# Patient Record
Sex: FEMALE | Race: WHITE | NOT HISPANIC OR LATINO | Employment: OTHER | URBAN - METROPOLITAN AREA
[De-identification: names, ages, dates, MRNs, and addresses within clinical notes are randomized per-mention and may not be internally consistent; named-entity substitution may affect disease eponyms.]

---

## 2020-07-21 ENCOUNTER — OFFICE VISIT (OUTPATIENT)
Dept: INTERNAL MEDICINE | Facility: CLINIC | Age: 75
End: 2020-07-21
Payer: MEDICARE

## 2020-07-21 VITALS
HEART RATE: 62 BPM | WEIGHT: 117.5 LBS | BODY MASS INDEX: 25.35 KG/M2 | OXYGEN SATURATION: 97 % | SYSTOLIC BLOOD PRESSURE: 146 MMHG | HEIGHT: 57 IN | DIASTOLIC BLOOD PRESSURE: 78 MMHG

## 2020-07-21 DIAGNOSIS — N39.0 ACUTE UTI: ICD-10-CM

## 2020-07-21 DIAGNOSIS — K58.9 IRRITABLE BOWEL SYNDROME, UNSPECIFIED TYPE: ICD-10-CM

## 2020-07-21 DIAGNOSIS — K21.9 GASTROESOPHAGEAL REFLUX DISEASE, ESOPHAGITIS PRESENCE NOT SPECIFIED: ICD-10-CM

## 2020-07-21 DIAGNOSIS — R22.9 SKIN NODULE: ICD-10-CM

## 2020-07-21 DIAGNOSIS — F41.9 ANXIETY: ICD-10-CM

## 2020-07-21 DIAGNOSIS — I10 ESSENTIAL HYPERTENSION: Primary | ICD-10-CM

## 2020-07-21 PROBLEM — R73.01 IMPAIRED FASTING GLUCOSE: Status: ACTIVE | Noted: 2018-03-22

## 2020-07-21 PROBLEM — H90.3 BILATERAL SENSORINEURAL HEARING LOSS: Status: ACTIVE | Noted: 2019-12-09

## 2020-07-21 PROBLEM — M15.0 PRIMARY GENERALIZED (OSTEO)ARTHRITIS: Status: ACTIVE | Noted: 2018-03-22

## 2020-07-21 PROBLEM — K58.2 MIXED IRRITABLE BOWEL SYNDROME: Status: ACTIVE | Noted: 2018-03-22

## 2020-07-21 LAB
BACTERIA #/AREA URNS AUTO: NORMAL /HPF
BILIRUB UR QL STRIP: NEGATIVE
CLARITY UR REFRACT.AUTO: CLEAR
COLOR UR AUTO: YELLOW
GLUCOSE UR QL STRIP: NEGATIVE
HGB UR QL STRIP: ABNORMAL
KETONES UR QL STRIP: NEGATIVE
LEUKOCYTE ESTERASE UR QL STRIP: NEGATIVE
MICROSCOPIC COMMENT: NORMAL
NITRITE UR QL STRIP: NEGATIVE
PH UR STRIP: 7 [PH] (ref 5–8)
PROT UR QL STRIP: NEGATIVE
RBC #/AREA URNS AUTO: 2 /HPF (ref 0–4)
SP GR UR STRIP: 1 (ref 1–1.03)
SQUAMOUS #/AREA URNS AUTO: 0 /HPF
URN SPEC COLLECT METH UR: ABNORMAL

## 2020-07-21 PROCEDURE — 87086 URINE CULTURE/COLONY COUNT: CPT

## 2020-07-21 PROCEDURE — 99999 PR PBB SHADOW E&M-NEW PATIENT-LVL IV: ICD-10-PCS | Mod: PBBFAC,,, | Performed by: PHYSICIAN ASSISTANT

## 2020-07-21 PROCEDURE — 81001 URINALYSIS AUTO W/SCOPE: CPT

## 2020-07-21 PROCEDURE — 99203 PR OFFICE/OUTPT VISIT, NEW, LEVL III, 30-44 MIN: ICD-10-PCS | Mod: S$PBB,,, | Performed by: PHYSICIAN ASSISTANT

## 2020-07-21 PROCEDURE — 99999 PR PBB SHADOW E&M-NEW PATIENT-LVL IV: CPT | Mod: PBBFAC,,, | Performed by: PHYSICIAN ASSISTANT

## 2020-07-21 PROCEDURE — 99204 OFFICE O/P NEW MOD 45 MIN: CPT | Mod: PBBFAC | Performed by: PHYSICIAN ASSISTANT

## 2020-07-21 PROCEDURE — 99203 OFFICE O/P NEW LOW 30 MIN: CPT | Mod: S$PBB,,, | Performed by: PHYSICIAN ASSISTANT

## 2020-07-21 RX ORDER — LORAZEPAM 1 MG/1
1 TABLET ORAL NIGHTLY PRN
Qty: 30 TABLET | Refills: 0 | Status: SHIPPED | OUTPATIENT
Start: 2020-07-21 | End: 2020-08-10 | Stop reason: SDUPTHER

## 2020-07-21 RX ORDER — DICYCLOMINE HYDROCHLORIDE 10 MG/1
10 CAPSULE ORAL
COMMUNITY
End: 2020-07-21 | Stop reason: SDUPTHER

## 2020-07-21 RX ORDER — POTASSIUM CHLORIDE 750 MG/1
10 TABLET, EXTENDED RELEASE ORAL DAILY
COMMUNITY
End: 2020-07-21 | Stop reason: SDUPTHER

## 2020-07-21 RX ORDER — LOSARTAN POTASSIUM 100 MG/1
100 TABLET ORAL DAILY
COMMUNITY
End: 2020-07-21 | Stop reason: SDUPTHER

## 2020-07-21 RX ORDER — LORAZEPAM 1 MG/1
1 TABLET ORAL EVERY 6 HOURS PRN
COMMUNITY
End: 2020-07-21 | Stop reason: SDUPTHER

## 2020-07-21 RX ORDER — HYDROCHLOROTHIAZIDE 25 MG/1
25 TABLET ORAL DAILY
Qty: 90 TABLET | Refills: 3 | Status: SHIPPED | OUTPATIENT
Start: 2020-07-21 | End: 2021-09-13

## 2020-07-21 RX ORDER — ESOMEPRAZOLE MAGNESIUM 40 MG/1
40 CAPSULE, DELAYED RELEASE ORAL
COMMUNITY
Start: 2020-06-30 | End: 2020-07-21 | Stop reason: SDUPTHER

## 2020-07-21 RX ORDER — POTASSIUM CHLORIDE 750 MG/1
10 TABLET, EXTENDED RELEASE ORAL DAILY
Qty: 90 TABLET | Refills: 3 | Status: SHIPPED | OUTPATIENT
Start: 2020-07-21 | End: 2021-07-12

## 2020-07-21 RX ORDER — OXYBUTYNIN CHLORIDE 5 MG/1
5 TABLET ORAL 3 TIMES DAILY
COMMUNITY
End: 2020-07-21 | Stop reason: SDUPTHER

## 2020-07-21 RX ORDER — DICYCLOMINE HYDROCHLORIDE 10 MG/1
10 CAPSULE ORAL
Qty: 90 CAPSULE | Refills: 3 | Status: SHIPPED | OUTPATIENT
Start: 2020-07-21 | End: 2020-12-10 | Stop reason: SDUPTHER

## 2020-07-21 RX ORDER — LOSARTAN POTASSIUM 100 MG/1
100 TABLET ORAL DAILY
Qty: 90 TABLET | Refills: 3 | Status: SHIPPED | OUTPATIENT
Start: 2020-07-21 | End: 2021-09-22

## 2020-07-21 RX ORDER — METOPROLOL SUCCINATE 50 MG/1
50 TABLET, EXTENDED RELEASE ORAL DAILY
COMMUNITY
End: 2020-07-21 | Stop reason: SDUPTHER

## 2020-07-21 RX ORDER — HYDROCHLOROTHIAZIDE 25 MG/1
25 TABLET ORAL DAILY
COMMUNITY
End: 2020-07-21 | Stop reason: SDUPTHER

## 2020-07-21 RX ORDER — METOPROLOL SUCCINATE 50 MG/1
50 TABLET, EXTENDED RELEASE ORAL DAILY
Qty: 90 TABLET | Refills: 3 | Status: SHIPPED | OUTPATIENT
Start: 2020-07-21 | End: 2021-09-13

## 2020-07-21 RX ORDER — NITROFURANTOIN 25; 75 MG/1; MG/1
100 CAPSULE ORAL
COMMUNITY
Start: 2020-07-14 | End: 2020-07-21

## 2020-07-21 RX ORDER — OMEPRAZOLE 40 MG/1
40 CAPSULE, DELAYED RELEASE ORAL DAILY
COMMUNITY
End: 2020-07-21 | Stop reason: CLARIF

## 2020-07-21 RX ORDER — ESOMEPRAZOLE MAGNESIUM 40 MG/1
40 CAPSULE, DELAYED RELEASE ORAL
Qty: 90 CAPSULE | Refills: 3 | Status: SHIPPED | OUTPATIENT
Start: 2020-07-21 | End: 2020-07-31 | Stop reason: CLARIF

## 2020-07-21 RX ORDER — OXYBUTYNIN CHLORIDE 5 MG/1
5 TABLET ORAL 3 TIMES DAILY
Qty: 90 TABLET | Refills: 3 | Status: SHIPPED | OUTPATIENT
Start: 2020-07-21 | End: 2021-03-18 | Stop reason: DRUGHIGH

## 2020-07-21 NOTE — PROGRESS NOTES
"Subjective:       Patient ID: Juli Parra is a 74 y.o. female.    Chief Complaint: Establish Care    HPI       Here to establish care    Recently moved to here a few weeks ago to live with her son after the passing of her .     Prior PCP Dr. Alan Fleming in [place], [place], last seen 6/30/2020 with lab work. (see Care Everywhere for medical records)    Needs med refills today.     HTN: stable on current meds(Losartan, hctz, and metoprolol). No cp, sob, ha, vision changes or edema. Occ has intermittent vertigo(chronic, eval'd by ENT in the past)    IBS: controlled with diet and bentyl as needed.     Anxiety: Stable on lorazepam 1mg nightly prn, Stress and anxiety as increased some since the death of her . Occ tearful but feels she is coping "okay" Has great support from her family.     OAB: Takes oxybutynin. Recent ED visit about 4-5 days ago for UTI. Treated with Macrobid, has one day left. Feels urge and mild dysuria still present. Hx of kidney stones s/p lithotripsy several years ago. No flank pain.     GERD: stable with PPI as needed.     Notes 2 small nodules to right forehead, comes and goes, seen by her PCP in [place] and started on Valtrex for suspected shingles one month ago. Per chart review rcvd course of Keflex as well.     OA: Stable. Obtains periodic steroid injections of her knees.      Past Medical History:   Diagnosis Date    Anxiety     GERD (gastroesophageal reflux disease)     HTN (hypertension)     IBS (irritable bowel syndrome)     Kidney stones     OAB (overactive bladder)     Vertigo        Family History   Problem Relation Age of Onset    Leukemia Father     Cancer Brother         Pancreatic       Social History     Tobacco Use    Smoking status: Never Smoker   Substance Use Topics    Alcohol use: Not Currently     Frequency: Never    Drug use: Never     Review of patient's allergies indicates:   Allergen Reactions    Egg Nausea Only    Sulfur Rash "         Current Outpatient Medications:     dicyclomine (BENTYL) 10 MG capsule, Take 1 capsule (10 mg total) by mouth 4 (four) times daily before meals and nightly., Disp: 90 capsule, Rfl: 3    esomeprazole (NEXIUM) 40 MG capsule, Take 1 capsule (40 mg total) by mouth before breakfast., Disp: 90 capsule, Rfl: 3    hydroCHLOROthiazide (HYDRODIURIL) 25 MG tablet, Take 1 tablet (25 mg total) by mouth once daily., Disp: 90 tablet, Rfl: 3    LORazepam (ATIVAN) 1 MG tablet, Take 1 tablet (1 mg total) by mouth nightly as needed for Anxiety., Disp: 30 tablet, Rfl: 0    losartan (COZAAR) 100 MG tablet, Take 1 tablet (100 mg total) by mouth once daily., Disp: 90 tablet, Rfl: 3    metoprolol succinate (TOPROL-XL) 50 MG 24 hr tablet, Take 1 tablet (50 mg total) by mouth once daily., Disp: 90 tablet, Rfl: 3    nitrofurantoin, macrocrystal-monohydrate, (MACROBID) 100 MG capsule, Take 100 mg by mouth., Disp: , Rfl:     oxybutynin (DITROPAN) 5 MG Tab, Take 1 tablet (5 mg total) by mouth 3 (three) times daily., Disp: 90 tablet, Rfl: 3    potassium chloride SA (K-DUR,KLOR-CON) 10 MEQ tablet, Take 1 tablet (10 mEq total) by mouth Daily., Disp: 90 tablet, Rfl: 3  No current facility-administered medications for this visit.     Past Surgical History:   Procedure Laterality Date    CATARACT EXTRACTION, BILATERAL      CHOLECYSTECTOMY      HYSTERECTOMY      LITHOTRIPSY         Review of Systems   Constitutional: Negative for chills, fever and unexpected weight change.   Respiratory: Negative for cough and shortness of breath.    Cardiovascular: Negative for chest pain and leg swelling.   Gastrointestinal: Negative for abdominal pain, nausea and vomiting.   Genitourinary: Positive for frequency and urgency.   Musculoskeletal: Positive for arthralgias.   Integumentary:  Negative for rash.   Neurological: Positive for vertigo. Negative for weakness, light-headedness and headaches.   Psychiatric/Behavioral: Positive for sleep  "disturbance.        Grief         Objective: BP (!) 146/78 (BP Location: Right arm, Patient Position: Sitting, BP Method: Medium (Manual))   Pulse 62   Ht 4' 9" (1.448 m)   Wt 53.3 kg (117 lb 8.1 oz)   SpO2 97%   BMI 25.43 kg/m²         Physical Exam  Vitals signs reviewed.   Constitutional:       General: She is not in acute distress.     Appearance: Normal appearance. She is well-developed.   HENT:      Head: Normocephalic and atraumatic.      Right Ear: Tympanic membrane, ear canal and external ear normal.      Left Ear: Tympanic membrane, ear canal and external ear normal.      Mouth/Throat:      Mouth: Mucous membranes are moist.      Pharynx: Oropharynx is clear.   Eyes:      Extraocular Movements: Extraocular movements intact.      Pupils: Pupils are equal, round, and reactive to light.   Cardiovascular:      Rate and Rhythm: Normal rate and regular rhythm.      Heart sounds: No murmur. No friction rub.   Pulmonary:      Effort: Pulmonary effort is normal.      Breath sounds: Normal breath sounds. No wheezing or rales.   Abdominal:      General: Bowel sounds are normal.      Palpations: Abdomen is soft.      Tenderness: There is no abdominal tenderness.   Musculoskeletal:      Right lower leg: No edema.      Left lower leg: No edema.   Lymphadenopathy:      Cervical: No cervical adenopathy.   Skin:     General: Skin is warm and dry.      Findings: No rash.   Neurological:      Mental Status: She is alert.   Psychiatric:         Mood and Affect: Affect is tearful (occ when discussing recent passing of her ).         Behavior: Behavior normal.         Thought Content: Thought content normal.         Assessment:       1. Essential hypertension    2. Acute UTI    3. Skin nodule    4. Anxiety    5. Gastroesophageal reflux disease, esophagitis presence not specified    6. Irritable bowel syndrome, unspecified type        Plan:         Juli was seen today for establish care.    Diagnoses and all " orders for this visit:    Essential hypertension  Stable  Prior chronic meds refilled  Discussed BP goals  Recent lab reviewed via CareEverywhere  -     potassium chloride SA (K-DUR,KLOR-CON) 10 MEQ tablet; Take 1 tablet (10 mEq total) by mouth Daily.  -     losartan (COZAAR) 100 MG tablet; Take 1 tablet (100 mg total) by mouth once daily.  -     hydroCHLOROthiazide (HYDRODIURIL) 25 MG tablet; Take 1 tablet (25 mg total) by mouth once daily.  -     metoprolol succinate (TOPROL-XL) 50 MG 24 hr tablet; Take 1 tablet (50 mg total) by mouth once daily.    Acute UTI  Currently on macrobid  - reach UA as pt reports continued symptoms  -     URINALYSIS  -     Urine culture    Skin nodule  - We discussed Derm referral   - Pt declines at this time, will monitor     Anxiety  Stable on current meds  Courtesy refill provided  Discussed need to establish with MD for further refills.   -     LORazepam (ATIVAN) 1 MG tablet; Take 1 tablet (1 mg total) by mouth nightly as needed for Anxiety.    Gastroesophageal reflux disease, esophagitis presence not specified  Stable  -     esomeprazole (NEXIUM) 40 MG capsule; Take 1 capsule (40 mg total) by mouth before breakfast.    Irritable bowel syndrome, unspecified type  Stable  -     dicyclomine (BENTYL) 10 MG capsule; Take 1 capsule (10 mg total) by mouth 4 (four) times daily before meals and nightly.    Other orders  -     oxybutynin (DITROPAN) 5 MG Tab; Take 1 tablet (5 mg total) by mouth 3 (three) times daily.      Nita Clifton PA-C

## 2020-07-21 NOTE — PATIENT INSTRUCTIONS
SCHEDULE APPOINTMENT WITH MD TO FULLY ESTABLISH CARE.    MY SUPERVISING MD IS DR. OVIDIO MORGAN. YOU MAY CHOOSE ANY MD YOU DESIRE.     I WILL CALL YOU ABOUT URINE LAB RESULTS.

## 2020-07-22 LAB — BACTERIA UR CULT: NO GROWTH

## 2020-07-23 ENCOUNTER — TELEPHONE (OUTPATIENT)
Dept: INTERNAL MEDICINE | Facility: CLINIC | Age: 75
End: 2020-07-23

## 2020-07-31 ENCOUNTER — TELEPHONE (OUTPATIENT)
Dept: INTERNAL MEDICINE | Facility: CLINIC | Age: 75
End: 2020-07-31

## 2020-07-31 RX ORDER — OMEPRAZOLE 40 MG/1
40 CAPSULE, DELAYED RELEASE ORAL DAILY
Qty: 90 CAPSULE | Refills: 2 | Status: SHIPPED | OUTPATIENT
Start: 2020-07-31 | End: 2021-10-27

## 2020-07-31 NOTE — TELEPHONE ENCOUNTER
----- Message from Georgette Sosa sent at 7/31/2020  1:35 PM CDT -----  Contact: Nicole britton/lee   Pharmacy states that esomeprazole (NEXIUM) 40 MG capsule is not cover by insurance. Protoxic and prosec is covered. Please advise

## 2020-08-10 ENCOUNTER — OFFICE VISIT (OUTPATIENT)
Dept: INTERNAL MEDICINE | Facility: CLINIC | Age: 75
End: 2020-08-10
Payer: MEDICARE

## 2020-08-10 VITALS
DIASTOLIC BLOOD PRESSURE: 80 MMHG | HEART RATE: 81 BPM | OXYGEN SATURATION: 95 % | BODY MASS INDEX: 25.62 KG/M2 | SYSTOLIC BLOOD PRESSURE: 138 MMHG | WEIGHT: 118.38 LBS

## 2020-08-10 DIAGNOSIS — I10 HYPERTENSION, UNSPECIFIED TYPE: Primary | ICD-10-CM

## 2020-08-10 DIAGNOSIS — M19.049 HAND ARTHRITIS: ICD-10-CM

## 2020-08-10 DIAGNOSIS — R21 RASH: ICD-10-CM

## 2020-08-10 DIAGNOSIS — K21.9 GASTROESOPHAGEAL REFLUX DISEASE, ESOPHAGITIS PRESENCE NOT SPECIFIED: ICD-10-CM

## 2020-08-10 DIAGNOSIS — R73.01 IMPAIRED FASTING GLUCOSE: ICD-10-CM

## 2020-08-10 DIAGNOSIS — F41.9 ANXIETY: ICD-10-CM

## 2020-08-10 DIAGNOSIS — M17.11 ARTHRITIS OF RIGHT KNEE: ICD-10-CM

## 2020-08-10 DIAGNOSIS — L98.9 SKIN LESION: ICD-10-CM

## 2020-08-10 DIAGNOSIS — H90.3 BILATERAL SENSORINEURAL HEARING LOSS: ICD-10-CM

## 2020-08-10 DIAGNOSIS — R73.09 ELEVATED GLUCOSE: ICD-10-CM

## 2020-08-10 PROCEDURE — 99999 PR PBB SHADOW E&M-EST. PATIENT-LVL IV: CPT | Mod: PBBFAC,,, | Performed by: INTERNAL MEDICINE

## 2020-08-10 PROCEDURE — 99214 OFFICE O/P EST MOD 30 MIN: CPT | Mod: PBBFAC | Performed by: INTERNAL MEDICINE

## 2020-08-10 PROCEDURE — 99999 PR PBB SHADOW E&M-EST. PATIENT-LVL IV: ICD-10-PCS | Mod: PBBFAC,,, | Performed by: INTERNAL MEDICINE

## 2020-08-10 PROCEDURE — 99214 PR OFFICE/OUTPT VISIT, EST, LEVL IV, 30-39 MIN: ICD-10-PCS | Mod: S$PBB,,, | Performed by: INTERNAL MEDICINE

## 2020-08-10 PROCEDURE — 99214 OFFICE O/P EST MOD 30 MIN: CPT | Mod: S$PBB,,, | Performed by: INTERNAL MEDICINE

## 2020-08-10 RX ORDER — LORAZEPAM 1 MG/1
1 TABLET ORAL NIGHTLY PRN
Qty: 30 TABLET | Refills: 3 | Status: SHIPPED | OUTPATIENT
Start: 2020-08-10 | End: 2020-08-20

## 2020-08-10 RX ORDER — VALACYCLOVIR HYDROCHLORIDE 500 MG/1
500 TABLET, FILM COATED ORAL 2 TIMES DAILY
Qty: 10 TABLET | Refills: 0 | Status: SHIPPED | OUTPATIENT
Start: 2020-08-10 | End: 2021-10-27

## 2020-08-10 NOTE — PROGRESS NOTES
Subjective:       Patient ID: Juli Parra is a 74 y.o. female.    Chief Complaint: Establish Care    Patient into officially establish care with practice.  She was seen by my partner for hypertension.  She recently moved to our area after 2nd   and her son who lives here did not want her living alone.  Basically she is here for follow-up.  She will ultimately need an orthopedist.  She gets shots in her hands and knees.    I am able to pull up her outside labs in reviewed her A1c lipids and chemistry from .  We will repeat those later in the year.  The patient also states she was treated for possible shingles or cutaneous herpes above the left high.  She had a rash that responded to Valtrex but has come back recently then she wanted to tried again before seeing dermatology.    Review of Systems   Constitutional: Negative for appetite change, chills and fever.   HENT: Negative for nosebleeds and sore throat.    Eyes: Negative for visual disturbance.   Respiratory: Negative for cough, shortness of breath and wheezing.    Cardiovascular: Negative for chest pain and leg swelling.   Gastrointestinal: Negative for abdominal pain, constipation and diarrhea.   Genitourinary: Negative for difficulty urinating and hematuria.   Musculoskeletal: Positive for arthralgias and joint deformity (fingers). Negative for neck pain and neck stiffness.   Integumentary:  Negative for pallor and rash.   Neurological: Negative for headaches.   Psychiatric/Behavioral: Negative for dysphoric mood and suicidal ideas. The patient is not nervous/anxious.         Grief           Objective:      Physical Exam  Constitutional:       General: She is not in acute distress.     Appearance: She is well-developed.   HENT:      Head: Normocephalic and atraumatic.      Mouth/Throat:      Pharynx: No oropharyngeal exudate.   Eyes:      General: No scleral icterus.     Conjunctiva/sclera: Conjunctivae normal.      Pupils: Pupils are  equal, round, and reactive to light.   Neck:      Musculoskeletal: Normal range of motion and neck supple.      Thyroid: No thyromegaly.   Cardiovascular:      Rate and Rhythm: Normal rate and regular rhythm.      Heart sounds: No murmur.   Pulmonary:      Effort: Pulmonary effort is normal.      Breath sounds: Normal breath sounds. No wheezing.   Abdominal:      General: Bowel sounds are normal. There is no distension.      Palpations: Abdomen is soft.      Tenderness: There is no abdominal tenderness.   Musculoskeletal:         General: Deformity (finger joints) present. No tenderness.   Lymphadenopathy:      Cervical: No cervical adenopathy.   Skin:     Findings: No erythema or rash.   Neurological:      General: No focal deficit present.      Mental Status: She is alert and oriented to person, place, and time.   Psychiatric:         Mood and Affect: Mood normal.         Behavior: Behavior normal.         Assessment:       1. Hypertension, unspecified type    2. Anxiety    3. Bilateral sensorineural hearing loss    4. Impaired fasting glucose    5. Gastroesophageal reflux disease, esophagitis presence not specified    6. Skin lesion    7. Arthritis of right knee    8. Hand arthritis    9. Elevated glucose    10. Rash        Plan:       Juli was seen today for establish care.    Diagnoses and all orders for this visit:    Hypertension, unspecified type    Anxiety  -     LORazepam (ATIVAN) 1 MG tablet; Take 1 tablet (1 mg total) by mouth nightly as needed for Anxiety.    Bilateral sensorineural hearing loss    Impaired fasting glucose    Gastroesophageal reflux disease, esophagitis presence not specified    Skin lesion    Arthritis of right knee  -     Ambulatory referral/consult to Orthopedics; Future    Hand arthritis  -     Ambulatory referral/consult to Orthopedics; Future    Elevated glucose  -     Basic metabolic panel; Future  -     Hemoglobin A1C; Future    Rash    Other orders  -     valACYclovir  (VALTREX) 500 MG tablet; Take 1 tablet (500 mg total) by mouth 2 (two) times daily. for 5 days        follow-up with labs in a few months sooner p.r.n.

## 2020-08-18 ENCOUNTER — TELEPHONE (OUTPATIENT)
Dept: INTERNAL MEDICINE | Facility: CLINIC | Age: 75
End: 2020-08-18

## 2020-08-18 DIAGNOSIS — R22.9 SKIN NODULE: Primary | ICD-10-CM

## 2020-08-18 NOTE — TELEPHONE ENCOUNTER
----- Message from Jt Benavides sent at 8/18/2020  8:32 AM CDT -----  Regarding: Referral  Contact: Patient [phone]  Patient would like to get medical advice.    Comments: patient calling stating at last visit 7/21/20 stating would get a referral to see Dermatologist, still awaiting the referral, call to update.    Patient [phone]    Please call an advise  Thank you

## 2020-08-20 ENCOUNTER — TELEPHONE (OUTPATIENT)
Dept: INTERNAL MEDICINE | Facility: CLINIC | Age: 75
End: 2020-08-20

## 2020-08-20 DIAGNOSIS — F41.9 ANXIETY: ICD-10-CM

## 2020-08-20 RX ORDER — LORAZEPAM 2 MG/1
TABLET ORAL
Qty: 15 TABLET | Refills: 3 | Status: SHIPPED | OUTPATIENT
Start: 2020-08-20 | End: 2020-12-22 | Stop reason: SDUPTHER

## 2020-08-20 NOTE — TELEPHONE ENCOUNTER
----- Message from Kamille Bernal sent at 8/20/2020  8:59 AM CDT -----  Contact: [facility] [phone]  Prescription Alternative Needed:     The pharmacy needs alternative on the following RX:    LORazepam (ATIVAN) 1 MG tablet    Reason: Drug on backorder. Please send alternative. 2 mg is in stock and is scored.    Pharmacy: [id number] [address] [place] [address] [address]PY AT [facility]    Please advise.    Thank You      
No pertinent family history in first degree relatives

## 2020-09-22 ENCOUNTER — PATIENT OUTREACH (OUTPATIENT)
Dept: ADMINISTRATIVE | Facility: OTHER | Age: 75
End: 2020-09-22

## 2020-09-22 ENCOUNTER — OFFICE VISIT (OUTPATIENT)
Dept: DERMATOLOGY | Facility: CLINIC | Age: 75
End: 2020-09-22
Payer: MEDICARE

## 2020-09-22 VITALS — BODY MASS INDEX: 25.53 KG/M2 | WEIGHT: 118 LBS

## 2020-09-22 DIAGNOSIS — L82.1 SEBORRHEIC KERATOSES: ICD-10-CM

## 2020-09-22 DIAGNOSIS — L72.0 MILIUM: Primary | ICD-10-CM

## 2020-09-22 DIAGNOSIS — R22.9 SKIN NODULE: ICD-10-CM

## 2020-09-22 PROCEDURE — 99201 PR OFFICE/OUTPT VISIT,NEW,LEVL I: CPT | Mod: S$PBB,,, | Performed by: DERMATOLOGY

## 2020-09-22 PROCEDURE — 99999 PR PBB SHADOW E&M-EST. PATIENT-LVL III: ICD-10-PCS | Mod: PBBFAC,,, | Performed by: DERMATOLOGY

## 2020-09-22 PROCEDURE — 99999 PR PBB SHADOW E&M-EST. PATIENT-LVL III: CPT | Mod: PBBFAC,,, | Performed by: DERMATOLOGY

## 2020-09-22 PROCEDURE — 99201 PR OFFICE/OUTPT VISIT,NEW,LEVL I: ICD-10-PCS | Mod: S$PBB,,, | Performed by: DERMATOLOGY

## 2020-09-22 PROCEDURE — 99213 OFFICE O/P EST LOW 20 MIN: CPT | Mod: PBBFAC,PO | Performed by: DERMATOLOGY

## 2020-09-22 NOTE — PROGRESS NOTES
Subjective:       Patient ID:  Juli Parra is a 75 y.o. female who presents for   Chief Complaint   Patient presents with    Spot     face     Lesions on face for over a month not painful no tx.     Spot - Initial  Affected locations: face  Signs / symptoms: asymptomatic  Aggravated by: nothing  Relieving factors/Treatments tried: nothing        Review of Systems   Constitutional: Negative for fever, chills, weight loss, weight gain, fatigue, night sweats and malaise.   Skin: Negative for daily sunscreen use and activity-related sunscreen use.   Hematologic/Lymphatic: Does not bruise/bleed easily.        Objective:    Physical Exam   Constitutional: She appears well-developed and well-nourished. No distress.   Neurological: She is alert and oriented to person, place, and time. She is not disoriented.   Psychiatric: She has a normal mood and affect.   Skin:   Areas Examined (abnormalities noted in diagram):   Head / Face Inspection Performed  Neck Inspection Performed  Chest / Axilla Inspection Performed  RUE Inspected  LUE Inspection Performed                   Diagram Legend     Erythematous scaling macule/papule c/w actinic keratosis       Vascular papule c/w angioma      Pigmented verrucoid papule/plaque c/w seborrheic keratosis      Yellow umbilicated papule c/w sebaceous hyperplasia      Irregularly shaped tan macule c/w lentigo     1-2 mm smooth white papules consistent with Milia      Movable subcutaneous cyst with punctum c/w epidermal inclusion cyst      Subcutaneous movable cyst c/w pilar cyst      Firm pink to brown papule c/w dermatofibroma      Pedunculated fleshy papule(s) c/w skin tag(s)      Evenly pigmented macule c/w junctional nevus     Mildly variegated pigmented, slightly irregular-bordered macule c/w mildly atypical nevus      Flesh colored to evenly pigmented papule c/w intradermal nevus       Pink pearly papule/plaque c/w basal cell carcinoma      Erythematous hyperkeratotic  cursted plaque c/w SCC      Surgical scar with no sign of skin cancer recurrence      Open and closed comedones      Inflammatory papules and pustules      Verrucoid papule consistent consistent with wart     Erythematous eczematous patches and plaques     Dystrophic onycholytic nail with subungual debris c/w onychomycosis     Umbilicated papule    Erythematous-base heme-crusted tan verrucoid plaque consistent with inflamed seborrheic keratosis     Erythematous Silvery Scaling Plaque c/w Psoriasis     See annotation      Assessment / Plan:        Milium  Reassurance  Consider vi peel      Skin nodule  -     Ambulatory referral/consult to Dermatology    Seborrheic keratoses  Reassurance  .               Follow up if symptoms worsen or fail to improve.

## 2020-09-22 NOTE — LETTER
September 23, 2020      Nita Clifton PA-C  [address] [facility] [address]           [place] [facility] D[facility]atology  [address]  [place] [address]  Phone: [phone]  Fax: [phone]          Patient: Juli Parra   MR Number: [id number]   YOB: 1945   Date of Visit: 9/22/2020       Dear Nita Clifton:    Thank you for referring Juli aPrra to me for evaluation. Attached you will find relevant portions of my assessment and plan of care.    If you have questions, please do not hesitate to call me. I look forward to following Juli Parra along with you.    Sincerely,    Carolyn Renteria MD    Enclosure  CC:  No Recipients    If you would like to receive this communication electronically, please contact [email or web address] or [phone] to request more information on [employer] Link access.    For providers and/or their staff who would like to refer a patient to [facility], please contact us through our one-stop-shop provider referral line, [facility], at [phone].    If you feel you have received this communication in error or would no longer like to receive these types of communications, please e-mail [email or web address]

## 2020-10-12 DIAGNOSIS — M25.561 RIGHT KNEE PAIN, UNSPECIFIED CHRONICITY: Primary | ICD-10-CM

## 2020-10-13 ENCOUNTER — HOSPITAL ENCOUNTER (OUTPATIENT)
Dept: RADIOLOGY | Facility: HOSPITAL | Age: 75
Discharge: HOME | End: 2020-10-13
Attending: STUDENT IN AN ORGANIZED HEALTH CARE EDUCATION/TRAINING PROGRAM
Payer: MEDICARE

## 2020-10-13 ENCOUNTER — HOSPITAL ENCOUNTER (OUTPATIENT)
Dept: RADIOLOGY | Facility: HOSPITAL | Age: 75
Discharge: HOME | End: 2020-10-13
Attending: ORTHOPAEDIC SURGERY
Payer: MEDICARE

## 2020-10-13 ENCOUNTER — OFFICE VISIT (OUTPATIENT)
Dept: ORTHOPEDICS | Facility: CLINIC | Age: 75
End: 2020-10-13
Payer: MEDICARE

## 2020-10-13 VITALS — BODY MASS INDEX: 25.45 KG/M2 | WEIGHT: 117.94 LBS | HEIGHT: 57 IN

## 2020-10-13 DIAGNOSIS — M19.049 HAND ARTHRITIS: ICD-10-CM

## 2020-10-13 DIAGNOSIS — M25.561 RIGHT KNEE PAIN, UNSPECIFIED CHRONICITY: ICD-10-CM

## 2020-10-13 DIAGNOSIS — M17.11 ARTHRITIS OF RIGHT KNEE: ICD-10-CM

## 2020-10-13 PROCEDURE — 73562 XR KNEE ORTHO RIGHT: ICD-10-PCS | Mod: 26,RT,, | Performed by: RADIOLOGY

## 2020-10-13 PROCEDURE — 99214 OFFICE O/P EST MOD 30 MIN: CPT | Mod: PBBFAC,25 | Performed by: ORTHOPAEDIC SURGERY

## 2020-10-13 PROCEDURE — 73110 X-RAY EXAM OF WRIST: CPT | Mod: 26,RT,, | Performed by: RADIOLOGY

## 2020-10-13 PROCEDURE — 99999 PR PBB SHADOW E&M-EST. PATIENT-LVL IV: ICD-10-PCS | Mod: PBBFAC,,, | Performed by: ORTHOPAEDIC SURGERY

## 2020-10-13 PROCEDURE — 73562 X-RAY EXAM OF KNEE 3: CPT | Mod: TC,RT

## 2020-10-13 PROCEDURE — 73560 X-RAY EXAM OF KNEE 1 OR 2: CPT | Mod: 26,59,LT, | Performed by: RADIOLOGY

## 2020-10-13 PROCEDURE — 73560 XR KNEE ORTHO RIGHT: ICD-10-PCS | Mod: 26,59,LT, | Performed by: RADIOLOGY

## 2020-10-13 PROCEDURE — 99203 PR OFFICE/OUTPT VISIT, NEW, LEVL III, 30-44 MIN: ICD-10-PCS | Mod: 25,S$PBB,, | Performed by: ORTHOPAEDIC SURGERY

## 2020-10-13 PROCEDURE — 20610 DRAIN/INJ JOINT/BURSA W/O US: CPT | Mod: PBBFAC | Performed by: ORTHOPAEDIC SURGERY

## 2020-10-13 PROCEDURE — 73110 X-RAY EXAM OF WRIST: CPT | Mod: TC,RT

## 2020-10-13 PROCEDURE — 73110 XR WRIST COMPLETE 3 VIEWS RIGHT: ICD-10-PCS | Mod: 26,RT,, | Performed by: RADIOLOGY

## 2020-10-13 PROCEDURE — 99203 OFFICE O/P NEW LOW 30 MIN: CPT | Mod: 25,S$PBB,, | Performed by: ORTHOPAEDIC SURGERY

## 2020-10-13 PROCEDURE — 73562 X-RAY EXAM OF KNEE 3: CPT | Mod: 26,RT,, | Performed by: RADIOLOGY

## 2020-10-13 PROCEDURE — 20610 LARGE JOINT ASPIRATION/INJECTION: R KNEE: ICD-10-PCS | Mod: S$PBB,RT,, | Performed by: ORTHOPAEDIC SURGERY

## 2020-10-13 PROCEDURE — 99999 PR PBB SHADOW E&M-EST. PATIENT-LVL IV: CPT | Mod: PBBFAC,,, | Performed by: ORTHOPAEDIC SURGERY

## 2020-10-13 PROCEDURE — 73560 X-RAY EXAM OF KNEE 1 OR 2: CPT | Mod: TC,59,LT

## 2020-10-13 RX ADMIN — TRIAMCINOLONE ACETONIDE 40 MG: 40 INJECTION, SUSPENSION INTRA-ARTICULAR; INTRAMUSCULAR at 02:10

## 2020-10-13 NOTE — LETTER
October 15, 2020      Jefe Serrano MD  [address]  [facility] [address]           [facility] 5th Fl  [address]  [facility] [address]  Phone: [phone]          Patient: Juli Parra   MR Number: [id number]   YOB: 1945   Date of Visit: 10/13/2020       Dear Dr. Jefe Serrano:    Thank you for referring Juli Parra to me for evaluation. Attached you will find relevant portions of my assessment and plan of care.    If you have questions, please do not hesitate to call me. I look forward to following Juli Parra along with you.    Sincerely,    Joseph Yo MD    Enclosure  CC:  No Recipients    If you would like to receive this communication electronically, please contact [email or web address] or [phone] to request more information on [employer] Link access.    For providers and/or their staff who would like to refer a patient to [facility], please contact us through our one-stop-shop provider referral line, [facility], at [phone].    If you feel you have received this communication in error or would no longer like to receive these types of communications, please e-mail [email or web address]

## 2020-10-13 NOTE — PROGRESS NOTES
Subjective:          Chief Complaint: Juli Parra is a 75 y.o. female who c/o right knee pain and right wrist pain      HPI  Juli Parra is a 75 y.o. female with osteoporosis, right wrist/hand arthritis, and right knee arthritis who presents for evaluation of right knee and right wrist pain.  Patient reports that she just moved to [place] from [place] due to her  passing away.  She moved in with her son.  She reports that she has right wrist/hand arthritis and right knee arthritis for which she receives corticosteroid injections q3-4 months.  Her pain is managed with injections and p.r.n. ibuprofen and Tylenol.  She has had injections to her right hand for 6 years and right knee for 2 years.  Her last pair of injections was 4 months ago (6/6/2020). She does not use any braces.  She does not use an assistive device to ambulate.  She is not interested in surgery for either of these problems at this time.  She takes calcium and vitamin-D for osteoporosis.      Past Medical History:   Diagnosis Date    Anxiety     GERD (gastroesophageal reflux disease)     HTN (hypertension)     IBS (irritable bowel syndrome)     Kidney stones     OAB (overactive bladder)     Vertigo        Current Outpatient Medications on File Prior to Visit   Medication Sig Dispense Refill    dicyclomine (BENTYL) 10 MG capsule Take 1 capsule (10 mg total) by mouth 4 (four) times daily before meals and nightly. 90 capsule 3    hydroCHLOROthiazide (HYDRODIURIL) 25 MG tablet Take 1 tablet (25 mg total) by mouth once daily. 90 tablet 3    LORazepam (ATIVAN) 2 MG Tab 1/2 nightly prn 15 tablet 3    losartan (COZAAR) 100 MG tablet Take 1 tablet (100 mg total) by mouth once daily. 90 tablet 3    metoprolol succinate (TOPROL-XL) 50 MG 24 hr tablet Take 1 tablet (50 mg total) by mouth once daily. 90 tablet 3    omeprazole (PRILOSEC) 40 MG capsule Take 1 capsule (40 mg total) by mouth once daily. 90 capsule 2    oxybutynin  (DITROPAN) 5 MG Tab Take 1 tablet (5 mg total) by mouth 3 (three) times daily. 90 tablet 3    potassium chloride SA (K-DUR,KLOR-CON) 10 MEQ tablet Take 1 tablet (10 mEq total) by mouth Daily. 90 tablet 3    valACYclovir (VALTREX) 500 MG tablet Take 1 tablet (500 mg total) by mouth 2 (two) times daily. for 5 days 10 tablet 0     No current facility-administered medications on file prior to visit.        Past Surgical History:   Procedure Laterality Date    CATARACT EXTRACTION, BILATERAL      CHOLECYSTECTOMY      HYSTERECTOMY      LITHOTRIPSY         Family History   Problem Relation Age of Onset    Leukemia Father     Cancer Brother         Pancreatic       Social History     Socioeconomic History    Marital status:      Spouse name: Not on file    Number of children: Not on file    Years of education: Not on file    Highest education level: Not on file   Occupational History    Not on file   Social Needs    Financial resource strain: Not on file    Food insecurity     Worry: Not on file     Inability: Not on file    Transportation needs     Medical: Not on file     Non-medical: Not on file   Tobacco Use    Smoking status: Never Smoker    Smokeless tobacco: Never Used   Substance and Sexual Activity    Alcohol use: Not Currently     Frequency: Never    Drug use: Never    Sexual activity: Not Currently   Lifestyle    Physical activity     Days per week: Not on file     Minutes per session: Not on file    Stress: Not on file   Relationships    Social connections     Talks on phone: Not on file     Gets together: Not on file     Attends Taoism service: Not on file     Active member of club or organization: Not on file     Attends meetings of clubs or organizations: Not on file     Relationship status: Not on file   Other Topics Concern    Are you pregnant or think you may be? Not Asked    Breast-feeding Not Asked   Social History Narrative    Not on file     ROS  Constitutional:  negative for fevers  Eyes: negative visual changes  ENT: negative for hearing loss  Respiratory: negative for dyspnea  Cardiovascular: negative for chest pain  Gastrointestinal: negative for abdominal pain  Genitourinary: negative for dysuria  Neurological: negative for headaches  Behavioral/Psych: negative for hallucinations  Endocrine: negative for temperature intolerance              Objective:        General: Juli is well-developed, well-nourished, appears stated age, in no acute distress, alert and oriented to time, place and person.     General appearance: A&Ox3. NAD.   HEENT: Normocephalic, head atraumatic. Conjuntiva normal. EOMI. External appearance of nose, mouth, ears wnl.  Neck: Supple. Trachea midline.  Respiratory: Breathing unlabored on room air. Symmetric chest rise.   CV: Extremities warm and well perfused.  Neurologic: No focal motor or sensory deficits.   Psychatric: A&Ox3. Appropriate mood and affect.  Skin: Warm, dry, well perfused. No rash.        Ortho/SPM Exam  Right knee exam  Normal inspection  Nontender to palpation  Range of motion 0-120  Motor and sensory intact  DP pulse 2 +    Right hand exam  Tenderness to palpation over the 1st CMC joint  Grinding at the 1st CMC joint with ROM  Motor and sensory intact  Radial pulse 2+        Imaging:   Plain radiographs of right knee obtained today independently interpreted show tricompartmental arthritis with collapse and varus deformity. There are calcifications in the suprapatellar pouch.     Plain radiographs of the right wrist obtained in clinic today show severe arthritis of the 1st carpometacarpal joint. Subarticular cystic changes are seen in all the carpal bones    Assessment:     75-year-old female with osteoporosis with right knee pain due to severe osteoarthritis, and right 1st CMC joint pain due to severe osteoarthritis. These are responsive to corticosteroid injections q3-4 months and prn tylenol and ibuprofen. Patient is not  interested in surgery at this time.      Encounter Diagnoses   Name Primary?    Arthritis of right knee     Hand arthritis           Plan:       CSI right knee administered today   Referral to Orthopedic Hand placed for right 1st CMC arthritis   Continue prn tylenol and ibuprofen  FU prn for right knee pain                    Patient questionnaires may have been collected.

## 2020-10-15 PROBLEM — M17.11 ARTHRITIS OF RIGHT KNEE: Status: ACTIVE | Noted: 2020-10-15

## 2020-10-15 RX ORDER — TRIAMCINOLONE ACETONIDE 40 MG/ML
40 INJECTION, SUSPENSION INTRA-ARTICULAR; INTRAMUSCULAR
Status: DISCONTINUED | OUTPATIENT
Start: 2020-10-13 | End: 2020-10-15 | Stop reason: HOSPADM

## 2020-10-15 NOTE — PROCEDURES
Large Joint Aspiration/Injection: R knee    Date/Time: 10/13/2020 2:30 PM  Performed by: Joseph Yo MD  Authorized by: Joseph Yo MD     Consent Done?:  Yes (Verbal)  Indications:  Pain  Site marked: the procedure site was marked    Timeout: prior to procedure the correct patient, procedure, and site was verified    Prep: patient was prepped and draped in usual sterile fashion      Local anesthesia used?: Yes    Local anesthetic:  Bupivacaine 0.25% without epinephrine  Anesthetic total (ml):  5      Details:  Needle Size:  25 G  Ultrasonic Guidance for needle placement?: No    Approach:  Anterolateral  Location:  Knee  Site:  R knee  Medications:  40 mg triamcinolone acetonide 40 mg/mL  Patient tolerance:  Patient tolerated the procedure well with no immediate complications

## 2020-10-27 ENCOUNTER — OFFICE VISIT (OUTPATIENT)
Dept: ORTHOPEDICS | Facility: CLINIC | Age: 75
End: 2020-10-27
Payer: MEDICARE

## 2020-10-27 ENCOUNTER — PATIENT OUTREACH (OUTPATIENT)
Dept: ADMINISTRATIVE | Facility: OTHER | Age: 75
End: 2020-10-27
Payer: MEDICARE

## 2020-10-27 VITALS — HEIGHT: 57 IN | WEIGHT: 119.63 LBS | BODY MASS INDEX: 25.81 KG/M2

## 2020-10-27 DIAGNOSIS — M18.12 ARTHRITIS OF CARPOMETACARPAL (CMC) JOINT OF LEFT THUMB: Primary | ICD-10-CM

## 2020-10-27 DIAGNOSIS — M19.049 HAND ARTHRITIS: ICD-10-CM

## 2020-10-27 DIAGNOSIS — M18.11 ARTHRITIS OF CARPOMETACARPAL (CMC) JOINT OF RIGHT THUMB: ICD-10-CM

## 2020-10-27 PROCEDURE — 99999 PR PBB SHADOW E&M-EST. PATIENT-LVL III: ICD-10-PCS | Mod: PBBFAC,,, | Performed by: ORTHOPAEDIC SURGERY

## 2020-10-27 PROCEDURE — 20600 DRAIN/INJ JOINT/BURSA W/O US: CPT | Mod: PBBFAC | Performed by: ORTHOPAEDIC SURGERY

## 2020-10-27 PROCEDURE — 99203 PR OFFICE/OUTPT VISIT, NEW, LEVL III, 30-44 MIN: ICD-10-PCS | Mod: 25,S$PBB,, | Performed by: ORTHOPAEDIC SURGERY

## 2020-10-27 PROCEDURE — 99203 OFFICE O/P NEW LOW 30 MIN: CPT | Mod: 25,S$PBB,, | Performed by: ORTHOPAEDIC SURGERY

## 2020-10-27 PROCEDURE — 20600 SMALL JOINT ASPIRATION/INJECTION: ICD-10-PCS | Mod: 50,S$PBB,, | Performed by: ORTHOPAEDIC SURGERY

## 2020-10-27 PROCEDURE — 99213 OFFICE O/P EST LOW 20 MIN: CPT | Mod: PBBFAC | Performed by: ORTHOPAEDIC SURGERY

## 2020-10-27 PROCEDURE — 99999 PR PBB SHADOW E&M-EST. PATIENT-LVL III: CPT | Mod: PBBFAC,,, | Performed by: ORTHOPAEDIC SURGERY

## 2020-10-27 RX ORDER — TRIAMCINOLONE ACETONIDE 40 MG/ML
40 INJECTION, SUSPENSION INTRA-ARTICULAR; INTRAMUSCULAR
Status: DISCONTINUED | OUTPATIENT
Start: 2020-10-27 | End: 2020-10-27 | Stop reason: HOSPADM

## 2020-10-27 RX ADMIN — TRIAMCINOLONE ACETONIDE 40 MG: 40 INJECTION, SUSPENSION INTRA-ARTICULAR; INTRAMUSCULAR at 10:10

## 2020-10-27 NOTE — LETTER
October 27, 2020      Joseph Yo MD  [address]  [facility] [address]           [facility] 5th Fl  [address]TH [address]LO[address]  [facility] [address]  Phone: [phone]          Patient: Juli Parra   MR Number: [id number]   YOB: 1945   Date of Visit: 10/27/2020       Dear Dr. Joseph Yo:    Thank you for referring Juli Parra to me for evaluation. Attached you will find relevant portions of my assessment and plan of care.    If you have questions, please do not hesitate to call me. I look forward to following Juli Parra along with you.    Sincerely,    Manfred Pacheco MD    Enclosure  CC:  No Recipients    If you would like to receive this communication electronically, please contact [email or web address] or [phone] to request more information on [facility] access.    For providers and/or their staff who would like to refer a patient to [facility], please contact us through our one-stop-shop provider referral line, [facility], at [phone].    If you feel you have received this communication in error or would no longer like to receive these types of communications, please e-mail [email or web address]

## 2020-10-27 NOTE — PROCEDURES
Small Joint Aspiration/Injection    Date/Time: 10/27/2020 10:45 AM  Performed by: Manfred Pacheco MD  Authorized by: Manfred Pacheco MD     Consent Done?:  Yes (Verbal)  Indications:  Pain  Site marked: the procedure site was marked    Timeout: prior to procedure the correct patient, procedure, and site was verified    Prep: patient was prepped and draped in usual sterile fashion      Local anesthesia used?: Yes    Local anesthetic:  Topical anesthetic  Location:  Thumb (left thumb cmc)  Ultrasonic guidance for needle placement?: No    Needle size:  25 G  Approach:  Dorsal  Medications:  40 mg triamcinolone acetonide 40 mg/mL  Patient tolerance:  Patient tolerated the procedure well with no immediate complications

## 2020-10-27 NOTE — PROGRESS NOTES
[facility]  History & Physical  Orthopedics    SUBJECTIVE:      COVID-19 attestation:  This patient was treated during the COVID-19 pandemic.  This was discussed with the patient, they are aware of our current policies and procedures, were given the option of delaying their visit and or switching to a virtual visit, delaying their surgery when applicable, and they elect to proceed.    Chief Complaint:  Bilateral base of thumb pain    Referring Provider: Joseph Yo MD     History of Present Illness:  Patient is a 75 y.o. right hand dominant female who presents today with complaints of bilateral base of thumb pain.  Patient endorses that she has had bilateral base of thumb pain associated with 1st CMC arthritis that was being managed with yearly CMC joint CSIs by her hand orthopedic surgeon Dr. Lim in [place].  Given that the patient has moved to [place] to be closer to family the patient would like to proceed with another CSI of her 1st CMC joint injection of her bilateral hands.  Patient received a right 1st CMC joint CSI by her previous orthopedic surgeon in June of this year with good relief.  Is not interested in surgery.  Of note, patient did have bilateral carpal tunnel releases by her same surgeon in the 1990s with complete resolution of her symptoms.  Denies any weakness, numbness, tingling to her bilateral upper extremities.  Seeing Dr. Yo for her knee arthritis for which she received steroid injections for and gets good relief from them    The patient is a/an retiree.    Onset of symptoms/DOI was 2 years ago.    Symptoms are aggravated by activity and movement.    Symptoms are alleviated by rest.    Symptoms consist of pain, swelling and decreased ROM.    The patient rates their pain as a 4/10.    Attempted treatment(s) and/or interventions include rest, activity modification and anti-inflammatory medications.     The patient denies any fevers, chills, N/V, D/C and  presents for evaluation.       Past Medical History:   Diagnosis Date    Anxiety     GERD (gastroesophageal reflux disease)     HTN (hypertension)     IBS (irritable bowel syndrome)     Kidney stones     OAB (overactive bladder)     Vertigo      Past Surgical History:   Procedure Laterality Date    CATARACT EXTRACTION, BILATERAL      CHOLECYSTECTOMY      HYSTERECTOMY      LITHOTRIPSY       Review of patient's allergies indicates:   Allergen Reactions    Egg Nausea Only    Sulfur Rash     Social History     Social History Narrative    Not on file     Family History   Problem Relation Age of Onset    Leukemia Father     Cancer Brother         Pancreatic         Current Outpatient Medications:     dicyclomine (BENTYL) 10 MG capsule, Take 1 capsule (10 mg total) by mouth 4 (four) times daily before meals and nightly., Disp: 90 capsule, Rfl: 3    hydroCHLOROthiazide (HYDRODIURIL) 25 MG tablet, Take 1 tablet (25 mg total) by mouth once daily., Disp: 90 tablet, Rfl: 3    LORazepam (ATIVAN) 2 MG Tab, 1/2 nightly prn, Disp: 15 tablet, Rfl: 3    losartan (COZAAR) 100 MG tablet, Take 1 tablet (100 mg total) by mouth once daily., Disp: 90 tablet, Rfl: 3    metoprolol succinate (TOPROL-XL) 50 MG 24 hr tablet, Take 1 tablet (50 mg total) by mouth once daily., Disp: 90 tablet, Rfl: 3    oxybutynin (DITROPAN) 5 MG Tab, Take 1 tablet (5 mg total) by mouth 3 (three) times daily., Disp: 90 tablet, Rfl: 3    potassium chloride SA (K-DUR,KLOR-CON) 10 MEQ tablet, Take 1 tablet (10 mEq total) by mouth Daily., Disp: 90 tablet, Rfl: 3    omeprazole (PRILOSEC) 40 MG capsule, Take 1 capsule (40 mg total) by mouth once daily., Disp: 90 capsule, Rfl: 2    valACYclovir (VALTREX) 500 MG tablet, Take 1 tablet (500 mg total) by mouth 2 (two) times daily. for 5 days, Disp: 10 tablet, Rfl: 0      Review of Systems:  Constitutional: no fever or chills  Eyes: no visual changes  ENT: no nasal congestion or sore  "throat  Respiratory: no cough or shortness of breath  Cardiovascular: no chest pain  Gastrointestinal: no nausea or vomiting, tolerating diet  Musculoskeletal: arthralgias, myalgias and pain    OBJECTIVE:      Vital Signs (Most Recent):  Vitals:    10/27/20 1026   Weight: 54.2 kg (119 lb 9.6 oz)   Height: 4' 9" (1.448 m)     Body mass index is 25.88 kg/m².      Physical Exam:  Constitutional: The patient appears well-developed and well-nourished. No distress.   Head: Normocephalic and atraumatic.   Nose: Nose normal.   Eyes: Conjunctivae and EOM are normal.   Neck: No tracheal deviation present.   Cardiovascular: Normal rate and intact distal pulses.    Pulmonary/Chest: Effort normal. No respiratory distress.   Abdominal: There is no guarding.   Neurological: The patient is alert.   Psychiatric: The patient has a normal mood and affect.     Bilateral Hand/Wrist Examination:    Observation/Inspection:  Swelling  none    Deformity  Z deformity of bilateral thumb  Discoloration  none     Scars   healed bilateral carpal tunnel release scars    Atrophy  None  Diffuse Heberden's nodules throughout both hands    HAND/WRIST EXAMINATION:  Finkelstein's Test   Neg  WHAT Test    Neg  Snuff box tenderness   Neg  Dickens's Test    Neg  Hook of [place]ate Tenderness  Neg  CMC grind    Pos bilaterally  Circumduction test   Pos bilaterally     Neurovascular Exam:  Digits WWP, brisk CR < 3s throughout  NVI motor/LTS to M/R/U nerves, radial pulse 2+  Tinel's Test - Carpal Tunnel  Neg  Tinel's Test - Cubital Tunnel  Neg  Phalen's Test    Neg  Median Nerve Compression Test Neg    ROM hand/wrist/elbow full, painless.  Patient is able to make a full and composite fist without extensor lag.  Tenderness to palpation over the 1st CMC joint without associated swelling or erythema.  Flexors and extensors full and intact.  Sensation intact to light touch throughout.  Brisk capillary refill and palpable radial pulse.    RRR  CTAB  Abd S/NT/ND " +BS    Diagnostic Results:     Xray -  bilateral hands with diffuse osteoarthritis throughout the hand and phalanxes, which is significantly evident in the 1st CMC joints of her bilateral hands.  EMG - none    ASSESSMENT/PLAN:      Juli Parra is a 75 y.o. 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  Plan:  Treatment options discussed which include activity modification, bracing, NSAIDs, CSI, and CMC arthroplasty.  Patient would like to proceed with bilateral 1st CMC joint CSIs given her previous success in the past of relieving her symptoms with those treatments which I think is reasonable at this time.  Patient was educated that she is allowed to receive these injections every 3 months and if she does not get any relief would like to proceed with operative intervention she is allowed to proceed with surgery after 6 weeks after her last injection due to her the increased risk of infection.   1) B 1st CMCJ CSIs  2) RTC PRN        Manfred Pacheco M.D.     Please be aware that this note has been generated with the assistance of MModal voice-to-text.  Please excuse any spelling or grammatical errors.

## 2020-12-03 ENCOUNTER — LAB VISIT (OUTPATIENT)
Dept: LAB | Facility: HOSPITAL | Age: 75
End: 2020-12-03
Attending: INTERNAL MEDICINE
Payer: MEDICARE

## 2020-12-03 DIAGNOSIS — R73.09 ELEVATED GLUCOSE: ICD-10-CM

## 2020-12-03 LAB
ANION GAP SERPL CALC-SCNC: 8 MMOL/L (ref 8–16)
BUN SERPL-MCNC: 12 MG/DL (ref 8–23)
CALCIUM SERPL-MCNC: 9.1 MG/DL (ref 8.7–10.5)
CHLORIDE SERPL-SCNC: 100 MMOL/L (ref 95–110)
CO2 SERPL-SCNC: 28 MMOL/L (ref 23–29)
CREAT SERPL-MCNC: 0.7 MG/DL (ref 0.5–1.4)
EST. GFR  (AFRICAN AMERICAN): >60 ML/MIN/1.73 M^2
EST. GFR  (NON AFRICAN AMERICAN): >60 ML/MIN/1.73 M^2
GLUCOSE SERPL-MCNC: 98 MG/DL (ref 70–110)
POTASSIUM SERPL-SCNC: 3.9 MMOL/L (ref 3.5–5.1)
SODIUM SERPL-SCNC: 136 MMOL/L (ref 136–145)

## 2020-12-03 PROCEDURE — 83036 HEMOGLOBIN GLYCOSYLATED A1C: CPT

## 2020-12-03 PROCEDURE — 36415 COLL VENOUS BLD VENIPUNCTURE: CPT | Mod: PO

## 2020-12-03 PROCEDURE — 80048 BASIC METABOLIC PNL TOTAL CA: CPT

## 2020-12-04 LAB
ESTIMATED AVG GLUCOSE: 111 MG/DL (ref 68–131)
HBA1C MFR BLD HPLC: 5.5 % (ref 4–5.6)

## 2020-12-10 DIAGNOSIS — K58.9 IRRITABLE BOWEL SYNDROME, UNSPECIFIED TYPE: ICD-10-CM

## 2020-12-10 RX ORDER — DICYCLOMINE HYDROCHLORIDE 10 MG/1
10 CAPSULE ORAL
Qty: 90 CAPSULE | Refills: 3 | Status: SHIPPED | OUTPATIENT
Start: 2020-12-10 | End: 2021-05-10 | Stop reason: SDUPTHER

## 2020-12-10 NOTE — TELEPHONE ENCOUNTER
"----- Message from Kamille Bernal sent at 12/10/2020  9:01 AM CST -----  Contact: Patient [phone]  Requesting an RX refill or new RX.  Is this a refill or new RX: Refill  RX name and strength: dicyclomine (BENTYL) 10 MG capsule  Is this a 30 day or 90 day RX: 90  Pharmacy name and phone #:   [id number]  [address] AT [address]  [place] [address]  Phone: [phone] Fax: [phone]    Comments: Rx from July shows "print", please send to pharmacy.    Thank You      "

## 2020-12-10 NOTE — PROGRESS NOTES
Refill Routing Note   Medication(s) are not appropriate for processing by [facility] for the following reason(s):     - Outside of protocol  [facility] action(s):  Route        Medication reconciliation completed: No   Automatic Epic Generated Protocol Data:        Requested Prescriptions   Pending Prescriptions Disp Refills    dicyclomine (BENTYL) 10 MG capsule 90 capsule 3     Sig: Take 1 capsule (10 mg total) by mouth 4 (four) times daily before meals and nightly.       There is no refill protocol information for this order           Appointments  past 12m or future 3m with PCP    Date Provider   Last Visit   8/10/2020 Jefe Serrano MD   Next Visit   12/22/2020 Jefe Serrano MD   ED visits in past 90 days: 0        Note composed:12:44 PM 12/10/2020

## 2020-12-11 ENCOUNTER — TELEPHONE (OUTPATIENT)
Dept: INTERNAL MEDICINE | Facility: CLINIC | Age: 75
End: 2020-12-11

## 2020-12-11 NOTE — TELEPHONE ENCOUNTER
----- Message from Josie Ramos sent at 12/11/2020  8:54 AM CST -----  Pt states that pharmacy doesn't have medication dicyclomine (BENTYL) 10 MG capsule

## 2020-12-22 ENCOUNTER — OFFICE VISIT (OUTPATIENT)
Dept: INTERNAL MEDICINE | Facility: CLINIC | Age: 75
End: 2020-12-22
Payer: MEDICARE

## 2020-12-22 VITALS
HEART RATE: 66 BPM | SYSTOLIC BLOOD PRESSURE: 130 MMHG | WEIGHT: 118.63 LBS | OXYGEN SATURATION: 95 % | HEIGHT: 57 IN | DIASTOLIC BLOOD PRESSURE: 60 MMHG | BODY MASS INDEX: 25.59 KG/M2

## 2020-12-22 DIAGNOSIS — I10 HYPERTENSION, UNSPECIFIED TYPE: Primary | ICD-10-CM

## 2020-12-22 DIAGNOSIS — R73.01 IMPAIRED FASTING GLUCOSE: ICD-10-CM

## 2020-12-22 DIAGNOSIS — F41.9 ANXIETY: ICD-10-CM

## 2020-12-22 DIAGNOSIS — M19.90 ARTHRITIS: ICD-10-CM

## 2020-12-22 PROCEDURE — 99999 PR PBB SHADOW E&M-EST. PATIENT-LVL IV: CPT | Mod: PBBFAC,,, | Performed by: INTERNAL MEDICINE

## 2020-12-22 PROCEDURE — 99214 OFFICE O/P EST MOD 30 MIN: CPT | Mod: S$PBB,,, | Performed by: INTERNAL MEDICINE

## 2020-12-22 PROCEDURE — 99214 PR OFFICE/OUTPT VISIT, EST, LEVL IV, 30-39 MIN: ICD-10-PCS | Mod: S$PBB,,, | Performed by: INTERNAL MEDICINE

## 2020-12-22 PROCEDURE — 99999 PR PBB SHADOW E&M-EST. PATIENT-LVL IV: ICD-10-PCS | Mod: PBBFAC,,, | Performed by: INTERNAL MEDICINE

## 2020-12-22 PROCEDURE — 99214 OFFICE O/P EST MOD 30 MIN: CPT | Mod: PBBFAC | Performed by: INTERNAL MEDICINE

## 2020-12-22 RX ORDER — LORAZEPAM 2 MG/1
TABLET ORAL
Qty: 15 TABLET | Refills: 3 | Status: SHIPPED | OUTPATIENT
Start: 2020-12-22 | End: 2021-05-10 | Stop reason: SDUPTHER

## 2020-12-22 RX ORDER — DICLOFENAC SODIUM 25 MG/1
25 TABLET, DELAYED RELEASE ORAL 2 TIMES DAILY
Qty: 30 TABLET | Refills: 3 | Status: SHIPPED | OUTPATIENT
Start: 2020-12-22 | End: 2021-05-10

## 2020-12-22 NOTE — PROGRESS NOTES
Subjective:       Patient ID: Juli Parra is a 75 y.o. female.    Chief Complaint: Follow-up    Here to follow-up arthritis, hypertension, history of elevated glucose.  Chemistry and A1c were normal.  She would like to try diclofenac because she says her arthritis continues to act up and that helped in the past.  She would use it sparingly and we talked about side effects to the stomach in can knees.  She uses alprazolam sparingly and would like a new prescription.   reviewed.  No chest pain shortness of breath fevers or chills.    Review of Systems   Constitutional: Negative for appetite change, chills and fever.   HENT: Negative for nosebleeds and sore throat.    Eyes: Negative for pain and visual disturbance.   Respiratory: Negative for cough, shortness of breath and wheezing.    Cardiovascular: Negative for chest pain and leg swelling.   Gastrointestinal: Negative for abdominal pain, constipation and diarrhea.   Endocrine: Negative for polyuria.   Genitourinary: Negative for difficulty urinating, hematuria and vaginal bleeding.   Musculoskeletal: Positive for arthralgias and joint swelling. Negative for back pain, gait problem and neck pain.   Integumentary:  Negative for pallor and rash.   Neurological: Negative for tremors, seizures and headaches.   Hematological: Does not bruise/bleed easily.   Psychiatric/Behavioral: Negative for dysphoric mood. The patient is not nervous/anxious.          Objective:      Physical Exam  Constitutional:       General: She is not in acute distress.     Appearance: She is well-developed.   HENT:      Head: Normocephalic and atraumatic.      Right Ear: Tympanic membrane, ear canal and external ear normal.      Left Ear: Tympanic membrane, ear canal and external ear normal.      Mouth/Throat:      Pharynx: No oropharyngeal exudate or posterior oropharyngeal erythema.   Eyes:      General: No scleral icterus.     Conjunctiva/sclera: Conjunctivae normal.      Pupils:  Pupils are equal, round, and reactive to light.   Neck:      Musculoskeletal: Normal range of motion and neck supple.      Thyroid: No thyromegaly.   Cardiovascular:      Rate and Rhythm: Normal rate and regular rhythm.      Pulses: Normal pulses.      Heart sounds: No murmur.   Pulmonary:      Effort: Pulmonary effort is normal.      Breath sounds: Normal breath sounds. No wheezing.   Abdominal:      General: Bowel sounds are normal. There is no distension.      Palpations: Abdomen is soft.      Tenderness: There is no abdominal tenderness.   Musculoskeletal:         General: Swelling (Fingers knees) present. No tenderness.      Right lower leg: No edema.      Left lower leg: No edema.   Lymphadenopathy:      Cervical: No cervical adenopathy.   Skin:     Coloration: Skin is not jaundiced.      Findings: No rash.   Neurological:      General: No focal deficit present.      Mental Status: She is alert and oriented to person, place, and time.   Psychiatric:         Mood and Affect: Mood normal.         Behavior: Behavior normal.         Assessment:       1. Hypertension, unspecified type    2. Anxiety    3. Arthritis    4. Impaired fasting glucose        Plan:       Juli was seen today for follow-up.    Diagnoses and all orders for this visit:    Hypertension, unspecified type  -     Basic Metabolic Panel; Future    Anxiety  -     LORazepam (ATIVAN) 2 MG Tab; 1/2 nightly prn    Arthritis  -     Basic Metabolic Panel; Future    Impaired fasting glucose    Other orders  -     diclofenac (VOLTAREN) 25 MG TbEC; Take 1 tablet (25 mg total) by mouth 2 (two) times daily.

## 2021-01-17 ENCOUNTER — PATIENT OUTREACH (OUTPATIENT)
Dept: ADMINISTRATIVE | Facility: OTHER | Age: 76
End: 2021-01-17

## 2021-01-19 ENCOUNTER — OFFICE VISIT (OUTPATIENT)
Dept: ORTHOPEDICS | Facility: CLINIC | Age: 76
End: 2021-01-19
Payer: MEDICARE

## 2021-01-19 VITALS — WEIGHT: 120.69 LBS | HEIGHT: 57 IN | BODY MASS INDEX: 26.04 KG/M2

## 2021-01-19 DIAGNOSIS — M17.0 PRIMARY OSTEOARTHRITIS OF BOTH KNEES: Primary | ICD-10-CM

## 2021-01-19 PROCEDURE — 99213 OFFICE O/P EST LOW 20 MIN: CPT | Mod: PBBFAC | Performed by: ORTHOPAEDIC SURGERY

## 2021-01-19 PROCEDURE — 20610 DRAIN/INJ JOINT/BURSA W/O US: CPT | Mod: 50,PBBFAC | Performed by: ORTHOPAEDIC SURGERY

## 2021-01-19 PROCEDURE — 99999 PR PBB SHADOW E&M-EST. PATIENT-LVL III: CPT | Mod: PBBFAC,,, | Performed by: ORTHOPAEDIC SURGERY

## 2021-01-19 PROCEDURE — 20610 LARGE JOINT ASPIRATION/INJECTION: BILATERAL KNEE: ICD-10-PCS | Mod: 50,S$PBB,, | Performed by: ORTHOPAEDIC SURGERY

## 2021-01-19 PROCEDURE — 99499 NO LOS: ICD-10-PCS | Mod: S$PBB,,, | Performed by: ORTHOPAEDIC SURGERY

## 2021-01-19 PROCEDURE — 99499 UNLISTED E&M SERVICE: CPT | Mod: S$PBB,,, | Performed by: ORTHOPAEDIC SURGERY

## 2021-01-19 PROCEDURE — 99999 PR PBB SHADOW E&M-EST. PATIENT-LVL III: ICD-10-PCS | Mod: PBBFAC,,, | Performed by: ORTHOPAEDIC SURGERY

## 2021-01-19 RX ADMIN — TRIAMCINOLONE ACETONIDE 40 MG: 40 INJECTION, SUSPENSION INTRA-ARTICULAR; INTRAMUSCULAR at 11:01

## 2021-01-22 RX ORDER — TRIAMCINOLONE ACETONIDE 40 MG/ML
40 INJECTION, SUSPENSION INTRA-ARTICULAR; INTRAMUSCULAR
Status: DISCONTINUED | OUTPATIENT
Start: 2021-01-19 | End: 2021-01-22 | Stop reason: HOSPADM

## 2021-03-01 ENCOUNTER — OFFICE VISIT (OUTPATIENT)
Dept: URGENT CARE | Facility: CLINIC | Age: 76
End: 2021-03-01
Payer: MEDICARE

## 2021-03-01 VITALS
BODY MASS INDEX: 25.89 KG/M2 | RESPIRATION RATE: 12 BRPM | HEART RATE: 82 BPM | DIASTOLIC BLOOD PRESSURE: 66 MMHG | OXYGEN SATURATION: 96 % | HEIGHT: 57 IN | SYSTOLIC BLOOD PRESSURE: 170 MMHG | WEIGHT: 120 LBS | TEMPERATURE: 98 F

## 2021-03-01 DIAGNOSIS — R30.0 DYSURIA: ICD-10-CM

## 2021-03-01 DIAGNOSIS — N30.00 ACUTE CYSTITIS WITHOUT HEMATURIA: Primary | ICD-10-CM

## 2021-03-01 LAB
BILIRUB UR QL STRIP: NEGATIVE
GLUCOSE UR QL STRIP: NEGATIVE
KETONES UR QL STRIP: NEGATIVE
LEUKOCYTE ESTERASE UR QL STRIP: POSITIVE
PH, POC UA: 5.5 (ref 5–8)
POC BLOOD, URINE: NEGATIVE
POC NITRATES, URINE: NEGATIVE
PROT UR QL STRIP: NEGATIVE
SP GR UR STRIP: 1.01 (ref 1–1.03)
UROBILINOGEN UR STRIP-ACNC: NORMAL (ref 0.1–1.1)

## 2021-03-01 PROCEDURE — 81003 URINALYSIS AUTO W/O SCOPE: CPT | Mod: QW,S$GLB,, | Performed by: PHYSICIAN ASSISTANT

## 2021-03-01 PROCEDURE — 81003 POCT URINALYSIS, DIPSTICK, AUTOMATED, W/O SCOPE: ICD-10-PCS | Mod: QW,S$GLB,, | Performed by: PHYSICIAN ASSISTANT

## 2021-03-01 PROCEDURE — 99214 OFFICE O/P EST MOD 30 MIN: CPT | Mod: 25,S$GLB,, | Performed by: PHYSICIAN ASSISTANT

## 2021-03-01 PROCEDURE — 99214 PR OFFICE/OUTPT VISIT, EST, LEVL IV, 30-39 MIN: ICD-10-PCS | Mod: 25,S$GLB,, | Performed by: PHYSICIAN ASSISTANT

## 2021-03-01 RX ORDER — NITROFURANTOIN 25; 75 MG/1; MG/1
100 CAPSULE ORAL 2 TIMES DAILY
Qty: 10 CAPSULE | Refills: 0 | Status: SHIPPED | OUTPATIENT
Start: 2021-03-01 | End: 2021-03-06

## 2021-03-05 ENCOUNTER — TELEPHONE (OUTPATIENT)
Dept: URGENT CARE | Facility: CLINIC | Age: 76
End: 2021-03-05

## 2021-03-05 LAB
BACTERIA UR CULT: ABNORMAL
BACTERIA UR CULT: ABNORMAL
OTHER ANTIBIOTIC SUSC ISLT: ABNORMAL

## 2021-03-05 RX ORDER — NITROFURANTOIN 25; 75 MG/1; MG/1
100 CAPSULE ORAL 2 TIMES DAILY
Qty: 4 CAPSULE | Refills: 0 | Status: SHIPPED | OUTPATIENT
Start: 2021-03-05 | End: 2021-03-07

## 2021-03-11 ENCOUNTER — OFFICE VISIT (OUTPATIENT)
Dept: INTERNAL MEDICINE | Facility: CLINIC | Age: 76
End: 2021-03-11
Payer: MEDICARE

## 2021-03-11 VITALS
WEIGHT: 121.69 LBS | OXYGEN SATURATION: 99 % | HEIGHT: 57 IN | HEART RATE: 67 BPM | BODY MASS INDEX: 26.25 KG/M2 | SYSTOLIC BLOOD PRESSURE: 132 MMHG | DIASTOLIC BLOOD PRESSURE: 88 MMHG

## 2021-03-11 DIAGNOSIS — N30.10 INTERSTITIAL CYSTITIS: ICD-10-CM

## 2021-03-11 DIAGNOSIS — R39.9 UTI SYMPTOMS: Primary | ICD-10-CM

## 2021-03-11 DIAGNOSIS — N32.81 OAB (OVERACTIVE BLADDER): ICD-10-CM

## 2021-03-11 DIAGNOSIS — Z87.442 HISTORY OF NEPHROLITHIASIS: ICD-10-CM

## 2021-03-11 LAB
BILIRUB SERPL-MCNC: NEGATIVE MG/DL
BLOOD, POC UA: NEGATIVE
GLUCOSE UR QL STRIP: NEGATIVE
KETONES UR QL STRIP: NEGATIVE
LEUKOCYTE ESTERASE URINE, POC: NORMAL
NITRITE, POC UA: NEGATIVE
PH, POC UA: 7
PROTEIN, POC: NEGATIVE
SPECIFIC GRAVITY, POC UA: 1
UROBILINOGEN, POC UA: NORMAL

## 2021-03-11 PROCEDURE — 81003 URINALYSIS AUTO W/O SCOPE: CPT | Mod: PBBFAC | Performed by: PHYSICIAN ASSISTANT

## 2021-03-11 PROCEDURE — 99213 OFFICE O/P EST LOW 20 MIN: CPT | Mod: S$PBB,,, | Performed by: PHYSICIAN ASSISTANT

## 2021-03-11 PROCEDURE — 99214 OFFICE O/P EST MOD 30 MIN: CPT | Mod: PBBFAC | Performed by: PHYSICIAN ASSISTANT

## 2021-03-11 PROCEDURE — 87086 URINE CULTURE/COLONY COUNT: CPT | Performed by: PHYSICIAN ASSISTANT

## 2021-03-11 PROCEDURE — 99213 PR OFFICE/OUTPT VISIT, EST, LEVL III, 20-29 MIN: ICD-10-PCS | Mod: S$PBB,,, | Performed by: PHYSICIAN ASSISTANT

## 2021-03-11 PROCEDURE — 99999 PR PBB SHADOW E&M-EST. PATIENT-LVL IV: ICD-10-PCS | Mod: PBBFAC,,, | Performed by: PHYSICIAN ASSISTANT

## 2021-03-11 PROCEDURE — 99999 PR PBB SHADOW E&M-EST. PATIENT-LVL IV: CPT | Mod: PBBFAC,,, | Performed by: PHYSICIAN ASSISTANT

## 2021-03-11 RX ORDER — NITROFURANTOIN 25; 75 MG/1; MG/1
100 CAPSULE ORAL 2 TIMES DAILY
COMMUNITY
End: 2021-04-29 | Stop reason: ALTCHOICE

## 2021-03-13 LAB — BACTERIA UR CULT: NO GROWTH

## 2021-03-14 ENCOUNTER — PATIENT OUTREACH (OUTPATIENT)
Dept: ADMINISTRATIVE | Facility: OTHER | Age: 76
End: 2021-03-14

## 2021-03-16 ENCOUNTER — OFFICE VISIT (OUTPATIENT)
Dept: ORTHOPEDICS | Facility: CLINIC | Age: 76
End: 2021-03-16
Payer: MEDICARE

## 2021-03-16 VITALS — WEIGHT: 121 LBS | HEIGHT: 57 IN | BODY MASS INDEX: 26.1 KG/M2

## 2021-03-16 DIAGNOSIS — M18.12 ARTHRITIS OF CARPOMETACARPAL (CMC) JOINT OF LEFT THUMB: Primary | ICD-10-CM

## 2021-03-16 DIAGNOSIS — M18.11 ARTHRITIS OF CARPOMETACARPAL (CMC) JOINT OF RIGHT THUMB: ICD-10-CM

## 2021-03-16 PROCEDURE — 99214 OFFICE O/P EST MOD 30 MIN: CPT | Mod: S$PBB,25,, | Performed by: ORTHOPAEDIC SURGERY

## 2021-03-16 PROCEDURE — 99214 PR OFFICE/OUTPT VISIT, EST, LEVL IV, 30-39 MIN: ICD-10-PCS | Mod: S$PBB,25,, | Performed by: ORTHOPAEDIC SURGERY

## 2021-03-16 PROCEDURE — 20600 DRAIN/INJ JOINT/BURSA W/O US: CPT | Mod: PBBFAC,RT | Performed by: ORTHOPAEDIC SURGERY

## 2021-03-16 PROCEDURE — 99213 OFFICE O/P EST LOW 20 MIN: CPT | Mod: PBBFAC | Performed by: ORTHOPAEDIC SURGERY

## 2021-03-16 PROCEDURE — 99999 PR PBB SHADOW E&M-EST. PATIENT-LVL III: CPT | Mod: PBBFAC,,, | Performed by: ORTHOPAEDIC SURGERY

## 2021-03-16 PROCEDURE — 99999 PR PBB SHADOW E&M-EST. PATIENT-LVL III: ICD-10-PCS | Mod: PBBFAC,,, | Performed by: ORTHOPAEDIC SURGERY

## 2021-03-16 PROCEDURE — 20600 DRAIN/INJ JOINT/BURSA W/O US: CPT | Mod: PBBFAC,LT | Performed by: ORTHOPAEDIC SURGERY

## 2021-03-16 PROCEDURE — 20600 SMALL JOINT ASPIRATION/INJECTION: L THUMB CMC: ICD-10-PCS | Mod: S$PBB,50,, | Performed by: ORTHOPAEDIC SURGERY

## 2021-03-16 RX ORDER — TRIAMCINOLONE ACETONIDE 40 MG/ML
20 INJECTION, SUSPENSION INTRA-ARTICULAR; INTRAMUSCULAR
Status: DISCONTINUED | OUTPATIENT
Start: 2021-03-16 | End: 2021-03-16 | Stop reason: HOSPADM

## 2021-03-16 RX ADMIN — TRIAMCINOLONE ACETONIDE 20 MG: 40 INJECTION, SUSPENSION INTRA-ARTICULAR; INTRAMUSCULAR at 10:03

## 2021-03-18 ENCOUNTER — OFFICE VISIT (OUTPATIENT)
Dept: UROLOGY | Facility: CLINIC | Age: 76
End: 2021-03-18
Payer: MEDICARE

## 2021-03-18 VITALS
DIASTOLIC BLOOD PRESSURE: 70 MMHG | SYSTOLIC BLOOD PRESSURE: 124 MMHG | WEIGHT: 121 LBS | BODY MASS INDEX: 26.1 KG/M2 | HEIGHT: 57 IN

## 2021-03-18 DIAGNOSIS — N32.81 OAB (OVERACTIVE BLADDER): ICD-10-CM

## 2021-03-18 DIAGNOSIS — N30.10 INTERSTITIAL CYSTITIS: ICD-10-CM

## 2021-03-18 DIAGNOSIS — Z87.442 HISTORY OF NEPHROLITHIASIS: ICD-10-CM

## 2021-03-18 PROCEDURE — 87088 URINE BACTERIA CULTURE: CPT | Performed by: NURSE PRACTITIONER

## 2021-03-18 PROCEDURE — 99204 OFFICE O/P NEW MOD 45 MIN: CPT | Mod: S$PBB,,, | Performed by: NURSE PRACTITIONER

## 2021-03-18 PROCEDURE — 87086 URINE CULTURE/COLONY COUNT: CPT | Performed by: NURSE PRACTITIONER

## 2021-03-18 PROCEDURE — 99999 PR PBB SHADOW E&M-EST. PATIENT-LVL IV: CPT | Mod: PBBFAC,,, | Performed by: NURSE PRACTITIONER

## 2021-03-18 PROCEDURE — 87186 SC STD MICRODIL/AGAR DIL: CPT | Performed by: NURSE PRACTITIONER

## 2021-03-18 PROCEDURE — 81001 URINALYSIS AUTO W/SCOPE: CPT | Mod: PBBFAC | Performed by: NURSE PRACTITIONER

## 2021-03-18 PROCEDURE — 99214 OFFICE O/P EST MOD 30 MIN: CPT | Mod: PBBFAC | Performed by: NURSE PRACTITIONER

## 2021-03-18 PROCEDURE — 99204 PR OFFICE/OUTPT VISIT, NEW, LEVL IV, 45-59 MIN: ICD-10-PCS | Mod: S$PBB,,, | Performed by: NURSE PRACTITIONER

## 2021-03-18 PROCEDURE — 51798 US URINE CAPACITY MEASURE: CPT | Mod: PBBFAC | Performed by: NURSE PRACTITIONER

## 2021-03-18 PROCEDURE — 87077 CULTURE AEROBIC IDENTIFY: CPT | Mod: 59 | Performed by: NURSE PRACTITIONER

## 2021-03-18 PROCEDURE — 99999 PR PBB SHADOW E&M-EST. PATIENT-LVL IV: ICD-10-PCS | Mod: PBBFAC,,, | Performed by: NURSE PRACTITIONER

## 2021-03-18 RX ORDER — OXYBUTYNIN CHLORIDE 10 MG/1
10 TABLET, EXTENDED RELEASE ORAL DAILY
Qty: 30 TABLET | Refills: 11 | Status: SHIPPED | OUTPATIENT
Start: 2021-03-18 | End: 2021-04-29 | Stop reason: DRUGHIGH

## 2021-03-20 LAB
BACTERIA UR CULT: ABNORMAL
BACTERIA UR CULT: ABNORMAL

## 2021-03-23 ENCOUNTER — TELEPHONE (OUTPATIENT)
Dept: UROLOGY | Facility: CLINIC | Age: 76
End: 2021-03-23

## 2021-03-23 RX ORDER — DOXYCYCLINE HYCLATE 100 MG
100 TABLET ORAL 2 TIMES DAILY
Qty: 20 TABLET | Refills: 0 | Status: SHIPPED | OUTPATIENT
Start: 2021-03-23 | End: 2021-04-02

## 2021-04-06 ENCOUNTER — TELEPHONE (OUTPATIENT)
Dept: UROLOGY | Facility: CLINIC | Age: 76
End: 2021-04-06

## 2021-04-06 DIAGNOSIS — R39.89 SUSPECTED UTI: Primary | ICD-10-CM

## 2021-04-08 ENCOUNTER — TELEPHONE (OUTPATIENT)
Dept: INTERNAL MEDICINE | Facility: CLINIC | Age: 76
End: 2021-04-08

## 2021-04-29 ENCOUNTER — TELEPHONE (OUTPATIENT)
Dept: UROLOGY | Facility: CLINIC | Age: 76
End: 2021-04-29

## 2021-04-29 ENCOUNTER — OFFICE VISIT (OUTPATIENT)
Dept: UROLOGY | Facility: CLINIC | Age: 76
End: 2021-04-29
Payer: MEDICARE

## 2021-04-29 VITALS
HEIGHT: 57 IN | WEIGHT: 123.88 LBS | SYSTOLIC BLOOD PRESSURE: 122 MMHG | BODY MASS INDEX: 26.73 KG/M2 | DIASTOLIC BLOOD PRESSURE: 70 MMHG

## 2021-04-29 DIAGNOSIS — N30.10 INTERSTITIAL CYSTITIS: ICD-10-CM

## 2021-04-29 DIAGNOSIS — N32.81 OAB (OVERACTIVE BLADDER): Primary | ICD-10-CM

## 2021-04-29 DIAGNOSIS — R39.89 SUSPECTED UTI: ICD-10-CM

## 2021-04-29 PROCEDURE — 99999 PR PBB SHADOW E&M-EST. PATIENT-LVL III: ICD-10-PCS | Mod: PBBFAC,,, | Performed by: NURSE PRACTITIONER

## 2021-04-29 PROCEDURE — 51798 US URINE CAPACITY MEASURE: CPT | Mod: PBBFAC | Performed by: NURSE PRACTITIONER

## 2021-04-29 PROCEDURE — 99214 PR OFFICE/OUTPT VISIT, EST, LEVL IV, 30-39 MIN: ICD-10-PCS | Mod: S$PBB,,, | Performed by: NURSE PRACTITIONER

## 2021-04-29 PROCEDURE — 99214 OFFICE O/P EST MOD 30 MIN: CPT | Mod: S$PBB,,, | Performed by: NURSE PRACTITIONER

## 2021-04-29 PROCEDURE — 99213 OFFICE O/P EST LOW 20 MIN: CPT | Mod: PBBFAC,25 | Performed by: NURSE PRACTITIONER

## 2021-04-29 PROCEDURE — 87086 URINE CULTURE/COLONY COUNT: CPT | Performed by: NURSE PRACTITIONER

## 2021-04-29 PROCEDURE — 99999 PR PBB SHADOW E&M-EST. PATIENT-LVL III: CPT | Mod: PBBFAC,,, | Performed by: NURSE PRACTITIONER

## 2021-04-29 PROCEDURE — 81001 URINALYSIS AUTO W/SCOPE: CPT | Mod: PBBFAC | Performed by: NURSE PRACTITIONER

## 2021-04-29 RX ORDER — NITROFURANTOIN 25; 75 MG/1; MG/1
100 CAPSULE ORAL 2 TIMES DAILY
Qty: 14 CAPSULE | Refills: 0 | Status: SHIPPED | OUTPATIENT
Start: 2021-04-29 | End: 2021-05-06

## 2021-04-29 RX ORDER — OXYBUTYNIN CHLORIDE 15 MG/1
15 TABLET, EXTENDED RELEASE ORAL DAILY
Qty: 30 TABLET | Refills: 11 | Status: SHIPPED | OUTPATIENT
Start: 2021-04-29 | End: 2022-05-03

## 2021-05-01 LAB — BACTERIA UR CULT: NORMAL

## 2021-05-03 ENCOUNTER — PATIENT OUTREACH (OUTPATIENT)
Dept: ADMINISTRATIVE | Facility: OTHER | Age: 76
End: 2021-05-03

## 2021-05-04 ENCOUNTER — TELEPHONE (OUTPATIENT)
Dept: UROLOGY | Facility: CLINIC | Age: 76
End: 2021-05-04

## 2021-05-04 ENCOUNTER — OFFICE VISIT (OUTPATIENT)
Dept: ORTHOPEDICS | Facility: CLINIC | Age: 76
End: 2021-05-04
Payer: MEDICARE

## 2021-05-04 VITALS — HEIGHT: 57 IN | WEIGHT: 122.56 LBS | BODY MASS INDEX: 26.44 KG/M2

## 2021-05-04 DIAGNOSIS — M17.0 PRIMARY OSTEOARTHRITIS OF BOTH KNEES: Primary | ICD-10-CM

## 2021-05-04 PROCEDURE — 99999 PR PBB SHADOW E&M-EST. PATIENT-LVL III: CPT | Mod: PBBFAC,,, | Performed by: ORTHOPAEDIC SURGERY

## 2021-05-04 PROCEDURE — 99213 OFFICE O/P EST LOW 20 MIN: CPT | Mod: PBBFAC | Performed by: ORTHOPAEDIC SURGERY

## 2021-05-04 PROCEDURE — 99999 PR PBB SHADOW E&M-EST. PATIENT-LVL III: ICD-10-PCS | Mod: PBBFAC,,, | Performed by: ORTHOPAEDIC SURGERY

## 2021-05-04 PROCEDURE — 20610 DRAIN/INJ JOINT/BURSA W/O US: CPT | Mod: 50,PBBFAC | Performed by: ORTHOPAEDIC SURGERY

## 2021-05-04 PROCEDURE — 99499 UNLISTED E&M SERVICE: CPT | Mod: S$PBB,,, | Performed by: ORTHOPAEDIC SURGERY

## 2021-05-04 PROCEDURE — 20610 LARGE JOINT ASPIRATION/INJECTION: BILATERAL KNEE: ICD-10-PCS | Mod: 50,S$PBB,, | Performed by: ORTHOPAEDIC SURGERY

## 2021-05-04 PROCEDURE — 99499 NO LOS: ICD-10-PCS | Mod: S$PBB,,, | Performed by: ORTHOPAEDIC SURGERY

## 2021-05-04 RX ORDER — TRIAMCINOLONE ACETONIDE 40 MG/ML
40 INJECTION, SUSPENSION INTRA-ARTICULAR; INTRAMUSCULAR
Status: DISCONTINUED | OUTPATIENT
Start: 2021-05-04 | End: 2021-05-04 | Stop reason: HOSPADM

## 2021-05-04 RX ADMIN — TRIAMCINOLONE ACETONIDE 40 MG: 40 INJECTION, SUSPENSION INTRA-ARTICULAR; INTRAMUSCULAR at 01:05

## 2021-05-05 ENCOUNTER — LAB VISIT (OUTPATIENT)
Dept: LAB | Facility: HOSPITAL | Age: 76
End: 2021-05-05
Attending: INTERNAL MEDICINE
Payer: MEDICARE

## 2021-05-05 DIAGNOSIS — I10 HYPERTENSION, UNSPECIFIED TYPE: ICD-10-CM

## 2021-05-05 DIAGNOSIS — M19.90 ARTHRITIS: ICD-10-CM

## 2021-05-05 LAB
ANION GAP SERPL CALC-SCNC: 9 MMOL/L (ref 8–16)
BUN SERPL-MCNC: 13 MG/DL (ref 8–23)
CALCIUM SERPL-MCNC: 9.6 MG/DL (ref 8.7–10.5)
CHLORIDE SERPL-SCNC: 98 MMOL/L (ref 95–110)
CO2 SERPL-SCNC: 27 MMOL/L (ref 23–29)
CREAT SERPL-MCNC: 0.7 MG/DL (ref 0.5–1.4)
EST. GFR  (AFRICAN AMERICAN): >60 ML/MIN/1.73 M^2
EST. GFR  (NON AFRICAN AMERICAN): >60 ML/MIN/1.73 M^2
GLUCOSE SERPL-MCNC: 158 MG/DL (ref 70–110)
POTASSIUM SERPL-SCNC: 4.4 MMOL/L (ref 3.5–5.1)
SODIUM SERPL-SCNC: 134 MMOL/L (ref 136–145)

## 2021-05-05 PROCEDURE — 36415 COLL VENOUS BLD VENIPUNCTURE: CPT | Mod: PO | Performed by: INTERNAL MEDICINE

## 2021-05-05 PROCEDURE — 80048 BASIC METABOLIC PNL TOTAL CA: CPT | Performed by: INTERNAL MEDICINE

## 2021-05-10 ENCOUNTER — LAB VISIT (OUTPATIENT)
Dept: LAB | Facility: HOSPITAL | Age: 76
End: 2021-05-10
Attending: INTERNAL MEDICINE
Payer: MEDICARE

## 2021-05-10 ENCOUNTER — OFFICE VISIT (OUTPATIENT)
Dept: INTERNAL MEDICINE | Facility: CLINIC | Age: 76
End: 2021-05-10
Payer: MEDICARE

## 2021-05-10 VITALS
BODY MASS INDEX: 26.54 KG/M2 | OXYGEN SATURATION: 98 % | HEIGHT: 57 IN | HEART RATE: 81 BPM | SYSTOLIC BLOOD PRESSURE: 132 MMHG | DIASTOLIC BLOOD PRESSURE: 66 MMHG | WEIGHT: 123 LBS

## 2021-05-10 DIAGNOSIS — K58.9 IRRITABLE BOWEL SYNDROME, UNSPECIFIED TYPE: ICD-10-CM

## 2021-05-10 DIAGNOSIS — K21.9 GASTROESOPHAGEAL REFLUX DISEASE, UNSPECIFIED WHETHER ESOPHAGITIS PRESENT: ICD-10-CM

## 2021-05-10 DIAGNOSIS — M17.11 ARTHRITIS OF RIGHT KNEE: ICD-10-CM

## 2021-05-10 DIAGNOSIS — R73.01 IMPAIRED FASTING GLUCOSE: ICD-10-CM

## 2021-05-10 DIAGNOSIS — F41.9 ANXIETY: ICD-10-CM

## 2021-05-10 DIAGNOSIS — M15.0 PRIMARY GENERALIZED (OSTEO)ARTHRITIS: ICD-10-CM

## 2021-05-10 DIAGNOSIS — R73.9 HYPERGLYCEMIA: ICD-10-CM

## 2021-05-10 DIAGNOSIS — I10 HYPERTENSION, UNSPECIFIED TYPE: Primary | ICD-10-CM

## 2021-05-10 PROCEDURE — 99999 PR PBB SHADOW E&M-EST. PATIENT-LVL III: ICD-10-PCS | Mod: PBBFAC,,, | Performed by: INTERNAL MEDICINE

## 2021-05-10 PROCEDURE — 99999 PR PBB SHADOW E&M-EST. PATIENT-LVL III: CPT | Mod: PBBFAC,,, | Performed by: INTERNAL MEDICINE

## 2021-05-10 PROCEDURE — 99213 OFFICE O/P EST LOW 20 MIN: CPT | Mod: PBBFAC | Performed by: INTERNAL MEDICINE

## 2021-05-10 PROCEDURE — 99214 OFFICE O/P EST MOD 30 MIN: CPT | Mod: S$PBB,,, | Performed by: INTERNAL MEDICINE

## 2021-05-10 PROCEDURE — 83036 HEMOGLOBIN GLYCOSYLATED A1C: CPT | Performed by: INTERNAL MEDICINE

## 2021-05-10 PROCEDURE — 36415 COLL VENOUS BLD VENIPUNCTURE: CPT | Performed by: INTERNAL MEDICINE

## 2021-05-10 PROCEDURE — 99214 PR OFFICE/OUTPT VISIT, EST, LEVL IV, 30-39 MIN: ICD-10-PCS | Mod: S$PBB,,, | Performed by: INTERNAL MEDICINE

## 2021-05-10 RX ORDER — LORAZEPAM 2 MG/1
TABLET ORAL
Qty: 15 TABLET | Refills: 5 | Status: SHIPPED | OUTPATIENT
Start: 2021-05-10 | End: 2021-10-20 | Stop reason: SDUPTHER

## 2021-05-10 RX ORDER — DICYCLOMINE HYDROCHLORIDE 10 MG/1
10 CAPSULE ORAL
Qty: 90 CAPSULE | Refills: 6 | Status: SHIPPED | OUTPATIENT
Start: 2021-05-10 | End: 2022-03-16

## 2021-05-11 ENCOUNTER — PATIENT MESSAGE (OUTPATIENT)
Dept: INTERNAL MEDICINE | Facility: CLINIC | Age: 76
End: 2021-05-11

## 2021-05-11 LAB
ESTIMATED AVG GLUCOSE: 111 MG/DL (ref 68–131)
HBA1C MFR BLD: 5.5 % (ref 4–5.6)

## 2021-05-26 ENCOUNTER — HOSPITAL ENCOUNTER (OUTPATIENT)
Dept: RADIOLOGY | Facility: HOSPITAL | Age: 76
Discharge: HOME | End: 2021-05-26
Attending: NURSE PRACTITIONER
Payer: MEDICARE

## 2021-05-26 DIAGNOSIS — R39.89 SUSPECTED UTI: ICD-10-CM

## 2021-05-26 PROCEDURE — 76770 US EXAM ABDO BACK WALL COMP: CPT | Mod: TC

## 2021-05-26 PROCEDURE — 76770 US RETROPERITONEAL COMPLETE: ICD-10-PCS | Mod: 26,,, | Performed by: RADIOLOGY

## 2021-05-26 PROCEDURE — 76770 US EXAM ABDO BACK WALL COMP: CPT | Mod: 26,,, | Performed by: RADIOLOGY

## 2021-06-10 ENCOUNTER — OFFICE VISIT (OUTPATIENT)
Dept: UROLOGY | Facility: CLINIC | Age: 76
End: 2021-06-10
Payer: MEDICARE

## 2021-06-10 VITALS — HEIGHT: 57 IN | BODY MASS INDEX: 26.78 KG/M2 | WEIGHT: 124.13 LBS

## 2021-06-10 DIAGNOSIS — N30.10 INTERSTITIAL CYSTITIS: ICD-10-CM

## 2021-06-10 DIAGNOSIS — Z87.442 HISTORY OF NEPHROLITHIASIS: ICD-10-CM

## 2021-06-10 DIAGNOSIS — N32.81 OAB (OVERACTIVE BLADDER): Primary | ICD-10-CM

## 2021-06-10 PROCEDURE — 99214 PR OFFICE/OUTPT VISIT, EST, LEVL IV, 30-39 MIN: ICD-10-PCS | Mod: S$PBB,,, | Performed by: NURSE PRACTITIONER

## 2021-06-10 PROCEDURE — 99999 PR PBB SHADOW E&M-EST. PATIENT-LVL III: CPT | Mod: PBBFAC,,, | Performed by: NURSE PRACTITIONER

## 2021-06-10 PROCEDURE — 99214 OFFICE O/P EST MOD 30 MIN: CPT | Mod: S$PBB,,, | Performed by: NURSE PRACTITIONER

## 2021-06-10 PROCEDURE — 99999 PR PBB SHADOW E&M-EST. PATIENT-LVL III: ICD-10-PCS | Mod: PBBFAC,,, | Performed by: NURSE PRACTITIONER

## 2021-06-10 PROCEDURE — 99213 OFFICE O/P EST LOW 20 MIN: CPT | Mod: PBBFAC | Performed by: NURSE PRACTITIONER

## 2021-07-19 ENCOUNTER — TELEPHONE (OUTPATIENT)
Dept: ORTHOPEDICS | Facility: CLINIC | Age: 76
End: 2021-07-19

## 2021-07-19 DIAGNOSIS — M19.049 HAND ARTHRITIS: Primary | ICD-10-CM

## 2021-07-20 ENCOUNTER — OFFICE VISIT (OUTPATIENT)
Dept: ORTHOPEDICS | Facility: CLINIC | Age: 76
End: 2021-07-20
Payer: MEDICARE

## 2021-07-20 ENCOUNTER — PATIENT OUTREACH (OUTPATIENT)
Dept: ADMINISTRATIVE | Facility: OTHER | Age: 76
End: 2021-07-20

## 2021-07-20 VITALS — WEIGHT: 124 LBS | HEIGHT: 57 IN | BODY MASS INDEX: 26.75 KG/M2

## 2021-07-20 DIAGNOSIS — M18.11 ARTHRITIS OF CARPOMETACARPAL (CMC) JOINT OF RIGHT THUMB: ICD-10-CM

## 2021-07-20 DIAGNOSIS — M18.12 ARTHRITIS OF CARPOMETACARPAL (CMC) JOINT OF LEFT THUMB: Primary | ICD-10-CM

## 2021-07-20 PROCEDURE — 99212 OFFICE O/P EST SF 10 MIN: CPT | Mod: PBBFAC,25 | Performed by: ORTHOPAEDIC SURGERY

## 2021-07-20 PROCEDURE — 20600 DRAIN/INJ JOINT/BURSA W/O US: CPT | Mod: PBBFAC,LT | Performed by: ORTHOPAEDIC SURGERY

## 2021-07-20 PROCEDURE — 99999 PR PBB SHADOW E&M-EST. PATIENT-LVL II: ICD-10-PCS | Mod: PBBFAC,,, | Performed by: ORTHOPAEDIC SURGERY

## 2021-07-20 PROCEDURE — 20600 SMALL JOINT ASPIRATION/INJECTION: L THUMB CMC: ICD-10-PCS | Mod: S$PBB,50,ICN, | Performed by: ORTHOPAEDIC SURGERY

## 2021-07-20 PROCEDURE — 99213 PR OFFICE/OUTPT VISIT, EST, LEVL III, 20-29 MIN: ICD-10-PCS | Mod: 25,S$PBB,ICN, | Performed by: ORTHOPAEDIC SURGERY

## 2021-07-20 PROCEDURE — 99213 OFFICE O/P EST LOW 20 MIN: CPT | Mod: 25,S$PBB,ICN, | Performed by: ORTHOPAEDIC SURGERY

## 2021-07-20 PROCEDURE — 99999 PR PBB SHADOW E&M-EST. PATIENT-LVL II: CPT | Mod: PBBFAC,,, | Performed by: ORTHOPAEDIC SURGERY

## 2021-07-20 PROCEDURE — 20600 DRAIN/INJ JOINT/BURSA W/O US: CPT | Mod: PBBFAC,RT | Performed by: ORTHOPAEDIC SURGERY

## 2021-07-20 RX ORDER — TRIAMCINOLONE ACETONIDE 40 MG/ML
20 INJECTION, SUSPENSION INTRA-ARTICULAR; INTRAMUSCULAR
Status: DISCONTINUED | OUTPATIENT
Start: 2021-07-20 | End: 2021-07-20 | Stop reason: HOSPADM

## 2021-07-20 RX ADMIN — TRIAMCINOLONE ACETONIDE 20 MG: 40 INJECTION, SUSPENSION INTRA-ARTICULAR; INTRAMUSCULAR at 10:07

## 2021-08-11 ENCOUNTER — TELEPHONE (OUTPATIENT)
Dept: UROLOGY | Facility: CLINIC | Age: 76
End: 2021-08-11

## 2021-08-11 DIAGNOSIS — R39.89 SUSPECTED UTI: Primary | ICD-10-CM

## 2021-08-12 ENCOUNTER — OFFICE VISIT (OUTPATIENT)
Dept: INTERNAL MEDICINE | Facility: CLINIC | Age: 76
End: 2021-08-12
Payer: MEDICARE

## 2021-08-12 VITALS
DIASTOLIC BLOOD PRESSURE: 62 MMHG | BODY MASS INDEX: 26.35 KG/M2 | SYSTOLIC BLOOD PRESSURE: 138 MMHG | HEART RATE: 71 BPM | HEIGHT: 57 IN | OXYGEN SATURATION: 98 % | WEIGHT: 122.13 LBS

## 2021-08-12 DIAGNOSIS — N30.10 INTERSTITIAL CYSTITIS: ICD-10-CM

## 2021-08-12 DIAGNOSIS — R39.9 UTI SYMPTOMS: Primary | ICD-10-CM

## 2021-08-12 LAB
BILIRUB SERPL-MCNC: ABNORMAL MG/DL
BILIRUB UR QL STRIP: NEGATIVE
BLOOD, POC UA: ABNORMAL
CLARITY UR REFRACT.AUTO: CLEAR
COLOR UR AUTO: YELLOW
GLUCOSE UR QL STRIP: ABNORMAL
GLUCOSE UR QL STRIP: NEGATIVE
HGB UR QL STRIP: NEGATIVE
KETONES UR QL STRIP: ABNORMAL
KETONES UR QL STRIP: NEGATIVE
LEUKOCYTE ESTERASE UR QL STRIP: NEGATIVE
LEUKOCYTE ESTERASE URINE, POC: ABNORMAL
NITRITE UR QL STRIP: NEGATIVE
NITRITE, POC UA: ABNORMAL
PH UR STRIP: 6 [PH] (ref 5–8)
PH, POC UA: 6
PROT UR QL STRIP: NEGATIVE
PROTEIN, POC: ABNORMAL
SP GR UR STRIP: 1 (ref 1–1.03)
SPECIFIC GRAVITY, POC UA: 1
URN SPEC COLLECT METH UR: NORMAL
UROBILINOGEN, POC UA: ABNORMAL

## 2021-08-12 PROCEDURE — 87086 URINE CULTURE/COLONY COUNT: CPT | Performed by: PHYSICIAN ASSISTANT

## 2021-08-12 PROCEDURE — 81003 URINALYSIS AUTO W/O SCOPE: CPT | Mod: PBBFAC,91 | Performed by: PHYSICIAN ASSISTANT

## 2021-08-12 PROCEDURE — 99213 OFFICE O/P EST LOW 20 MIN: CPT | Mod: PBBFAC | Performed by: PHYSICIAN ASSISTANT

## 2021-08-12 PROCEDURE — 99999 PR PBB SHADOW E&M-EST. PATIENT-LVL III: CPT | Mod: PBBFAC,,, | Performed by: PHYSICIAN ASSISTANT

## 2021-08-12 PROCEDURE — 99213 OFFICE O/P EST LOW 20 MIN: CPT | Mod: S$PBB,,, | Performed by: PHYSICIAN ASSISTANT

## 2021-08-12 PROCEDURE — 99213 PR OFFICE/OUTPT VISIT, EST, LEVL III, 20-29 MIN: ICD-10-PCS | Mod: S$PBB,,, | Performed by: PHYSICIAN ASSISTANT

## 2021-08-12 PROCEDURE — 99999 PR PBB SHADOW E&M-EST. PATIENT-LVL III: ICD-10-PCS | Mod: PBBFAC,,, | Performed by: PHYSICIAN ASSISTANT

## 2021-08-12 PROCEDURE — 81003 URINALYSIS AUTO W/O SCOPE: CPT | Performed by: PHYSICIAN ASSISTANT

## 2021-08-14 LAB — BACTERIA UR CULT: NO GROWTH

## 2021-10-18 DIAGNOSIS — M79.641 PAIN IN BOTH HANDS: Primary | ICD-10-CM

## 2021-10-18 DIAGNOSIS — M79.642 PAIN IN BOTH HANDS: Primary | ICD-10-CM

## 2021-10-20 ENCOUNTER — OFFICE VISIT (OUTPATIENT)
Dept: INTERNAL MEDICINE | Facility: CLINIC | Age: 76
End: 2021-10-20
Payer: MEDICARE

## 2021-10-20 VITALS
HEART RATE: 62 BPM | OXYGEN SATURATION: 95 % | WEIGHT: 119.69 LBS | DIASTOLIC BLOOD PRESSURE: 72 MMHG | BODY MASS INDEX: 25.82 KG/M2 | SYSTOLIC BLOOD PRESSURE: 140 MMHG | HEIGHT: 57 IN

## 2021-10-20 DIAGNOSIS — M15.0 PRIMARY GENERALIZED (OSTEO)ARTHRITIS: ICD-10-CM

## 2021-10-20 DIAGNOSIS — F41.9 ANXIETY: Primary | ICD-10-CM

## 2021-10-20 DIAGNOSIS — R73.01 IMPAIRED FASTING GLUCOSE: ICD-10-CM

## 2021-10-20 DIAGNOSIS — K21.9 GASTROESOPHAGEAL REFLUX DISEASE, UNSPECIFIED WHETHER ESOPHAGITIS PRESENT: ICD-10-CM

## 2021-10-20 DIAGNOSIS — E78.5 HYPERLIPIDEMIA, UNSPECIFIED HYPERLIPIDEMIA TYPE: ICD-10-CM

## 2021-10-20 DIAGNOSIS — I10 HYPERTENSION, UNSPECIFIED TYPE: ICD-10-CM

## 2021-10-20 PROCEDURE — 99214 OFFICE O/P EST MOD 30 MIN: CPT | Mod: PBBFAC | Performed by: INTERNAL MEDICINE

## 2021-10-20 PROCEDURE — 99214 PR OFFICE/OUTPT VISIT, EST, LEVL IV, 30-39 MIN: ICD-10-PCS | Mod: S$PBB,,, | Performed by: INTERNAL MEDICINE

## 2021-10-20 PROCEDURE — 99999 PR PBB SHADOW E&M-EST. PATIENT-LVL IV: ICD-10-PCS | Mod: PBBFAC,,, | Performed by: INTERNAL MEDICINE

## 2021-10-20 PROCEDURE — 99999 PR PBB SHADOW E&M-EST. PATIENT-LVL IV: CPT | Mod: PBBFAC,,, | Performed by: INTERNAL MEDICINE

## 2021-10-20 PROCEDURE — 99214 OFFICE O/P EST MOD 30 MIN: CPT | Mod: S$PBB,,, | Performed by: INTERNAL MEDICINE

## 2021-10-20 RX ORDER — LORAZEPAM 2 MG/1
TABLET ORAL
Qty: 15 TABLET | Refills: 5 | Status: SHIPPED | OUTPATIENT
Start: 2021-10-20 | End: 2022-04-21 | Stop reason: SDUPTHER

## 2021-10-27 ENCOUNTER — APPOINTMENT (OUTPATIENT)
Dept: RADIOLOGY | Facility: HOSPITAL | Age: 76
End: 2021-10-27
Attending: ORTHOPAEDIC SURGERY
Payer: MEDICARE

## 2021-10-27 ENCOUNTER — OFFICE VISIT (OUTPATIENT)
Dept: ORTHOPEDICS | Facility: CLINIC | Age: 76
End: 2021-10-27
Payer: MEDICARE

## 2021-10-27 VITALS
DIASTOLIC BLOOD PRESSURE: 62 MMHG | RESPIRATION RATE: 18 BRPM | HEART RATE: 59 BPM | HEIGHT: 57 IN | WEIGHT: 120 LBS | OXYGEN SATURATION: 99 % | BODY MASS INDEX: 25.89 KG/M2 | SYSTOLIC BLOOD PRESSURE: 138 MMHG

## 2021-10-27 DIAGNOSIS — M79.641 PAIN IN BOTH HANDS: ICD-10-CM

## 2021-10-27 DIAGNOSIS — M18.11 ARTHRITIS OF CARPOMETACARPAL (CMC) JOINT OF RIGHT THUMB: Primary | ICD-10-CM

## 2021-10-27 DIAGNOSIS — M79.642 PAIN IN BOTH HANDS: ICD-10-CM

## 2021-10-27 DIAGNOSIS — M18.12 ARTHRITIS OF CARPOMETACARPAL (CMC) JOINT OF LEFT THUMB: ICD-10-CM

## 2021-10-27 PROCEDURE — 73130 XR HAND COMPLETE 3 VIEWS BILATERAL: ICD-10-PCS | Mod: 26,50,, | Performed by: RADIOLOGY

## 2021-10-27 PROCEDURE — 99214 OFFICE O/P EST MOD 30 MIN: CPT | Mod: PBBFAC,PN,25 | Performed by: ORTHOPAEDIC SURGERY

## 2021-10-27 PROCEDURE — 20600 SMALL JOINT ASPIRATION/INJECTION: R THUMB CMC, L THUMB CMC: ICD-10-PCS | Mod: 50,S$PBB,, | Performed by: ORTHOPAEDIC SURGERY

## 2021-10-27 PROCEDURE — 20600 DRAIN/INJ JOINT/BURSA W/O US: CPT | Mod: 50,PBBFAC,PN | Performed by: ORTHOPAEDIC SURGERY

## 2021-10-27 PROCEDURE — 73130 X-RAY EXAM OF HAND: CPT | Mod: TC,50,FY,PN

## 2021-10-27 PROCEDURE — 99213 OFFICE O/P EST LOW 20 MIN: CPT | Mod: 25,S$PBB,, | Performed by: ORTHOPAEDIC SURGERY

## 2021-10-27 PROCEDURE — 99999 PR PBB SHADOW E&M-EST. PATIENT-LVL IV: ICD-10-PCS | Mod: PBBFAC,,, | Performed by: ORTHOPAEDIC SURGERY

## 2021-10-27 PROCEDURE — 99999 PR PBB SHADOW E&M-EST. PATIENT-LVL IV: CPT | Mod: PBBFAC,,, | Performed by: ORTHOPAEDIC SURGERY

## 2021-10-27 PROCEDURE — 99213 PR OFFICE/OUTPT VISIT, EST, LEVL III, 20-29 MIN: ICD-10-PCS | Mod: 25,S$PBB,, | Performed by: ORTHOPAEDIC SURGERY

## 2021-10-27 PROCEDURE — 73130 X-RAY EXAM OF HAND: CPT | Mod: 26,50,, | Performed by: RADIOLOGY

## 2021-10-27 RX ADMIN — TRIAMCINOLONE ACETONIDE 40 MG: 40 INJECTION, SUSPENSION INTRA-ARTICULAR; INTRAMUSCULAR at 11:10

## 2021-10-28 RX ORDER — TRIAMCINOLONE ACETONIDE 40 MG/ML
40 INJECTION, SUSPENSION INTRA-ARTICULAR; INTRAMUSCULAR
Status: DISCONTINUED | OUTPATIENT
Start: 2021-10-27 | End: 2021-10-28 | Stop reason: HOSPADM

## 2021-11-17 ENCOUNTER — PATIENT OUTREACH (OUTPATIENT)
Dept: ADMINISTRATIVE | Facility: OTHER | Age: 76
End: 2021-11-17
Payer: MEDICARE

## 2021-11-17 DIAGNOSIS — M25.561 PAIN IN BOTH KNEES, UNSPECIFIED CHRONICITY: Primary | ICD-10-CM

## 2021-11-17 DIAGNOSIS — M25.562 PAIN IN BOTH KNEES, UNSPECIFIED CHRONICITY: Primary | ICD-10-CM

## 2021-11-18 ENCOUNTER — OFFICE VISIT (OUTPATIENT)
Dept: ORTHOPEDICS | Facility: CLINIC | Age: 76
End: 2021-11-18
Payer: MEDICARE

## 2021-11-18 VITALS
WEIGHT: 122.13 LBS | HEART RATE: 60 BPM | OXYGEN SATURATION: 97 % | DIASTOLIC BLOOD PRESSURE: 60 MMHG | BODY MASS INDEX: 26.35 KG/M2 | RESPIRATION RATE: 18 BRPM | HEIGHT: 57 IN | SYSTOLIC BLOOD PRESSURE: 128 MMHG

## 2021-11-18 DIAGNOSIS — M17.0 PRIMARY OSTEOARTHRITIS OF BOTH KNEES: Primary | ICD-10-CM

## 2021-11-18 PROCEDURE — 99213 PR OFFICE/OUTPT VISIT, EST, LEVL III, 20-29 MIN: ICD-10-PCS | Mod: 25,S$PBB,, | Performed by: ORTHOPAEDIC SURGERY

## 2021-11-18 PROCEDURE — 99213 OFFICE O/P EST LOW 20 MIN: CPT | Mod: PBBFAC,PN,25 | Performed by: ORTHOPAEDIC SURGERY

## 2021-11-18 PROCEDURE — 20610 LARGE JOINT ASPIRATION/INJECTION: BILATERAL KNEE: ICD-10-PCS | Mod: 50,S$PBB,, | Performed by: ORTHOPAEDIC SURGERY

## 2021-11-18 PROCEDURE — 20610 DRAIN/INJ JOINT/BURSA W/O US: CPT | Mod: 50,PBBFAC,PN | Performed by: ORTHOPAEDIC SURGERY

## 2021-11-18 PROCEDURE — 99999 PR PBB SHADOW E&M-EST. PATIENT-LVL III: CPT | Mod: PBBFAC,,, | Performed by: ORTHOPAEDIC SURGERY

## 2021-11-18 PROCEDURE — 99999 PR PBB SHADOW E&M-EST. PATIENT-LVL III: ICD-10-PCS | Mod: PBBFAC,,, | Performed by: ORTHOPAEDIC SURGERY

## 2021-11-18 PROCEDURE — 99213 OFFICE O/P EST LOW 20 MIN: CPT | Mod: 25,S$PBB,, | Performed by: ORTHOPAEDIC SURGERY

## 2021-11-18 RX ORDER — TRIAMCINOLONE ACETONIDE 40 MG/ML
40 INJECTION, SUSPENSION INTRA-ARTICULAR; INTRAMUSCULAR
Status: DISCONTINUED | OUTPATIENT
Start: 2021-11-18 | End: 2021-11-18 | Stop reason: HOSPADM

## 2021-11-18 RX ADMIN — TRIAMCINOLONE ACETONIDE 40 MG: 40 INJECTION, SUSPENSION INTRA-ARTICULAR; INTRAMUSCULAR at 10:11

## 2021-12-14 ENCOUNTER — OFFICE VISIT (OUTPATIENT)
Dept: UROLOGY | Facility: CLINIC | Age: 76
End: 2021-12-14
Payer: MEDICARE

## 2021-12-14 VITALS — BODY MASS INDEX: 26.12 KG/M2 | WEIGHT: 121.06 LBS | HEIGHT: 57 IN

## 2021-12-14 DIAGNOSIS — N30.10 INTERSTITIAL CYSTITIS: ICD-10-CM

## 2021-12-14 DIAGNOSIS — N32.81 OVERACTIVE BLADDER: Primary | ICD-10-CM

## 2021-12-14 LAB
BILIRUB SERPL-MCNC: NEGATIVE MG/DL
BLOOD URINE, POC: NEGATIVE
COLOR, POC UA: YELLOW
GLUCOSE UR QL STRIP: NORMAL
KETONES UR QL STRIP: NEGATIVE
LEUKOCYTE ESTERASE URINE, POC: NEGATIVE
NITRITE, POC UA: NEGATIVE
PH, POC UA: 6
PROTEIN, POC: NEGATIVE
SPECIFIC GRAVITY, POC UA: 1005
UROBILINOGEN, POC UA: NORMAL

## 2021-12-14 PROCEDURE — 99213 OFFICE O/P EST LOW 20 MIN: CPT | Mod: PBBFAC | Performed by: NURSE PRACTITIONER

## 2021-12-14 PROCEDURE — 99213 OFFICE O/P EST LOW 20 MIN: CPT | Mod: S$PBB,,, | Performed by: NURSE PRACTITIONER

## 2021-12-14 PROCEDURE — 81001 URINALYSIS AUTO W/SCOPE: CPT | Mod: PBBFAC | Performed by: NURSE PRACTITIONER

## 2021-12-14 PROCEDURE — 99999 PR PBB SHADOW E&M-EST. PATIENT-LVL III: CPT | Mod: PBBFAC,,, | Performed by: NURSE PRACTITIONER

## 2021-12-14 PROCEDURE — 99999 PR PBB SHADOW E&M-EST. PATIENT-LVL III: ICD-10-PCS | Mod: PBBFAC,,, | Performed by: NURSE PRACTITIONER

## 2021-12-14 PROCEDURE — 99213 PR OFFICE/OUTPT VISIT, EST, LEVL III, 20-29 MIN: ICD-10-PCS | Mod: S$PBB,,, | Performed by: NURSE PRACTITIONER

## 2022-01-31 ENCOUNTER — PATIENT OUTREACH (OUTPATIENT)
Dept: ADMINISTRATIVE | Facility: OTHER | Age: 77
End: 2022-01-31
Payer: MEDICARE

## 2022-02-01 ENCOUNTER — OFFICE VISIT (OUTPATIENT)
Dept: ORTHOPEDICS | Facility: CLINIC | Age: 77
End: 2022-02-01
Payer: MEDICARE

## 2022-02-01 VITALS
HEART RATE: 71 BPM | OXYGEN SATURATION: 98 % | RESPIRATION RATE: 18 BRPM | BODY MASS INDEX: 26.63 KG/M2 | HEIGHT: 57 IN | WEIGHT: 123.44 LBS | DIASTOLIC BLOOD PRESSURE: 64 MMHG | SYSTOLIC BLOOD PRESSURE: 152 MMHG

## 2022-02-01 DIAGNOSIS — M17.0 PRIMARY OSTEOARTHRITIS OF BOTH KNEES: Primary | ICD-10-CM

## 2022-02-01 DIAGNOSIS — M18.12 ARTHRITIS OF CARPOMETACARPAL (CMC) JOINT OF LEFT THUMB: ICD-10-CM

## 2022-02-01 DIAGNOSIS — M18.11 ARTHRITIS OF CARPOMETACARPAL (CMC) JOINT OF RIGHT THUMB: ICD-10-CM

## 2022-02-01 PROCEDURE — 99999 PR PBB SHADOW E&M-EST. PATIENT-LVL IV: CPT | Mod: PBBFAC,,, | Performed by: ORTHOPAEDIC SURGERY

## 2022-02-01 PROCEDURE — 99214 OFFICE O/P EST MOD 30 MIN: CPT | Mod: PBBFAC,PN,25 | Performed by: ORTHOPAEDIC SURGERY

## 2022-02-01 PROCEDURE — 99213 OFFICE O/P EST LOW 20 MIN: CPT | Mod: 25,S$PBB,, | Performed by: ORTHOPAEDIC SURGERY

## 2022-02-01 PROCEDURE — 99213 PR OFFICE/OUTPT VISIT, EST, LEVL III, 20-29 MIN: ICD-10-PCS | Mod: 25,S$PBB,, | Performed by: ORTHOPAEDIC SURGERY

## 2022-02-01 PROCEDURE — 20600 DRAIN/INJ JOINT/BURSA W/O US: CPT | Mod: 50,PBBFAC,PN | Performed by: ORTHOPAEDIC SURGERY

## 2022-02-01 PROCEDURE — 99999 PR PBB SHADOW E&M-EST. PATIENT-LVL IV: ICD-10-PCS | Mod: PBBFAC,,, | Performed by: ORTHOPAEDIC SURGERY

## 2022-02-01 PROCEDURE — 20600 SMALL JOINT ASPIRATION/INJECTION: L THUMB CMC, R THUMB CMC: ICD-10-PCS | Mod: 50,S$PBB,, | Performed by: ORTHOPAEDIC SURGERY

## 2022-02-01 RX ORDER — TRIAMCINOLONE ACETONIDE 40 MG/ML
40 INJECTION, SUSPENSION INTRA-ARTICULAR; INTRAMUSCULAR
Status: DISCONTINUED | OUTPATIENT
Start: 2022-02-01 | End: 2022-02-01 | Stop reason: HOSPADM

## 2022-02-01 RX ADMIN — TRIAMCINOLONE ACETONIDE 40 MG: 40 INJECTION, SUSPENSION INTRA-ARTICULAR; INTRAMUSCULAR at 10:02

## 2022-02-01 NOTE — PROGRESS NOTES
"Follow up visit    History of Present Illness:   Juli comes to the office for follow up evaluation of bilateral CMC arthritis  - here for repeat injections    ROS: unremarkable and no change since last visit    Physical Examination:    Vitals:    02/01/22 1044   BP: (!) 152/64   Pulse: 71   Resp: 18   SpO2: 98%   Weight: 56 kg (123 lb 7.3 oz)   Height: 4' 9" (1.448 m)   PainSc:   2   PainLoc: Hand      NAD  Bilateral Hand/Wrist Examination:    Observation/Inspection:  Swelling  none    Deformity  none  Discoloration  none     Scars   none    Atrophy  none    HAND/WRIST EXAMINATION:  Finkelstein's Test   Neg  WHAT Test    Neg  CMC grind    Pos  Circumduction test   Pos    Neurovascular Exam:  Digits WWP, brisk CR < 3s throughout  NVI motor/LTS to M/R/U nerves, radial pulse 2+  Tinel's Test - Carpal Tunnel  Neg  Tinel's Test - Cubital Tunnel  Neg  Phalen's Test    Neg  Median Nerve Compression Test Neg    ROM hand/wrist/elbow full, painless     Radiographic imaging: 3 views of the bilateral hands show advanced CMC OA    Assessment/Plan:  76 y.o. female  with Bilateral CMC arthritis    We discussed the etiology of persistent pain and further treatment options.  Injection of the bilateral thumb carpometacarpal joint injection  performed, please see procedure note for more details.  Prior to the injection risks and benefits of corticosteroid injection were discussed with the patient including pain, infection, bleeding, skin color changes, swelling, steroid flare. We discussed that over time injections can result in chondral damage, acceleration of arthritis formation, damage to tendons and damage to joints.  The patient consented for the procedure.  Post-injection instructions were given to the patient in writing.  Return for follow up visit: PRN    All questions were answered in detail. The patient  verbalized the understanding of the treatment plan and is in full agreement with the treatment plan.    "

## 2022-02-01 NOTE — PROCEDURES
Small Joint Aspiration/Injection: L thumb CMC, R thumb CMC    Date/Time: 2/1/2022 10:45 AM  Performed by: Berta Conroy MD  Authorized by: Berta Conroy MD     Consent Done?:  Yes (Verbal)  Indications:  Arthritis  Timeout: prior to procedure the correct patient, procedure, and site was verified    Prep: patient was prepped and draped in usual sterile fashion      Local anesthesia used?: Yes    Local anesthetic:  Topical anesthetic  Location:  Thumb  Site:  L thumb CMC and R thumb CMC  Needle size:  25 G  Medications:  40 mg triamcinolone acetonide 40 mg/mL; 40 mg triamcinolone acetonide 40 mg/mL  Patient tolerance:  Patient tolerated the procedure well with no immediate complications

## 2022-02-22 ENCOUNTER — OFFICE VISIT (OUTPATIENT)
Dept: ORTHOPEDICS | Facility: CLINIC | Age: 77
End: 2022-02-22
Payer: MEDICARE

## 2022-02-22 VITALS
DIASTOLIC BLOOD PRESSURE: 60 MMHG | WEIGHT: 121.25 LBS | OXYGEN SATURATION: 96 % | RESPIRATION RATE: 18 BRPM | SYSTOLIC BLOOD PRESSURE: 130 MMHG | HEIGHT: 57 IN | BODY MASS INDEX: 26.16 KG/M2 | HEART RATE: 66 BPM

## 2022-02-22 DIAGNOSIS — M17.0 PRIMARY OSTEOARTHRITIS OF BOTH KNEES: Primary | ICD-10-CM

## 2022-02-22 PROCEDURE — 99499 UNLISTED E&M SERVICE: CPT | Mod: S$PBB,,, | Performed by: PHYSICIAN ASSISTANT

## 2022-02-22 PROCEDURE — 99214 OFFICE O/P EST MOD 30 MIN: CPT | Mod: PBBFAC,PN | Performed by: PHYSICIAN ASSISTANT

## 2022-02-22 PROCEDURE — 20610 PR DRAIN/INJECT LARGE JOINT/BURSA: ICD-10-PCS | Mod: 50,S$PBB,, | Performed by: PHYSICIAN ASSISTANT

## 2022-02-22 PROCEDURE — 99999 PR PBB SHADOW E&M-EST. PATIENT-LVL IV: CPT | Mod: PBBFAC,,, | Performed by: PHYSICIAN ASSISTANT

## 2022-02-22 PROCEDURE — 20610 DRAIN/INJ JOINT/BURSA W/O US: CPT | Mod: 50,S$PBB,, | Performed by: PHYSICIAN ASSISTANT

## 2022-02-22 PROCEDURE — 99499 NO LOS: ICD-10-PCS | Mod: S$PBB,,, | Performed by: PHYSICIAN ASSISTANT

## 2022-02-22 PROCEDURE — 20610 DRAIN/INJ JOINT/BURSA W/O US: CPT | Mod: 50,PBBFAC,PN | Performed by: PHYSICIAN ASSISTANT

## 2022-02-22 PROCEDURE — 99999 PR PBB SHADOW E&M-EST. PATIENT-LVL IV: ICD-10-PCS | Mod: PBBFAC,,, | Performed by: PHYSICIAN ASSISTANT

## 2022-02-22 RX ADMIN — Medication 88 MG: at 02:02

## 2022-02-22 NOTE — PROGRESS NOTES
Juli Parra presents to clinic today for bilateral knee Monovisc injections.  There has been no significant change in her medical status since her last visit. No fever, chills, malaise, or unexplained weight change.    Allergies, medications, past medical and surgical history were reviewed.    Exam demonstrates there is no effusion in the  bilateral knee, and the skin is intact.    Diagnosis: bilateral knee osteoarthritis    After time out was performed and patient ID, side, and site were verified, the  bilateral  knee was sterilly prepped in the standard fashion.  A 22-gauge needle was introduced into bilateral knee joint from an jeovanny-lateral site without complication and knee was then injected with 4 ml of Monovisc.  Sterile dressing was applied.  The patient was instructed to resume activities as tolerated and to call with any problems.     We will see Juli Parra back for follow up as needed

## 2022-03-14 ENCOUNTER — PES CALL (OUTPATIENT)
Dept: ADMINISTRATIVE | Facility: CLINIC | Age: 77
End: 2022-03-14
Payer: MEDICARE

## 2022-03-30 ENCOUNTER — TELEPHONE (OUTPATIENT)
Dept: INTERNAL MEDICINE | Facility: CLINIC | Age: 77
End: 2022-03-30
Payer: MEDICARE

## 2022-03-30 NOTE — TELEPHONE ENCOUNTER
PA for Dicyclomine HCl 10MG capsules approved         TEZ BRICENO Key: [id number] - PA Case ID: [id number] - Rx #: [id number]  Approved today  Your request has been approved  Drug  Dicyclomine HCl 10MG capsules  Form  [employer] Electronic PA Form (2017 NCPDP)  Original Claim Info  90,56 PA REQUIRED-MD CONTACT [facility] REQUIRES PRIOR AUTHORIZATION(PHARMACY HELP DESK [phone])

## 2022-03-30 NOTE — TELEPHONE ENCOUNTER
PA for DIcyclomine HCl 10MG capsules initiated Decision to be sent via fax          TEZ BRICENO Key: [id number] - PA Case ID: [id number] - Rx #: [id number]   Status  Sent to [facility]  Drug  Dicyclomine HCl 10MG capsules  Form  [employer] Medicare Electronic PA Form (2017 NCPDP)  Original Claim Info  80,570 PA REQUIRED-MD CONTACT [facility] REQUIRES PRIOR AUTHORIZATION(PHARMACY HELP DESK [phone])

## 2022-04-12 ENCOUNTER — LAB VISIT (OUTPATIENT)
Dept: LAB | Facility: HOSPITAL | Age: 77
End: 2022-04-12
Attending: INTERNAL MEDICINE
Payer: MEDICARE

## 2022-04-12 DIAGNOSIS — K21.9 GASTROESOPHAGEAL REFLUX DISEASE, UNSPECIFIED WHETHER ESOPHAGITIS PRESENT: ICD-10-CM

## 2022-04-12 DIAGNOSIS — R73.01 IMPAIRED FASTING GLUCOSE: ICD-10-CM

## 2022-04-12 DIAGNOSIS — E78.5 HYPERLIPIDEMIA, UNSPECIFIED HYPERLIPIDEMIA TYPE: ICD-10-CM

## 2022-04-12 DIAGNOSIS — F41.9 ANXIETY: ICD-10-CM

## 2022-04-12 DIAGNOSIS — I10 HYPERTENSION, UNSPECIFIED TYPE: ICD-10-CM

## 2022-04-12 DIAGNOSIS — M15.0 PRIMARY GENERALIZED (OSTEO)ARTHRITIS: ICD-10-CM

## 2022-04-12 LAB
ALBUMIN SERPL BCP-MCNC: 3.8 G/DL (ref 3.5–5.2)
ALP SERPL-CCNC: 65 U/L (ref 55–135)
ALT SERPL W/O P-5'-P-CCNC: 15 U/L (ref 10–44)
ANION GAP SERPL CALC-SCNC: 8 MMOL/L (ref 8–16)
AST SERPL-CCNC: 21 U/L (ref 10–40)
BASOPHILS # BLD AUTO: 0.04 K/UL (ref 0–0.2)
BASOPHILS NFR BLD: 0.8 % (ref 0–1.9)
BILIRUB SERPL-MCNC: 0.7 MG/DL (ref 0.1–1)
BUN SERPL-MCNC: 12 MG/DL (ref 8–23)
CALCIUM SERPL-MCNC: 9.4 MG/DL (ref 8.7–10.5)
CHLORIDE SERPL-SCNC: 100 MMOL/L (ref 95–110)
CHOLEST SERPL-MCNC: 202 MG/DL (ref 120–199)
CHOLEST/HDLC SERPL: 3.3 {RATIO} (ref 2–5)
CO2 SERPL-SCNC: 29 MMOL/L (ref 23–29)
CREAT SERPL-MCNC: 0.6 MG/DL (ref 0.5–1.4)
DIFFERENTIAL METHOD: ABNORMAL
EOSINOPHIL # BLD AUTO: 0.2 K/UL (ref 0–0.5)
EOSINOPHIL NFR BLD: 3.8 % (ref 0–8)
ERYTHROCYTE [DISTWIDTH] IN BLOOD BY AUTOMATED COUNT: 12.2 % (ref 11.5–14.5)
EST. GFR  (AFRICAN AMERICAN): >60 ML/MIN/1.73 M^2
EST. GFR  (NON AFRICAN AMERICAN): >60 ML/MIN/1.73 M^2
ESTIMATED AVG GLUCOSE: 114 MG/DL (ref 68–131)
GLUCOSE SERPL-MCNC: 109 MG/DL (ref 70–110)
HBA1C MFR BLD: 5.6 % (ref 4–5.6)
HCT VFR BLD AUTO: 39.6 % (ref 37–48.5)
HDLC SERPL-MCNC: 62 MG/DL (ref 40–75)
HDLC SERPL: 30.7 % (ref 20–50)
HGB BLD-MCNC: 13.1 G/DL (ref 12–16)
IMM GRANULOCYTES # BLD AUTO: 0.03 K/UL (ref 0–0.04)
IMM GRANULOCYTES NFR BLD AUTO: 0.6 % (ref 0–0.5)
LDLC SERPL CALC-MCNC: 116.8 MG/DL (ref 63–159)
LYMPHOCYTES # BLD AUTO: 0.8 K/UL (ref 1–4.8)
LYMPHOCYTES NFR BLD: 17.3 % (ref 18–48)
MCH RBC QN AUTO: 32.4 PG (ref 27–31)
MCHC RBC AUTO-ENTMCNC: 33.1 G/DL (ref 32–36)
MCV RBC AUTO: 98 FL (ref 82–98)
MONOCYTES # BLD AUTO: 0.6 K/UL (ref 0.3–1)
MONOCYTES NFR BLD: 11.8 % (ref 4–15)
NEUTROPHILS # BLD AUTO: 3.1 K/UL (ref 1.8–7.7)
NEUTROPHILS NFR BLD: 65.7 % (ref 38–73)
NONHDLC SERPL-MCNC: 140 MG/DL
NRBC BLD-RTO: 0 /100 WBC
PLATELET # BLD AUTO: 256 K/UL (ref 150–450)
PMV BLD AUTO: 9.4 FL (ref 9.2–12.9)
POTASSIUM SERPL-SCNC: 4.1 MMOL/L (ref 3.5–5.1)
PROT SERPL-MCNC: 7 G/DL (ref 6–8.4)
RBC # BLD AUTO: 4.04 M/UL (ref 4–5.4)
SODIUM SERPL-SCNC: 137 MMOL/L (ref 136–145)
TRIGL SERPL-MCNC: 116 MG/DL (ref 30–150)
TSH SERPL DL<=0.005 MIU/L-ACNC: 1.65 UIU/ML (ref 0.4–4)
WBC # BLD AUTO: 4.75 K/UL (ref 3.9–12.7)

## 2022-04-12 PROCEDURE — 85025 COMPLETE CBC W/AUTO DIFF WBC: CPT | Performed by: INTERNAL MEDICINE

## 2022-04-12 PROCEDURE — 84443 ASSAY THYROID STIM HORMONE: CPT | Performed by: INTERNAL MEDICINE

## 2022-04-12 PROCEDURE — 80061 LIPID PANEL: CPT | Performed by: INTERNAL MEDICINE

## 2022-04-12 PROCEDURE — 80053 COMPREHEN METABOLIC PANEL: CPT | Performed by: INTERNAL MEDICINE

## 2022-04-12 PROCEDURE — 36415 COLL VENOUS BLD VENIPUNCTURE: CPT | Mod: PO | Performed by: INTERNAL MEDICINE

## 2022-04-12 PROCEDURE — 83036 HEMOGLOBIN GLYCOSYLATED A1C: CPT | Performed by: INTERNAL MEDICINE

## 2022-04-21 ENCOUNTER — OFFICE VISIT (OUTPATIENT)
Dept: INTERNAL MEDICINE | Facility: CLINIC | Age: 77
End: 2022-04-21
Payer: MEDICARE

## 2022-04-21 VITALS
HEIGHT: 57 IN | OXYGEN SATURATION: 98 % | WEIGHT: 121.94 LBS | SYSTOLIC BLOOD PRESSURE: 138 MMHG | DIASTOLIC BLOOD PRESSURE: 70 MMHG | BODY MASS INDEX: 26.31 KG/M2 | HEART RATE: 71 BPM

## 2022-04-21 DIAGNOSIS — R73.01 IMPAIRED FASTING GLUCOSE: ICD-10-CM

## 2022-04-21 DIAGNOSIS — F41.9 ANXIETY: ICD-10-CM

## 2022-04-21 DIAGNOSIS — K21.9 GASTROESOPHAGEAL REFLUX DISEASE, UNSPECIFIED WHETHER ESOPHAGITIS PRESENT: ICD-10-CM

## 2022-04-21 DIAGNOSIS — M17.11 ARTHRITIS OF RIGHT KNEE: ICD-10-CM

## 2022-04-21 DIAGNOSIS — K58.2 MIXED IRRITABLE BOWEL SYNDROME: ICD-10-CM

## 2022-04-21 DIAGNOSIS — I10 HYPERTENSION, UNSPECIFIED TYPE: Primary | ICD-10-CM

## 2022-04-21 PROCEDURE — 99999 PR PBB SHADOW E&M-EST. PATIENT-LVL III: ICD-10-PCS | Mod: PBBFAC,,, | Performed by: INTERNAL MEDICINE

## 2022-04-21 PROCEDURE — 99999 PR PBB SHADOW E&M-EST. PATIENT-LVL III: CPT | Mod: PBBFAC,,, | Performed by: INTERNAL MEDICINE

## 2022-04-21 PROCEDURE — 99214 OFFICE O/P EST MOD 30 MIN: CPT | Mod: S$PBB,,, | Performed by: INTERNAL MEDICINE

## 2022-04-21 PROCEDURE — 99213 OFFICE O/P EST LOW 20 MIN: CPT | Mod: PBBFAC | Performed by: INTERNAL MEDICINE

## 2022-04-21 PROCEDURE — 99214 PR OFFICE/OUTPT VISIT, EST, LEVL IV, 30-39 MIN: ICD-10-PCS | Mod: S$PBB,,, | Performed by: INTERNAL MEDICINE

## 2022-04-21 RX ORDER — LORAZEPAM 2 MG/1
TABLET ORAL
Qty: 15 TABLET | Refills: 5 | Status: SHIPPED | OUTPATIENT
Start: 2022-04-21 | End: 2022-10-05 | Stop reason: SDUPTHER

## 2022-04-21 RX ORDER — ESOMEPRAZOLE MAGNESIUM 40 MG/1
40 CAPSULE, DELAYED RELEASE ORAL
Qty: 90 CAPSULE | Refills: 11 | Status: SHIPPED | OUTPATIENT
Start: 2022-04-21 | End: 2024-02-23

## 2022-04-21 RX ORDER — FLUTICASONE PROPIONATE 50 MCG
1 SPRAY, SUSPENSION (ML) NASAL DAILY
Qty: 16 G | Refills: 3 | Status: SHIPPED | OUTPATIENT
Start: 2022-04-21 | End: 2023-04-28

## 2022-04-21 NOTE — PROGRESS NOTES
Subjective:       Patient ID: Juli Parra is a 76 y.o. female.    Chief Complaint: Follow-up    HPI: Pt here for follow up of medical problems. Overall she says except for her right knee she feels medically well, but she did not think the last injection helped her knee and she does not want surgery. She does want updated Rxs hand written. I will print for her  reviewed. She does still complain of some anxiety. Side effects of nerve medication reviewed.     Review of Systems   Constitutional: Negative for appetite change, chills and fever.   HENT: Negative for nosebleeds and sore throat.    Eyes: Negative for pain and visual disturbance.   Respiratory: Negative for cough, shortness of breath and wheezing.    Cardiovascular: Negative for chest pain and leg swelling.   Gastrointestinal: Negative for abdominal pain, constipation and diarrhea.   Endocrine: Negative for polyuria.   Genitourinary: Negative for difficulty urinating, hematuria and vaginal bleeding.   Musculoskeletal: Positive for arthralgias and gait problem. Negative for back pain and neck pain.   Integumentary:  Negative for pallor and rash.   Neurological: Negative for tremors, seizures and headaches.   Hematological: Does not bruise/bleed easily.   Psychiatric/Behavioral: Negative for dysphoric mood. The patient is nervous/anxious.            Past Medical History:   Diagnosis Date    Anxiety     GERD (gastroesophageal reflux disease)     HTN (hypertension)     IBS (irritable bowel syndrome)     Kidney stones     OAB (overactive bladder)     Vertigo      Past Surgical History:   Procedure Laterality Date    CATARACT EXTRACTION, BILATERAL      CHOLECYSTECTOMY      HYSTERECTOMY      LITHOTRIPSY        Patient Active Problem List   Diagnosis    Mixed irritable bowel syndrome    Primary generalized (osteo)arthritis    Hypertension    Impaired fasting glucose    Gastroesophageal reflux disease    Bilateral sensorineural hearing loss     Anxiety    Arthritis of right knee        Objective:      Physical Exam  Constitutional:       General: She is not in acute distress.     Appearance: She is well-developed.   HENT:      Head: Normocephalic and atraumatic.      Right Ear: Tympanic membrane, ear canal and external ear normal.      Left Ear: Tympanic membrane, ear canal and external ear normal.      Mouth/Throat:      Pharynx: No oropharyngeal exudate or posterior oropharyngeal erythema.   Eyes:      General: No scleral icterus.     Conjunctiva/sclera: Conjunctivae normal.      Pupils: Pupils are equal, round, and reactive to light.   Neck:      Thyroid: No thyromegaly.   Cardiovascular:      Rate and Rhythm: Normal rate and regular rhythm.      Pulses: Normal pulses.      Heart sounds: No murmur heard.  Pulmonary:      Effort: Pulmonary effort is normal.      Breath sounds: Normal breath sounds. No wheezing.   Abdominal:      General: Bowel sounds are normal. There is no distension.      Palpations: Abdomen is soft.      Tenderness: There is no abdominal tenderness.   Musculoskeletal:         General: Swelling (right knee) and tenderness (right knee) present.      Cervical back: Normal range of motion and neck supple.      Right lower leg: No edema.      Left lower leg: No edema.   Lymphadenopathy:      Cervical: No cervical adenopathy.   Skin:     Coloration: Skin is not jaundiced.      Findings: No rash.   Neurological:      General: No focal deficit present.      Mental Status: She is alert and oriented to person, place, and time.   Psychiatric:         Mood and Affect: Mood normal.         Behavior: Behavior normal.         Assessment:       Problem List Items Addressed This Visit        Psychiatric    Anxiety    Relevant Medications    LORazepam (ATIVAN) 2 MG Tab       Cardiac/Vascular    Hypertension - Primary       Endocrine    Impaired fasting glucose       GI    Mixed irritable bowel syndrome    Gastroesophageal reflux disease        Orthopedic    Arthritis of right knee          Plan:         Juli was seen today for follow-up.    Diagnoses and all orders for this visit:    Hypertension, unspecified type    Impaired fasting glucose    Gastroesophageal reflux disease, unspecified whether esophagitis present    Mixed irritable bowel syndrome    Arthritis of right knee    Anxiety  -     LORazepam (ATIVAN) 2 MG Tab; 1/2 nightly prn    Other orders  -     esomeprazole (NEXIUM) 40 MG capsule; Take 1 capsule (40 mg total) by mouth before breakfast.  -     fluticasone propionate (FLONASE) 50 mcg/actuation nasal spray; 1 spray (50 mcg total) by Each Nostril route once daily.       Labs reviewed with the pt.       Refuses Flu and Pnuemo vaccines. Yearly follow up, continue to see Ortho.

## 2022-05-03 ENCOUNTER — TELEPHONE (OUTPATIENT)
Dept: FAMILY MEDICINE | Facility: CLINIC | Age: 77
End: 2022-05-03
Payer: MEDICARE

## 2022-05-03 DIAGNOSIS — N32.81 OAB (OVERACTIVE BLADDER): ICD-10-CM

## 2022-05-03 NOTE — TELEPHONE ENCOUNTER
----- Message from Silvestre Deng sent at 5/3/2022  8:05 AM CDT -----  Type: Patient Call Back    Who called: Self    What is the request in detail: Pt called to check the status of her refill request for oxybutynin (DITROPAN XL) 15 MG TR24. Pt states Joseph sent over the request on 4/29        Can the clinic reply by [facility]? no    Would the patient rather a call back or a response via [facility]? Call back    Best call back number: [phone]

## 2022-05-03 NOTE — TELEPHONE ENCOUNTER
----- Message from Silvestre Deng sent at 5/3/2022 10:23 AM CDT -----  Type: Patient Call Back    Who called:Self    What is the request in detail: Pt prescription was denied. Pt calling to see why    Can the clinic reply by [facility]? no    Would the patient rather a call back or a response via [facility]? Call back    Best call back number:[phone] (M)

## 2022-05-05 RX ORDER — OXYBUTYNIN CHLORIDE 15 MG/1
15 TABLET, EXTENDED RELEASE ORAL DAILY
Qty: 30 TABLET | Refills: 11 | Status: SHIPPED | OUTPATIENT
Start: 2022-05-05 | End: 2023-02-16 | Stop reason: SDUPTHER

## 2022-06-01 ENCOUNTER — OFFICE VISIT (OUTPATIENT)
Dept: ORTHOPEDICS | Facility: CLINIC | Age: 77
End: 2022-06-01
Payer: MEDICARE

## 2022-06-01 VITALS
DIASTOLIC BLOOD PRESSURE: 56 MMHG | BODY MASS INDEX: 26.47 KG/M2 | WEIGHT: 122.31 LBS | SYSTOLIC BLOOD PRESSURE: 132 MMHG | RESPIRATION RATE: 18 BRPM | OXYGEN SATURATION: 97 % | HEART RATE: 67 BPM

## 2022-06-01 DIAGNOSIS — M18.12 ARTHRITIS OF CARPOMETACARPAL (CMC) JOINT OF LEFT THUMB: ICD-10-CM

## 2022-06-01 DIAGNOSIS — M18.11 ARTHRITIS OF CARPOMETACARPAL (CMC) JOINT OF RIGHT THUMB: Primary | ICD-10-CM

## 2022-06-01 PROCEDURE — 99999 PR PBB SHADOW E&M-EST. PATIENT-LVL III: ICD-10-PCS | Mod: PBBFAC,,, | Performed by: PHYSICIAN ASSISTANT

## 2022-06-01 PROCEDURE — 99213 PR OFFICE/OUTPT VISIT, EST, LEVL III, 20-29 MIN: ICD-10-PCS | Mod: S$PBB,25,, | Performed by: PHYSICIAN ASSISTANT

## 2022-06-01 PROCEDURE — 20600 DRAIN/INJ JOINT/BURSA W/O US: CPT | Mod: 50,PBBFAC,PN | Performed by: PHYSICIAN ASSISTANT

## 2022-06-01 PROCEDURE — 99213 OFFICE O/P EST LOW 20 MIN: CPT | Mod: PBBFAC,PN | Performed by: PHYSICIAN ASSISTANT

## 2022-06-01 PROCEDURE — 20600 DRAIN/INJ JOINT/BURSA W/O US: CPT | Mod: 50,S$PBB,, | Performed by: PHYSICIAN ASSISTANT

## 2022-06-01 PROCEDURE — 99999 PR PBB SHADOW E&M-EST. PATIENT-LVL III: CPT | Mod: PBBFAC,,, | Performed by: PHYSICIAN ASSISTANT

## 2022-06-01 PROCEDURE — 20600 SMALL JOINT ASPIRATION/INJECTION: L THUMB CMC, R THUMB CMC: ICD-10-PCS | Mod: 50,S$PBB,, | Performed by: PHYSICIAN ASSISTANT

## 2022-06-01 PROCEDURE — 99213 OFFICE O/P EST LOW 20 MIN: CPT | Mod: S$PBB,25,, | Performed by: PHYSICIAN ASSISTANT

## 2022-06-01 RX ADMIN — TRIAMCINOLONE ACETONIDE 40 MG: 40 INJECTION, SUSPENSION INTRA-ARTICULAR; INTRAMUSCULAR at 02:06

## 2022-06-02 RX ORDER — TRIAMCINOLONE ACETONIDE 40 MG/ML
40 INJECTION, SUSPENSION INTRA-ARTICULAR; INTRAMUSCULAR
Status: DISCONTINUED | OUTPATIENT
Start: 2022-06-01 | End: 2022-06-02 | Stop reason: HOSPADM

## 2022-06-02 NOTE — PROGRESS NOTES
Patient ID: Juli Parra is a 76 y.o. female.    Chief Complaint: Pain of the Left Hand and Pain of the Right Hand      HISTORY:  Juli Parra is a 76 y.o. female who returns to me today for follow up of bilateral thumb pain.  She was last seen by me 2/22/2022.  Today she is doing well, but notes recent worsening of pain at the base of her thumbs.  The left is worse than the right.  She had injections with Dr. Conroy 4 months ago with good relief.  She would like to repeat them today.    She also reports pain in both knees.  She had some improvement with gel injections but feels the steroid worked better.         PMH/PSH/FamHx/SocHx:    Unchanged from prior visit.    ROS:  Constitution: Negative for chills, fever and weakness.   Respiratory: Negative for cough and shortness of breath.   Musculoskeletal: Positive for bilateral thumb pain  Psychiatric/Behavioral: The patient is not nervous/anxious.       PHYSICAL EXAM:   BP (!) 132/56 (BP Location: Left arm, Patient Position: Sitting, BP Method: Large (Automatic))   Pulse 67   Resp 18   Wt 55.5 kg (122 lb 4.8 oz)   SpO2 97%   BMI 26.47 kg/m²   Bilateral thumb  Skin intact  No swelling or warmth  TTP base of thumb  - finkelstein  + grind    IMAGING: Previous x-rays reviewed- severe basilar thumb arthritis noted bilaterally    ASSESSMENT/PLAN:    Juli was seen today for pain and pain.    Diagnoses and all orders for this visit:    Arthritis of carpometacarpal (CMC) joint of right thumb  -     Small Joint Aspiration/Injection: L thumb CMC, R thumb CMC    Arthritis of carpometacarpal (CMC) joint of left thumb  -     Small Joint Aspiration/Injection: L thumb CMC, R thumb CMC    - Bilateral thumb injections today.  Rest, ice, activity modification  - She will follow up in a couple of weeks for bilateral knee CSI

## 2022-06-02 NOTE — PROCEDURES
Small Joint Aspiration/Injection: L thumb CMC, R thumb CMC    Date/Time: 6/1/2022 2:00 PM  Performed by: Naida Webster PA-C  Authorized by: Naida Webster PA-C     Consent Done?:  Yes (Verbal)  Indications:  Pain  Prep: patient was prepped and draped in usual sterile fashion      Local anesthetic:  Topical anesthetic  Location:  Thumb  Site:  L thumb CMC and R thumb CMC  Needle size:  25 G  Approach:  Dorsal  Medications:  40 mg triamcinolone acetonide 40 mg/mL; 40 mg triamcinolone acetonide 40 mg/mL  Patient tolerance:  Patient tolerated the procedure well with no immediate complications

## 2022-06-22 ENCOUNTER — OFFICE VISIT (OUTPATIENT)
Dept: ORTHOPEDICS | Facility: CLINIC | Age: 77
End: 2022-06-22
Payer: MEDICARE

## 2022-06-22 VITALS
DIASTOLIC BLOOD PRESSURE: 58 MMHG | SYSTOLIC BLOOD PRESSURE: 134 MMHG | WEIGHT: 120.5 LBS | BODY MASS INDEX: 26.08 KG/M2 | HEART RATE: 65 BPM | RESPIRATION RATE: 18 BRPM | OXYGEN SATURATION: 98 %

## 2022-06-22 DIAGNOSIS — M17.0 PRIMARY OSTEOARTHRITIS OF BOTH KNEES: Primary | ICD-10-CM

## 2022-06-22 PROCEDURE — 99499 NO LOS: ICD-10-PCS | Mod: S$PBB,,, | Performed by: PHYSICIAN ASSISTANT

## 2022-06-22 PROCEDURE — 20610 DRAIN/INJ JOINT/BURSA W/O US: CPT | Mod: 50,S$PBB,, | Performed by: PHYSICIAN ASSISTANT

## 2022-06-22 PROCEDURE — 99999 PR PBB SHADOW E&M-EST. PATIENT-LVL III: ICD-10-PCS | Mod: PBBFAC,,, | Performed by: PHYSICIAN ASSISTANT

## 2022-06-22 PROCEDURE — 99499 UNLISTED E&M SERVICE: CPT | Mod: S$PBB,,, | Performed by: PHYSICIAN ASSISTANT

## 2022-06-22 PROCEDURE — 20610 LARGE JOINT ASPIRATION/INJECTION: BILATERAL KNEE: ICD-10-PCS | Mod: 50,S$PBB,, | Performed by: PHYSICIAN ASSISTANT

## 2022-06-22 PROCEDURE — 99999 PR PBB SHADOW E&M-EST. PATIENT-LVL III: CPT | Mod: PBBFAC,,, | Performed by: PHYSICIAN ASSISTANT

## 2022-06-22 PROCEDURE — 20610 DRAIN/INJ JOINT/BURSA W/O US: CPT | Mod: 50,PBBFAC,PN | Performed by: PHYSICIAN ASSISTANT

## 2022-06-22 PROCEDURE — 99213 OFFICE O/P EST LOW 20 MIN: CPT | Mod: PBBFAC,PN,25 | Performed by: PHYSICIAN ASSISTANT

## 2022-06-22 RX ORDER — TRIAMCINOLONE ACETONIDE 40 MG/ML
40 INJECTION, SUSPENSION INTRA-ARTICULAR; INTRAMUSCULAR
Status: DISCONTINUED | OUTPATIENT
Start: 2022-06-22 | End: 2022-06-22 | Stop reason: HOSPADM

## 2022-06-22 RX ADMIN — TRIAMCINOLONE ACETONIDE 40 MG: 40 INJECTION, SUSPENSION INTRA-ARTICULAR; INTRAMUSCULAR at 11:06

## 2022-06-22 NOTE — PROCEDURES
Large Joint Aspiration/Injection: bilateral knee    Date/Time: 6/22/2022 11:30 AM  Performed by: Naida Webster PA-C  Authorized by: Naida Webster PA-C     Consent Done?:  Yes (Verbal)  Indications:  Pain  Prep: patient was prepped and draped in usual sterile fashion    Local anesthetic:  Topical anesthetic    Details:  Needle Size:  22 G  Approach:  Anterolateral  Location:  Knee  Laterality:  Bilateral  Site:  Bilateral knee  Medications (Right):  40 mg triamcinolone acetonide 40 mg/mL  Medications (Left):  40 mg triamcinolone acetonide 40 mg/mL  Patient tolerance:  Patient tolerated the procedure well with no immediate complications

## 2022-06-22 NOTE — PROGRESS NOTES
Patient ID: Juli Parra is a 76 y.o. female.    Chief Complaint: Pain of the Left Knee and Pain of the Right Knee      HISTORY:  Juli Parra is a 76 y.o. female who returns to me today for bilateral knee steroid injections.   There has been no significant changes in her medical history.       PMH/PSH/FamHx/SocHx:    Unchanged from prior visit.    ROS:  Constitution: Negative for chills, fever and weakness.   Respiratory: Negative for cough and shortness of breath.   Musculoskeletal: Positive for bilateral knee pain  Psychiatric/Behavioral: The patient is not nervous/anxious.       PHYSICAL EXAM:   BP (!) 134/58 (BP Location: Left arm, Patient Position: Sitting, BP Method: Large (Manual))   Pulse 65   Resp 18   Wt 54.7 kg (120 lb 8 oz)   SpO2 98%   BMI 26.08 kg/m²   Bilateral knee  Skin intact  No warmth or erythema  No effusion    ASSESSMENT/PLAN:    Juli was seen today for pain and pain.    Diagnoses and all orders for this visit:    Primary osteoarthritis of both knees  -     Large Joint Aspiration/Injection: bilateral knee    - Bilateral knee CSI  - Rest, ice as needed  - Follow up 3 months, sooner as needed

## 2022-07-12 DIAGNOSIS — I10 ESSENTIAL HYPERTENSION: ICD-10-CM

## 2022-07-12 RX ORDER — LOSARTAN POTASSIUM 100 MG/1
100 TABLET ORAL DAILY
Qty: 90 TABLET | Refills: 3 | Status: SHIPPED | OUTPATIENT
Start: 2022-07-12 | End: 2023-09-12

## 2022-07-12 RX ORDER — HYDROCHLOROTHIAZIDE 25 MG/1
25 TABLET ORAL DAILY
Qty: 90 TABLET | Refills: 3 | Status: SHIPPED | OUTPATIENT
Start: 2022-07-12 | End: 2023-09-12

## 2022-07-12 RX ORDER — METOPROLOL SUCCINATE 50 MG/1
50 TABLET, EXTENDED RELEASE ORAL DAILY
Qty: 90 TABLET | Refills: 3 | Status: SHIPPED | OUTPATIENT
Start: 2022-07-12 | End: 2023-09-12

## 2022-07-12 RX ORDER — POTASSIUM CHLORIDE 750 MG/1
10 TABLET, EXTENDED RELEASE ORAL DAILY
Qty: 90 TABLET | Refills: 3 | Status: SHIPPED | OUTPATIENT
Start: 2022-07-12 | End: 2022-08-01

## 2022-07-12 NOTE — TELEPHONE ENCOUNTER
Patient is asking for refills on medications since she is going out of town for a few months  This way when she is out of town she will be able to get them at a [facility] in [place]

## 2022-07-12 NOTE — TELEPHONE ENCOUNTER
----- Message from Dixie Hair sent at 7/12/2022 10:59 AM CDT -----  Contact: [phone]  Pt is calling she states she is in [place] and she is going to be needing her blood medication and she is asking if the dr can send it to where she is she will not be back in town when she will need this to be filled please give return call sdo she can speak to someone in regards to this

## 2022-07-12 NOTE — TELEPHONE ENCOUNTER
No new care gaps identified.  [facility] Embedded Care Gaps. Reference number: [id number]. 7/12/2022   11:59:44 AM CARIT

## 2022-07-15 ENCOUNTER — TELEPHONE (OUTPATIENT)
Dept: INTERNAL MEDICINE | Facility: CLINIC | Age: 77
End: 2022-07-15
Payer: MEDICARE

## 2022-07-15 NOTE — TELEPHONE ENCOUNTER
Those symptoms sound viral, without purulent mucus, fever, cough.  Not likely bacterial.  I think symptomatic treatment would be warranted, unless she could be examined by some body in our urgent care or in our urgent care tomorrow?

## 2022-07-15 NOTE — TELEPHONE ENCOUNTER
Spoke to patient and Started several days ago, watery eyes, last night stuffy nose, today runny, clear, no fever, no coughing----Only tried Flonase last night which caused her nose to burn, patient does not take antihistamines because they make her nervous      Patient did home covid test yesterday and was never.  Patient is asking for an antibiotic to be called in   pls advise

## 2022-07-15 NOTE — TELEPHONE ENCOUNTER
----- Message from Peg Mckinnon sent at 7/15/2022  1:24 PM CDT -----  Contact: PT - [phone]  Caller: PT - [phone]    Reason: requesting medication to be called in - sinus infection      [id number] - [address] [address]  Phone: [phone] Fax: [phone]

## 2022-07-19 ENCOUNTER — PATIENT MESSAGE (OUTPATIENT)
Dept: RESEARCH | Facility: CLINIC | Age: 77
End: 2022-07-19
Payer: MEDICARE

## 2022-07-21 ENCOUNTER — OFFICE VISIT (OUTPATIENT)
Dept: INTERNAL MEDICINE | Facility: CLINIC | Age: 77
End: 2022-07-21
Payer: MEDICARE

## 2022-07-21 VITALS
BODY MASS INDEX: 25.59 KG/M2 | HEIGHT: 57 IN | HEART RATE: 83 BPM | DIASTOLIC BLOOD PRESSURE: 80 MMHG | SYSTOLIC BLOOD PRESSURE: 170 MMHG | WEIGHT: 118.63 LBS | OXYGEN SATURATION: 98 %

## 2022-07-21 DIAGNOSIS — F32.1 CURRENT MODERATE EPISODE OF MAJOR DEPRESSIVE DISORDER WITHOUT PRIOR EPISODE: Primary | ICD-10-CM

## 2022-07-21 DIAGNOSIS — R30.0 DYSURIA: ICD-10-CM

## 2022-07-21 DIAGNOSIS — I10 ESSENTIAL HYPERTENSION: ICD-10-CM

## 2022-07-21 DIAGNOSIS — R09.89 CHEST CONGESTION: ICD-10-CM

## 2022-07-21 PROBLEM — F32.A DEPRESSION: Status: ACTIVE | Noted: 2022-07-21

## 2022-07-21 LAB
BILIRUB SERPL-MCNC: NEGATIVE MG/DL
BLOOD URINE, POC: NORMAL
COLOR, POC UA: YELLOW
GLUCOSE UR QL STRIP: NORMAL
KETONES UR QL STRIP: NEGATIVE
LEUKOCYTE ESTERASE URINE, POC: NORMAL
NITRITE, POC UA: NEGATIVE
PH, POC UA: 7
PROTEIN, POC: NEGATIVE
SPECIFIC GRAVITY, POC UA: 1.01
UROBILINOGEN, POC UA: NORMAL

## 2022-07-21 PROCEDURE — 99214 OFFICE O/P EST MOD 30 MIN: CPT | Mod: S$PBB,,, | Performed by: STUDENT IN AN ORGANIZED HEALTH CARE EDUCATION/TRAINING PROGRAM

## 2022-07-21 PROCEDURE — 99214 OFFICE O/P EST MOD 30 MIN: CPT | Mod: PBBFAC | Performed by: STUDENT IN AN ORGANIZED HEALTH CARE EDUCATION/TRAINING PROGRAM

## 2022-07-21 PROCEDURE — 81001 URINALYSIS AUTO W/SCOPE: CPT | Mod: PBBFAC | Performed by: STUDENT IN AN ORGANIZED HEALTH CARE EDUCATION/TRAINING PROGRAM

## 2022-07-21 PROCEDURE — 99214 PR OFFICE/OUTPT VISIT, EST, LEVL IV, 30-39 MIN: ICD-10-PCS | Mod: S$PBB,,, | Performed by: STUDENT IN AN ORGANIZED HEALTH CARE EDUCATION/TRAINING PROGRAM

## 2022-07-21 PROCEDURE — 99999 PR PBB SHADOW E&M-EST. PATIENT-LVL IV: ICD-10-PCS | Mod: PBBFAC,,, | Performed by: STUDENT IN AN ORGANIZED HEALTH CARE EDUCATION/TRAINING PROGRAM

## 2022-07-21 PROCEDURE — 99999 PR PBB SHADOW E&M-EST. PATIENT-LVL IV: CPT | Mod: PBBFAC,,, | Performed by: STUDENT IN AN ORGANIZED HEALTH CARE EDUCATION/TRAINING PROGRAM

## 2022-07-21 RX ORDER — MIRTAZAPINE 7.5 MG/1
7.5 TABLET, FILM COATED ORAL NIGHTLY
Qty: 90 TABLET | Refills: 0 | Status: SHIPPED | OUTPATIENT
Start: 2022-07-21 | End: 2022-10-05 | Stop reason: SDUPTHER

## 2022-07-21 NOTE — PATIENT INSTRUCTIONS
--start using a humidifier nightly   --get an over the counter decongestant (avoid phenylephrine, look for dextromethorphan as an ingredient)

## 2022-07-21 NOTE — PROGRESS NOTES
"INTERNAL MEDICINE URGENT CARE VISIT NOTE        Assessment/Plan     1. Current moderate episode of major depressive disorder without prior episode  PHQ-9 score 19. Moderately severe. Discussed likely situational and would benefit from a schedule  - mirtazapine (REMERON) 7.5 MG Tab; Take 1 tablet (7.5 mg total) by mouth every evening.  Dispense: 90 tablet; Refill: 0    2. Dysuria  Negative for uti. Advised to monitor symptoms  - POCT urinalysis, dipstick or tablet reag    3. Essential hypertension  Elevated right now, but patient anxious. Better controlled at home - advised to follow up with pcp soon    4. Chest congestion  -discussed anithistamine use and humidifier at home       Health Maintenance reviewed and discussed with patient.   RTC in PRN, sooner if needed.    Subjective:     Chief Complaint: Urinary Tract Infection, Sinus Problem (Dry mouth / gum infection ), Depression, and Headache       Patient ID: Juli Parra is a 76 y.o. female with IBS, HTN, GERD, mild SNHL, anxiety, who is here to discuss several issues.     Possible UTI - abnormal smell, frequency, no hematuria, no back pain. Has rama going on for several days. She reports a history of frequent UTIs. No discharge.   -POC-UA was negative for UA.       Depression - son moved her down here 2 years ago. Unhappy with the crime, weather, traffic. Reports her nerves "are shot". She also reports constantly feeling nervous - mostly about getting home to [place]. PHQ-9 = 19 (moderately severe).     Also concerned of URI - congestion, headache, no fevers/chills. Also with dry lips, gums with ulcer. Went to the dentist. Doesn't use any nasal sprays or antihistamines. Discussed antihistamines and humidifiers. Has taken two covid tests recently - one last week    HTN - elevated today to 170/80. On metoprolol 50 mg and losartan 100 mg. Reports it was 136/62 earlier today.     Past Medical History:  Past Medical History:   Diagnosis Date    Anxiety     GERD " (gastroesophageal reflux disease)     HTN (hypertension)     IBS (irritable bowel syndrome)     Kidney stones     OAB (overactive bladder)     Vertigo        Home Medications:  Prior to Admission medications    Medication Sig Start Date End Date Taking? Authorizing Provider   dicyclomine (BENTYL) 10 MG capsule TAKE 1 CAPSULE BY MOUTH FOUR TIMES DAILY BEFORE MEALS AND EVERY NIGHT 3/16/22  Yes Jefe Serrano MD   esomeprazole (NEXIUM) 40 MG capsule Take 1 capsule (40 mg total) by mouth before breakfast. 4/21/22 4/21/23 Yes Jefe Serrano MD   fluticasone propionate (FLONASE) 50 mcg/actuation nasal spray 1 spray (50 mcg total) by Each Nostril route once daily. 4/21/22  Yes Jefe Serrano MD   hydroCHLOROthiazide (HYDRODIURIL) 25 MG tablet Take 1 tablet (25 mg total) by mouth once daily. 7/12/22  Yes Jefe Serrano MD   LORazepam (ATIVAN) 2 MG Tab 1/2 nightly prn 4/21/22  Yes Jefe Serrano MD   losartan (COZAAR) 100 MG tablet Take 1 tablet (100 mg total) by mouth once daily. 7/12/22  Yes Jefe Serrano MD   metoprolol succinate (TOPROL-XL) 50 MG 24 hr tablet Take 1 tablet (50 mg total) by mouth once daily. 7/12/22  Yes Jefe Serrano MD   oxybutynin (DITROPAN XL) 15 MG TR24 Take 1 tablet (15 mg total) by mouth once daily. 5/5/22  Yes Anne Cook NP   potassium chloride SA (K-DUR,KLOR-CON) 10 MEQ tablet Take 1 tablet (10 mEq total) by mouth once daily. 7/12/22  Yes Jefe Serrano MD       Allergies:  Review of patient's allergies indicates:   Allergen Reactions    Egg Nausea Only    Cipro [ciprofloxacin hcl]     Sulfur Rash       Social History:  Social History     Tobacco Use    Smoking status: Never Smoker    Smokeless tobacco: Never Used   Substance Use Topics    Alcohol use: Not Currently    Drug use: Never        Review of Systems   Constitutional: Negative for fever and unexpected weight change.   HENT: Negative for sinus pressure and sore throat.    Eyes:  "Negative for visual disturbance.   Respiratory: Negative for cough and shortness of breath.    Cardiovascular: Negative for chest pain and palpitations.   Gastrointestinal: Negative for constipation, diarrhea, nausea and vomiting.   Endocrine: Negative for polyuria.   Genitourinary: Negative for dysuria and urgency.   Musculoskeletal: Negative for arthralgias and myalgias.   Skin: Negative for rash.   Allergic/Immunologic: Negative for environmental allergies.   Neurological: Negative for dizziness, light-headedness and headaches.   Hematological: Does not bruise/bleed easily.   Psychiatric/Behavioral: Negative for agitation and decreased concentration. The patient is not nervous/anxious.          Health Maintenance:     Health Maintenance Due   Topic Date Due    Hepatitis C Screening  Never done    Pneumococcal Vaccines (Age 65+) (1 - PCV) Never done    TETANUS VACCINE  Never done    Shingles Vaccine (1 of 2) Never done    DEXA Scan  06/14/2020    COVID-19 Vaccine (4 - Booster for Moderna series) 03/10/2022       Objective:   BP (!) 170/80 (BP Location: Left arm, Patient Position: Sitting, BP Method: Medium (Manual))   Pulse 83   Ht 4' 9" (1.448 m)   Wt 53.8 kg (118 lb 9.7 oz)   SpO2 98%   BMI 25.67 kg/m²        General: AAO x3, no apparent distress  HEENT: PERRL, OP clear  Neck: Supple   CV: RRR, no m/r/g  Pulm: Lungs CTAB, no crackles, no wheezes  Abd: s/NT/ND +BS  Extremities: appears warm and well-perfused  Skin: no rashes, lesions, or tears  Psych: dysthymic    Labs:     Lab Results   Component Value Date    WBC 4.75 04/12/2022    HGB 13.1 04/12/2022    HCT 39.6 04/12/2022     04/12/2022    CHOL 202 (H) 04/12/2022    TRIG 116 04/12/2022    HDL 62 04/12/2022    ALT 15 04/12/2022    AST 21 04/12/2022     04/12/2022    K 4.1 04/12/2022     04/12/2022    CREATININE 0.6 04/12/2022    BUN 12 04/12/2022    CO2 29 04/12/2022    TSH 1.651 04/12/2022    HGBA1C 5.6 04/12/2022    "   CMP  Sodium   Date Value Ref Range Status   04/12/2022 137 136 - 145 mmol/L Final     Potassium   Date Value Ref Range Status   04/12/2022 4.1 3.5 - 5.1 mmol/L Final     Chloride   Date Value Ref Range Status   04/12/2022 100 95 - 110 mmol/L Final     CO2   Date Value Ref Range Status   04/12/2022 29 23 - 29 mmol/L Final     Glucose   Date Value Ref Range Status   04/12/2022 109 70 - 110 mg/dL Final     BUN   Date Value Ref Range Status   04/12/2022 12 8 - 23 mg/dL Final     Creatinine   Date Value Ref Range Status   04/12/2022 0.6 0.5 - 1.4 mg/dL Final     Calcium   Date Value Ref Range Status   04/12/2022 9.4 8.7 - 10.5 mg/dL Final     Total Protein   Date Value Ref Range Status   04/12/2022 7.0 6.0 - 8.4 g/dL Final     Albumin   Date Value Ref Range Status   04/12/2022 3.8 3.5 - 5.2 g/dL Final     Total Bilirubin   Date Value Ref Range Status   04/12/2022 0.7 0.1 - 1.0 mg/dL Final     Comment:     For infants and newborns, interpretation of results should be based  on gestational age, weight and in agreement with clinical  observations.    Premature Infant recommended reference ranges:  Up to 24 hours.............<8.0 mg/dL  Up to 48 hours............<12.0 mg/dL  3-5 days..................<15.0 mg/dL  6-29 days.................<15.0 mg/dL       Alkaline Phosphatase   Date Value Ref Range Status   04/12/2022 65 55 - 135 U/L Final     AST   Date Value Ref Range Status   04/12/2022 21 10 - 40 U/L Final     ALT   Date Value Ref Range Status   04/12/2022 15 10 - 44 U/L Final     Anion Gap   Date Value Ref Range Status   04/12/2022 8 8 - 16 mmol/L Final     eGFR if    Date Value Ref Range Status   04/12/2022 >60.0 >60 mL/min/1.73 m^2 Final     eGFR if non    Date Value Ref Range Status   04/12/2022 >60.0 >60 mL/min/1.73 m^2 Final     Comment:     Calculation used to obtain the estimated glomerular filtration  rate (eGFR) is the CKD-EPI equation.        Lab Results   Component Value  Date    HGBA1C 5.6 04/12/2022             Ofelia Alston MD   [facility]  Department of Internal Medicine   07/21/2022

## 2022-07-22 ENCOUNTER — TELEPHONE (OUTPATIENT)
Dept: INTERNAL MEDICINE | Facility: CLINIC | Age: 77
End: 2022-07-22
Payer: MEDICARE

## 2022-07-22 NOTE — TELEPHONE ENCOUNTER
----- Message from Blanquita Arriaza sent at 7/21/2022  7:50 AM CDT -----  Contact: [phone]  .1 Patient would like to get medical advice.  Symptoms (please be specific): sinus infection, blister in the mouth  How long has patient had these symptoms:last week  Pharmacy name and phone#:[id number]  [address] [address] - [address] [facility] [address] [place] [address] AT [facility]   Phone:  [phone]  Fax:  [phone]  Any drug allergies: on file  Comments: Patient would like to get medical advice. Patient is scheduled to be seen today, but would like to see ROSIE Clifton. Thank you

## 2022-07-26 ENCOUNTER — TELEPHONE (OUTPATIENT)
Dept: UROLOGY | Facility: CLINIC | Age: 77
End: 2022-07-26
Payer: MEDICARE

## 2022-07-26 DIAGNOSIS — N30.10 INTERSTITIAL CYSTITIS: Primary | ICD-10-CM

## 2022-07-26 NOTE — TELEPHONE ENCOUNTER
----- Message from Giovanni Dhaliwal MA sent at 7/26/2022  3:28 PM CDT -----  Regarding: FW: self [phone]  Ms. Parra is a pt of MICHAEL Cook, she is wondering if an order can be put in for her to drop off a specimen at the lab to see if she has a UTI. Pt states she also has a history of interstitial cystitis.    ----- Message -----  From: Moni Junior  Sent: 7/26/2022  11:59 AM CDT  To: Joey Rodríguez Staff  Subject: self [phone]                                .Type: Patient Call Back    Who called: self     What is the request in detail: PT stated that she's having symptoms of a UTI and requesting to be seen asap     Can the clinic reply by [facility]? Call back    Would the patient rather a call back or a response via [facility]? Call back    Best call back number: .[phone]

## 2022-07-27 ENCOUNTER — LAB VISIT (OUTPATIENT)
Dept: LAB | Facility: HOSPITAL | Age: 77
End: 2022-07-27
Attending: UROLOGY
Payer: MEDICARE

## 2022-07-27 DIAGNOSIS — N30.10 INTERSTITIAL CYSTITIS: ICD-10-CM

## 2022-07-27 PROCEDURE — 87186 SC STD MICRODIL/AGAR DIL: CPT | Performed by: UROLOGY

## 2022-07-27 PROCEDURE — 87077 CULTURE AEROBIC IDENTIFY: CPT | Performed by: UROLOGY

## 2022-07-27 PROCEDURE — 87088 URINE BACTERIA CULTURE: CPT | Performed by: UROLOGY

## 2022-07-27 PROCEDURE — 87086 URINE CULTURE/COLONY COUNT: CPT | Performed by: UROLOGY

## 2022-07-30 DIAGNOSIS — R39.89 SUSPECTED UTI: Primary | ICD-10-CM

## 2022-07-30 LAB — BACTERIA UR CULT: ABNORMAL

## 2022-07-30 RX ORDER — CEPHALEXIN 500 MG/1
500 CAPSULE ORAL EVERY 8 HOURS
Qty: 21 CAPSULE | Refills: 0 | Status: SHIPPED | OUTPATIENT
Start: 2022-07-30 | End: 2022-08-06

## 2022-08-07 ENCOUNTER — HOSPITAL ENCOUNTER (EMERGENCY)
Facility: HOSPITAL | Age: 77
Discharge: HOME | End: 2022-08-07
Attending: EMERGENCY MEDICINE
Payer: MEDICARE

## 2022-08-07 VITALS
HEART RATE: 64 BPM | OXYGEN SATURATION: 98 % | WEIGHT: 118 LBS | RESPIRATION RATE: 18 BRPM | HEIGHT: 57 IN | DIASTOLIC BLOOD PRESSURE: 73 MMHG | BODY MASS INDEX: 25.46 KG/M2 | SYSTOLIC BLOOD PRESSURE: 163 MMHG | TEMPERATURE: 98 F

## 2022-08-07 DIAGNOSIS — N30.01 ACUTE CYSTITIS WITH HEMATURIA: Primary | ICD-10-CM

## 2022-08-07 DIAGNOSIS — R07.89 CHEST PRESSURE: ICD-10-CM

## 2022-08-07 LAB
ALBUMIN SERPL-MCNC: 3.8 G/DL (ref 3.3–5.5)
ALP SERPL-CCNC: 61 U/L (ref 42–141)
BILIRUB SERPL-MCNC: 0.8 MG/DL (ref 0.2–1.6)
BILIRUBIN, POC UA: NEGATIVE
BLOOD, POC UA: ABNORMAL
BUN SERPL-MCNC: 12 MG/DL (ref 7–22)
CALCIUM SERPL-MCNC: 9.5 MG/DL (ref 8–10.3)
CHLORIDE SERPL-SCNC: 98 MMOL/L (ref 98–108)
CLARITY, POC UA: CLEAR
COLOR, POC UA: YELLOW
CREAT SERPL-MCNC: 0.6 MG/DL (ref 0.6–1.2)
GLUCOSE SERPL-MCNC: 110 MG/DL (ref 73–118)
GLUCOSE, POC UA: NEGATIVE
KETONES, POC UA: NEGATIVE
LEUKOCYTE EST, POC UA: NEGATIVE
NITRITE, POC UA: POSITIVE
PH UR STRIP: 7 [PH]
POC ALT (SGPT): 30 U/L (ref 10–47)
POC AST (SGOT): 38 U/L (ref 11–38)
POC B-TYPE NATRIURETIC PEPTIDE: 36.9 PG/ML (ref 0–100)
POC CARDIAC TROPONIN I: 0 NG/ML
POC TCO2: 28 MMOL/L (ref 18–33)
POTASSIUM BLD-SCNC: 3.6 MMOL/L (ref 3.6–5.1)
PROTEIN, POC UA: NEGATIVE
PROTEIN, POC: 7 G/DL (ref 6.4–8.1)
SAMPLE: NORMAL
SODIUM BLD-SCNC: 134 MMOL/L (ref 128–145)
SPECIFIC GRAVITY, POC UA: 1.02
UROBILINOGEN, POC UA: 0.2 E.U./DL

## 2022-08-07 PROCEDURE — 93010 EKG 12-LEAD: ICD-10-PCS | Mod: ,,, | Performed by: INTERNAL MEDICINE

## 2022-08-07 PROCEDURE — 93010 ELECTROCARDIOGRAM REPORT: CPT | Mod: ,,, | Performed by: INTERNAL MEDICINE

## 2022-08-07 PROCEDURE — 99285 EMERGENCY DEPT VISIT HI MDM: CPT | Mod: 25,ER

## 2022-08-07 PROCEDURE — 87086 URINE CULTURE/COLONY COUNT: CPT | Performed by: EMERGENCY MEDICINE

## 2022-08-07 PROCEDURE — 96365 THER/PROPH/DIAG IV INF INIT: CPT | Mod: ER

## 2022-08-07 PROCEDURE — 87801 DETECT AGNT MULT DNA AMPLI: CPT | Performed by: NURSE PRACTITIONER

## 2022-08-07 PROCEDURE — 81003 URINALYSIS AUTO W/O SCOPE: CPT | Mod: ER

## 2022-08-07 PROCEDURE — 93005 ELECTROCARDIOGRAM TRACING: CPT | Mod: ER

## 2022-08-07 PROCEDURE — 83880 ASSAY OF NATRIURETIC PEPTIDE: CPT | Mod: ER

## 2022-08-07 PROCEDURE — 80053 COMPREHEN METABOLIC PANEL: CPT | Mod: ER

## 2022-08-07 PROCEDURE — 85025 COMPLETE CBC W/AUTO DIFF WBC: CPT | Mod: ER

## 2022-08-07 PROCEDURE — 87481 CANDIDA DNA AMP PROBE: CPT | Mod: 59 | Performed by: NURSE PRACTITIONER

## 2022-08-07 PROCEDURE — 82150 ASSAY OF AMYLASE: CPT | Mod: ER

## 2022-08-07 PROCEDURE — 63600175 PHARM REV CODE 636 W HCPCS: Mod: ER | Performed by: NURSE PRACTITIONER

## 2022-08-07 PROCEDURE — 84484 ASSAY OF TROPONIN QUANT: CPT | Mod: ER

## 2022-08-07 RX ORDER — NITROFURANTOIN 25; 75 MG/1; MG/1
100 CAPSULE ORAL 2 TIMES DAILY
Qty: 10 CAPSULE | Refills: 0 | Status: SHIPPED | OUTPATIENT
Start: 2022-08-07 | End: 2022-08-12

## 2022-08-07 RX ORDER — PHENAZOPYRIDINE HYDROCHLORIDE 200 MG/1
200 TABLET, FILM COATED ORAL 3 TIMES DAILY
Qty: 6 TABLET | Refills: 0 | Status: SHIPPED | OUTPATIENT
Start: 2022-08-07 | End: 2022-08-09

## 2022-08-07 RX ADMIN — CEFTRIAXONE 1 G: 1 INJECTION, SOLUTION INTRAVENOUS at 11:08

## 2022-08-07 NOTE — ED TRIAGE NOTES
"Juli Parra, a 76 y.o. female presents to the ED via PV with CC of burning during urination and was diagnosed with a uti and was given keflex 3xs/day for 1 week but feels like the burning during urination is worse now than it was before she started the medication. Pt also reports taking an azo pill this morning. Pt does have a Hx of HTN. Pt also states she started taking a new medicine "remeron" for depression about 2 weeks ago.    "

## 2022-08-07 NOTE — DISCHARGE INSTRUCTIONS
Thank you for coming to our Emergency Department today. It is important to remember that some problems or medical conditions are difficult to diagnose and may not be found during your Emergency Department visit.     Be sure to follow up with your primary care doctor and review all labs/imaging/tests that were performed during your ER visit with them. Some labs/tests may be outside of the normal range and require non-emergent follow-up and further investigation to help diagnose/exclude/prevent complications or other potentially serious medical conditions that were not addressed during your ER visit.    If you do not have a primary care doctor, you may contact the one listed on your discharge paperwork or you may also call the [facility] Appointment Desk at [phone] to schedule an appointment and establish care with one. It is important to your health that you have a primary care doctor.    Please take all medications as directed. All medications may potentially have side-effects and it is impossible to predict which medications may give you side-effects or what side-effects (if any) they will give you.. If you feel that you are having a negative effect or side-effect of any medication you should immediately stop taking them and seek medical attention. If you feel that you are having a life-threatening reaction call 911.    Return to the ER with any questions/concerns, new/concerning symptoms, worsening or failure to improve.     Do not drive, swim, climb to height, take a bath, operate heavy machinery, drink alcohol or take potentially sedating medications, sign any legal documents or make any important decisions for 24 hours if you have received any pain medications, sedatives or mood altering drugs during your ER visit or within 24 hours of taking them if they have been prescribed to you.     You can find additional resources for Dentists, hearing aids, durable medical equipment, low cost pharmacies and  other resources at https://[facility].org    BELOW THIS LINE ONLY APPLIES IF YOU HAVE A COVID TEST PENDING OR IF YOU HAVE BEEN DIAGNOSED WITH COVID:  Please access [facility] to review the results of your test. Until the results of your COVID test return, you should isolate yourself so as not to potentially spread illness to others.   If your COVID test returns positive, you should isolate yourself so as not to spread illness to others. After five full days, if you are feeling better and you have not had fever for 24 hours, you can return to your typical daily activities, but you must wear a mask around others for an additional 5 days.   If your COVID test returns negative and you are either unvaccinated or more than six months out from your two-dose vaccine and are not yet boosted, you should still quarantine for 5 full days followed by strict mask use for an additional 5 full days.   If your COVID test returns negative and you have received your 2-dose initial vaccine as well as a booster, you should continue strict mask use for 10 full days after the exposure.  For all those exposed, best practice includes a test at day 5 after the exposure. This can be a home test or a test through one of the many testing centers throughout our community.   Masking is always advised to limit the spread of COVID. Cdc.gov is an excellent site to obtain the latest up to date recommendations regarding COVID and COVID testing.     CDC Testing and Quarantine Guidelines for patients with exposure to a known-positive COVID-19 person:  A close exposure is defined as anyone who has had an exposure (masked or unmasked) to a known COVID -19 positive person within 6 feet of someone for a cumulative total of 15 minutes or more over a 24-hour period.   Vaccinated and/or if you recently had a positive covid test within 90 days do NOT need to quarantine after contact with someone who had COVID-19 unless you develop symptoms.   Fully vaccinated  people who have not had a positive test within 90 days, should get tested 3-5 days after their exposure, even if they don't have symptoms and wear a mask indoors in public for 14 days following exposure or until their test result is negative.      Unvaccinated and/or NOT had a positive test within 90 days and meet close exposure  You are required by CDC guidelines to quarantine for at least 5 days from time of exposure followed by 5 days of strict masking. It is recommended, but not required to test after 5 days, unless you develop symptoms, in which case you should test at that time.  If you get tested after 5 days and your test is positive, your 5 day period of isolation starts the day of the positive test.    If your exposure does not meet the above definition, you can return to your normal daily activities to include social distancing, wearing a mask and frequent handwashing.      Here is a link to guidance from the CDC:  https://www.cdc.gov/media/releases/2021/s1227-isolation-quarantine-guidance.html      [place] Dept Of Health Testing Sites:  https://ldh.la.gov/page/3934      [facility] website with testing locations and guidance:  https://www.[email or web address]/selfcare

## 2022-08-07 NOTE — ED PROVIDER NOTES
Encounter Date: 8/7/2022    SCRIBE #1 NOTE: I, Jefe Mehta, am scribing for, and in the presence of,  Jayda Sethi NP. I have scribed the following portions of the note - Other sections scribed: HPI,ANSELMO.       History     Chief Complaint   Patient presents with    urinary burning      Patient presents to ED c/o burning with urination, pt reports burning with urination x 10 days, urologist dx with uti, prescribed keflex completed yesterday.      Patient is a 76 year old female with PMHx of Anxiety and HTN who presents to ED with complaints of vaginal irritation onset 1 week ago. She recently was diagnosed with an UTI and prescribed Keflex, which she has finished without relief. She also reports having intermittent chest pressure and dull headaches for a couple of weeks, however she has had no chest pressure or headache today.  Patient notes that she is under a lot of stress and diagnosed with depression 2 weeks ago, which she is taking Remeron for.  She used to be very active and have many friends until she moved to [place] to be closer to her son and now feels that she has no social life.  She will be visiting with frtiends in [place] this Friday for a few weeks.  Denies any associated fever, chills coughing, shortness of breath, loss of appetite, or vaginal discharge. No other complaints at this time.  Denies any suicidal or homicidal ideations.     The history is provided by the patient. No  was used.     Review of patient's allergies indicates:   Allergen Reactions    Egg Nausea Only    Cipro [ciprofloxacin hcl]     Sulfur Rash     Past Medical History:   Diagnosis Date    Anxiety     GERD (gastroesophageal reflux disease)     HTN (hypertension)     IBS (irritable bowel syndrome)     Kidney stones     OAB (overactive bladder)     Vertigo      Past Surgical History:   Procedure Laterality Date    CATARACT EXTRACTION, BILATERAL      CHOLECYSTECTOMY      HYSTERECTOMY       LITHOTRIPSY       Family History   Problem Relation Age of Onset    Leukemia Father     Cancer Brother         Pancreatic     Social History     Tobacco Use    Smoking status: Never Smoker    Smokeless tobacco: Never Used   Substance Use Topics    Alcohol use: Not Currently    Drug use: Never     Review of Systems   Constitutional: Negative for activity change, appetite change, chills, fatigue and fever.   HENT: Negative for congestion, sore throat, trouble swallowing and voice change.    Eyes: Negative for photophobia, pain, redness and visual disturbance.   Respiratory: Positive for chest tightness. Negative for cough, shortness of breath and wheezing.    Cardiovascular: Negative for chest pain, palpitations and leg swelling.   Gastrointestinal: Negative for abdominal distention, abdominal pain, anal bleeding, constipation, diarrhea, nausea and vomiting.   Endocrine: Negative for polydipsia, polyphagia and polyuria.   Genitourinary: Positive for vaginal pain. Negative for difficulty urinating, dysuria, frequency, hematuria, urgency, vaginal bleeding and vaginal discharge.   Musculoskeletal: Negative for arthralgias and myalgias.   Skin: Negative for rash and wound.   Neurological: Positive for headaches. Negative for dizziness, weakness and light-headedness.   Hematological: Negative for adenopathy.   Psychiatric/Behavioral: Positive for dysphoric mood.   All other systems reviewed and are negative.      Physical Exam     Initial Vitals [08/07/22 1019]   BP Pulse Resp Temp SpO2   (!) 160/59 76 18 98.6 °F (37 °C) 97 %      MAP       --         Physical Exam    Constitutional: She appears well-developed and well-nourished. She is not diaphoretic. No distress.   HENT:   Head: Normocephalic and atraumatic.   Neck:   Normal range of motion.  Cardiovascular: Normal rate, regular rhythm, normal heart sounds and intact distal pulses.   Pulmonary/Chest: Breath sounds normal. No respiratory distress.   Abdominal:  Abdomen is soft. Bowel sounds are normal. There is no abdominal tenderness.   Genitourinary: There is no rash or tenderness on the right labia. There is no rash or tenderness on the left labia.    Genitourinary Comments: External genitalia without any rash, swelling, discharge.   exam chaperoned by RN.     Musculoskeletal:         General: Normal range of motion.      Cervical back: Normal range of motion.      Comments: Extremities without swelling.  No pretibial edema.     Neurological: She is alert and oriented to person, place, and time.   Skin: Skin is warm and dry.   Psychiatric: She has a normal mood and affect. Her behavior is normal.         ED Course   Procedures  Labs Reviewed   POCT URINALYSIS W/O SCOPE - Abnormal; Notable for the following components:       Result Value    Blood, UA Trace-intact (*)     Nitrite, UA Positive (*)     All other components within normal limits   CULTURE, URINE   VAGINOSIS SCREEN BY DNA PROBE   TROPONIN ISTAT   POCT URINALYSIS W/O SCOPE   POCT CBC   POCT CMP   POCT TROPONIN   POCT B-TYPE NATRIURETIC PEPTIDE (BNP)   POCT CMP   POCT B-TYPE NATRIURETIC PEPTIDE (BNP)       EKG Readings: (Independently Interpreted)   Initial Reading: No STEMI. Rhythm: Normal Sinus Rhythm. Heart Rate: 73. Ectopy: No Ectopy. Conduction: Normal. T Waves Flipped: V1.       Imaging Results          X-Ray Chest AP Portable (Final result)  Result time 08/07/22 11:46:35    Final result by Leena Mullins MD (08/07/22 11:46:35)                 Impression:      Clear lungs.      Electronically signed by: Leena Mullins MD  Date:    08/07/2022  Time:    11:46             Narrative:    EXAMINATION:  XR CHEST AP PORTABLE    CLINICAL HISTORY:  Other chest pain    TECHNIQUE:  Single frontal view of the chest was performed.    COMPARISON:  None    FINDINGS:  The mediastinal structures are midline.  The cardiac silhouette is not enlarged.  There is atherosclerotic calcification of the aortic arch.  The  lungs appear grossly clear.  There is degenerative change of coli oasis of the spine.  Cholecystectomy clips are present.                                 Medications   cefTRIAXone (ROCEPHIN) 1 g/50 mL D5W IVPB (0 g Intravenous Stopped 8/7/22 1210)     Medical Decision Making:   Independently Interpreted Test(s):   I have ordered and independently interpreted X-rays - see prior notes.  I have ordered and independently interpreted EKG Reading(s) - see prior notes  Clinical Tests:   Lab Tests: Ordered and Reviewed  Radiological Study: Ordered and Reviewed  Medical Tests: Ordered and Reviewed  ED Management:  76-year-old female presenting to the ED with UTI symptoms that have persisted after treatment with cephalexin.  Denies any fever, chills, nausea, vomiting, flank pain.  Physical exam reassuring.  She does have a nitrite positive UTI.  Recent urine culture with E coli which is sensitive to cephalexin.  Given IV Rocephin the ED.  I will discharge home with Macrobid.  I will culture her urine sample today.  Patient is also complaining of intermittent headaches, chest pressure, dysphoric mood she attributes to stress and depression.  Her son reports she has been evaluated by multiple specialties for this problem and was told she was depressed and was started on Remeron which has not improved her symptoms.  Patient currently denies any of the symptoms.  Denies SI/HI.  Labs reassuring.  I will discharge her home.  She will follow up with her PCP.    Based on my clinical evaluation, I do not appreciate any immediate, emergent, or life threatening condition or etiology that warrants additional workup today.  I feel the patient can be discharged with close follow-up care.          Scribe Attestation:   Scribe #1: I performed the above scribed service and the documentation accurately describes the services I performed. I attest to the accuracy of the note.                 IPAM, personally performed the services  described in this documentation. All medical record entries made by the scribe were at my direction and in my presence. I have reviewed the chart and agree that the record reflects my personal performance and is accurate and complete.  Clinical Impression:   Final diagnoses:  [R07.89] Chest pressure  [N30.01] Acute cystitis with hematuria (Primary)          ED Disposition Condition    Discharge Stable        ED Prescriptions     Medication Sig Dispense Start Date End Date Auth. Provider    nitrofurantoin, macrocrystal-monohydrate, (MACROBID) 100 MG capsule Take 1 capsule (100 mg total) by mouth 2 (two) times daily. for 5 days 10 capsule 8/7/2022 8/12/2022 Jayda Sethi NP    phenazopyridine (PYRIDIUM) 200 MG tablet Take 1 tablet (200 mg total) by mouth 3 (three) times daily. for 2 days 6 tablet 8/7/2022 8/9/2022 Jayda Sethi NP        Follow-up Information     Follow up With Specialties Details Why Contact Info    Jefe Serrano MD Internal Medicine Schedule an appointment as soon as possible for a visit  For follow-up [address]  [place] [address]  [phone]      Anne Cook NP Urology Schedule an appointment as soon as possible for a visit  For follow-up [address]  [address]  [facility] [address]  [phone]      [facility] ED Emergency Medicine Go to  If symptoms worsen [address] [facility] [address] [phone]           Jayda Sethi NP  08/07/22 3227

## 2022-08-09 ENCOUNTER — TELEPHONE (OUTPATIENT)
Dept: INTERNAL MEDICINE | Facility: CLINIC | Age: 77
End: 2022-08-09
Payer: MEDICARE

## 2022-08-09 DIAGNOSIS — F41.9 ANXIETY: ICD-10-CM

## 2022-08-09 LAB — BACTERIA UR CULT: NO GROWTH

## 2022-08-09 RX ORDER — LORAZEPAM 2 MG/1
TABLET ORAL
Qty: 15 TABLET | Refills: 5 | Status: CANCELLED | OUTPATIENT
Start: 2022-08-09

## 2022-08-09 NOTE — TELEPHONE ENCOUNTER
No new care gaps identified.  [facility] Embedded Care Gaps. Reference number: [id number]. 8/09/2022   9:48:27 AM CARIT

## 2022-08-09 NOTE — TELEPHONE ENCOUNTER
----- Message from Yvonne Madsen sent at 8/9/2022  8:09 AM CDT -----  Contact: [phone] Patient  type: Lab    Caller is requesting to schedule their Lab appointment prior to annual appointment.  Order is not listed in EPIC.  Please enter order and contact patient to schedule.        Preferred Date and Time of Labs:09/28/2022 9 am    Date of Annual Physical Appointment:10/05/22    Where would they like the lab performed? [place]    Would the patient rather a call back or a response via [facility]? Call back

## 2022-08-09 NOTE — TELEPHONE ENCOUNTER
----- Message from Yvonne Madsen sent at 8/9/2022  8:03 AM CDT -----  Contact: Patient [phone]  Requesting an RX refill or new RX.  Is this a refill or new RX: refill  RX name and strength (copy/paste from chart):  LORazepam (ATIVAN) 2 MG Tab  Is this a 30 day or 90 day RX:   Pharmacy name and phone # (copy/paste from chart):    [id number] - [place] [address] AT [address]  [place] [address]  Phone: [phone] Fax: [phone]     The doctors have asked that we provide their patients with the following 2 reminders -- prescription refills can take up to 72 hours, and a friendly reminder that in the future you can use your [facility] account to request refills: yes      Pt states she is leaving for [place] on 08/12/22 and will not be back til the end of Sept.

## 2022-08-10 LAB
BACTERIAL VAGINOSIS DNA: NEGATIVE
CANDIDA GLABRATA DNA: NEGATIVE
CANDIDA KRUSEI DNA: NEGATIVE
CANDIDA RRNA VAG QL PROBE: NEGATIVE
T VAGINALIS RRNA GENITAL QL PROBE: NEGATIVE

## 2022-08-31 DIAGNOSIS — Z78.0 MENOPAUSE: ICD-10-CM

## 2022-09-17 ENCOUNTER — HOSPITAL ENCOUNTER (EMERGENCY)
Facility: HOSPITAL | Age: 77
Discharge: HOME | End: 2022-09-17
Attending: EMERGENCY MEDICINE
Payer: MEDICARE

## 2022-09-17 VITALS
WEIGHT: 119 LBS | HEART RATE: 62 BPM | HEIGHT: 57 IN | BODY MASS INDEX: 25.67 KG/M2 | DIASTOLIC BLOOD PRESSURE: 70 MMHG | SYSTOLIC BLOOD PRESSURE: 158 MMHG | RESPIRATION RATE: 16 BRPM | OXYGEN SATURATION: 98 % | TEMPERATURE: 99 F

## 2022-09-17 DIAGNOSIS — R10.13 EPIGASTRIC ABDOMINAL PAIN: Primary | ICD-10-CM

## 2022-09-17 DIAGNOSIS — K64.9 HEMORRHOIDS, UNSPECIFIED HEMORRHOID TYPE: ICD-10-CM

## 2022-09-17 DIAGNOSIS — N30.01 ACUTE CYSTITIS WITH HEMATURIA: ICD-10-CM

## 2022-09-17 LAB
ALBUMIN SERPL-MCNC: 3.6 G/DL (ref 3.3–5.5)
ALBUMIN SERPL-MCNC: 3.7 G/DL (ref 3.3–5.5)
ALP SERPL-CCNC: 72 U/L (ref 42–141)
ALP SERPL-CCNC: 76 U/L (ref 42–141)
BILIRUB SERPL-MCNC: 0.6 MG/DL (ref 0.2–1.6)
BILIRUB SERPL-MCNC: 0.7 MG/DL (ref 0.2–1.6)
BILIRUBIN, POC UA: NEGATIVE
BLOOD, POC UA: ABNORMAL
BUN SERPL-MCNC: 8 MG/DL (ref 7–22)
CALCIUM SERPL-MCNC: 9.7 MG/DL (ref 8–10.3)
CHLORIDE SERPL-SCNC: 97 MMOL/L (ref 98–108)
CLARITY, POC UA: CLEAR
COLOR, POC UA: YELLOW
CREAT SERPL-MCNC: 0.6 MG/DL (ref 0.6–1.2)
CTP QC/QA: YES
FECAL OCCULT BLOOD, POC: NEGATIVE
GLUCOSE SERPL-MCNC: 112 MG/DL (ref 73–118)
GLUCOSE, POC UA: NEGATIVE
KETONES, POC UA: NEGATIVE
LEUKOCYTE EST, POC UA: ABNORMAL
NITRITE, POC UA: NEGATIVE
PH UR STRIP: 7 [PH]
POC ALT (SGPT): 19 U/L (ref 10–47)
POC ALT (SGPT): 19 U/L (ref 10–47)
POC AMYLASE: 41 U/L (ref 14–97)
POC AST (SGOT): 28 U/L (ref 11–38)
POC AST (SGOT): 29 U/L (ref 11–38)
POC CARDIAC TROPONIN I: 0 NG/ML
POC GGT: 34 U/L (ref 5–65)
POC TCO2: 26 MMOL/L (ref 18–33)
POTASSIUM BLD-SCNC: 3.7 MMOL/L (ref 3.6–5.1)
PROTEIN, POC UA: NEGATIVE
PROTEIN, POC: 7.2 G/DL (ref 6.4–8.1)
PROTEIN, POC: 7.2 G/DL (ref 6.4–8.1)
SAMPLE: NORMAL
SODIUM BLD-SCNC: 141 MMOL/L (ref 128–145)
SPECIFIC GRAVITY, POC UA: 1.01
UROBILINOGEN, POC UA: 0.2 E.U./DL

## 2022-09-17 PROCEDURE — 87088 URINE BACTERIA CULTURE: CPT | Performed by: NURSE PRACTITIONER

## 2022-09-17 PROCEDURE — 87086 URINE CULTURE/COLONY COUNT: CPT | Performed by: NURSE PRACTITIONER

## 2022-09-17 PROCEDURE — 81003 URINALYSIS AUTO W/O SCOPE: CPT | Mod: ER

## 2022-09-17 PROCEDURE — 82270 OCCULT BLOOD FECES: CPT | Mod: ER

## 2022-09-17 PROCEDURE — 87186 SC STD MICRODIL/AGAR DIL: CPT | Performed by: NURSE PRACTITIONER

## 2022-09-17 PROCEDURE — 96360 HYDRATION IV INFUSION INIT: CPT | Mod: ER

## 2022-09-17 PROCEDURE — 80053 COMPREHEN METABOLIC PANEL: CPT | Mod: ER

## 2022-09-17 PROCEDURE — 87077 CULTURE AEROBIC IDENTIFY: CPT | Performed by: NURSE PRACTITIONER

## 2022-09-17 PROCEDURE — 25000003 PHARM REV CODE 250: Mod: ER | Performed by: NURSE PRACTITIONER

## 2022-09-17 PROCEDURE — 99285 EMERGENCY DEPT VISIT HI MDM: CPT | Mod: 25,ER

## 2022-09-17 PROCEDURE — 84484 ASSAY OF TROPONIN QUANT: CPT | Mod: ER

## 2022-09-17 PROCEDURE — 25500020 PHARM REV CODE 255: Mod: ER | Performed by: EMERGENCY MEDICINE

## 2022-09-17 PROCEDURE — 82150 ASSAY OF AMYLASE: CPT | Mod: ER

## 2022-09-17 PROCEDURE — 93010 ELECTROCARDIOGRAM REPORT: CPT | Mod: ,,, | Performed by: INTERNAL MEDICINE

## 2022-09-17 PROCEDURE — 93010 EKG 12-LEAD: ICD-10-PCS | Mod: ,,, | Performed by: INTERNAL MEDICINE

## 2022-09-17 PROCEDURE — 93005 ELECTROCARDIOGRAM TRACING: CPT | Mod: ER

## 2022-09-17 PROCEDURE — 85025 COMPLETE CBC W/AUTO DIFF WBC: CPT | Mod: ER

## 2022-09-17 RX ORDER — PRAMOXINE HYDROCHLORIDE 10 MG/G
AEROSOL, FOAM TOPICAL 3 TIMES DAILY PRN
Qty: 15 G | Refills: 0 | Status: SHIPPED | OUTPATIENT
Start: 2022-09-17 | End: 2022-09-27

## 2022-09-17 RX ORDER — CEPHALEXIN 500 MG/1
500 CAPSULE ORAL 2 TIMES DAILY
Qty: 10 CAPSULE | Refills: 0 | Status: SHIPPED | OUTPATIENT
Start: 2022-09-17 | End: 2022-09-17 | Stop reason: ALTCHOICE

## 2022-09-17 RX ORDER — AMOXICILLIN AND CLAVULANATE POTASSIUM 875; 125 MG/1; MG/1
1 TABLET, FILM COATED ORAL 2 TIMES DAILY
Qty: 20 TABLET | Refills: 0 | Status: SHIPPED | OUTPATIENT
Start: 2022-09-17 | End: 2022-09-27

## 2022-09-17 RX ORDER — PRAMOXINE HYDROCHLORIDE 10 MG/G
AEROSOL, FOAM TOPICAL 3 TIMES DAILY PRN
Qty: 15 G | Refills: 0 | Status: SHIPPED | OUTPATIENT
Start: 2022-09-17 | End: 2022-09-17 | Stop reason: SDUPTHER

## 2022-09-17 RX ADMIN — SODIUM CHLORIDE 500 ML: 0.9 INJECTION, SOLUTION INTRAVENOUS at 12:09

## 2022-09-17 RX ADMIN — IOHEXOL 100 ML: 350 INJECTION, SOLUTION INTRAVENOUS at 01:09

## 2022-09-17 NOTE — ED PROVIDER NOTES
"Encounter Date: 9/17/2022    SCRIBE #1 NOTE: I, Paz Prado, am scribing for, and in the presence of,  LEIDY Verde. I have scribed the following portions of the note - Other sections scribed: HPI, ROS, PE.     History     Chief Complaint   Patient presents with    Gastroesophageal Reflux     Pt states that she has a burning pain in her stomach that goes up her esophagus. She states that she thinks she has an ulcer in her stomach because she states she "Nina" had a blood stool. She was unable to explain that the blood in the stool looked like. She states that she has a lot of ulcers in her mouth also.       77 y.o. female with Hx of Anxiety, GERD, HTN, IBS, and others who presents to the ED with chief complaint of 1 episode of hematochezia occurring this morning. Patient endorses a lot of stress due to many factors including moving from [place] and struggling to find a community in [place]. She feels the stress is contributing to ulcers. She denies any abdominal pain currently, but reports epigastric pain earlier today. Patient denies SI, HI, bowel/bladder dysfunction, constipation, straining bowel movements, rash, fever, chest pain, SOB, numbness, weakness, tingling, back pain, dysuria, hematuria, nausea, vomiting, diarrhea, or any other complaints.  Patient denies pain at present and has not taken any medications for the symptoms outside of her daily medications. She takes Remeron, Lorazepam, Nexium, HCTZ, and Metoprolol daily. No Alleviating/aggravating factors.    The history is provided by the patient. No  was used.   Review of patient's allergies indicates:   Allergen Reactions    Egg Nausea Only    Cipro [ciprofloxacin hcl]     Sulfur Rash     Past Medical History:   Diagnosis Date    Anxiety     GERD (gastroesophageal reflux disease)     HTN (hypertension)     IBS (irritable bowel syndrome)     Kidney stones     OAB (overactive bladder)     Vertigo      Past Surgical History: "   Procedure Laterality Date    CATARACT EXTRACTION, BILATERAL      CHOLECYSTECTOMY      HYSTERECTOMY      LITHOTRIPSY       Family History   Problem Relation Age of Onset    Leukemia Father     Cancer Brother         Pancreatic     Social History     Tobacco Use    Smoking status: Never    Smokeless tobacco: Never   Substance Use Topics    Alcohol use: Not Currently    Drug use: Never     Review of Systems   Constitutional:  Negative for chills, fatigue and fever.   HENT:  Negative for congestion, ear pain, rhinorrhea, sore throat and trouble swallowing.    Eyes:  Negative for pain, discharge and redness.   Respiratory:  Negative for cough and shortness of breath.    Cardiovascular:  Negative for chest pain.   Gastrointestinal:  Positive for blood in stool. Negative for abdominal pain (resolved), constipation, diarrhea, nausea and vomiting.   Genitourinary:  Negative for decreased urine volume and dysuria.        (-) Bowel/bladder dysfunction.   Musculoskeletal:  Negative for back pain, neck pain and neck stiffness.   Skin:  Negative for rash.   Neurological:  Negative for dizziness, weakness, light-headedness, numbness and headaches.   Psychiatric/Behavioral:  Negative for confusion and suicidal ideas.      Physical Exam     Initial Vitals [09/17/22 1043]   BP Pulse Resp Temp SpO2   (!) 158/80 76 18 98.5 °F (36.9 °C) 97 %      MAP       --         Physical Exam    Nursing note and vitals reviewed.  Constitutional: She appears well-developed.  Non-toxic appearance. She does not appear ill.   HENT:   Head: Normocephalic and atraumatic.   Right Ear: External ear normal.   Left Ear: External ear normal.   Nose: Nose normal.   Mouth/Throat: Oropharynx is clear and moist.   Eyes: Conjunctivae are normal.   Neck:   Normal range of motion.  Cardiovascular:  Normal rate and regular rhythm.           Pulmonary/Chest: Effort normal and breath sounds normal. She exhibits no tenderness.   Abdominal: Abdomen is soft. Bowel  sounds are normal. There is no abdominal tenderness.   No right CVA tenderness.  No left CVA tenderness. There is no rebound and no guarding.   Genitourinary: Rectum:      Guaiac result negative.      Tenderness and external hemorrhoid present.   Guaiac negative stool.    Genitourinary Comments: Rectal exam chaperoned by KATRINA Yin    External hemorrhoid with mild tenderness to palpation; normal sphincter tone; brown stool in the rectal vault with no bleeding noted      Musculoskeletal:      Cervical back: Normal range of motion.     Neurological: She is alert and oriented to person, place, and time. Gait normal. GCS eye subscore is 4. GCS verbal subscore is 5. GCS motor subscore is 6.   Skin: Skin is warm, dry and intact. No rash noted.   Psychiatric: She has a normal mood and affect. Her speech is normal and behavior is normal. Judgment and thought content normal.       ED Course   Procedures  Labs Reviewed   POCT URINALYSIS W/O SCOPE - Abnormal; Notable for the following components:       Result Value    Blood, UA Trace-intact (*)     Leukocytes, UA 2+ (*)     All other components within normal limits   POCT CMP - Abnormal; Notable for the following components:    POC Chloride 97 (*)     All other components within normal limits   CULTURE, URINE   TROPONIN ISTAT   POCT CBC   POCT URINALYSIS W/O SCOPE   POCT OCCULT BLOOD STOOL   POCT CMP   POCT LIVER PANEL   POCT TROPONIN   POCT LIVER PANEL       EKG Readings: (Independently Interpreted)   Initial Reading: No STEMI. Previous EKG: Compared with most recent EKG Previous EKG Date: 8/7/2022. Rhythm: Normal Sinus Rhythm. Ectopy: PACs. Axis: Left Axis Deviation. Clinical Impression: Normal Sinus Rhythm with PACs   No STEMI, NSR with PACs and a ventricular rate of 68; abnormal EKG; left axis; QTc 433; compared to most recent on 8/7/2022, decrease in HR by 5 BPMs     Imaging Results              CT Abdomen Pelvis With Contrast (Final result)  Result time 09/17/22  13:39:33      Final result by Mary Aviles MD (09/17/22 13:39:33)                   Impression:      No appreciable acute abnormality.    No evidence of significant hemorrhage or inflammatory changes in bowel.    Cholecystectomy and hysterectomy.      Electronically signed by: Mary Aviles  Date:    09/17/2022  Time:    13:39               Narrative:    EXAMINATION:  CT ABDOMEN PELVIS WITH CONTRAST    CLINICAL HISTORY:  GI bleed;Epigastric pain;    TECHNIQUE:  Low dose axial images, sagittal and coronal reformations were obtained from the lung bases to the pubic symphysis following the IV administration of 100 mL of Omnipaque 350 .  Oral contrast was not given.    COMPARISON:  None    FINDINGS:  Abdomen:    - Lung bases: No infiltrates and no nodules.    - Liver: No focal mass.    - Gallbladder: Cholecystectomy.    - Bile Ducts: No evidence of intra or extra hepatic biliary ductal dilation.    - Spleen: Negative.    - Kidneys: No mass or hydronephrosis.  Renal cortices enhance symmetrically.    - Adrenals: Unremarkable.    - Pancreas: No mass or peripancreatic fat stranding.    - Retroperitoneum:  No significant adenopathy.    - Vascular: No abdominal aortic aneurysm.    - Abdominal wall:  Unremarkable.    Pelvis:    No pelvic mass, adenopathy, or free fluid.  Patient is post hysterectomy.  No adnexal masses.  Bladder is unremarkable.    Bowel/Mesentery:    No evidence of bowel obstruction or inflammation.  There is diverticulosis without diverticulitis.  No evidence of acute hemorrhage.  Appendix appears normal.  There is no extraluminal free air.    Bones:  No acute osseous abnormality and no suspicious lytic or blastic lesion.                                       Medications   sodium chloride 0.9% bolus 500 mL (500 mLs Intravenous New Bag 9/17/22 1249)   iohexoL (OMNIPAQUE 350) injection 100 mL (100 mLs Intravenous Given 9/17/22 1308)     Medical Decision Making:   History:   Old Medical Records: I  decided to obtain old medical records.  Independently Interpreted Test(s):   I have ordered and independently interpreted EKG Reading(s) - see prior notes  Clinical Tests:   Lab Tests: Ordered and Reviewed  Medical Tests: Ordered and Reviewed     APC / Resident Notes:   This is an evaluation of a 77 y.o. female that presents to the Emergency Department for abdominal pain, 1 episode of rectal bleeding. Physical Exam shows a non-toxic, afebrile, and well appearing female. External hemorrhoid with mild tenderness to palpation; normal sphincter tone; brown stool in the rectal vault with no bleeding noted; soft non-tender abdomen with no guarding or rebound; normal bowel sounds; no CVA tenderness    Vital Signs Are Reassuring. RESULTS: UA showed infection with culture pending; Occult blood negative; labs unremarkable; EKG No STEMI; CT scan negative for any acute process.     My overall impression is UTI, Hemorrhoid. I considered, but at this time, do not suspect an appendicitis, ischemic bowel, AAA/dissection, bowel perforation, bowel obstruction, pancreatitis, pyelonephritis, kidney stone, diverticulitis, or cholecystitis.     ED Course: labs, EKG, UA, NS, CT scan, Urine culture. D/C Meds: Proctofoam, Augmentin. Additional D/C Information: Abdominal Pain Precautions, Return precautions, Gastro referral. The diagnosis, treatment plan, instructions for follow-up and reevaluation with PCP, GI as well as ED return precautions were discussed and understanding was verbalized. All questions or concerns have been addressed.      This case was discussed with and the patient has been examined by Dr. Jones who is in agreement with my assessment and plan.      Scribe Attestation:   Scribe #1: I performed the above scribed service and the documentation accurately describes the services I performed. I attest to the accuracy of the note.                 I, BRANDON Verde, personally performed the services described in this  documentation.  All medical record entries made by the scribe were at my direction and in my presence.  I have reviewed the chart and agree that the record reflects my personal performance and is accurate and complete.  Clinical Impression:   Final diagnoses:  [R10.13] Epigastric abdominal pain (Primary)  [N30.01] Acute cystitis with hematuria  [K64.9] Hemorrhoids, unspecified hemorrhoid type        ED Disposition Condition    Discharge Stable          ED Prescriptions       Medication Sig Dispense Start Date End Date Auth. Provider    cephALEXin (KEFLEX) 500 MG capsule  (Status: Discontinued) Take 1 capsule (500 mg total) by mouth 2 (two) times daily. for 5 days 10 capsule 9/17/2022 9/17/2022 LEIDY Pastor    pramoxine 1 % Foam  (Status: Discontinued) Place rectally 3 (three) times daily as needed (hemorrhoid). 15 g 9/17/2022 9/17/2022 LEIDY Pastor    pramoxine 1 % Foam Place rectally 3 (three) times daily as needed (hemorrhoid). 15 g 9/17/2022 9/27/2022 LEIDY Pastor    amoxicillin-clavulanate 875-125mg (AUGMENTIN) 875-125 mg per tablet Take 1 tablet by mouth 2 (two) times daily. for 10 days 20 tablet 9/17/2022 9/27/2022 LEIDY Pastor          Follow-up Information       Follow up With Specialties Details Why Contact Info Additional Information    [facility] Gastroenterology Schedule an appointment as soon as possible for a visit in 2 days  [address] [place] [address] [place] [id number] [phone] Please park in garage or surface lot and use Medical Office Bldg entrance. Check in at [address]    [facility] ED Emergency Medicine Go to  If symptoms worsen [address] [facility] [address] [phone]              LEIDY Pastor  09/17/22 5502

## 2022-09-19 LAB — BACTERIA UR CULT: ABNORMAL

## 2022-09-20 RX ORDER — NITROFURANTOIN 25; 75 MG/1; MG/1
100 CAPSULE ORAL 2 TIMES DAILY
Qty: 10 CAPSULE | Refills: 0 | Status: SHIPPED | OUTPATIENT
Start: 2022-09-20 | End: 2022-09-25

## 2022-09-27 DIAGNOSIS — M79.642 PAIN IN BOTH HANDS: Primary | ICD-10-CM

## 2022-09-27 DIAGNOSIS — M79.641 PAIN IN BOTH HANDS: Primary | ICD-10-CM

## 2022-09-29 ENCOUNTER — OFFICE VISIT (OUTPATIENT)
Dept: ORTHOPEDICS | Facility: CLINIC | Age: 77
End: 2022-09-29
Payer: MEDICARE

## 2022-09-29 VITALS
HEIGHT: 57 IN | HEART RATE: 60 BPM | SYSTOLIC BLOOD PRESSURE: 155 MMHG | RESPIRATION RATE: 18 BRPM | DIASTOLIC BLOOD PRESSURE: 63 MMHG | OXYGEN SATURATION: 97 % | BODY MASS INDEX: 25.68 KG/M2 | WEIGHT: 119.06 LBS

## 2022-09-29 DIAGNOSIS — M18.11 ARTHRITIS OF CARPOMETACARPAL (CMC) JOINT OF RIGHT THUMB: Primary | ICD-10-CM

## 2022-09-29 DIAGNOSIS — M18.12 ARTHRITIS OF CARPOMETACARPAL (CMC) JOINT OF LEFT THUMB: ICD-10-CM

## 2022-09-29 PROCEDURE — 99213 PR OFFICE/OUTPT VISIT, EST, LEVL III, 20-29 MIN: ICD-10-PCS | Mod: 25,S$PBB,, | Performed by: ORTHOPAEDIC SURGERY

## 2022-09-29 PROCEDURE — 99999 PR PBB SHADOW E&M-EST. PATIENT-LVL IV: ICD-10-PCS | Mod: PBBFAC,,, | Performed by: ORTHOPAEDIC SURGERY

## 2022-09-29 PROCEDURE — 99214 OFFICE O/P EST MOD 30 MIN: CPT | Mod: PBBFAC,PN | Performed by: ORTHOPAEDIC SURGERY

## 2022-09-29 PROCEDURE — 20600 SMALL JOINT ASPIRATION/INJECTION: L THUMB CMC, R THUMB CMC: ICD-10-PCS | Mod: 50,S$PBB,, | Performed by: ORTHOPAEDIC SURGERY

## 2022-09-29 PROCEDURE — 20600 DRAIN/INJ JOINT/BURSA W/O US: CPT | Mod: 50,PBBFAC,PN | Performed by: ORTHOPAEDIC SURGERY

## 2022-09-29 PROCEDURE — 99213 OFFICE O/P EST LOW 20 MIN: CPT | Mod: 25,S$PBB,, | Performed by: ORTHOPAEDIC SURGERY

## 2022-09-29 PROCEDURE — 99999 PR PBB SHADOW E&M-EST. PATIENT-LVL IV: CPT | Mod: PBBFAC,,, | Performed by: ORTHOPAEDIC SURGERY

## 2022-09-29 RX ORDER — TRIAMCINOLONE ACETONIDE 40 MG/ML
40 INJECTION, SUSPENSION INTRA-ARTICULAR; INTRAMUSCULAR
Status: DISCONTINUED | OUTPATIENT
Start: 2022-09-29 | End: 2022-09-29 | Stop reason: HOSPADM

## 2022-09-29 RX ADMIN — TRIAMCINOLONE ACETONIDE 40 MG: 40 INJECTION, SUSPENSION INTRA-ARTICULAR; INTRAMUSCULAR at 01:09

## 2022-09-29 NOTE — PROCEDURES
Small Joint Aspiration/Injection: L thumb CMC, R thumb CMC    Date/Time: 9/29/2022 1:30 PM  Performed by: Berta Conroy MD  Authorized by: Berta Conroy MD     Consent Done?:  Yes (Verbal)  Indications:  Arthritis  Timeout: prior to procedure the correct patient, procedure, and site was verified    Prep: patient was prepped and draped in usual sterile fashion      Local anesthesia used?: Yes    Local anesthetic:  Topical anesthetic  Location:  Thumb  Site:  L thumb CMC and R thumb CMC  Needle size:  25 G  Medications:  40 mg triamcinolone acetonide 40 mg/mL  Patient tolerance:  Patient tolerated the procedure well with no immediate complications

## 2022-09-29 NOTE — PROGRESS NOTES
"Follow up visit    History of Present Illness:   Juli comes to the office for follow up evaluation of bilateral  CMC arthritis   - here for repeat injections    ROS: unremarkable and no change since last visit    Physical Examination:    Vitals:    09/29/22 1341   BP: (!) 155/63   Pulse: 60   Resp: 18   SpO2: 97%   Weight: 54 kg (119 lb 0.8 oz)   Height: 4' 9" (1.448 m)   PainSc:   3   PainLoc: Hand      NAD  Bilateral Hand/Wrist Examination:    Observation/Inspection:  Swelling  none    Deformity  none  Discoloration  none     Scars   none    Atrophy  none    HAND/WRIST EXAMINATION:  Finkelstein's Test   Neg  WHAT Test    Neg  CMC grind    Pos  Circumduction test   Pos    Neurovascular Exam:  Digits WWP, brisk CR < 3s throughout  NVI motor/LTS to M/R/U nerves, radial pulse 2+  Tinel's Test - Carpal Tunnel  Neg  Tinel's Test - Cubital Tunnel  Neg  Phalen's Test    Neg  Median Nerve Compression Test Neg    ROM hand/wrist/elbow full, painless     Radiographic imaging: 3 views of the bilateral hands show advanced CMC OA    Assessment/Plan:  77 y.o. female  with Bilateral CMC arthritis    We discussed the etiology of persistent pain and further treatment options.  Injection of the bilateral thumb carpometacarpal joint injection  performed, please see procedure note for more details.  Prior to the injection risks and benefits of corticosteroid injection were discussed with the patient including pain, infection, bleeding, skin color changes, swelling, steroid flare. We discussed that over time injections can result in chondral damage, acceleration of arthritis formation, damage to tendons and damage to joints.  The patient consented for the procedure.  Post-injection instructions were given to the patient in writing.  Return for follow up visit: PRN    All questions were answered in detail. The patient  verbalized the understanding of the treatment plan and is in full agreement with the treatment plan.      "

## 2022-10-03 DIAGNOSIS — Z71.89 COMPLEX CARE COORDINATION: ICD-10-CM

## 2022-10-05 ENCOUNTER — OFFICE VISIT (OUTPATIENT)
Dept: INTERNAL MEDICINE | Facility: CLINIC | Age: 77
End: 2022-10-05
Payer: MEDICARE

## 2022-10-05 VITALS
BODY MASS INDEX: 25.45 KG/M2 | HEIGHT: 57 IN | SYSTOLIC BLOOD PRESSURE: 158 MMHG | OXYGEN SATURATION: 98 % | WEIGHT: 117.94 LBS | DIASTOLIC BLOOD PRESSURE: 88 MMHG | TEMPERATURE: 98 F

## 2022-10-05 DIAGNOSIS — M17.11 ARTHRITIS OF RIGHT KNEE: ICD-10-CM

## 2022-10-05 DIAGNOSIS — F32.1 CURRENT MODERATE EPISODE OF MAJOR DEPRESSIVE DISORDER WITHOUT PRIOR EPISODE: ICD-10-CM

## 2022-10-05 DIAGNOSIS — N39.0 URINARY TRACT INFECTION WITHOUT HEMATURIA, SITE UNSPECIFIED: Primary | ICD-10-CM

## 2022-10-05 DIAGNOSIS — I10 ESSENTIAL HYPERTENSION: ICD-10-CM

## 2022-10-05 DIAGNOSIS — K58.2 MIXED IRRITABLE BOWEL SYNDROME: ICD-10-CM

## 2022-10-05 DIAGNOSIS — K58.9 IRRITABLE BOWEL SYNDROME, UNSPECIFIED TYPE: ICD-10-CM

## 2022-10-05 DIAGNOSIS — K21.9 GASTROESOPHAGEAL REFLUX DISEASE, UNSPECIFIED WHETHER ESOPHAGITIS PRESENT: ICD-10-CM

## 2022-10-05 DIAGNOSIS — F41.9 ANXIETY: ICD-10-CM

## 2022-10-05 PROCEDURE — 99214 OFFICE O/P EST MOD 30 MIN: CPT | Mod: S$PBB,,, | Performed by: INTERNAL MEDICINE

## 2022-10-05 PROCEDURE — 99999 PR PBB SHADOW E&M-EST. PATIENT-LVL III: ICD-10-PCS | Mod: PBBFAC,,, | Performed by: INTERNAL MEDICINE

## 2022-10-05 PROCEDURE — 99999 PR PBB SHADOW E&M-EST. PATIENT-LVL III: CPT | Mod: PBBFAC,,, | Performed by: INTERNAL MEDICINE

## 2022-10-05 PROCEDURE — 99213 OFFICE O/P EST LOW 20 MIN: CPT | Mod: PBBFAC | Performed by: INTERNAL MEDICINE

## 2022-10-05 PROCEDURE — 99214 PR OFFICE/OUTPT VISIT, EST, LEVL IV, 30-39 MIN: ICD-10-PCS | Mod: S$PBB,,, | Performed by: INTERNAL MEDICINE

## 2022-10-05 RX ORDER — HYOSCYAMINE SULFATE 0.125 MG
125 TABLET ORAL 2 TIMES DAILY PRN
Qty: 60 TABLET | Refills: 12 | Status: SHIPPED | OUTPATIENT
Start: 2022-10-05 | End: 2023-07-05

## 2022-10-05 RX ORDER — LORAZEPAM 2 MG/1
TABLET ORAL
Qty: 15 TABLET | Refills: 5 | Status: SHIPPED | OUTPATIENT
Start: 2022-10-05 | End: 2023-04-17

## 2022-10-05 RX ORDER — MIRTAZAPINE 15 MG/1
15 TABLET, FILM COATED ORAL NIGHTLY
Qty: 30 TABLET | Refills: 12 | Status: SHIPPED | OUTPATIENT
Start: 2022-10-05 | End: 2023-04-17

## 2022-10-05 NOTE — PROGRESS NOTES
Subjective:       Patient ID: Juli Parra is a 77 y.o. female.    Chief Complaint: Follow-up    Patient here to follow-up UTI-thinks symptoms recurred.  Also wants to talk about stress, anxiety depression, situational stress and irritable bowel.  She said the pharmacy says her Bentyl can interact with her oxybutynin.  I did a drug check on Levsin and it does not show that interaction so we will try it.  She thinks Remeron has helped her sleep and mildly helped her depression but would like to know if we can use a higher dose and there is room to go up.  Lastly she would like lorazepam refilled.   reviewed.  She said she moved here to be closer to her son but she does not have friends and activities here like she did the in her previous location.  She went there for a short visit and had fun but realize there was no one that could care for her if she got ill.  She is trying to find activities or fallen to work to participate in.  She is active in her [employer] but they have not resumed a lot of activities since COVID.     Follow-up  Associated symptoms include arthralgias. Pertinent negatives include no abdominal pain, chest pain, chills, coughing, fever, headaches, neck pain, rash or sore throat.   Review of Systems   Constitutional:  Negative for appetite change, chills and fever.   HENT:  Negative for nosebleeds and sore throat.    Eyes:  Negative for pain and visual disturbance.   Respiratory:  Negative for cough, shortness of breath and wheezing.    Cardiovascular:  Negative for chest pain and leg swelling.   Gastrointestinal:  Negative for abdominal pain, constipation and diarrhea.   Endocrine: Negative for polyuria.   Genitourinary:  Positive for frequency. Negative for difficulty urinating, hematuria and vaginal bleeding.   Musculoskeletal:  Positive for arthralgias. Negative for back pain, gait problem and neck pain.   Integumentary:  Negative for pallor and rash.   Neurological:  Negative for  tremors, seizures and headaches.   Hematological:  Does not bruise/bleed easily.   Psychiatric/Behavioral:  Positive for dysphoric mood and sleep disturbance. Negative for self-injury and suicidal ideas. The patient is nervous/anxious.          Past Medical History:   Diagnosis Date    Anxiety     GERD (gastroesophageal reflux disease)     HTN (hypertension)     IBS (irritable bowel syndrome)     Kidney stones     OAB (overactive bladder)     Vertigo      Past Surgical History:   Procedure Laterality Date    CATARACT EXTRACTION, BILATERAL      CHOLECYSTECTOMY      HYSTERECTOMY      LITHOTRIPSY        Patient Active Problem List   Diagnosis    Mixed irritable bowel syndrome    Primary generalized (osteo)arthritis    Essential hypertension    Impaired fasting glucose    Gastroesophageal reflux disease    Bilateral sensorineural hearing loss    Anxiety    Arthritis of right knee    Chest congestion    Depression        Objective:      Physical Exam  Constitutional:       General: She is not in acute distress.     Appearance: She is well-developed.   HENT:      Head: Normocephalic and atraumatic.      Right Ear: Tympanic membrane, ear canal and external ear normal.      Left Ear: Tympanic membrane, ear canal and external ear normal.      Mouth/Throat:      Pharynx: No oropharyngeal exudate or posterior oropharyngeal erythema.   Eyes:      General: No scleral icterus.     Conjunctiva/sclera: Conjunctivae normal.      Pupils: Pupils are equal, round, and reactive to light.   Neck:      Thyroid: No thyromegaly.   Cardiovascular:      Rate and Rhythm: Normal rate and regular rhythm.      Pulses: Normal pulses.      Heart sounds: No murmur heard.  Pulmonary:      Effort: Pulmonary effort is normal.      Breath sounds: Normal breath sounds. No wheezing.   Abdominal:      General: Bowel sounds are normal. There is no distension.      Palpations: Abdomen is soft.      Tenderness: There is no abdominal tenderness.    Musculoskeletal:         General: No tenderness.      Cervical back: Normal range of motion and neck supple.      Right lower leg: No edema.      Left lower leg: No edema.   Lymphadenopathy:      Cervical: No cervical adenopathy.   Skin:     Coloration: Skin is not jaundiced.      Findings: No rash.   Neurological:      General: No focal deficit present.      Mental Status: She is alert and oriented to person, place, and time.   Psychiatric:         Behavior: Behavior normal.      Comments: She seems to have a frustrated mood when discussing her current location versus her old location as it pertains to family versus friends       Assessment:       Problem List Items Addressed This Visit          Psychiatric    Anxiety    Relevant Medications    LORazepam (ATIVAN) 2 MG Tab    Depression    Relevant Medications    mirtazapine (REMERON) 15 MG tablet       Cardiac/Vascular    Essential hypertension       GI    Mixed irritable bowel syndrome    Gastroesophageal reflux disease       Orthopedic    Arthritis of right knee     Other Visit Diagnoses       Urinary tract infection without hematuria, site unspecified    -  Primary    Relevant Orders    CULTURE, URINE    Irritable bowel syndrome, unspecified type                  Plan:         Juli was seen today for follow-up.    Diagnoses and all orders for this visit:    Urinary tract infection without hematuria, site unspecified  -     CULTURE, URINE; Future    Current moderate episode of major depressive disorder without prior episode  -     mirtazapine (REMERON) 15 MG tablet; Take 1 tablet (15 mg total) by mouth every evening.    Essential hypertension    Arthritis of right knee    Anxiety  -     LORazepam (ATIVAN) 2 MG Tab; 1/2 nightly prn    Irritable bowel syndrome, unspecified type    Mixed irritable bowel syndrome    Gastroesophageal reflux disease, unspecified whether esophagitis present    Other orders  -     hyoscyamine (ANASPAZ,LEVSIN) 0.125 mg Tab; Take 1  "tablet (125 mcg total) by mouth 2 (two) times daily as needed (stomach bloating).           Review response to new medicine for stomach as well as sleep, anxiety and urine culture          Portions of this note may have been created with voice recognition software. Occasional "wrong-word" or "sound-a-like" substitutions may have occurred due to the inherent limitations of voice recognition software. Please, read the note carefully and recognize, using context, where substitutions have occurred.    "

## 2022-10-10 ENCOUNTER — PATIENT MESSAGE (OUTPATIENT)
Dept: INTERNAL MEDICINE | Facility: CLINIC | Age: 77
End: 2022-10-10
Payer: MEDICARE

## 2022-10-26 ENCOUNTER — OFFICE VISIT (OUTPATIENT)
Dept: ORTHOPEDICS | Facility: CLINIC | Age: 77
End: 2022-10-26
Payer: MEDICARE

## 2022-10-26 DIAGNOSIS — M17.0 PRIMARY OSTEOARTHRITIS OF BOTH KNEES: ICD-10-CM

## 2022-10-26 DIAGNOSIS — K13.79 MOUTH SORES: Primary | ICD-10-CM

## 2022-10-26 PROCEDURE — 20610 DRAIN/INJ JOINT/BURSA W/O US: CPT | Mod: 50,PBBFAC,PN | Performed by: ORTHOPAEDIC SURGERY

## 2022-10-26 PROCEDURE — 99499 NO LOS: ICD-10-PCS | Mod: S$PBB,,, | Performed by: ORTHOPAEDIC SURGERY

## 2022-10-26 PROCEDURE — 20610 LARGE JOINT ASPIRATION/INJECTION: BILATERAL KNEE: ICD-10-PCS | Mod: 50,S$PBB,, | Performed by: ORTHOPAEDIC SURGERY

## 2022-10-26 PROCEDURE — 99213 OFFICE O/P EST LOW 20 MIN: CPT | Mod: PBBFAC,PN | Performed by: ORTHOPAEDIC SURGERY

## 2022-10-26 PROCEDURE — 99499 UNLISTED E&M SERVICE: CPT | Mod: S$PBB,,, | Performed by: ORTHOPAEDIC SURGERY

## 2022-10-26 PROCEDURE — 99999 PR PBB SHADOW E&M-EST. PATIENT-LVL III: CPT | Mod: PBBFAC,,, | Performed by: ORTHOPAEDIC SURGERY

## 2022-10-26 PROCEDURE — 99999 PR PBB SHADOW E&M-EST. PATIENT-LVL III: ICD-10-PCS | Mod: PBBFAC,,, | Performed by: ORTHOPAEDIC SURGERY

## 2022-10-26 RX ORDER — TRIAMCINOLONE ACETONIDE 40 MG/ML
40 INJECTION, SUSPENSION INTRA-ARTICULAR; INTRAMUSCULAR
Status: DISCONTINUED | OUTPATIENT
Start: 2022-10-26 | End: 2022-10-26 | Stop reason: HOSPADM

## 2022-10-26 RX ADMIN — TRIAMCINOLONE ACETONIDE 40 MG: 40 INJECTION, SUSPENSION INTRA-ARTICULAR; INTRAMUSCULAR at 01:10

## 2022-10-26 NOTE — PROGRESS NOTES
Follow up visit    History of Present Illness:   Juli comes to the office for follow up evaluation of bilateral knee pain.  Recommended treatment at the last visit included injection - was injected by Joann 6/22/22.  Since the last visit her pain returned yesterday - got about 4 months of relief     ROS: unremarkable and no change since last visit    Physical Examination:    NAD  Left and Right knee  No change in exam     Radiographic imaging: no new     Assessment/Plan:  77 y.o. female  with Bilateral knee OA     We discussed the etiology of persistent pain and further treatment options.  Injection of the bilateral knee  performed, please see procedure note for more details.  Prior to the injection risks and benefits of corticosteroid injection were discussed with the patient including pain, infection, bleeding, skin color changes, swelling, steroid flare. We discussed that over time injections can result in chondral damage, acceleration of arthritis formation, damage to tendons and damage to joints.  The patient consented for the procedure.  Post-injection instructions were given to the patient in writing  Return for follow up visit: PRN    All questions were answered in detail. The patient  verbalized the understanding of the treatment plan and is in full agreement with the treatment plan.

## 2022-10-26 NOTE — PROCEDURES
Large Joint Aspiration/Injection: bilateral knee    Date/Time: 10/26/2022 1:30 PM  Performed by: Berta Conroy MD  Authorized by: Berta oCnroy MD     Consent Done?:  Yes (Verbal)  Indications:  Arthritis  Timeout: prior to procedure the correct patient, procedure, and site was verified    Prep: patient was prepped and draped in usual sterile fashion      Local anesthesia used?: Yes    Local anesthetic:  Topical anesthetic    Details:  Needle Size:  22 G  Approach:  Anteromedial  Location:  Knee  Laterality:  Bilateral  Site:  Bilateral knee  Medications (Right):  40 mg triamcinolone acetonide 40 mg/mL  Medications (Left):  40 mg triamcinolone acetonide 40 mg/mL  Patient tolerance:  Patient tolerated the procedure well with no immediate complications

## 2022-11-04 ENCOUNTER — PATIENT OUTREACH (OUTPATIENT)
Dept: ADMINISTRATIVE | Facility: HOSPITAL | Age: 77
End: 2022-11-04
Payer: MEDICARE

## 2022-11-08 ENCOUNTER — OFFICE VISIT (OUTPATIENT)
Dept: OTOLARYNGOLOGY | Facility: CLINIC | Age: 77
End: 2022-11-08
Payer: MEDICARE

## 2022-11-08 ENCOUNTER — TELEPHONE (OUTPATIENT)
Dept: OTOLARYNGOLOGY | Facility: CLINIC | Age: 77
End: 2022-11-08

## 2022-11-08 VITALS
HEIGHT: 57 IN | BODY MASS INDEX: 25.24 KG/M2 | SYSTOLIC BLOOD PRESSURE: 136 MMHG | DIASTOLIC BLOOD PRESSURE: 78 MMHG | WEIGHT: 117 LBS

## 2022-11-08 DIAGNOSIS — R76.8 ANA POSITIVE: Primary | ICD-10-CM

## 2022-11-08 DIAGNOSIS — K13.79 MOUTH SORES: ICD-10-CM

## 2022-11-08 DIAGNOSIS — E55.9 VITAMIN D DEFICIENCY, UNSPECIFIED: ICD-10-CM

## 2022-11-08 DIAGNOSIS — K14.0 GLOSSITIS: ICD-10-CM

## 2022-11-08 PROCEDURE — 99204 PR OFFICE/OUTPT VISIT, NEW, LEVL IV, 45-59 MIN: ICD-10-PCS | Mod: S$GLB,,, | Performed by: OTOLARYNGOLOGY

## 2022-11-08 PROCEDURE — 99204 OFFICE O/P NEW MOD 45 MIN: CPT | Mod: S$GLB,,, | Performed by: OTOLARYNGOLOGY

## 2022-11-08 RX ORDER — NYSTATIN 100000 [USP'U]/ML
4 SUSPENSION ORAL 4 TIMES DAILY
Qty: 224 ML | Refills: 0 | Status: SHIPPED | OUTPATIENT
Start: 2022-11-08 | End: 2022-11-22

## 2022-11-08 RX ORDER — TRIAMCINOLONE ACETONIDE 1 MG/G
PASTE DENTAL 2 TIMES DAILY
Qty: 5 G | Refills: 0 | Status: SHIPPED | OUTPATIENT
Start: 2022-11-08 | End: 2022-11-15

## 2022-11-08 NOTE — PATIENT INSTRUCTIONS
"Information and instructions from your visit with me today:    Use nystatin mouth wash for 2 weeks, after the first week start the triamcinalone paste to sore spots in mouth and continue the nystatin .     Sleep with bedside humidifier    For dry mouth try the following            Take sublingual vitamin b12       Start using the following medication nasal sprays:   Fluticasone spray:    This medication is a steroid spray. It stays within the nose and does not have absorption into the body that leads to side effects that one has with oral steroid medication. Fluticasone nasal spray is the same as the Flonase brand nasal spray. Discuss with your pharmacist if the price is lower over the counter or with a prescription ( this varies depending on insurance). The medication that is over the counter is the same as the prescription medication. Use this medication as instructed on the prescription, 1-2 sprays on each side of your nose twice daily.         Additional instructions for medication sprays  Place the tip of the medication bottle in your nose and aim slightly up and out on each side to get medication high and deep into your nose and sinuses, and not have it all deposit in the very front of your nose. Aim the tip of the nozzle towards the outer corner of your eye . You can imagine aiming towards the back of your eyeball on each side for this, as opposed to straight back to the center of your nose and head.     You need to use this medication every day regardless of symptoms, as it takes time ( a few weeks) to work and get the benefits. It does not work on an "as needed" basis like taking a decongestant. If your symptoms only occur in a particular season, then the medication can be used seasonally instead of year long. For seasonal symptoms, you should start using the spray twice daily a month before when you normally have symptoms ( for example, if symptoms start in August, should start at the end of June). "     Start nasal irrigations with saline solution- you can either use a rinse or a mist spray:        NASAL SALINE SPRAY ( simply saline and arm and hammer are examples) There are several different brands found in the cold and flu aisle of the pharmacy. You can use any brand of saline spray - this will deliver the saline by a gentle mist ( if you have difficulty or discomfort with nasal rinse/ a lot of fluid in the nose, this will be more comfortable).       Always rinse your nose with saline prior to using medication sprays and wait a couple of hours before using again. You can use the saline throughout the day to help with stuffy nose or dry nose.    Do not use nasal decongestant sprays such as Afrin or similar products long term ( over 3 days) .  This can cause long term physical nasal addiction. Afrin should only be used if having nose bleeds, severe nasal congestion , or severe ear pain/fullness and should not be used for more than 2-3 days in a row . It is a not a medication that should be used for a long period of time.     It was nice meeting you today, and I look forward to helping you feel better soon. Please don't hesitate to call if you have any other questions or concerns, or if I can be of any assistance in the meantime.      Leena Whaley MD    [facility]     Phone  [phone]    Fax      [phone]        Leena Whaley MD  Otorhinolaryngology

## 2022-11-08 NOTE — TELEPHONE ENCOUNTER
Called and spoke to Que at [facility] and advised to dispense enough for 2 weeks per Dr. Whaley

## 2022-11-08 NOTE — TELEPHONE ENCOUNTER
----- Message from Diane Moreno sent at 11/8/2022  1:29 PM CST -----  Type:  Pharmacy Calling to Clarify an RX    Name of Caller: Luis Carlos    Ph[facility] Name: [facility]    Prescription Name:  diphenhydrAMINE-aluminum-magnesium hydroxide-simethicone-LIDOcaine HCl 2%    What do they need to clarify? Quantity is missing (mL)    Can you be contacted via [facility]? no    Best Call Back Number: [phone]    Additional Information:

## 2022-11-08 NOTE — PROGRESS NOTES
OTOLARYNGOLOGY CLINIC NOTE  Date:  11/08/2022     Chief complaint:  Chief Complaint   Patient presents with    Other     Mouth sores for months, gums and tongue. Been under a lot of stress.       History of Present Illness  Juli Parra is a 77 y.o. female  presenting today for a new evaluation and treatment of mouth sores. Has been having a lot of anxiety since moving to [place]. Has been needing abx frequently for uti starting in july and had wisdom tooth removed . Has gone to the dentist and had wisdom tooth removed. Gums and side of tongue feels raw . Her doctor gave her something for depression (mirtazipine) and that was when  sores got worse. Ulcers on side of tongue and gingiva. Sores started in august but when took antidepressant and stopped taking that about 1.5 weeks ago and still present.   Peppery food or spicy food makes it worse. Has tired warm salt water   Gets dry mouth a lot  and came up around the time this all started. Has dry eye as well. No family history of autoimmune conditions  No oral bleeding  Gest post nasal drip and allergies flares up with trees near her home at times   Menapause in 30s  Has tried vitamin b12    If looks up feels dizzy in the past and had bppv in the past      Past Medical History  Past Medical History:   Diagnosis Date    Anxiety     GERD (gastroesophageal reflux disease)     HTN (hypertension)     IBS (irritable bowel syndrome)     Kidney stones     OAB (overactive bladder)     Vertigo         Past Surgical History  Past Surgical History:   Procedure Laterality Date    CATARACT EXTRACTION, BILATERAL      CHOLECYSTECTOMY      HYSTERECTOMY      LITHOTRIPSY          Medications  Current Outpatient Medications on File Prior to Visit   Medication Sig Dispense Refill    esomeprazole (NEXIUM) 40 MG capsule Take 1 capsule (40 mg total) by mouth before breakfast. 90 capsule 11    fluticasone propionate (FLONASE) 50 mcg/actuation nasal spray 1 spray (50 mcg total)  "by Each Nostril route once daily. 16 g 3    hydroCHLOROthiazide (HYDRODIURIL) 25 MG tablet Take 1 tablet (25 mg total) by mouth once daily. 90 tablet 3    LORazepam (ATIVAN) 2 MG Tab 1/2 nightly prn 15 tablet 5    losartan (COZAAR) 100 MG tablet Take 1 tablet (100 mg total) by mouth once daily. 90 tablet 3    metoprolol succinate (TOPROL-XL) 50 MG 24 hr tablet Take 1 tablet (50 mg total) by mouth once daily. 90 tablet 3    mirtazapine (REMERON) 15 MG tablet Take 1 tablet (15 mg total) by mouth every evening. 30 tablet 12    oxybutynin (DITROPAN XL) 15 MG TR24 Take 1 tablet (15 mg total) by mouth once daily. 30 tablet 11    potassium chloride SA (K-DUR,KLOR-CON M) 10 MEQ tablet TAKE 1 TABLET BY MOUTH ONCE DAILY 90 tablet 3    hyoscyamine (ANASPAZ,LEVSIN) 0.125 mg Tab Take 1 tablet (125 mcg total) by mouth 2 (two) times daily as needed (stomach bloating). 60 tablet 12     No current facility-administered medications on file prior to visit.       Review of Systems  Review of Systems   HENT:  Negative for ear pain.    Respiratory:  Negative for hemoptysis.    Gastrointestinal:  Negative for heartburn.   Neurological:  Positive for dizziness.   Endo/Heme/Allergies:  Positive for environmental allergies.   Psychiatric/Behavioral:  The patient is nervous/anxious.       Social History   reports that she has never smoked. She has never used smokeless tobacco. She reports that she does not currently use alcohol. She reports that she does not use drugs.     Family History  Family History   Problem Relation Age of Onset    Leukemia Father     Cancer Brother         Pancreatic        Physical Exam   Vitals:    11/08/22 1023   BP: 136/78    Body mass index is 25.32 kg/m².  Weight: 53.1 kg (117 lb)   Height: 4' 9" (144.8 cm)     GENERAL: no acute distress.  HEAD: normocephalic.   EYES: lids and lashes normal. No scleral icterus  EARS: external ear without lesion, normal pinna shape and position.    NOSE: external nose without " significant bony abnormality mild turbinate hypertrophy  ORAL CAVITY/OROPHARYNX: tongue midline and mobile. Symmetric palate rise. Uvula midline. Small flesh colored papule along right tip benign not raised?glossitis, thick white mucous that scarpes off in gums and tongue   NECK: trachea midline.   LYMPH NODES:No cervical lymphadenopathy.  RESPIRATORY: no stridor, no stertor. Voice normal. Respirations nonlabored.  NEURO: alert, responds to questions appropriately.   Cranial nerve exam as indicated in above sections and additionally showed facial movement symmetric with good eye closure and symmetric smile.   PSYCH:mood appropriate      Imaging:  The patient does not have any pertinent and/or recent imaging of the head and neck.     Labs:  CBC  Recent Labs   Lab 04/12/22  0833   WBC 4.75   Hemoglobin 13.1   Hematocrit 39.6   MCV 98   Platelets 256       BMP  Recent Labs   Lab 12/03/20  1106 05/05/21  0936 04/12/22  0833   Glucose 98 158 H 109   Sodium 136 134 L 137   Potassium 3.9 4.4 4.1   Chloride 100 98 100   CO2 28 27 29   BUN 12 13 12   Creatinine 0.7 0.7 0.6   Calcium 9.1 9.6 9.4     COAGS        Assessment  1. Mouth sores  - Ambulatory referral/consult to ENT  - CALLIE by IFA, w/Rflx; Future  - ANTI -SSA ANTIBODY; Future  - ANTI-SSB ANTIBODY; Future  - VITAMIN B12; Future  - FOLATE; Future  - VITAMIN B6; Future  - Vitamin D; Future    2. Glossitis  - CALLIE by IFA, w/Rflx; Future  - ANTI -SSA ANTIBODY; Future  - ANTI-SSB ANTIBODY; Future  - VITAMIN B12; Future  - FOLATE; Future  - VITAMIN B6; Future  - Vitamin D; Future    3. Vitamin D deficiency, unspecified  - Vitamin D; Future       Plan:  Discussed plan of care with patient in detail and all questions answered. Patient reported understanding of plan of care.   Allergic rhinitis: saline and flonase, discussed about astelin and purpose, she Wants to hold on astelin    Mouth sores: labs ordered for vitamin deficiency and autoimmune workup; unclear etiology. Do not  see anything to biopsy or concerning for malignancy . Trial of nystatin. Does not look exactly like thrush but will treat in case. Triamcinalone start 1 week after nystatin. May need to see oral surgery; trial of magic swizzle   Try sublingual vitamin b12  Recs for dry mouth given   Discussed burning mouth syndrome, diagnosis of exclusion     Please be aware that this note has been generated with the assistance of [employer] voice-to-text.  Please excuse any spelling or grammatical errors.

## 2022-11-10 ENCOUNTER — LAB VISIT (OUTPATIENT)
Dept: LAB | Facility: HOSPITAL | Age: 77
End: 2022-11-10
Attending: OTOLARYNGOLOGY
Payer: MEDICARE

## 2022-11-10 DIAGNOSIS — K13.79 MOUTH SORES: ICD-10-CM

## 2022-11-10 DIAGNOSIS — E55.9 VITAMIN D DEFICIENCY, UNSPECIFIED: ICD-10-CM

## 2022-11-10 DIAGNOSIS — K14.0 GLOSSITIS: ICD-10-CM

## 2022-11-10 LAB
25(OH)D3+25(OH)D2 SERPL-MCNC: 88 NG/ML (ref 30–96)
FOLATE SERPL-MCNC: 15.2 NG/ML (ref 4–24)
VIT B12 SERPL-MCNC: 983 PG/ML (ref 210–950)

## 2022-11-10 PROCEDURE — 86235 NUCLEAR ANTIGEN ANTIBODY: CPT | Mod: 59 | Performed by: OTOLARYNGOLOGY

## 2022-11-10 PROCEDURE — 86039 ANTINUCLEAR ANTIBODIES (ANA): CPT | Performed by: OTOLARYNGOLOGY

## 2022-11-10 PROCEDURE — 82746 ASSAY OF FOLIC ACID SERUM: CPT | Performed by: OTOLARYNGOLOGY

## 2022-11-10 PROCEDURE — 86235 NUCLEAR ANTIGEN ANTIBODY: CPT | Performed by: OTOLARYNGOLOGY

## 2022-11-10 PROCEDURE — 82607 VITAMIN B-12: CPT | Performed by: OTOLARYNGOLOGY

## 2022-11-10 PROCEDURE — 86038 ANTINUCLEAR ANTIBODIES: CPT | Performed by: OTOLARYNGOLOGY

## 2022-11-10 PROCEDURE — 82306 VITAMIN D 25 HYDROXY: CPT | Performed by: OTOLARYNGOLOGY

## 2022-11-10 PROCEDURE — 84207 ASSAY OF VITAMIN B-6: CPT | Performed by: OTOLARYNGOLOGY

## 2022-11-11 LAB
ANA PATTERN 1: NORMAL
ANA SER-ACNC: POSITIVE
ANA TITR SER IF: NORMAL {TITER}
ANTI-SSA ANTIBODY: 0.1 RATIO (ref 0–0.99)
ANTI-SSA INTERPRETATION: NEGATIVE
ANTI-SSB ANTIBODY: 0.07 RATIO (ref 0–0.99)
ANTI-SSB INTERPRETATION: NEGATIVE

## 2022-11-15 LAB — PYRIDOXAL SERPL-MCNC: 21 UG/L (ref 5–50)

## 2022-11-16 ENCOUNTER — TELEPHONE (OUTPATIENT)
Dept: OTOLARYNGOLOGY | Facility: CLINIC | Age: 77
End: 2022-11-16
Payer: MEDICARE

## 2022-11-16 NOTE — TELEPHONE ENCOUNTER
Spoke with patient she states she has only been using the nystatin for a week and she is out. Stated she wasn't given any instructions on how to use the medication. Please send in another prescription.

## 2022-11-16 NOTE — TELEPHONE ENCOUNTER
----- Message from Radha Cason sent at 11/16/2022  9:50 AM CST -----  Regarding: Self/  [phone]  Type: Patient Call Back    Who called:  Patient    What is the request in detail: Patient would like a call back from staff regarding mediation  nystatin (MYCOSTATIN) 100,000 unit/mL suspension.  Thank you    Would the patient rather a call back or a response via [facility]?  Call back    Best call back number:  [phone]          Thank you

## 2022-11-17 RX ORDER — NYSTATIN 100000 [USP'U]/ML
4 SUSPENSION ORAL 4 TIMES DAILY
Qty: 473 ML | Refills: 1 | Status: SHIPPED | OUTPATIENT
Start: 2022-11-17 | End: 2022-12-01

## 2022-11-21 ENCOUNTER — PATIENT MESSAGE (OUTPATIENT)
Dept: OTOLARYNGOLOGY | Facility: CLINIC | Age: 77
End: 2022-11-21
Payer: MEDICARE

## 2022-12-08 ENCOUNTER — TELEPHONE (OUTPATIENT)
Dept: UROLOGY | Facility: CLINIC | Age: 77
End: 2022-12-08
Payer: MEDICARE

## 2022-12-08 NOTE — TELEPHONE ENCOUNTER
The pt called in wanting to be seen for a possible UTI. I explained to the pt that in order to be seen sooner, she may want to go see her PCP or an urgent care for full work up due to us not having any availability to get her in. She verbalized understanding.

## 2022-12-15 ENCOUNTER — OFFICE VISIT (OUTPATIENT)
Dept: RHEUMATOLOGY | Facility: CLINIC | Age: 77
End: 2022-12-15
Payer: MEDICARE

## 2022-12-15 VITALS
HEART RATE: 78 BPM | WEIGHT: 121.5 LBS | BODY MASS INDEX: 26.21 KG/M2 | HEIGHT: 57 IN | DIASTOLIC BLOOD PRESSURE: 71 MMHG | SYSTOLIC BLOOD PRESSURE: 159 MMHG

## 2022-12-15 DIAGNOSIS — R76.8 ANA POSITIVE: ICD-10-CM

## 2022-12-15 DIAGNOSIS — R21 RASH: Primary | ICD-10-CM

## 2022-12-15 PROCEDURE — 99213 OFFICE O/P EST LOW 20 MIN: CPT | Mod: PBBFAC | Performed by: INTERNAL MEDICINE

## 2022-12-15 PROCEDURE — 99999 PR PBB SHADOW E&M-EST. PATIENT-LVL III: CPT | Mod: PBBFAC,,, | Performed by: INTERNAL MEDICINE

## 2022-12-15 PROCEDURE — 99999 PR PBB SHADOW E&M-EST. PATIENT-LVL III: ICD-10-PCS | Mod: PBBFAC,,, | Performed by: INTERNAL MEDICINE

## 2022-12-15 PROCEDURE — 99203 PR OFFICE/OUTPT VISIT, NEW, LEVL III, 30-44 MIN: ICD-10-PCS | Mod: S$PBB,,, | Performed by: INTERNAL MEDICINE

## 2022-12-15 PROCEDURE — 99203 OFFICE O/P NEW LOW 30 MIN: CPT | Mod: S$PBB,,, | Performed by: INTERNAL MEDICINE

## 2022-12-15 ASSESSMENT — ROUTINE ASSESSMENT OF PATIENT INDEX DATA (RAPID3)
TOTAL RAPID3 SCORE: 0.56
PATIENT GLOBAL ASSESSMENT SCORE: 0.5
MDHAQ FUNCTION SCORE: 0.2
AM STIFFNESS SCORE: 1, YES
PAIN SCORE: 0.5
FATIGUE SCORE: 0.5
PSYCHOLOGICAL DISTRESS SCORE: 2.2

## 2022-12-15 NOTE — PROGRESS NOTES
Subjective:       Patient ID: Juli Parra is a 77 y.o. female.    Chief Complaint: Disease Management    HPI 77 year old F with PMH of GERD, HTN, IBS, kidney stones,vertigo here for evaluation. She has extreme stress for the last couple of months. Reports sores in mouth for 3 months.  Reports that her lips became dry.  Sores are on the gums.  Denies vaginal ulcers. Denies any rashes,photosensitivity, hair loss, pleurisy, or raynauds. Reports swelling in left foot for several weeks.  Denies joint swelling elsewhere. Denies abdominal pain or bloody stools. Denies smoking history.    Family hx: negative for SLE or RA     Past Medical History:   Diagnosis Date    Anxiety     GERD (gastroesophageal reflux disease)     HTN (hypertension)     IBS (irritable bowel syndrome)     Kidney stones     OAB (overactive bladder)     Vertigo        Review of Systems   Constitutional:  Negative for activity change, appetite change, chills, diaphoresis and fatigue.   HENT:  Negative for congestion, ear discharge, ear pain, facial swelling, mouth sores, sinus pressure, sneezing, sore throat, tinnitus and trouble swallowing.    Eyes:  Negative for photophobia, pain, discharge, redness, itching and visual disturbance.   Respiratory:  Negative for apnea, chest tightness, shortness of breath, wheezing and stridor.    Cardiovascular:  Negative for leg swelling.   Gastrointestinal:  Negative for abdominal distention, abdominal pain, anal bleeding, blood in stool, constipation, diarrhea and nausea.   Endocrine: Negative for cold intolerance and heat intolerance.   Genitourinary:  Negative for difficulty urinating and dysuria.   Musculoskeletal:  Negative for arthralgias, back pain, gait problem, joint swelling, myalgias, neck pain and neck stiffness.   Skin:  Negative for color change, pallor, rash and wound.   Neurological:  Negative for dizziness, seizures, light-headedness and numbness.   Hematological:  Negative for adenopathy.  "Does not bruise/bleed easily.   Psychiatric/Behavioral:  Negative for sleep disturbance. The patient is not nervous/anxious.          Objective:   BP (!) 159/71   Pulse 78   Ht 4' 9" (1.448 m)   Wt 55.1 kg (121 lb 7.6 oz)   BMI 26.29 kg/m²      Physical Exam   Constitutional: She is oriented to person, place, and time.   HENT:   Head: Normocephalic and atraumatic.   Right Ear: External ear normal.   Left Ear: External ear normal.   Nose: Nose normal.   Mouth/Throat: Oropharynx is clear and moist. No oropharyngeal exudate.   Eyes: Pupils are equal, round, and reactive to light. Conjunctivae are normal. Right eye exhibits no discharge. Left eye exhibits no discharge. No scleral icterus.   Neck: No JVD present. No thyromegaly present.   Cardiovascular: Normal rate, regular rhythm and normal heart sounds. Exam reveals no gallop and no friction rub.   No murmur heard.  Pulmonary/Chest: Effort normal and breath sounds normal. No stridor. No respiratory distress. She has no wheezes. She has no rhonchi. She has no rales. She exhibits no tenderness.   Abdominal: Soft. Bowel sounds are normal. She exhibits no distension and no mass. There is no abdominal tenderness. There is no rebound and no guarding.   Musculoskeletal:         General: No tenderness.      Cervical back: Neck supple.   Lymphadenopathy:     She has no cervical adenopathy.   Neurological: She is alert and oriented to person, place, and time. No cranial nerve deficit. Gait normal. Coordination normal.   Skin: Skin is dry. No bruising and no rash noted. No erythema. No jaundice or pallor.   Psychiatric: Affect and judgment normal.      No data to display     Assessment:     77 year old F with PMH of GERD, HTN, IBS, kidney stones,vertigo here for evaluation of oral ulcer.  Told patient that given negative subserologies, I do not think she has SLE.  I discussed with her other symptoms of SLE and asked her to return if she develops new symptoms.  I am going to " refer her to dermatology for evaluation of pemphigus.    1. CALLIE positive              Plan:       Problem List Items Addressed This Visit    None  Visit Diagnoses       CALLIE positive              Derm referral    30 * minutes of total time spent on the encounter, which includes face to face time and non-face to face time preparing to see the patient (eg, review of tests), Obtaining and/or reviewing separately obtained history, Documenting clinical information in the electronic or other health record, Independently interpreting results (not separately reported) and communicating results to the patient/family/caregiver, or Care coordination (not separately reported). -0

## 2023-01-10 ENCOUNTER — OFFICE VISIT (OUTPATIENT)
Dept: URGENT CARE | Facility: CLINIC | Age: 78
End: 2023-01-10
Payer: MEDICARE

## 2023-01-10 VITALS
HEART RATE: 73 BPM | RESPIRATION RATE: 16 BRPM | HEIGHT: 57 IN | OXYGEN SATURATION: 97 % | WEIGHT: 121 LBS | TEMPERATURE: 98 F | SYSTOLIC BLOOD PRESSURE: 183 MMHG | BODY MASS INDEX: 26.1 KG/M2 | DIASTOLIC BLOOD PRESSURE: 81 MMHG

## 2023-01-10 DIAGNOSIS — R30.0 DYSURIA: ICD-10-CM

## 2023-01-10 DIAGNOSIS — A49.9 BACTERIAL UTI: Primary | ICD-10-CM

## 2023-01-10 DIAGNOSIS — N39.0 BACTERIAL UTI: Primary | ICD-10-CM

## 2023-01-10 LAB
BILIRUB UR QL STRIP: NEGATIVE
GLUCOSE UR QL STRIP: NEGATIVE
KETONES UR QL STRIP: NEGATIVE
LEUKOCYTE ESTERASE UR QL STRIP: NEGATIVE
PH, POC UA: 7.5
POC BLOOD, URINE: POSITIVE
POC NITRATES, URINE: NEGATIVE
PROT UR QL STRIP: NEGATIVE
SP GR UR STRIP: 1 (ref 1–1.03)
UROBILINOGEN UR STRIP-ACNC: ABNORMAL (ref 0.1–1.1)

## 2023-01-10 PROCEDURE — 81003 URINALYSIS AUTO W/O SCOPE: CPT | Mod: QW,S$GLB,, | Performed by: EMERGENCY MEDICINE

## 2023-01-10 PROCEDURE — 81003 POCT URINALYSIS, DIPSTICK, AUTOMATED, W/O SCOPE: ICD-10-PCS | Mod: QW,S$GLB,, | Performed by: EMERGENCY MEDICINE

## 2023-01-10 PROCEDURE — 99214 PR OFFICE/OUTPT VISIT, EST, LEVL IV, 30-39 MIN: ICD-10-PCS | Mod: S$GLB,,, | Performed by: EMERGENCY MEDICINE

## 2023-01-10 PROCEDURE — 99214 OFFICE O/P EST MOD 30 MIN: CPT | Mod: S$GLB,,, | Performed by: EMERGENCY MEDICINE

## 2023-01-10 RX ORDER — NITROFURANTOIN 25; 75 MG/1; MG/1
100 CAPSULE ORAL 2 TIMES DAILY
Qty: 20 CAPSULE | Refills: 0 | Status: SHIPPED | OUTPATIENT
Start: 2023-01-10 | End: 2023-01-20

## 2023-01-10 RX ORDER — VALACYCLOVIR HYDROCHLORIDE 500 MG/1
TABLET, FILM COATED ORAL
COMMUNITY
Start: 2022-07-18

## 2023-01-10 NOTE — PATIENT INSTRUCTIONS
DRINK PLENTY OF FLUIDS  MACROBID ANTIBIOTIC PRESCRIPTION SENT TO PHARMACY   OKAY TO CONTINUE OVER-THE-COUNTER AZO OR PYRIDIUM FOR SYMPTOMS UNTIL INFECTION CLEARS   FOLLOW-UP WITH YOUR UROLOGIST OR PRIMARY CARE PHYSICIAN AS PLANNED   SEE BLADDER INFECTION SHEET

## 2023-01-10 NOTE — PROGRESS NOTES
"Subjective:       Patient ID: Juli Parra is a 77 y.o. female.    Vitals:  height is 4' 9" (1.448 m) and weight is 54.9 kg (121 lb). Her tympanic temperature is 98.3 °F (36.8 °C). Her blood pressure is 183/81 (abnormal) and her pulse is 73. Her respiration is 16 and oxygen saturation is 97%.     Chief Complaint: Urinary Tract Infection    Lower urinary tract symptoms consistent with her prior urinary tract infections for 2-3 days.  She does have follow-up with Urology.  No fevers no chills no flank pain.  States has had success with Macrobid in the past and sent this to pharmacy.  She drinks plenty of fluids and takes Pyridium    Urinary Tract Infection   This is a new problem. The current episode started in the past 7 days (3 days). The problem has been gradually worsening. The quality of the pain is described as burning. The pain is at a severity of 2/10. The pain is mild. She is Not sexually active. There is No history of pyelonephritis. Associated symptoms include frequency and urgency. Pertinent negatives include no behavior changes, chills, discharge, flank pain, hematuria, hesitancy, nausea, possible pregnancy, sweats, vomiting, weight loss, bubble bath use, constipation, rash or withholding. She has tried increased fluids for the symptoms. The treatment provided no relief.     ROS    Constitution: Negative for chills.   Gastrointestinal:  Negative for nausea, vomiting and constipation.   Genitourinary:  Positive for dysuria, frequency and urgency. Negative for flank pain and hematuria.   Skin:  Negative for rash and erythema.     Objective:      Physical Exam   Constitutional: She is oriented to person, place, and time. She appears well-developed.   HENT:   Head: Normocephalic and atraumatic. Head is without abrasion, without contusion and without laceration.   Ears:   Right Ear: External ear normal.   Left Ear: External ear normal.   Nose: Nose normal.   Mouth/Throat: Oropharynx is clear and moist " and mucous membranes are normal.   Eyes: Conjunctivae, EOM and lids are normal. Pupils are equal, round, and reactive to light.   Neck: Trachea normal and phonation normal. Neck supple.   Cardiovascular: Normal rate, regular rhythm and normal heart sounds.   Pulmonary/Chest: Effort normal and breath sounds normal. No stridor. No respiratory distress.   Abdominal: She exhibits no distension. Soft.      Comments: NO CVA TENDERNESS   Musculoskeletal: Normal range of motion.         General: Normal range of motion.   Neurological: She is alert and oriented to person, place, and time.   Skin: Skin is warm, dry, intact and no rash. Capillary refill takes less than 2 seconds. No abrasion, No burn, No bruising, No erythema and No ecchymosis   Psychiatric: Her speech is normal and behavior is normal. Judgment and thought content normal.   Nursing note and vitals reviewed.       Assessment:       1. Bacterial UTI    2. Dysuria          Plan:         Bacterial UTI    Dysuria  -     POCT Urinalysis, Dipstick, Automated, W/O Scope    Other orders  -     nitrofurantoin, macrocrystal-monohydrate, (MACROBID) 100 MG capsule; Take 1 capsule (100 mg total) by mouth 2 (two) times daily. for 10 days  Dispense: 20 capsule; Refill: 0      Patient Instructions   DRINK PLENTY OF FLUIDS  MACROBID ANTIBIOTIC PRESCRIPTION SENT TO PHARMACY   OKAY TO CONTINUE OVER-THE-COUNTER AZO OR PYRIDIUM FOR SYMPTOMS UNTIL INFECTION CLEARS   FOLLOW-UP WITH YOUR UROLOGIST OR PRIMARY CARE PHYSICIAN AS PLANNED   SEE BLADDER INFECTION SHEET

## 2023-02-09 ENCOUNTER — OFFICE VISIT (OUTPATIENT)
Dept: OTOLARYNGOLOGY | Facility: CLINIC | Age: 78
End: 2023-02-09
Payer: MEDICARE

## 2023-02-09 VITALS — BODY MASS INDEX: 26.1 KG/M2 | WEIGHT: 121 LBS | HEIGHT: 57 IN

## 2023-02-09 DIAGNOSIS — K14.0 GLOSSITIS: ICD-10-CM

## 2023-02-09 DIAGNOSIS — R76.8 ANA POSITIVE: ICD-10-CM

## 2023-02-09 DIAGNOSIS — K13.79 MOUTH SORES: Primary | ICD-10-CM

## 2023-02-09 PROCEDURE — 99213 PR OFFICE/OUTPT VISIT, EST, LEVL III, 20-29 MIN: ICD-10-PCS | Mod: S$GLB,,, | Performed by: OTOLARYNGOLOGY

## 2023-02-09 PROCEDURE — 99213 OFFICE O/P EST LOW 20 MIN: CPT | Mod: S$GLB,,, | Performed by: OTOLARYNGOLOGY

## 2023-02-09 NOTE — PROGRESS NOTES
OTOLARYNGOLOGY CLINIC NOTE  Date:  02/09/2023     Chief complaint:  Chief Complaint   Patient presents with    Follow-up     Follow up for mouth sores, still having the sore come and go       History of Present Illness  Juli Parra is a 77 y.o. female  presenting today for a followup.      Pharmacy did not have triamcinaolone    Her dentist had mouth sores with metoprolol          I originally saw the patient on 11- 8- 22. Below text is copied from initial note on that date describing history of present illness at time of presentation.   Has been having a lot of anxiety since moving to [place]. Has been needing abx frequently for uti starting in july and had wisdom tooth removed . Has gone to the dentist and had wisdom tooth removed. Gums and side of tongue feels raw . Her doctor gave her something for depression (mirtazipine) and that was when  sores got worse. Ulcers on side of tongue and gingiva. Sores started in august but when took antidepressant and stopped taking that about 1.5 weeks ago and still present.   Peppery food or spicy food makes it worse. Has tired warm salt water   Gets dry mouth a lot  and came up around the time this all started. Has dry eye as well. No family history of autoimmune conditions  No oral bleeding  Gest post nasal drip and allergies flares up with trees near her home at times   Menapause in 30s  Has tried vitamin b12     If looks up feels dizzy in the past and had bppv in the past       Past Medical History  Past Medical History:   Diagnosis Date    Anxiety     GERD (gastroesophageal reflux disease)     HTN (hypertension)     IBS (irritable bowel syndrome)     Kidney stones     OAB (overactive bladder)     Vertigo         Past Surgical History  Past Surgical History:   Procedure Laterality Date    CATARACT EXTRACTION, BILATERAL      CHOLECYSTECTOMY      HYSTERECTOMY      LITHOTRIPSY          Medications  Current Outpatient Medications on File Prior to Visit  "  Medication Sig Dispense Refill    diphenhydrAMINE-aluminum-magnesium hydroxide-simethicone-LIDOcaine HCl 2% Swish and spit 15 mLs every 4 (four) hours as needed (pain). 1 each 3    esomeprazole (NEXIUM) 40 MG capsule Take 1 capsule (40 mg total) by mouth before breakfast. 90 capsule 11    fluticasone propionate (FLONASE) 50 mcg/actuation nasal spray 1 spray (50 mcg total) by Each Nostril route once daily. 16 g 3    hydroCHLOROthiazide (HYDRODIURIL) 25 MG tablet Take 1 tablet (25 mg total) by mouth once daily. 90 tablet 3    LORazepam (ATIVAN) 2 MG Tab 1/2 nightly prn 15 tablet 5    losartan (COZAAR) 100 MG tablet Take 1 tablet (100 mg total) by mouth once daily. 90 tablet 3    metoprolol succinate (TOPROL-XL) 50 MG 24 hr tablet Take 1 tablet (50 mg total) by mouth once daily. 90 tablet 3    mirtazapine (REMERON) 15 MG tablet Take 1 tablet (15 mg total) by mouth every evening. 30 tablet 12    oxybutynin (DITROPAN XL) 15 MG TR24 Take 1 tablet (15 mg total) by mouth once daily. 30 tablet 11    potassium chloride SA (K-DUR,KLOR-CON M) 10 MEQ tablet TAKE 1 TABLET BY MOUTH ONCE DAILY 90 tablet 3    valACYclovir (VALTREX) 500 MG tablet Take by mouth.      hyoscyamine (ANASPAZ,LEVSIN) 0.125 mg Tab Take 1 tablet (125 mcg total) by mouth 2 (two) times daily as needed (stomach bloating). 60 tablet 12     No current facility-administered medications on file prior to visit.       Review of Systems  ROS     Social History   reports that she has never smoked. She has never used smokeless tobacco. She reports that she does not currently use alcohol. She reports that she does not use drugs.     Family History  Family History   Problem Relation Age of Onset    Leukemia Father     Cancer Brother         Pancreatic        Physical Exam   There were no vitals filed for this visit. Body mass index is 26.18 kg/m².  Weight: 54.9 kg (121 lb)   Height: 4' 9" (144.8 cm)     GENERAL: no acute distress.  HEAD: normocephalic.   EYES: No " scleral icterus  EARS: external ear without lesion, normal pinna shape and position.    NOSE: external nose without significant bony abnormality  ORAL CAVITY/OROPHARYNX: tongue mobile. No mass or lesion on tongues nor gingiva. Oral cavity soft to palpation  NECK: trachea midline.   LYMPH NODES:No cervical lymphadenopathy.  RESPIRATORY: no stridor, no stertor. Voice normal. Respirations nonlabored.  NEURO: alert, responds to questions appropriately.    PSYCH:mood appropriate      Imaging:  The patient does not have any new imaging of the head and neck since last visit.     Labs:  CBC  Recent Labs   Lab 04/12/22  0833   WBC 4.75   Hemoglobin 13.1   Hematocrit 39.6   MCV 98   Platelets 256     BMP  Recent Labs   Lab 12/03/20  1106 05/05/21  0936 04/12/22  0833   Glucose 98 158 H 109   Sodium 136 134 L 137   Potassium 3.9 4.4 4.1   Chloride 100 98 100   CO2 28 27 29   BUN 12 13 12   Creatinine 0.7 0.7 0.6   Calcium 9.1 9.6 9.4     COAGS        Assessment  1. Mouth sores    2. CALLIE positive    3. Glossitis       Plan:  Discussed plan of care with patient in detail and all questions answered. Patient reported understanding of plan of care.   Will message me if needs more magic swizzle  Try the sublingual b12 again  Discuss with prescribing md about metoprolol and mouth sores. I am not aware of this but brief search online showed it could be a possible side effect. Advised not to stop this medication without talking to prescribing md first   I spent a total of 21 minutes on the day of the visit.  This includes face to face time and non-face to face time preparing to see the patient (eg, review of tests), obtaining and/or reviewing separately obtained history, documenting clinical information in the electronic or other health record, independently interpreting results and communicating results to the patient/family/caregiver, or care coordinator.   Please be aware that this note has been generated with the assistance of  MModal voice-to-text.  Please excuse any spelling or grammatical errors.

## 2023-02-16 ENCOUNTER — OFFICE VISIT (OUTPATIENT)
Dept: UROLOGY | Facility: CLINIC | Age: 78
End: 2023-02-16
Payer: MEDICARE

## 2023-02-16 VITALS — WEIGHT: 119.63 LBS | BODY MASS INDEX: 25.88 KG/M2

## 2023-02-16 DIAGNOSIS — N32.81 OAB (OVERACTIVE BLADDER): ICD-10-CM

## 2023-02-16 DIAGNOSIS — Z87.442 HISTORY OF NEPHROLITHIASIS: ICD-10-CM

## 2023-02-16 DIAGNOSIS — N30.10 INTERSTITIAL CYSTITIS: ICD-10-CM

## 2023-02-16 DIAGNOSIS — N39.0 RECURRENT UTI: Primary | ICD-10-CM

## 2023-02-16 PROCEDURE — 99999 PR PBB SHADOW E&M-EST. PATIENT-LVL III: CPT | Mod: PBBFAC,,, | Performed by: UROLOGY

## 2023-02-16 PROCEDURE — 99214 OFFICE O/P EST MOD 30 MIN: CPT | Mod: S$PBB,,, | Performed by: UROLOGY

## 2023-02-16 PROCEDURE — 99214 PR OFFICE/OUTPT VISIT, EST, LEVL IV, 30-39 MIN: ICD-10-PCS | Mod: S$PBB,,, | Performed by: UROLOGY

## 2023-02-16 PROCEDURE — 99999 PR PBB SHADOW E&M-EST. PATIENT-LVL III: ICD-10-PCS | Mod: PBBFAC,,, | Performed by: UROLOGY

## 2023-02-16 PROCEDURE — 99213 OFFICE O/P EST LOW 20 MIN: CPT | Mod: PBBFAC | Performed by: UROLOGY

## 2023-02-16 RX ORDER — ESTRADIOL 10 UG/1
INSERT VAGINAL
Qty: 30 TABLET | Refills: 11 | Status: SHIPPED | OUTPATIENT
Start: 2023-02-16 | End: 2023-07-05

## 2023-02-16 RX ORDER — OXYBUTYNIN CHLORIDE 15 MG/1
15 TABLET, EXTENDED RELEASE ORAL DAILY
Qty: 90 TABLET | Refills: 3 | Status: SHIPPED | OUTPATIENT
Start: 2023-02-16

## 2023-02-16 NOTE — PROGRESS NOTES
Subjective:       Juli Parra is a 77 y.o. female who is an established patient with [facility], though new to me was seen  for evaluation of UTI.      Has been seen for UTI. Last UTI about 6 months ago. Feels that UTIs are brought on by stress. More stress recently after the passing of her . Previously on Macrodantin prophylaxis that worked well. Using D-mannose. Not using vaginal estrogen.     Also with h/o OAB - previously given Ditropan TID, now on Ditropan 15mg. Working well.     Also reports h/I IC and kidney stones. Reports being diagnosed with IC in the 90s. Her symptoms have been managed well with IC diet alone for many years. She reports history of ureteroscopy ~ several years ago by urologist in [place]. She is not currently having any flank pain or gross hematuria.    CT 9/22 - no stones/renal mass/hydronephrosis    UCx:  7/20 - neg  3/21 - >100k Enterobacter  3/21 -  neg  3/21 - >100k Klebsiella, 50-99k Enterococcus  7/21 -  neg  7/22 - 50-99k E coli  8/22 - neg  9/22 - >100k Klebsiella  10/22 - neg      The following portions of the patient's history were reviewed and updated as appropriate: allergies, current medications, past family history, past medical history, past social history, past surgical history and problem list.    Review of Systems  12 point review of systems completed. Pertinent positive and negatives listed in HPI        Objective:    Vitals: Wt 54.2 kg (119 lb 9.6 oz)   BMI 25.88 kg/m²     Physical Exam   General: well developed, well nourished in no acute distress  Head: normocephalic, atraumatic  Neck: supple, trachea midline, no obvious enlargement of thyroid  HEENT: EOMI, mucus membranes moist, sclera anicteric, no hearing impairment  Lungs: symmetric expansion, non-labored breathing  Skin: no rashes or lesions  Neuro: alert and oriented x 3, no gross deficits  Psych: normal judgment and insight, normal mood/affect and non-anxious  Genitourinary:   deferred      Lab  Review   Urine analysis today in clinic shows positive for red blood cells 5-10    Lab Results   Component Value Date    WBC 4.75 04/12/2022    HGB 13.1 04/12/2022    HCT 39.6 04/12/2022    MCV 98 04/12/2022     04/12/2022     Lab Results   Component Value Date    CREATININE 0.6 04/12/2022    BUN 12 04/12/2022     Imaging  Images and reports were personally reviewed by me and discussed with patient  CT 9/22  reviewed        Assessment/Plan:      1. Recurrent UTI    - Discussed UTI prevention strategies.   - Adequate hydration.   - Double voiding. Consider timed voiding.    - Avoid constipation.   - CT 9/2022 - no stones/hydro   - Cystoscopy - consider   - Cranberry/probiotics/D-mannose   - Estrace cream 2x weekly - recommend. Will do Vagifem.   - Call with UTI symptoms so UA/UCx can be sent.      2. Interstitial cystitis    - Avoid bladder irritants     3. History of nephrolithiasis    - Prior URS   - CT clear 9/2022     4. OAB (overactive bladder)    - Doing well with Ditropan 15mg         Follow up in 6 months

## 2023-03-21 DIAGNOSIS — M25.561 PAIN IN BOTH KNEES, UNSPECIFIED CHRONICITY: Primary | ICD-10-CM

## 2023-03-21 DIAGNOSIS — M25.562 PAIN IN BOTH KNEES, UNSPECIFIED CHRONICITY: Primary | ICD-10-CM

## 2023-03-22 ENCOUNTER — OFFICE VISIT (OUTPATIENT)
Dept: ORTHOPEDICS | Facility: CLINIC | Age: 78
End: 2023-03-22
Payer: MEDICARE

## 2023-03-22 ENCOUNTER — APPOINTMENT (OUTPATIENT)
Dept: RADIOLOGY | Facility: HOSPITAL | Age: 78
End: 2023-03-22
Attending: ORTHOPAEDIC SURGERY
Payer: MEDICARE

## 2023-03-22 ENCOUNTER — TELEPHONE (OUTPATIENT)
Dept: PAIN MEDICINE | Facility: CLINIC | Age: 78
End: 2023-03-22
Payer: MEDICARE

## 2023-03-22 ENCOUNTER — TELEPHONE (OUTPATIENT)
Dept: ORTHOPEDICS | Facility: CLINIC | Age: 78
End: 2023-03-22

## 2023-03-22 DIAGNOSIS — M25.562 PAIN IN BOTH KNEES, UNSPECIFIED CHRONICITY: ICD-10-CM

## 2023-03-22 DIAGNOSIS — S39.92XA INJURY OF COCCYX, INITIAL ENCOUNTER: Primary | ICD-10-CM

## 2023-03-22 DIAGNOSIS — M17.0 PRIMARY OSTEOARTHRITIS OF BOTH KNEES: ICD-10-CM

## 2023-03-22 DIAGNOSIS — M25.561 PAIN IN BOTH KNEES, UNSPECIFIED CHRONICITY: ICD-10-CM

## 2023-03-22 PROCEDURE — 20610 DRAIN/INJ JOINT/BURSA W/O US: CPT | Mod: 50,PBBFAC,PN | Performed by: ORTHOPAEDIC SURGERY

## 2023-03-22 PROCEDURE — 73562 X-RAY EXAM OF KNEE 3: CPT | Mod: 26,50,, | Performed by: RADIOLOGY

## 2023-03-22 PROCEDURE — 99999 PR PBB SHADOW E&M-EST. PATIENT-LVL III: CPT | Mod: PBBFAC,,, | Performed by: ORTHOPAEDIC SURGERY

## 2023-03-22 PROCEDURE — 99999 PR PBB SHADOW E&M-EST. PATIENT-LVL III: ICD-10-PCS | Mod: PBBFAC,,, | Performed by: ORTHOPAEDIC SURGERY

## 2023-03-22 PROCEDURE — 99213 OFFICE O/P EST LOW 20 MIN: CPT | Mod: PBBFAC,PN | Performed by: ORTHOPAEDIC SURGERY

## 2023-03-22 PROCEDURE — 20610 LARGE JOINT ASPIRATION/INJECTION: BILATERAL KNEE: ICD-10-PCS | Mod: 50,S$PBB,, | Performed by: ORTHOPAEDIC SURGERY

## 2023-03-22 PROCEDURE — 73562 XR KNEE 3 VIEW BILATERAL: ICD-10-PCS | Mod: 26,50,, | Performed by: RADIOLOGY

## 2023-03-22 PROCEDURE — 73562 X-RAY EXAM OF KNEE 3: CPT | Mod: TC,50,FY,PN

## 2023-03-22 PROCEDURE — 99213 PR OFFICE/OUTPT VISIT, EST, LEVL III, 20-29 MIN: ICD-10-PCS | Mod: 25,S$PBB,, | Performed by: ORTHOPAEDIC SURGERY

## 2023-03-22 PROCEDURE — 99213 OFFICE O/P EST LOW 20 MIN: CPT | Mod: 25,S$PBB,, | Performed by: ORTHOPAEDIC SURGERY

## 2023-03-22 RX ORDER — TRIAMCINOLONE ACETONIDE 40 MG/ML
40 INJECTION, SUSPENSION INTRA-ARTICULAR; INTRAMUSCULAR
Status: DISCONTINUED | OUTPATIENT
Start: 2023-03-22 | End: 2023-03-22 | Stop reason: HOSPADM

## 2023-03-22 RX ADMIN — TRIAMCINOLONE ACETONIDE 40 MG: 40 INJECTION, SUSPENSION INTRA-ARTICULAR; INTRAMUSCULAR at 11:03

## 2023-03-22 NOTE — PROCEDURES
Large Joint Aspiration/Injection: bilateral knee    Date/Time: 3/22/2023 11:00 AM  Performed by: Berta Conroy MD  Authorized by: Berta Conroy MD     Consent Done?:  Yes (Verbal)  Indications:  Arthritis  Timeout: prior to procedure the correct patient, procedure, and site was verified    Prep: patient was prepped and draped in usual sterile fashion      Local anesthesia used?: Yes    Local anesthetic:  Topical anesthetic    Details:  Needle Size:  22 G  Approach:  Anteromedial  Location:  Knee  Laterality:  Bilateral  Site:  Bilateral knee  Medications (Right):  40 mg triamcinolone acetonide 40 mg/mL  Medications (Left):  40 mg triamcinolone acetonide 40 mg/mL  Patient tolerance:  Patient tolerated the procedure well with no immediate complications

## 2023-03-22 NOTE — TELEPHONE ENCOUNTER
----- Message from Nisa Hoover MA sent at 3/22/2023  1:26 PM CDT -----  Please advise, thanks.   ----- Message -----  From: Jud Linares MA  Sent: 3/22/2023  12:39 PM CDT  To: Yola Ruiz Staff    The patient is there any thing sooner with Dr. Aceves. Her appt is on 04/12/23 . Dougie Renner   ----- Message -----  From: Berta Conroy MD  Sent: 3/22/2023  12:18 PM CDT  To: Marycarmen Alvares Staff    Can you let her know that her xray does show a fracture of her tailbone  I put in a referral to Dr Aceves

## 2023-03-22 NOTE — TELEPHONE ENCOUNTER
----- Message from Berta Conroy MD sent at 3/22/2023 12:17 PM CDT -----  Can you let her know that her xray does show a fracture of her tailbone  I put in a referral to Dr Aceves

## 2023-03-24 ENCOUNTER — OFFICE VISIT (OUTPATIENT)
Dept: PAIN MEDICINE | Facility: CLINIC | Age: 78
End: 2023-03-24
Payer: MEDICARE

## 2023-03-24 VITALS
HEART RATE: 68 BPM | DIASTOLIC BLOOD PRESSURE: 85 MMHG | SYSTOLIC BLOOD PRESSURE: 199 MMHG | OXYGEN SATURATION: 99 % | RESPIRATION RATE: 16 BRPM

## 2023-03-24 DIAGNOSIS — S39.92XA INJURY OF COCCYX, INITIAL ENCOUNTER: ICD-10-CM

## 2023-03-24 DIAGNOSIS — M53.3 COCCYDYNIA: ICD-10-CM

## 2023-03-24 DIAGNOSIS — M51.36 DDD (DEGENERATIVE DISC DISEASE), LUMBAR: Primary | ICD-10-CM

## 2023-03-24 DIAGNOSIS — M47.816 LUMBAR SPONDYLOSIS: ICD-10-CM

## 2023-03-24 PROBLEM — M51.369 DDD (DEGENERATIVE DISC DISEASE), LUMBAR: Status: ACTIVE | Noted: 2023-03-24

## 2023-03-24 PROCEDURE — 99999 PR PBB SHADOW E&M-EST. PATIENT-LVL V: ICD-10-PCS | Mod: PBBFAC,,, | Performed by: PAIN MEDICINE

## 2023-03-24 PROCEDURE — 99215 OFFICE O/P EST HI 40 MIN: CPT | Mod: PBBFAC,PN | Performed by: PAIN MEDICINE

## 2023-03-24 PROCEDURE — 99204 OFFICE O/P NEW MOD 45 MIN: CPT | Mod: S$PBB,,, | Performed by: PAIN MEDICINE

## 2023-03-24 PROCEDURE — 99999 PR PBB SHADOW E&M-EST. PATIENT-LVL V: CPT | Mod: PBBFAC,,, | Performed by: PAIN MEDICINE

## 2023-03-24 PROCEDURE — 99204 PR OFFICE/OUTPT VISIT, NEW, LEVL IV, 45-59 MIN: ICD-10-PCS | Mod: S$PBB,,, | Performed by: PAIN MEDICINE

## 2023-03-24 NOTE — PATIENT INSTRUCTIONS
Ibuprofen    Max daily dosage = 2400mg; no more than 800mg per dose    Can take:  800mg every 8 hours  600mg every 6 hours  400mg every 4 hours    Take with food

## 2023-03-24 NOTE — PROGRESS NOTES
Subjective:     Patient ID: Juli Parra is a 77 y.o. female    Chief Complaint: Low-back Pain (Tailbone dull constant pain)      Referred by: Berta Conroy MD      HPI:    Initial Encounter (3/24/23):  Juli Parra is a 77 y.o. female who presents today with acute low back/tailbone pain. This pain started following a fall onto her buttocks 3 days ago. She denies having any pain prior to this event. The pain is located in the  midline lower lumbar/sacral region. The pain does not radiate. She denies any numbness, tingling, weakness or b/b dysfunction. The pain is intermittent and mainly present while seated. The pain improves when standing/walking and she denies any pain while lying down.    This pain is described in detail below.    Physical Therapy: No    Non-pharmacologic Treatment: standing and lying down helps.         TENS? No    Pain Medications:         Currently taking: Motrin    Has tried in the past:      Has not tried:  Opioids, Tylenol, Muscle relaxants, TCAs, SNRIs, anticonvulsants, topical creams    Blood thinners: None    Interventional Therapies: None    Relevant Surgeries: None    Affecting sleep? No    Affecting daily activities? yes    Depressive symptoms? no          SI/HI? No    Work status: Retired    Pain Scores:    Best:       7/10  Worst:     10/10  Usually:   8/10  Today:    8/10    Pain Disability Index  Family/Home Responsibilities:: 9  Recreation:: 9  Social Activity:: 9  Occupation:: 9  Sexual Behavior:: 0  Self Care:: 0  Life-Support Activities:: 0  Pain Disability Index (PDI): 36    Review of Systems   Constitutional:  Negative for activity change, appetite change, chills, fatigue, fever and unexpected weight change.   HENT:  Negative for hearing loss.    Eyes:  Negative for visual disturbance.   Respiratory:  Negative for chest tightness and shortness of breath.    Cardiovascular:  Negative for chest pain.   Gastrointestinal:  Negative for abdominal pain,  constipation, diarrhea, nausea and vomiting.   Genitourinary:  Negative for difficulty urinating.   Musculoskeletal:  Positive for back pain and myalgias. Negative for gait problem and neck pain.   Skin:  Negative for rash.   Neurological:  Negative for dizziness, weakness, light-headedness, numbness and headaches.   Psychiatric/Behavioral:  Negative for hallucinations, sleep disturbance and suicidal ideas. The patient is not nervous/anxious.      Past Medical History:   Diagnosis Date    Anxiety     GERD (gastroesophageal reflux disease)     HTN (hypertension)     IBS (irritable bowel syndrome)     Kidney stones     OAB (overactive bladder)     Vertigo        Past Surgical History:   Procedure Laterality Date    CATARACT EXTRACTION, BILATERAL      CHOLECYSTECTOMY      HYSTERECTOMY      LITHOTRIPSY         Social History     Socioeconomic History    Marital status:    Tobacco Use    Smoking status: Never    Smokeless tobacco: Never   Substance and Sexual Activity    Alcohol use: Not Currently    Drug use: Never    Sexual activity: Not Currently       Review of patient's allergies indicates:   Allergen Reactions    Egg Nausea Only    Cipro [ciprofloxacin hcl]     Sulfur Rash       Current Outpatient Medications on File Prior to Visit   Medication Sig Dispense Refill    dicyclomine (BENTYL) 10 MG capsule TAKE 1 CAPSULE BY MOUTH FOUR TIMES DAILY EVERY NIGHT BEFORE MEALS 90 capsule 6    diphenhydrAMINE-aluminum-magnesium hydroxide-simethicone-LIDOcaine HCl 2% Swish and spit 15 mLs every 4 (four) hours as needed (pain). 1 each 3    esomeprazole (NEXIUM) 40 MG capsule Take 1 capsule (40 mg total) by mouth before breakfast. 90 capsule 11    estradioL (VAGIFEM) 10 mcg Tab Place 1 tablet (10 mcg total) vaginally once daily for 14 days, THEN 1 tablet (10 mcg total) twice a week. 30 tablet 11    fluticasone propionate (FLONASE) 50 mcg/actuation nasal spray 1 spray (50 mcg total) by Each Nostril route once daily. 16 g 3     hydroCHLOROthiazide (HYDRODIURIL) 25 MG tablet Take 1 tablet (25 mg total) by mouth once daily. 90 tablet 3    LORazepam (ATIVAN) 2 MG Tab 1/2 nightly prn 15 tablet 5    losartan (COZAAR) 100 MG tablet Take 1 tablet (100 mg total) by mouth once daily. 90 tablet 3    metoprolol succinate (TOPROL-XL) 50 MG 24 hr tablet Take 1 tablet (50 mg total) by mouth once daily. 90 tablet 3    mirtazapine (REMERON) 15 MG tablet Take 1 tablet (15 mg total) by mouth every evening. 30 tablet 12    oxybutynin (DITROPAN XL) 15 MG TR24 Take 1 tablet (15 mg total) by mouth once daily. 90 tablet 3    potassium chloride SA (K-DUR,KLOR-CON M) 10 MEQ tablet TAKE 1 TABLET BY MOUTH ONCE DAILY 90 tablet 3    valACYclovir (VALTREX) 500 MG tablet Take by mouth.      hyoscyamine (ANASPAZ,LEVSIN) 0.125 mg Tab Take 1 tablet (125 mcg total) by mouth 2 (two) times daily as needed (stomach bloating). 60 tablet 12     No current facility-administered medications on file prior to visit.       Objective:      BP (!) 199/85 (BP Location: Left arm, Patient Position: Sitting, BP Method: Medium (Automatic))   Pulse 68   Resp 16   SpO2 99%     Exam:  GEN:  Well developed, well nourished.  No acute distress.   HEENT:  No trauma.  Mucous membranes moist.  Nares patent bilaterally.  PSYCH: Normal affect. Thought content appropriate.  CHEST:  Breathing symmetric.  No audible wheezing.  ABD: Soft, non-distended.  SKIN:  Warm, pink, dry.  No rash on exposed areas.    EXT:  No cyanosis, clubbing, or edema.  No color change or changes in nail or hair growth.  NEURO/MUSCULOSKELETAL:  Fully alert, oriented, and appropriate. Speech normal gaye. No cranial nerve deficits.   Gait: Normal.  No focal motor deficits.     No tenderness to palpation of lumbar spine/paraspinals  No tenderness to percussion of lumbar spine    Imaging:      Narrative & Impression    EXAMINATION:  XR SACRUM AND COCCYX     CLINICAL HISTORY:  Unspecified injury of lower back, initial  encounter     TECHNIQUE:  Two or three views of the sacrum and coccyx were performed.     COMPARISON:  None     FINDINGS:  There is suggestion of a nondisplaced fracture involving the 1st coccygeal segment.  No other fracture or dislocation.  DJD.     Impression:     See above        Electronically signed by: Alan Platt MD  Date:                                            03/22/2023  Time:                                           11:59       Assessment:       Encounter Diagnoses   Name Primary?    Injury of coccyx, initial encounter     DDD (degenerative disc disease), lumbar Yes    Lumbar spondylosis     Coccydynia          Plan:       Juli was seen today for low-back pain.    Diagnoses and all orders for this visit:    DDD (degenerative disc disease), lumbar  -     X-Ray Lumbar Spine Ap Lateral w/Flex Ext; Future    Injury of coccyx, initial encounter  -     Ambulatory referral/consult to Pain Clinic    Lumbar spondylosis  -     X-Ray Lumbar Spine Ap Lateral w/Flex Ext; Future    Coccydynia        Juli Parra is a 77 y.o. female with acute low back/tailbone pain following a fall onto her buttocks. Unclear if pain is related to lumbar spine, tailbone or both. Low suspicion for radiculopathy or compression fracture.     Pertinent imaging studies reviewed by me. Imaging results were discussed with patient.  Lumbar xrays to r/o injury to lumbar spine.  Continue OTC ibuprofen. Patient was given instruction on how to take this medication.  RTC in 4 weeks or sooner if needed. At that time we will discuss her pain further. If not improving, then may consider ganglion impar block or lumbar interventions.           This note was created by combination of typed  and M-Modal dictation. Transcription and phonetic errors may be present.  If there are any questions, please contact me.

## 2023-04-06 ENCOUNTER — TELEPHONE (OUTPATIENT)
Dept: FAMILY MEDICINE | Facility: CLINIC | Age: 78
End: 2023-04-06
Payer: MEDICARE

## 2023-04-06 NOTE — TELEPHONE ENCOUNTER
Spoke with the Patient to inform and to scheduled an [facility] appointment.  Patient declined.  Patient will call back if interested later.

## 2023-04-17 ENCOUNTER — OFFICE VISIT (OUTPATIENT)
Dept: INTERNAL MEDICINE | Facility: CLINIC | Age: 78
End: 2023-04-17
Payer: MEDICARE

## 2023-04-17 VITALS
BODY MASS INDEX: 25.45 KG/M2 | HEART RATE: 66 BPM | WEIGHT: 117.94 LBS | SYSTOLIC BLOOD PRESSURE: 132 MMHG | HEIGHT: 57 IN | DIASTOLIC BLOOD PRESSURE: 68 MMHG | OXYGEN SATURATION: 98 %

## 2023-04-17 DIAGNOSIS — K21.9 GASTROESOPHAGEAL REFLUX DISEASE, UNSPECIFIED WHETHER ESOPHAGITIS PRESENT: ICD-10-CM

## 2023-04-17 DIAGNOSIS — I10 ESSENTIAL HYPERTENSION: ICD-10-CM

## 2023-04-17 DIAGNOSIS — S32.2XXA CLOSED FRACTURE OF COCCYX, INITIAL ENCOUNTER: ICD-10-CM

## 2023-04-17 DIAGNOSIS — F32.1 CURRENT MODERATE EPISODE OF MAJOR DEPRESSIVE DISORDER WITHOUT PRIOR EPISODE: ICD-10-CM

## 2023-04-17 DIAGNOSIS — F41.9 ANXIETY: ICD-10-CM

## 2023-04-17 DIAGNOSIS — M51.36 DDD (DEGENERATIVE DISC DISEASE), LUMBAR: Primary | ICD-10-CM

## 2023-04-17 DIAGNOSIS — M53.3 COCCYDYNIA: ICD-10-CM

## 2023-04-17 PROCEDURE — 99214 OFFICE O/P EST MOD 30 MIN: CPT | Mod: PBBFAC | Performed by: INTERNAL MEDICINE

## 2023-04-17 PROCEDURE — 99999 PR PBB SHADOW E&M-EST. PATIENT-LVL IV: ICD-10-PCS | Mod: PBBFAC,,, | Performed by: INTERNAL MEDICINE

## 2023-04-17 PROCEDURE — 99214 OFFICE O/P EST MOD 30 MIN: CPT | Mod: S$PBB,,, | Performed by: INTERNAL MEDICINE

## 2023-04-17 PROCEDURE — 99214 PR OFFICE/OUTPT VISIT, EST, LEVL IV, 30-39 MIN: ICD-10-PCS | Mod: S$PBB,,, | Performed by: INTERNAL MEDICINE

## 2023-04-17 PROCEDURE — 99999 PR PBB SHADOW E&M-EST. PATIENT-LVL IV: CPT | Mod: PBBFAC,,, | Performed by: INTERNAL MEDICINE

## 2023-04-17 RX ORDER — LORAZEPAM 1 MG/1
1 TABLET ORAL NIGHTLY
Qty: 33 TABLET | Refills: 5 | Status: SHIPPED | OUTPATIENT
Start: 2023-04-17 | End: 2023-10-17 | Stop reason: SDUPTHER

## 2023-04-17 RX ORDER — CITALOPRAM 10 MG/1
10 TABLET ORAL DAILY
Qty: 30 TABLET | Refills: 11 | Status: SHIPPED | OUTPATIENT
Start: 2023-04-17 | End: 2023-06-02 | Stop reason: ALTCHOICE

## 2023-04-17 NOTE — PROGRESS NOTES
Subjective:       Patient ID: Juli Parra is a 77 y.o. female.    Chief Complaint: Follow-up    Patient comes in for follow-up of medical problems.  Unfortunately she fractured her tailbone a few weeks ago.  It has been slow to heal.  She is been sitting on donut pillows with some relief.  In the last few days is a little better so she is optimistic it is slowly improving.  She had a lot of lab work a few months ago from [facility].    She is due for bone density.     Review of Systems   Constitutional:  Negative for appetite change, chills and fever.   HENT:  Negative for nosebleeds and sore throat.    Eyes:  Negative for pain and visual disturbance.   Respiratory:  Negative for cough, shortness of breath and wheezing.    Cardiovascular:  Negative for chest pain and leg swelling.   Gastrointestinal:  Negative for abdominal pain, constipation and diarrhea.   Endocrine: Negative for polyuria.   Genitourinary:  Negative for difficulty urinating, hematuria and vaginal bleeding.   Musculoskeletal:  Positive for arthralgias (tailbone fracture pain). Negative for back pain, gait problem and neck pain.   Integumentary:  Negative for pallor and rash.   Neurological:  Negative for tremors, seizures and headaches.   Hematological:  Does not bruise/bleed easily.   Psychiatric/Behavioral:  Positive for dysphoric mood. The patient is not nervous/anxious.          Past Medical History:   Diagnosis Date    Anxiety     GERD (gastroesophageal reflux disease)     HTN (hypertension)     IBS (irritable bowel syndrome)     Kidney stones     OAB (overactive bladder)     Vertigo      Past Surgical History:   Procedure Laterality Date    CATARACT EXTRACTION, BILATERAL      CHOLECYSTECTOMY      HYSTERECTOMY      LITHOTRIPSY        Patient Active Problem List   Diagnosis    Mixed irritable bowel syndrome    Primary generalized (osteo)arthritis    Essential hypertension    Impaired fasting glucose    Gastroesophageal reflux  disease    Bilateral sensorineural hearing loss    Anxiety    Arthritis of right knee    Chest congestion    Depression    Coccydynia    Lumbar spondylosis    DDD (degenerative disc disease), lumbar        Objective:      Physical Exam  Constitutional:       General: She is not in acute distress.     Appearance: She is well-developed.   HENT:      Head: Normocephalic and atraumatic.      Right Ear: Tympanic membrane, ear canal and external ear normal.      Left Ear: Tympanic membrane, ear canal and external ear normal.      Mouth/Throat:      Pharynx: No oropharyngeal exudate or posterior oropharyngeal erythema.   Eyes:      General: No scleral icterus.     Conjunctiva/sclera: Conjunctivae normal.      Pupils: Pupils are equal, round, and reactive to light.   Neck:      Thyroid: No thyromegaly.   Cardiovascular:      Rate and Rhythm: Normal rate and regular rhythm.      Pulses: Normal pulses.      Heart sounds: No murmur heard.  Pulmonary:      Effort: Pulmonary effort is normal.      Breath sounds: Normal breath sounds. No wheezing.   Abdominal:      General: Bowel sounds are normal. There is no distension.      Palpations: Abdomen is soft.      Tenderness: There is no abdominal tenderness.   Musculoskeletal:         General: No tenderness.      Cervical back: Normal range of motion and neck supple.      Right lower leg: No edema.      Left lower leg: No edema.   Lymphadenopathy:      Cervical: No cervical adenopathy.   Skin:     Coloration: Skin is not jaundiced.      Findings: No rash.   Neurological:      General: No focal deficit present.      Mental Status: She is alert and oriented to person, place, and time.   Psychiatric:         Behavior: Behavior normal.      Comments: Mildly depressed mood. Says she is lonely, misses [place].        Assessment:       Problem List Items Addressed This Visit          Neuro    DDD (degenerative disc disease), lumbar - Primary       Psychiatric    Anxiety    Relevant  "Medications    LORazepam (ATIVAN) 1 MG tablet       Cardiac/Vascular    Essential hypertension       GI    Gastroesophageal reflux disease       Orthopedic    Coccydynia     Other Visit Diagnoses       Closed fracture of coccyx, initial encounter                Plan:         Juli was seen today for follow-up.    Diagnoses and all orders for this visit:    DDD (degenerative disc disease), lumbar    Essential hypertension    Coccydynia    Closed fracture of coccyx, initial encounter    Anxiety  -     LORazepam (ATIVAN) 1 MG tablet; Take 1 tablet (1 mg total) by mouth every evening.    Gastroesophageal reflux disease, unspecified whether esophagitis present    Other orders  -     citalopram (CELEXA) 10 MG tablet; Take 1 tablet (10 mg total) by mouth once daily.           Patient was having side effects from mirtazapine so we will change to Celexa.  Call with any problems   Follow-up in a few months sooner prn          Portions of this note may have been created with voice recognition software. Occasional "wrong-word" or "sound-a-like" substitutions may have occurred due to the inherent limitations of voice recognition software. Please, read the note carefully and recognize, using context, where substitutions have occurred.  "

## 2023-04-20 ENCOUNTER — OFFICE VISIT (OUTPATIENT)
Dept: ORTHOPEDICS | Facility: CLINIC | Age: 78
End: 2023-04-20
Payer: MEDICARE

## 2023-04-20 DIAGNOSIS — M18.11 ARTHRITIS OF CARPOMETACARPAL (CMC) JOINT OF RIGHT THUMB: ICD-10-CM

## 2023-04-20 DIAGNOSIS — M18.12 ARTHRITIS OF CARPOMETACARPAL (CMC) JOINT OF LEFT THUMB: ICD-10-CM

## 2023-04-20 DIAGNOSIS — K13.79 MOUTH SORES: Primary | ICD-10-CM

## 2023-04-20 PROCEDURE — 20600 DRAIN/INJ JOINT/BURSA W/O US: CPT | Mod: 50,PBBFAC,PN | Performed by: ORTHOPAEDIC SURGERY

## 2023-04-20 PROCEDURE — 99999 PR PBB SHADOW E&M-EST. PATIENT-LVL III: CPT | Mod: PBBFAC,,, | Performed by: ORTHOPAEDIC SURGERY

## 2023-04-20 PROCEDURE — 99499 NO LOS: ICD-10-PCS | Mod: S$PBB,,, | Performed by: ORTHOPAEDIC SURGERY

## 2023-04-20 PROCEDURE — 20600 SMALL JOINT ASPIRATION/INJECTION: L THUMB CMC, R THUMB CMC: ICD-10-PCS | Mod: 50,S$PBB,, | Performed by: ORTHOPAEDIC SURGERY

## 2023-04-20 PROCEDURE — 99213 OFFICE O/P EST LOW 20 MIN: CPT | Mod: PBBFAC,PN | Performed by: ORTHOPAEDIC SURGERY

## 2023-04-20 PROCEDURE — 99999 PR PBB SHADOW E&M-EST. PATIENT-LVL III: ICD-10-PCS | Mod: PBBFAC,,, | Performed by: ORTHOPAEDIC SURGERY

## 2023-04-20 PROCEDURE — 99499 UNLISTED E&M SERVICE: CPT | Mod: S$PBB,,, | Performed by: ORTHOPAEDIC SURGERY

## 2023-04-20 RX ORDER — TRIAMCINOLONE ACETONIDE 40 MG/ML
40 INJECTION, SUSPENSION INTRA-ARTICULAR; INTRAMUSCULAR
Status: DISCONTINUED | OUTPATIENT
Start: 2023-04-20 | End: 2023-04-20 | Stop reason: HOSPADM

## 2023-04-20 RX ADMIN — TRIAMCINOLONE ACETONIDE 40 MG: 40 INJECTION, SUSPENSION INTRA-ARTICULAR; INTRAMUSCULAR at 01:04

## 2023-04-20 NOTE — PROCEDURES
Small Joint Aspiration/Injection: L thumb CMC, R thumb CMC    Date/Time: 4/20/2023 1:30 PM  Performed by: Berta Conroy MD  Authorized by: Berta Conroy MD     Consent Done?:  Yes (Verbal)  Indications:  Arthritis  Timeout: prior to procedure the correct patient, procedure, and site was verified    Prep: patient was prepped and draped in usual sterile fashion      Local anesthesia used?: Yes    Local anesthetic:  Topical anesthetic  Location:  Thumb  Site:  L thumb CMC and R thumb CMC  Needle size:  25 G  Medications:  40 mg triamcinolone acetonide 40 mg/mL  Patient tolerance:  Patient tolerated the procedure well with no immediate complications

## 2023-04-20 NOTE — PROGRESS NOTES
Follow up visit    History of Present Illness:   Juli comes to the office for follow up evaluation of bilateral  CMC arthritis   - here for repeat injections    ROS: unremarkable and no change since last visit    Physical Examination:    Vitals:    04/20/23 1318   PainSc:   2   PainLoc: Hand      NAD  Bilateral Hand/Wrist Examination:    Observation/Inspection:  Swelling  none    Deformity  none  Discoloration  none     Scars   none    Atrophy  none    HAND/WRIST EXAMINATION:  Finkelstein's Test   Neg  WHAT Test    Neg  CMC grind    Pos  Circumduction test   Pos    Neurovascular Exam:  Digits WWP, brisk CR < 3s throughout  NVI motor/LTS to M/R/U nerves, radial pulse 2+  Tinel's Test - Carpal Tunnel  Neg  Tinel's Test - Cubital Tunnel  Neg  Phalen's Test    Neg  Median Nerve Compression Test Neg    ROM hand/wrist/elbow full, painless     Radiographic imaging: 3 views of the bilateral hands show advanced CMC OA    Assessment/Plan:  77 y.o. female  with Bilateral CMC arthritis    We discussed the etiology of persistent pain and further treatment options.  Injection of the bilateral thumb carpometacarpal joint injection  performed, please see procedure note for more details.  Prior to the injection risks and benefits of corticosteroid injection were discussed with the patient including pain, infection, bleeding, skin color changes, swelling, steroid flare. We discussed that over time injections can result in chondral damage, acceleration of arthritis formation, damage to tendons and damage to joints.  The patient consented for the procedure.  Post-injection instructions were given to the patient in writing.  Return for follow up visit: PRN    All questions were answered in detail. The patient  verbalized the understanding of the treatment plan and is in full agreement with the treatment plan.

## 2023-04-27 ENCOUNTER — OFFICE VISIT (OUTPATIENT)
Dept: PAIN MEDICINE | Facility: CLINIC | Age: 78
End: 2023-04-27
Payer: MEDICARE

## 2023-04-27 VITALS
DIASTOLIC BLOOD PRESSURE: 60 MMHG | SYSTOLIC BLOOD PRESSURE: 130 MMHG | RESPIRATION RATE: 16 BRPM | HEART RATE: 59 BPM | OXYGEN SATURATION: 97 %

## 2023-04-27 DIAGNOSIS — M47.816 LUMBAR SPONDYLOSIS: ICD-10-CM

## 2023-04-27 DIAGNOSIS — M51.36 DDD (DEGENERATIVE DISC DISEASE), LUMBAR: ICD-10-CM

## 2023-04-27 DIAGNOSIS — M53.3 COCCYDYNIA: Primary | ICD-10-CM

## 2023-04-27 PROCEDURE — 99214 OFFICE O/P EST MOD 30 MIN: CPT | Mod: PBBFAC,PN

## 2023-04-27 PROCEDURE — 99214 OFFICE O/P EST MOD 30 MIN: CPT | Mod: S$PBB,,,

## 2023-04-27 PROCEDURE — 99999 PR PBB SHADOW E&M-EST. PATIENT-LVL IV: CPT | Mod: PBBFAC,,,

## 2023-04-27 PROCEDURE — 99999 PR PBB SHADOW E&M-EST. PATIENT-LVL IV: ICD-10-PCS | Mod: PBBFAC,,,

## 2023-04-27 PROCEDURE — 99214 PR OFFICE/OUTPT VISIT, EST, LEVL IV, 30-39 MIN: ICD-10-PCS | Mod: S$PBB,,,

## 2023-04-27 NOTE — PROGRESS NOTES
Subjective:     Patient ID: Juli Parra is a 77 y.o. female    Chief Complaint: Follow-up (4wk f\u)      Referred by: No ref. provider found      HPI:    Interval History PA (04/27/2023):  Patient returns to clinic for follow up of lower back/tailbone pain.  Patient reports since onset of tailbone pain following a fall on her buttocks that the pain has slowly been improving.  She notes currently pain is down to a 1-2/10.  She continues to note some pain with certain activities.  Notes since previous visit she no longer has to use a donut cushion to alleviate the pain.  Able to sit without pain.  She continues to take ibuprofen p.r.n. with benefit.  She does note having some upset stomach issues this week, denies any black/bloody stools.    Initial Encounter (3/24/23):  Juli Parra is a 77 y.o. female who presents today with acute low back/tailbone pain. This pain started following a fall onto her buttocks 3 days ago. She denies having any pain prior to this event. The pain is located in the  midline lower lumbar/sacral region. The pain does not radiate. She denies any numbness, tingling, weakness or b/b dysfunction. The pain is intermittent and mainly present while seated. The pain improves when standing/walking and she denies any pain while lying down.    This pain is described in detail below.    Physical Therapy: No    Non-pharmacologic Treatment: standing and lying down helps.         TENS? No    Pain Medications:         Currently taking: Motrin    Has tried in the past:      Has not tried:  Opioids, Tylenol, Muscle relaxants, TCAs, SNRIs, anticonvulsants, topical creams    Blood thinners: None    Interventional Therapies: None    Relevant Surgeries: None    Affecting sleep? No    Affecting daily activities? yes    Depressive symptoms? no          SI/HI? No    Work status: Retired    Pain Scores:    Best:       1/10  Worst:     8/10  Usually:   4/10  Today:    1/10         Review of Systems    Constitutional:  Negative for activity change, appetite change, chills, fatigue, fever and unexpected weight change.   HENT:  Negative for hearing loss.    Eyes:  Negative for visual disturbance.   Respiratory:  Negative for chest tightness and shortness of breath.    Cardiovascular:  Negative for chest pain.   Gastrointestinal:  Negative for abdominal pain, constipation, diarrhea, nausea and vomiting.   Genitourinary:  Negative for difficulty urinating.   Musculoskeletal:  Positive for back pain and myalgias. Negative for gait problem and neck pain.   Skin:  Negative for rash.   Neurological:  Negative for dizziness, weakness, light-headedness, numbness and headaches.   Psychiatric/Behavioral:  Negative for hallucinations, sleep disturbance and suicidal ideas. The patient is not nervous/anxious.      Past Medical History:   Diagnosis Date    Anxiety     GERD (gastroesophageal reflux disease)     HTN (hypertension)     IBS (irritable bowel syndrome)     Kidney stones     OAB (overactive bladder)     Vertigo        Past Surgical History:   Procedure Laterality Date    CATARACT EXTRACTION, BILATERAL      CHOLECYSTECTOMY      HYSTERECTOMY      LITHOTRIPSY         Social History     Socioeconomic History    Marital status:    Tobacco Use    Smoking status: Never    Smokeless tobacco: Never   Substance and Sexual Activity    Alcohol use: Not Currently    Drug use: Never    Sexual activity: Not Currently       Review of patient's allergies indicates:   Allergen Reactions    Egg Nausea Only    Cipro [ciprofloxacin hcl]     Sulfur Rash       Current Outpatient Medications on File Prior to Visit   Medication Sig Dispense Refill    citalopram (CELEXA) 10 MG tablet Take 1 tablet (10 mg total) by mouth once daily. 30 tablet 11    dicyclomine (BENTYL) 10 MG capsule TAKE 1 CAPSULE BY MOUTH FOUR TIMES DAILY EVERY NIGHT BEFORE MEALS 90 capsule 6    diphenhydrAMINE-aluminum-magnesium hydroxide-simethicone-LIDOcaine HCl 2%  Swish and spit 15 mLs every 4 (four) hours as needed (pain). 1 each 3    estradioL (VAGIFEM) 10 mcg Tab Place 1 tablet (10 mcg total) vaginally once daily for 14 days, THEN 1 tablet (10 mcg total) twice a week. 30 tablet 11    fluticasone propionate (FLONASE) 50 mcg/actuation nasal spray 1 spray (50 mcg total) by Each Nostril route once daily. 16 g 3    hydroCHLOROthiazide (HYDRODIURIL) 25 MG tablet Take 1 tablet (25 mg total) by mouth once daily. 90 tablet 3    LORazepam (ATIVAN) 1 MG tablet Take 1 tablet (1 mg total) by mouth every evening. 33 tablet 5    losartan (COZAAR) 100 MG tablet Take 1 tablet (100 mg total) by mouth once daily. 90 tablet 3    metoprolol succinate (TOPROL-XL) 50 MG 24 hr tablet Take 1 tablet (50 mg total) by mouth once daily. 90 tablet 3    oxybutynin (DITROPAN XL) 15 MG TR24 Take 1 tablet (15 mg total) by mouth once daily. 90 tablet 3    potassium chloride SA (K-DUR,KLOR-CON M) 10 MEQ tablet TAKE 1 TABLET BY MOUTH ONCE DAILY 90 tablet 3    valACYclovir (VALTREX) 500 MG tablet Take by mouth.      esomeprazole (NEXIUM) 40 MG capsule Take 1 capsule (40 mg total) by mouth before breakfast. 90 capsule 11    hyoscyamine (ANASPAZ,LEVSIN) 0.125 mg Tab Take 1 tablet (125 mcg total) by mouth 2 (two) times daily as needed (stomach bloating). 60 tablet 12     No current facility-administered medications on file prior to visit.       Objective:      BP (!) 197/81 (BP Location: Left arm, Patient Position: Sitting, BP Method: Medium (Automatic))   Pulse (!) 59   Resp 16   SpO2 97%     Exam:  GEN:  Well developed, well nourished.  No acute distress.   HEENT:  No trauma.  Mucous membranes moist.  Nares patent bilaterally.  PSYCH: Normal affect. Thought content appropriate.  CHEST:  Breathing symmetric.  No audible wheezing.  ABD: Soft, non-distended.  SKIN:  Warm, pink, dry.  No rash on exposed areas.    EXT:  No cyanosis, clubbing, or edema.  No color change or changes in nail or hair  growth.  NEURO/MUSCULOSKELETAL:  Fully alert, oriented, and appropriate. Speech normal gaye. No cranial nerve deficits.   Gait: Normal.  No focal motor deficits.       Imaging:  Narrative & Impression    EXAMINATION:  XR LUMBAR SPINE AP AND LAT WITH FLEX/EXT     CLINICAL HISTORY:  Other intervertebral disc degeneration, lumbar region     TECHNIQUE:  Three view lumbar spine plus flexion and extension views.     COMPARISON:  None     FINDINGS:  Alignment: Grade 1 anterolisthesis of L4 on 5 and of L5 on S1 and upper lumbar rightward scoliotic curvature.     Vertebrae: Mild superior endplate depression of T12 compatible with an age-indeterminate compression fracture.  Remainder of vertebral body heights are preserved.     Discs and facets: Severe T12-L1 spondylosis and degenerative endplate sclerosis.  Similar changes present throughout the remainder of the lumbar spine most severe at L2-3. Lower lumbar facet arthritis is present.  Spondylolysis at L5 is suspected.     Miscellaneous: No instability on flexion or extension given poor visualization of alignment at L5-S1 on the flexion view.     Impression:     Age-indeterminate T12 compression fracture     Multilevel degenerative changes throughout the lumbar spine and possible L5 spondylolysis.  Findings are indeterminate for instability at L5-S1.     This report was flagged in Epic as abnormal.        Electronically signed by: Steve Olivarez Jr  Date:                                            03/24/2023  Time:                                           11:01       Narrative & Impression    EXAMINATION:  XR SACRUM AND COCCYX     CLINICAL HISTORY:  Unspecified injury of lower back, initial encounter     TECHNIQUE:  Two or three views of the sacrum and coccyx were performed.     COMPARISON:  None     FINDINGS:  There is suggestion of a nondisplaced fracture involving the 1st coccygeal segment.  No other fracture or dislocation.  DJD.     Impression:     See above         Electronically signed by: Alan Platt MD  Date:                                            03/22/2023  Time:                                           11:59       Assessment:       Encounter Diagnoses   Name Primary?    DDD (degenerative disc disease), lumbar     Coccydynia Yes    Lumbar spondylosis            Plan:       Juli was seen today for follow-up.    Diagnoses and all orders for this visit:    Coccydynia    DDD (degenerative disc disease), lumbar    Lumbar spondylosis          Juligalileo Parra is a 77 y.o. female with acute low back/tailbone pain following a fall onto her buttocks. Unclear if pain is related to lumbar spine, tailbone or both. Low suspicion for radiculopathy or compression fracture.  Lumbar x-ray does note a mild compression fracture of T12 although patient states that this has been present for years and denies any pain in his location.    Prior records review.  Pertinent imaging studies reviewed by me. Imaging results were discussed with patient.  Patient's tailbone pain improving since onset.  Can continue to monitor.  Discussed decreasing ibuprofen use given recent GI upset.  Advised that she can alternate with Tylenol.   We discussed potential GI adverse effects of NSAID use.  I advised patient to take this medication with food and to discontinue this medication if they experiences any GI upset or black/bloody stools.  Patient expressed understanding and agreement.  Return to clinic in 6 weeks or sooner if needed.  At that time we will discuss her pain further.  If limited improvement for any worsening occurs may consider ganglion impar block versus lumbar interventions.      Abhishek Rodrigez PA-C  [facility]  Interventional Pain Management   04/27/2023    This note was created by combination of typed  and M-Modal dictation.  Transcription and phonetic errors may be present.  If there are any questions, please contact me.

## 2023-04-28 RX ORDER — FLUTICASONE PROPIONATE 50 MCG
SPRAY, SUSPENSION (ML) NASAL
Qty: 48 G | Refills: 3 | Status: SHIPPED | OUTPATIENT
Start: 2023-04-28

## 2023-04-28 NOTE — TELEPHONE ENCOUNTER
Refill Routing Note   Medication(s) are not appropriate for processing by [facility] for the following reason(s):      Due for refill >6 months ago    [facility] action(s):  Defer            Appointments  past 12m or future 3m with PCP    Date Provider   Last Visit   4/17/2023 Jefe Serrano MD   Next Visit   10/17/2023 Jefe Serrano MD   ED visits in past 90 days: 0        Note composed:10:07 AM 04/28/2023

## 2023-04-28 NOTE — TELEPHONE ENCOUNTER
No care due was identified.  [facility] Embedded Care Due Messages. Reference number: [id number].   4/28/2023 10:00:06 AM CDT

## 2023-05-03 DIAGNOSIS — Z71.89 COMPLEX CARE COORDINATION: ICD-10-CM

## 2023-05-22 ENCOUNTER — TELEPHONE (OUTPATIENT)
Dept: UROLOGY | Facility: CLINIC | Age: 78
End: 2023-05-22
Payer: MEDICARE

## 2023-05-22 NOTE — TELEPHONE ENCOUNTER
The pt called the office wanting an appointment to see Dr. Sagastume for a UTI. I spoke with the pt and explained to her that Dr. Sagastume would not have any sooner appointment and that she should see either her PCP or go to an urgent care. She verbalized understanding.

## 2023-05-23 ENCOUNTER — OFFICE VISIT (OUTPATIENT)
Dept: INTERNAL MEDICINE | Facility: CLINIC | Age: 78
End: 2023-05-23
Payer: MEDICARE

## 2023-05-23 VITALS
OXYGEN SATURATION: 96 % | WEIGHT: 114.88 LBS | BODY MASS INDEX: 24.78 KG/M2 | DIASTOLIC BLOOD PRESSURE: 70 MMHG | HEIGHT: 57 IN | SYSTOLIC BLOOD PRESSURE: 170 MMHG | HEART RATE: 72 BPM

## 2023-05-23 DIAGNOSIS — F41.9 ANXIETY: ICD-10-CM

## 2023-05-23 DIAGNOSIS — F32.1 CURRENT MODERATE EPISODE OF MAJOR DEPRESSIVE DISORDER WITHOUT PRIOR EPISODE: ICD-10-CM

## 2023-05-23 DIAGNOSIS — N30.10 INTERSTITIAL CYSTITIS: ICD-10-CM

## 2023-05-23 DIAGNOSIS — N39.0 RECURRENT UTI: Primary | ICD-10-CM

## 2023-05-23 LAB
BILIRUB SERPL-MCNC: NEGATIVE MG/DL
BLOOD, POC UA: NORMAL
GLUCOSE UR QL STRIP: NORMAL
KETONES UR QL STRIP: NEGATIVE
LEUKOCYTE ESTERASE URINE, POC: NORMAL
NITRITE, POC UA: NEGATIVE
PH, POC UA: 7
PROTEIN, POC: NEGATIVE
SPECIFIC GRAVITY, POC UA: 10.1
UROBILINOGEN, POC UA: NORMAL

## 2023-05-23 PROCEDURE — 99999 PR PBB SHADOW E&M-EST. PATIENT-LVL IV: ICD-10-PCS | Mod: PBBFAC,,, | Performed by: PHYSICIAN ASSISTANT

## 2023-05-23 PROCEDURE — 99214 OFFICE O/P EST MOD 30 MIN: CPT | Mod: S$PBB,,, | Performed by: PHYSICIAN ASSISTANT

## 2023-05-23 PROCEDURE — 87086 URINE CULTURE/COLONY COUNT: CPT | Performed by: PHYSICIAN ASSISTANT

## 2023-05-23 PROCEDURE — 99999 PR PBB SHADOW E&M-EST. PATIENT-LVL IV: CPT | Mod: PBBFAC,,, | Performed by: PHYSICIAN ASSISTANT

## 2023-05-23 PROCEDURE — 99214 PR OFFICE/OUTPT VISIT, EST, LEVL IV, 30-39 MIN: ICD-10-PCS | Mod: S$PBB,,, | Performed by: PHYSICIAN ASSISTANT

## 2023-05-23 PROCEDURE — 81003 URINALYSIS AUTO W/O SCOPE: CPT | Mod: PBBFAC | Performed by: PHYSICIAN ASSISTANT

## 2023-05-23 PROCEDURE — 99214 OFFICE O/P EST MOD 30 MIN: CPT | Mod: PBBFAC | Performed by: PHYSICIAN ASSISTANT

## 2023-05-23 RX ORDER — NITROFURANTOIN 25; 75 MG/1; MG/1
100 CAPSULE ORAL 2 TIMES DAILY
Qty: 14 CAPSULE | Refills: 0 | Status: SHIPPED | OUTPATIENT
Start: 2023-05-23 | End: 2023-05-30

## 2023-05-23 RX ORDER — CHLORHEXIDINE GLUCONATE ORAL RINSE 1.2 MG/ML
SOLUTION DENTAL
COMMUNITY
Start: 2023-02-07

## 2023-05-23 NOTE — PROGRESS NOTES
"Subjective     Patient ID: Juli Parra is a 77 y.o. female.    Chief Complaint: Urinary Tract Infection    HPI    Established pt of Jefe Serrano MD (new to me)    Same day/urgent care visit    Here with concerns of UTI  Follows with urology for recurrent UTI and interstitial cystitis.     Onset about symptoms about 1 week ago, urge, frequency. mild back ache, foul odor.     Gum is sore. Recent had some dental work.     Anxiety/Depression: tried celexa but had stomach upset, on further interview she thinks it actually may have been related to NSAIDs she was taking for tail bone fracture. She desire to retry. Misses home [place], crying spells, bored, low energy, decreased appetite. Active with [employer] she enjoys.     Past Medical History:   Diagnosis Date    Anxiety     GERD (gastroesophageal reflux disease)     HTN (hypertension)     IBS (irritable bowel syndrome)     Kidney stones     OAB (overactive bladder)     Vertigo      Social History     Tobacco Use    Smoking status: Never    Smokeless tobacco: Never   Substance Use Topics    Alcohol use: Not Currently    Drug use: Never     Review of patient's allergies indicates:   Allergen Reactions    Egg Nausea Only    Cipro [ciprofloxacin hcl]     Sulfur Rash       Review of Systems   Constitutional:  Negative for fever.   Gastrointestinal:  Negative for abdominal pain, nausea and vomiting.   Genitourinary:  Positive for frequency and urgency. Negative for dysuria and hematuria.   Integumentary:  Negative for rash.   Psychiatric/Behavioral:  Positive for dysphoric mood. Negative for suicidal ideas. The patient is nervous/anxious.         Objective  BP (!) 168/76 (BP Location: Left arm, Patient Position: Sitting, BP Method: Medium (Manual))   Pulse 72   Ht 4' 9" (1.448 m)   Wt 52.1 kg (114 lb 13.8 oz)   SpO2 96%   BMI 24.86 kg/m²       Physical Exam  Vitals reviewed.   Constitutional:       General: She is not in acute distress.     Appearance: She is " "well-developed.   HENT:      Head: Normocephalic and atraumatic.      Right Ear: Tympanic membrane, ear canal and external ear normal.      Left Ear: Tympanic membrane, ear canal and external ear normal.   Cardiovascular:      Rate and Rhythm: Normal rate and regular rhythm.      Heart sounds: No murmur heard.  Pulmonary:      Effort: Pulmonary effort is normal.      Breath sounds: Normal breath sounds. No wheezing or rales.   Abdominal:      General: Bowel sounds are normal.      Palpations: Abdomen is soft.      Tenderness: There is no abdominal tenderness.   Musculoskeletal:      Right lower leg: No edema.      Left lower leg: No edema.   Skin:     General: Skin is warm and dry.      Findings: No rash.   Neurological:      Mental Status: She is alert.   Psychiatric:         Mood and Affect: Mood normal.          Assessment and Plan     Problem List Items Addressed This Visit    None  Visit Diagnoses       Recurrent UTI    -  Primary    Interstitial cystitis                Juli was seen today for urinary tract infection.    Diagnoses and all orders for this visit:    Recurrent UTI  Interstitial cystitis  POC ua with ++blood +leuk  Will start macrobid  Follow up urine culture  -     Urine culture  -     POCT URINALYSIS  -     nitrofurantoin, macrocrystal-monohydrate, (MACROBID) 100 MG capsule; Take 1 capsule (100 mg total) by mouth 2 (two) times daily. for 7 days    Anxiety  Current moderate episode of major depressive disorder without prior episode  Pt plans to retry celexa, encouraged to take with food  Handout Given out [employer] program      HTN   BP high today, (pt reports 'white coat htn")  Will have pt monitor and obtain home reading via phone in 2 weeks.     Nita Clifton PA-C          Future Appointments   Date Time Provider Department Center   6/8/2023 10:00 AM Abhishek Rodrigez PA-C [facility]   6/16/2023 10:30 AM [facility] D[facility]   8/15/2023 11:00 AM Hien LESLIE" MD [facility]   10/17/2023  1:00 PM [facility]

## 2023-05-25 ENCOUNTER — TELEPHONE (OUTPATIENT)
Dept: INTERNAL MEDICINE | Facility: CLINIC | Age: 78
End: 2023-05-25
Payer: MEDICARE

## 2023-05-25 LAB — BACTERIA UR CULT: NORMAL

## 2023-05-25 NOTE — TELEPHONE ENCOUNTER
Please inform pt her urine culture is negative. No bacteria growth. She can discontinue the Macrobid. Let me know of any questions.

## 2023-06-01 ENCOUNTER — OFFICE VISIT (OUTPATIENT)
Dept: INTERNAL MEDICINE | Facility: CLINIC | Age: 78
End: 2023-06-01
Payer: MEDICARE

## 2023-06-01 VITALS
BODY MASS INDEX: 24.92 KG/M2 | WEIGHT: 115.5 LBS | HEIGHT: 57 IN | SYSTOLIC BLOOD PRESSURE: 156 MMHG | OXYGEN SATURATION: 96 % | HEART RATE: 99 BPM | DIASTOLIC BLOOD PRESSURE: 68 MMHG

## 2023-06-01 DIAGNOSIS — B37.2 CANDIDAL INTERTRIGO: Primary | ICD-10-CM

## 2023-06-01 PROCEDURE — 99214 OFFICE O/P EST MOD 30 MIN: CPT | Mod: PBBFAC | Performed by: PHYSICIAN ASSISTANT

## 2023-06-01 PROCEDURE — 99999 PR PBB SHADOW E&M-EST. PATIENT-LVL IV: CPT | Mod: PBBFAC,,, | Performed by: PHYSICIAN ASSISTANT

## 2023-06-01 PROCEDURE — 99213 PR OFFICE/OUTPT VISIT, EST, LEVL III, 20-29 MIN: ICD-10-PCS | Mod: S$PBB,,, | Performed by: PHYSICIAN ASSISTANT

## 2023-06-01 PROCEDURE — 99999 PR PBB SHADOW E&M-EST. PATIENT-LVL IV: ICD-10-PCS | Mod: PBBFAC,,, | Performed by: PHYSICIAN ASSISTANT

## 2023-06-01 PROCEDURE — 99213 OFFICE O/P EST LOW 20 MIN: CPT | Mod: S$PBB,,, | Performed by: PHYSICIAN ASSISTANT

## 2023-06-01 RX ORDER — KETOCONAZOLE 20 MG/G
CREAM TOPICAL 2 TIMES DAILY
Qty: 60 G | Refills: 0 | Status: SHIPPED | OUTPATIENT
Start: 2023-06-01 | End: 2023-07-05

## 2023-06-01 RX ORDER — NYSTATIN 100000 [USP'U]/G
POWDER TOPICAL 4 TIMES DAILY
Qty: 60 G | Refills: 0 | Status: SHIPPED | OUTPATIENT
Start: 2023-06-01 | End: 2023-10-17

## 2023-06-01 NOTE — PATIENT INSTRUCTIONS
Antifungal cream to your pharmacy use for 2 weeks    If Nystatin powder is too much you can try over the counter Zeasorb.     Shingles shot today.

## 2023-06-01 NOTE — PROGRESS NOTES
"Subjective     Patient ID: Juli Parra is a 77 y.o. female.    Chief Complaint: Rash    HPI    Established pt of Jefe Serrano MD     Here with concerns of itchy rash under her right breast, noticed last week. Tried calamine lotion and cortisone with mild improvement. Inquires if it could be shingles. No pain    Past Medical History:   Diagnosis Date    Anxiety     GERD (gastroesophageal reflux disease)     HTN (hypertension)     IBS (irritable bowel syndrome)     Kidney stones     OAB (overactive bladder)     Vertigo      Social History     Tobacco Use    Smoking status: Never    Smokeless tobacco: Never   Substance Use Topics    Alcohol use: Not Currently    Drug use: Never     Review of patient's allergies indicates:   Allergen Reactions    Egg Nausea Only    Cipro [ciprofloxacin hcl]     Sulfur Rash         Review of Systems   Constitutional:  Negative for chills and fever.   Integumentary:  Positive for rash. Negative for wound.   Psychiatric/Behavioral:  Positive for dysphoric mood.         Objective  BP (!) 156/68 (BP Location: Left arm, Patient Position: Sitting, BP Method: Medium (Manual))   Pulse 99   Ht 4' 9" (1.448 m)   Wt 52.4 kg (115 lb 8.3 oz)   SpO2 96%   BMI 25.00 kg/m²       Physical Exam  Vitals reviewed.   Constitutional:       General: She is not in acute distress.     Appearance: She is well-developed.   Pulmonary:      Effort: Pulmonary effort is normal. No respiratory distress.   Skin:     Findings: Rash present.      Comments: Scaly erythematous patch to right breast fold  No vesicles    Neurological:      Mental Status: She is alert.   Psychiatric:         Mood and Affect: Mood is depressed.          Assessment and Plan     1. Candidal intertrigo  -     ketoconazole (NIZORAL) 2 % cream; Apply topically 2 (two) times daily.  Dispense: 60 g; Refill: 0  -     nystatin (MYCOSTATIN) powder; Apply topically 4 (four) times daily.  Dispense: 60 g; Refill: 0        Nita Clifton, " LASHA

## 2023-06-02 ENCOUNTER — TELEPHONE (OUTPATIENT)
Dept: INTERNAL MEDICINE | Facility: CLINIC | Age: 78
End: 2023-06-02
Payer: MEDICARE

## 2023-06-02 DIAGNOSIS — F32.1 CURRENT MODERATE EPISODE OF MAJOR DEPRESSIVE DISORDER WITHOUT PRIOR EPISODE: Primary | ICD-10-CM

## 2023-06-02 RX ORDER — DULOXETIN HYDROCHLORIDE 30 MG/1
30 CAPSULE, DELAYED RELEASE ORAL DAILY
Qty: 30 CAPSULE | Refills: 1 | Status: SHIPPED | OUTPATIENT
Start: 2023-06-02 | End: 2023-07-05 | Stop reason: SDUPTHER

## 2023-06-02 NOTE — TELEPHONE ENCOUNTER
----- Message from Vianey Moreno sent at 6/2/2023  9:33 AM CDT -----  Contact: [phone]  Pt would like you to go ahead and call in the duloxetine. Pt is using   [id number]  [address]  [place] [address]  Phone: [phone] Fax: [phone]              Thank you

## 2023-06-02 NOTE — TELEPHONE ENCOUNTER
Called pt; pt answered    Informed pt Rx had been sent to pharmacy     Helped schedule pt for Med FU appt with ROSIE Clifton in 1mo

## 2023-06-20 ENCOUNTER — TELEPHONE (OUTPATIENT)
Dept: INTERNAL MEDICINE | Facility: CLINIC | Age: 78
End: 2023-06-20
Payer: MEDICARE

## 2023-06-20 NOTE — TELEPHONE ENCOUNTER
PA for Dicyclomine HCl 10MG capsules started  See copied reply below  Decision to be sent via fax      TEZ BRICENO Key: MAC VELEZ Case ID: [id number] - Rx #: [id number]  Status  Sent to [employer]  Drug Dicyclomine HCl 10MG capsules  [employer] Electronic PA Form (2017 NCPDP)  Original Claim Info  00,569 PA [facility](PHARMACY HELP DESK [phone]

## 2023-06-21 ENCOUNTER — TELEPHONE (OUTPATIENT)
Dept: INTERNAL MEDICINE | Facility: CLINIC | Age: 78
End: 2023-06-21
Payer: MEDICARE

## 2023-06-21 NOTE — TELEPHONE ENCOUNTER
PA for Dicyclomine HCl 10MG capsules approved  See copied reply below          TEZ BRICENO Key: NEFTALIXTFEDE VELEZ Case ID: [id number] - Rx #: [id number]   Approved on June 20  Your request has been approved  Drug Dicyclomine HCl 10MG capsules  Form [employer] Medicare Electronic PA Form (2017 NCPDP)  Original Claim Info  34,894 PA REQUIRED-MD CONTACT [facility] REQUIRES PRIOR AUTHORIZATION(PHARMACY HELP DESK [phone])

## 2023-07-05 ENCOUNTER — OFFICE VISIT (OUTPATIENT)
Dept: INTERNAL MEDICINE | Facility: CLINIC | Age: 78
End: 2023-07-05
Payer: MEDICARE

## 2023-07-05 VITALS
OXYGEN SATURATION: 96 % | SYSTOLIC BLOOD PRESSURE: 144 MMHG | BODY MASS INDEX: 25.31 KG/M2 | WEIGHT: 117.31 LBS | DIASTOLIC BLOOD PRESSURE: 74 MMHG | HEIGHT: 57 IN | HEART RATE: 64 BPM

## 2023-07-05 DIAGNOSIS — F41.9 ANXIETY: ICD-10-CM

## 2023-07-05 DIAGNOSIS — I10 ESSENTIAL HYPERTENSION: ICD-10-CM

## 2023-07-05 DIAGNOSIS — F32.1 CURRENT MODERATE EPISODE OF MAJOR DEPRESSIVE DISORDER WITHOUT PRIOR EPISODE: Primary | ICD-10-CM

## 2023-07-05 PROCEDURE — 99214 OFFICE O/P EST MOD 30 MIN: CPT | Mod: PBBFAC | Performed by: PHYSICIAN ASSISTANT

## 2023-07-05 PROCEDURE — 99999 PR PBB SHADOW E&M-EST. PATIENT-LVL IV: ICD-10-PCS | Mod: PBBFAC,,, | Performed by: PHYSICIAN ASSISTANT

## 2023-07-05 PROCEDURE — 99214 PR OFFICE/OUTPT VISIT, EST, LEVL IV, 30-39 MIN: ICD-10-PCS | Mod: S$PBB,,, | Performed by: PHYSICIAN ASSISTANT

## 2023-07-05 PROCEDURE — 99214 OFFICE O/P EST MOD 30 MIN: CPT | Mod: S$PBB,,, | Performed by: PHYSICIAN ASSISTANT

## 2023-07-05 PROCEDURE — 99999 PR PBB SHADOW E&M-EST. PATIENT-LVL IV: CPT | Mod: PBBFAC,,, | Performed by: PHYSICIAN ASSISTANT

## 2023-07-05 RX ORDER — DULOXETIN HYDROCHLORIDE 30 MG/1
30 CAPSULE, DELAYED RELEASE ORAL DAILY
Qty: 30 CAPSULE | Refills: 6 | Status: SHIPPED | OUTPATIENT
Start: 2023-07-05 | End: 2023-07-31

## 2023-07-05 NOTE — PROGRESS NOTES
Subjective     Patient ID: Juli Parra is a 77 y.o. female.    Chief Complaint: Follow-up    HPI    Established pt of Jefe Serrano MD     Here for med-eval follow up  Started on duloxetine last month for anxiety/depression. She reports she is feel better, less crying spells, sleeping better. Notes yesterday was the anniversary of her  death and she coped better than she expected. She desires to continue duloxetine. No Si/HI.     Past Medical History:   Diagnosis Date    Anxiety     GERD (gastroesophageal reflux disease)     HTN (hypertension)     IBS (irritable bowel syndrome)     Kidney stones     OAB (overactive bladder)     Vertigo      Social History     Tobacco Use    Smoking status: Never    Smokeless tobacco: Never   Substance Use Topics    Alcohol use: Not Currently    Drug use: Never     Review of patient's allergies indicates:   Allergen Reactions    Egg Nausea Only    Cipro [ciprofloxacin hcl]     Sulfur Rash       Review of Systems   Constitutional:  Negative for chills, fever and unexpected weight change.   Respiratory:  Negative for cough and shortness of breath.    Cardiovascular:  Negative for chest pain and leg swelling.   Gastrointestinal:  Negative for abdominal pain, nausea and vomiting.   Integumentary:  Negative for rash.   Neurological:  Negative for headaches.   Psychiatric/Behavioral:  Positive for dysphoric mood and self-injury.         Objective     Physical Exam  Vitals reviewed.   Constitutional:       General: She is not in acute distress.     Appearance: She is well-developed.   HENT:      Head: Normocephalic and atraumatic.   Cardiovascular:      Rate and Rhythm: Normal rate and regular rhythm.      Heart sounds: No murmur heard.  Pulmonary:      Effort: Pulmonary effort is normal.      Breath sounds: Normal breath sounds. No wheezing or rales.   Abdominal:      General: Bowel sounds are normal.      Palpations: Abdomen is soft.      Tenderness: There is no  "abdominal tenderness.   Musculoskeletal:      Right lower leg: No edema.      Left lower leg: No edema.   Skin:     General: Skin is warm and dry.      Findings: No rash.   Neurological:      Mental Status: She is alert and oriented to person, place, and time.   Psychiatric:         Mood and Affect: Mood normal.          Assessment and Plan     1. Current moderate episode of major depressive disorder without prior episode  2. Anxiety  Improved  Continue current meds  -     DULoxetine (CYMBALTA) 30 MG capsule; Take 1 capsule (30 mg total) by mouth once daily.  Dispense: 30 capsule; Refill: 6      3. Essential hypertension  Slightly above goal at 144/74, repeat 150/70  Reports normal BP readings at home  (pt reports 'white coat htn")  Continue current meds and monitor at home (bring BP log on RTC)      Future Appointments   Date Time Provider Department Center   8/15/2023 11:00 AM Hien Sagastume MD [facility]   10/17/2023  1:00 PM Jefe Serrano MD [facility]         Nita Clifton PA-C    "

## 2023-07-27 ENCOUNTER — OFFICE VISIT (OUTPATIENT)
Dept: ORTHOPEDICS | Facility: CLINIC | Age: 78
End: 2023-07-27
Payer: MEDICARE

## 2023-07-27 DIAGNOSIS — M18.12 ARTHRITIS OF CARPOMETACARPAL (CMC) JOINT OF LEFT THUMB: ICD-10-CM

## 2023-07-27 DIAGNOSIS — M18.11 ARTHRITIS OF CARPOMETACARPAL (CMC) JOINT OF RIGHT THUMB: Primary | ICD-10-CM

## 2023-07-27 PROCEDURE — 99999 PR PBB SHADOW E&M-EST. PATIENT-LVL III: ICD-10-PCS | Mod: PBBFAC,,, | Performed by: ORTHOPAEDIC SURGERY

## 2023-07-27 PROCEDURE — 99213 OFFICE O/P EST LOW 20 MIN: CPT | Mod: PBBFAC,PN | Performed by: ORTHOPAEDIC SURGERY

## 2023-07-27 PROCEDURE — 99999PBSHW PR PBB SHADOW TECHNICAL ONLY FILED TO HB: ICD-10-PCS | Mod: PBBFAC,,,

## 2023-07-27 PROCEDURE — 99999 PR PBB SHADOW E&M-EST. PATIENT-LVL III: CPT | Mod: PBBFAC,,, | Performed by: ORTHOPAEDIC SURGERY

## 2023-07-27 PROCEDURE — 99499 NO LOS: ICD-10-PCS | Mod: S$PBB,,, | Performed by: ORTHOPAEDIC SURGERY

## 2023-07-27 PROCEDURE — 20600 DRAIN/INJ JOINT/BURSA W/O US: CPT | Mod: 50,PBBFAC,PN | Performed by: ORTHOPAEDIC SURGERY

## 2023-07-27 PROCEDURE — 99999PBSHW PR PBB SHADOW TECHNICAL ONLY FILED TO HB: Mod: PBBFAC,,,

## 2023-07-27 PROCEDURE — 99499 UNLISTED E&M SERVICE: CPT | Mod: S$PBB,,, | Performed by: ORTHOPAEDIC SURGERY

## 2023-07-27 PROCEDURE — 20600 SMALL JOINT ASPIRATION/INJECTION: L THUMB CMC, R THUMB CMC: ICD-10-PCS | Mod: 50,S$PBB,, | Performed by: ORTHOPAEDIC SURGERY

## 2023-07-27 RX ORDER — TRIAMCINOLONE ACETONIDE 40 MG/ML
40 INJECTION, SUSPENSION INTRA-ARTICULAR; INTRAMUSCULAR
Status: DISCONTINUED | OUTPATIENT
Start: 2023-07-27 | End: 2023-07-27 | Stop reason: HOSPADM

## 2023-07-27 RX ADMIN — TRIAMCINOLONE ACETONIDE 40 MG: 40 INJECTION, SUSPENSION INTRA-ARTICULAR; INTRAMUSCULAR at 03:07

## 2023-07-27 NOTE — PROGRESS NOTES
Follow up visit    History of Present Illness:   Juli comes to the office for follow up evaluation of bilateral  CMC arthritis   - here for repeat injections    ROS: unremarkable and no change since last visit    Physical Examination:    There were no vitals filed for this visit.     NAD  Bilateral Hand/Wrist Examination:    Observation/Inspection:  Swelling  none    Deformity  none  Discoloration  none     Scars   none    Atrophy  none    HAND/WRIST EXAMINATION:  Finkelstein's Test   Neg  WHAT Test    Neg  CMC grind    Pos  Circumduction test   Pos    Neurovascular Exam:  Digits WWP, brisk CR < 3s throughout  NVI motor/LTS to M/R/U nerves, radial pulse 2+  Tinel's Test - Carpal Tunnel  Neg  Tinel's Test - Cubital Tunnel  Neg  Phalen's Test    Neg  Median Nerve Compression Test Neg    ROM hand/wrist/elbow full, painless     Radiographic imaging: 3 views of the bilateral hands show advanced CMC OA    Assessment/Plan:  77 y.o. female  with Bilateral CMC arthritis    We discussed the etiology of persistent pain and further treatment options.  Injection of the bilateral thumb carpometacarpal joint injection  performed, please see procedure note for more details.  Prior to the injection risks and benefits of corticosteroid injection were discussed with the patient including pain, infection, bleeding, skin color changes, swelling, steroid flare. We discussed that over time injections can result in chondral damage, acceleration of arthritis formation, damage to tendons and damage to joints.  The patient consented for the procedure.  Post-injection instructions were given to the patient in writing.  Return for follow up visit: PRN    All questions were answered in detail. The patient  verbalized the understanding of the treatment plan and is in full agreement with the treatment plan.

## 2023-07-27 NOTE — PROCEDURES
Small Joint Aspiration/Injection: L thumb CMC, R thumb CMC    Date/Time: 7/27/2023 3:15 PM  Performed by: Berta Conroy MD  Authorized by: Berta Conroy MD     Consent Done?:  Yes (Verbal)  Indications:  Arthritis  Timeout: prior to procedure the correct patient, procedure, and site was verified    Prep: patient was prepped and draped in usual sterile fashion      Local anesthesia used?: Yes    Local anesthetic:  Topical anesthetic  Location:  Thumb  Site:  L thumb CMC and R thumb CMC  Needle size:  25 G  Medications:  40 mg triamcinolone acetonide 40 mg/mL  Patient tolerance:  Patient tolerated the procedure well with no immediate complications

## 2023-07-31 DIAGNOSIS — F32.1 CURRENT MODERATE EPISODE OF MAJOR DEPRESSIVE DISORDER WITHOUT PRIOR EPISODE: ICD-10-CM

## 2023-07-31 DIAGNOSIS — F41.9 ANXIETY: ICD-10-CM

## 2023-07-31 RX ORDER — DULOXETIN HYDROCHLORIDE 30 MG/1
30 CAPSULE, DELAYED RELEASE ORAL
Qty: 30 CAPSULE | Refills: 6 | Status: SHIPPED | OUTPATIENT
Start: 2023-07-31 | End: 2023-08-24 | Stop reason: DRUGHIGH

## 2023-08-07 ENCOUNTER — HOSPITAL ENCOUNTER (EMERGENCY)
Facility: HOSPITAL | Age: 78
Discharge: HOME | End: 2023-08-07
Attending: EMERGENCY MEDICINE
Payer: MEDICARE

## 2023-08-07 VITALS
TEMPERATURE: 98 F | BODY MASS INDEX: 25.46 KG/M2 | WEIGHT: 118 LBS | SYSTOLIC BLOOD PRESSURE: 161 MMHG | RESPIRATION RATE: 18 BRPM | HEIGHT: 57 IN | OXYGEN SATURATION: 95 % | DIASTOLIC BLOOD PRESSURE: 70 MMHG | HEART RATE: 59 BPM

## 2023-08-07 DIAGNOSIS — S42.291A OTHER CLOSED DISPLACED FRACTURE OF PROXIMAL END OF RIGHT HUMERUS, INITIAL ENCOUNTER: Primary | ICD-10-CM

## 2023-08-07 PROCEDURE — 96375 TX/PRO/DX INJ NEW DRUG ADDON: CPT

## 2023-08-07 PROCEDURE — 63600175 PHARM REV CODE 636 W HCPCS: Performed by: EMERGENCY MEDICINE

## 2023-08-07 PROCEDURE — 99284 EMERGENCY DEPT VISIT MOD MDM: CPT | Mod: 25

## 2023-08-07 PROCEDURE — 96374 THER/PROPH/DIAG INJ IV PUSH: CPT

## 2023-08-07 RX ORDER — ONDANSETRON 4 MG/1
4 TABLET, ORALLY DISINTEGRATING ORAL EVERY 6 HOURS PRN
Qty: 10 TABLET | Refills: 0 | Status: SHIPPED | OUTPATIENT
Start: 2023-08-07 | End: 2023-10-17

## 2023-08-07 RX ORDER — HYDROCODONE BITARTRATE AND ACETAMINOPHEN 5; 325 MG/1; MG/1
1 TABLET ORAL EVERY 4 HOURS PRN
Qty: 18 TABLET | Refills: 0 | Status: SHIPPED | OUTPATIENT
Start: 2023-08-07 | End: 2023-10-17

## 2023-08-07 RX ORDER — MORPHINE SULFATE 4 MG/ML
4 INJECTION, SOLUTION INTRAMUSCULAR; INTRAVENOUS
Status: COMPLETED | OUTPATIENT
Start: 2023-08-07 | End: 2023-08-07

## 2023-08-07 RX ORDER — ONDANSETRON 2 MG/ML
4 INJECTION INTRAMUSCULAR; INTRAVENOUS
Status: COMPLETED | OUTPATIENT
Start: 2023-08-07 | End: 2023-08-07

## 2023-08-07 RX ADMIN — ONDANSETRON 4 MG: 2 INJECTION INTRAMUSCULAR; INTRAVENOUS at 05:08

## 2023-08-07 RX ADMIN — MORPHINE SULFATE 4 MG: 4 INJECTION, SOLUTION INTRAMUSCULAR; INTRAVENOUS at 05:08

## 2023-08-07 NOTE — DISCHARGE INSTRUCTIONS
Someone will contact you to schedule a follow-up appointment with an orthopedic surgeon.    Return to the ER for poorly-controlled pain or if you developed weakness and/or numbness to your right elbow, wrist, or hand.    We are only allowed to prescribe three days of pain medication in the ED. See your primary care provider if you have pain management needs beyond this three day period.     Thank you for allowing us to participate in your healthcare needs. We really appreciate it and hope that the care you received was exemplary and that you'll consider us for any further needs.    - Dr. Armstrong

## 2023-08-07 NOTE — ED TRIAGE NOTES
Pt arrived to ED via EMS with a c/o arm pain due to fall in the bathroom today. Pt denies dizziness, LOC, SOB, chest pain, nausea/vomiting. Also denies hitting head during slip. Pt states she slipped in the bathroom and lied flat,inclining towards her right hand. She had an I/V when she came here and got 200 ml of fluid on the way to ED Pt is AAO x4, vital sign are stable.

## 2023-08-07 NOTE — ED PROVIDER NOTES
Encounter Date: 8/7/2023       History     Chief Complaint   Patient presents with    Arm Injury     Pt arrived via ems, pt chief complaint is an Arm Injury. Pt had a fall in the bathroom with possible right arm fracture. Upon ems arrival pt was hypotensive and was treated with a fluid bolus.      This history was obtained from the patient without limitations.  She is a 77-year-old with the below past medical history presents by ambulance from her home.  She complains of severe pain to her right upper arm that resulted from a trip and fall that occurred just prior to arrival.  She is unable to move the arm.  She denies numbness and tingling throughout the extremity.  She denies head trauma in all other injuries.  She did not receive pain medication during transport.        Review of patient's allergies indicates:   Allergen Reactions    Egg Nausea Only    Cipro [ciprofloxacin hcl]     Sulfur Rash     Past Medical History:   Diagnosis Date    Anxiety     GERD (gastroesophageal reflux disease)     HTN (hypertension)     IBS (irritable bowel syndrome)     Kidney stones     OAB (overactive bladder)     Vertigo      Past Surgical History:   Procedure Laterality Date    CATARACT EXTRACTION, BILATERAL      CHOLECYSTECTOMY      HYSTERECTOMY      LITHOTRIPSY       Family History   Problem Relation Age of Onset    Leukemia Father     Cancer Brother         Pancreatic     Social History     Tobacco Use    Smoking status: Never    Smokeless tobacco: Never   Substance Use Topics    Alcohol use: Not Currently    Drug use: Never     Review of Systems  See HPI.    Physical Exam     Initial Vitals [08/07/23 0515]   BP Pulse Resp Temp SpO2   (!) 157/71 (!) 56 16 97.9 °F (36.6 °C) 97 %      MAP       --         Physical Exam    Nursing note and vitals reviewed.  Constitutional: She is not diaphoretic. No distress.   HENT:   Head: Normocephalic and atraumatic.   Cardiovascular:            Pulses:       Radial pulses are 2+ on the right  side.   Musculoskeletal:      Right shoulder: Tenderness present. Decreased range of motion.      Right upper arm: Tenderness present.      Right elbow: No swelling or deformity. Normal range of motion. No tenderness.      Right forearm: No swelling, deformity or tenderness.           ED Course   Procedures  Labs Reviewed - No data to display       Imaging Results              X-Ray Shoulder Trauma Right (Final result)  Result time 08/07/23 06:20:58      Final result by Suerndra Faria MD (08/07/23 06:20:58)                   Impression:      Displaced fracture of the right proximal humerus.      Electronically signed by: Surendra Faria MD  Date:    08/07/2023  Time:    06:20               Narrative:    EXAMINATION:  XR SHOULDER TRAUMA 3 VIEW RIGHT    CLINICAL HISTORY:  Pain in right upper arm.    TECHNIQUE:  Two views of the right shoulder were performed.    COMPARISON:  None.    FINDINGS:  Displaced acute fracture of the proximal humerus neck with mild posterolateral displacement.  No gross dislocation though additional views could be considered if there is strong clinical concern.  Degenerative changes of the acromioclavicular joint.  No unexpected radiopaque foreign body.                                       X-Ray Elbow 2 Views Right (Final result)  Result time 08/07/23 06:22:31   Procedure changed from X-Ray Elbow Complete Right     Final result by Surendra Faria MD (08/07/23 06:22:31)                   Impression:      No acute displaced fracture in the right elbow.      Electronically signed by: Surendra Faria MD  Date:    08/07/2023  Time:    06:22               Narrative:    EXAMINATION:  XR ELBOW 2 VIEWS RIGHT    CLINICAL HISTORY:  pain in right upper arm;  Pain in right upper arm.    TECHNIQUE:  Two views of the right elbow.    COMPARISON:  None.    FINDINGS:  No acute displaced fracture.  No dislocation.  No large joint effusion allowing for positioning.  No unexpected radiopaque foreign  body.  Mild soft tissue edema.                                       Medications   morphine injection 4 mg (4 mg Intravenous Given 8/7/23 0550)   ondansetron injection 4 mg (4 mg Intravenous Given 8/7/23 0515)                              Clinical Impression:   Final diagnoses:  [S42.291A] Other closed displaced fracture of proximal end of right humerus, initial encounter (Primary)        ED Disposition Condition    Discharge Stable          ED Prescriptions       Medication Sig Dispense Start Date End Date Auth. Provider    HYDROcodone-acetaminophen (NORCO) 5-325 mg per tablet Take 1 tablet by mouth every 4 (four) hours as needed (Moderate to severe pain). 18 tablet 8/7/2023 -- Ron Armstrong III, MD    ondansetron (ZOFRAN-ODT) 4 MG TbDL Take 1 tablet (4 mg total) by mouth every 6 (six) hours as needed (nausea). 10 tablet 8/7/2023 -- Ron Armstrong III, MD          Follow-up Information    None          Ron Armstrong III, MD  08/16/23 1620

## 2023-08-10 ENCOUNTER — HOSPITAL ENCOUNTER (EMERGENCY)
Facility: HOSPITAL | Age: 78
Discharge: HOME | End: 2023-08-10
Attending: EMERGENCY MEDICINE
Payer: MEDICARE

## 2023-08-10 VITALS
TEMPERATURE: 98 F | RESPIRATION RATE: 19 BRPM | SYSTOLIC BLOOD PRESSURE: 191 MMHG | HEIGHT: 57 IN | OXYGEN SATURATION: 99 % | HEART RATE: 66 BPM | WEIGHT: 121 LBS | BODY MASS INDEX: 26.1 KG/M2 | DIASTOLIC BLOOD PRESSURE: 79 MMHG

## 2023-08-10 DIAGNOSIS — E87.1 HYPONATREMIA: ICD-10-CM

## 2023-08-10 DIAGNOSIS — R41.0 CONFUSION: ICD-10-CM

## 2023-08-10 DIAGNOSIS — M17.11 ARTHRITIS OF RIGHT KNEE: ICD-10-CM
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
## 2023-08-10 LAB
ALBUMIN SERPL BCP-MCNC: 2.7 G/DL (ref 3.5–5.2)
ALBUMIN SERPL BCP-MCNC: 3.2 G/DL (ref 3.5–5.2)
ALP SERPL-CCNC: 63 U/L (ref 55–135)
ALP SERPL-CCNC: 63 U/L (ref 55–135)
ALT SERPL W/O P-5'-P-CCNC: 16 U/L (ref 10–44)
ALT SERPL W/O P-5'-P-CCNC: 17 U/L (ref 10–44)
AMPHET+METHAMPHET UR QL: NEGATIVE
ANION GAP SERPL CALC-SCNC: 5 MMOL/L (ref 8–16)
ANION GAP SERPL CALC-SCNC: 8 MMOL/L (ref 8–16)
AST SERPL-CCNC: 19 U/L (ref 10–40)
AST SERPL-CCNC: 27 U/L (ref 10–40)
BARBITURATES UR QL SCN>200 NG/ML: NEGATIVE
BASOPHILS # BLD AUTO: 0.04 K/UL (ref 0–0.2)
BASOPHILS NFR BLD: 0.3 % (ref 0–1.9)
BENZODIAZ UR QL SCN>200 NG/ML: NEGATIVE
BILIRUB SERPL-MCNC: 0.8 MG/DL (ref 0.1–1)
BILIRUB SERPL-MCNC: 0.9 MG/DL (ref 0.1–1)
BILIRUB UR QL STRIP: NEGATIVE
BUN SERPL-MCNC: 14 MG/DL (ref 8–23)
BUN SERPL-MCNC: 23 MG/DL (ref 8–23)
BZE UR QL SCN: NEGATIVE
CALCIUM SERPL-MCNC: 8 MG/DL (ref 8.7–10.5)
CALCIUM SERPL-MCNC: 8.8 MG/DL (ref 8.7–10.5)
CANNABINOIDS UR QL SCN: NEGATIVE
CHLORIDE SERPL-SCNC: 103 MMOL/L (ref 95–110)
CHLORIDE SERPL-SCNC: 94 MMOL/L (ref 95–110)
CLARITY UR: CLEAR
CO2 SERPL-SCNC: 25 MMOL/L (ref 23–29)
CO2 SERPL-SCNC: 25 MMOL/L (ref 23–29)
COLOR UR: YELLOW
CREAT SERPL-MCNC: 0.6 MG/DL (ref 0.5–1.4)
CREAT SERPL-MCNC: 0.7 MG/DL (ref 0.5–1.4)
CREAT UR-MCNC: 44 MG/DL (ref 15–325)
DIFFERENTIAL METHOD: ABNORMAL
EOSINOPHIL # BLD AUTO: 0.2 K/UL (ref 0–0.5)
EOSINOPHIL NFR BLD: 1.4 % (ref 0–8)
ERYTHROCYTE [DISTWIDTH] IN BLOOD BY AUTOMATED COUNT: 12.8 % (ref 11.5–14.5)
EST. GFR  (NO RACE VARIABLE): >60 ML/MIN/1.73 M^2
EST. GFR  (NO RACE VARIABLE): >60 ML/MIN/1.73 M^2
GLUCOSE SERPL-MCNC: 132 MG/DL (ref 70–110)
GLUCOSE SERPL-MCNC: 133 MG/DL (ref 70–110)
GLUCOSE UR QL STRIP: NEGATIVE
HCT VFR BLD AUTO: 31.2 % (ref 37–48.5)
HGB BLD-MCNC: 10.5 G/DL (ref 12–16)
HGB UR QL STRIP: NEGATIVE
IMM GRANULOCYTES # BLD AUTO: 0.14 K/UL (ref 0–0.04)
IMM GRANULOCYTES NFR BLD AUTO: 1.1 % (ref 0–0.5)
INR PPP: 1 (ref 0.8–1.2)
KETONES UR QL STRIP: NEGATIVE
LEUKOCYTE ESTERASE UR QL STRIP: NEGATIVE
LYMPHOCYTES # BLD AUTO: 0.8 K/UL (ref 1–4.8)
LYMPHOCYTES NFR BLD: 6.7 % (ref 18–48)
MCH RBC QN AUTO: 31.6 PG (ref 27–31)
MCHC RBC AUTO-ENTMCNC: 33.7 G/DL (ref 32–36)
MCV RBC AUTO: 94 FL (ref 82–98)
METHADONE UR QL SCN>300 NG/ML: NEGATIVE
MONOCYTES # BLD AUTO: 1.2 K/UL (ref 0.3–1)
MONOCYTES NFR BLD: 10 % (ref 4–15)
NEUTROPHILS # BLD AUTO: 9.9 K/UL (ref 1.8–7.7)
NEUTROPHILS NFR BLD: 80.5 % (ref 38–73)
NITRITE UR QL STRIP: NEGATIVE
NRBC BLD-RTO: 0 /100 WBC
OPIATES UR QL SCN: NEGATIVE
OSMOLALITY SERPL: 273 MOSM/KG (ref 275–295)
PCP UR QL SCN>25 NG/ML: NEGATIVE
PH UR STRIP: 6 [PH] (ref 5–8)
PLATELET # BLD AUTO: 209 K/UL (ref 150–450)
PMV BLD AUTO: 9.3 FL (ref 9.2–12.9)
POCT GLUCOSE: 174 MG/DL (ref 70–110)
POTASSIUM SERPL-SCNC: 4 MMOL/L (ref 3.5–5.1)
POTASSIUM SERPL-SCNC: 4.3 MMOL/L (ref 3.5–5.1)
PROT SERPL-MCNC: 6 G/DL (ref 6–8.4)
PROT SERPL-MCNC: 6.9 G/DL (ref 6–8.4)
PROT UR QL STRIP: NEGATIVE
PROTHROMBIN TIME: 10.8 SEC (ref 9–12.5)
RBC # BLD AUTO: 3.32 M/UL (ref 4–5.4)
SODIUM SERPL-SCNC: 127 MMOL/L (ref 136–145)
SODIUM SERPL-SCNC: 133 MMOL/L (ref 136–145)
SP GR UR STRIP: 1.01 (ref 1–1.03)
TOXICOLOGY INFORMATION: NORMAL
TROPONIN I SERPL DL<=0.01 NG/ML-MCNC: <0.006 NG/ML (ref 0–0.03)
TSH SERPL DL<=0.005 MIU/L-ACNC: 1.61 UIU/ML (ref 0.4–4)
URN SPEC COLLECT METH UR: NORMAL
UROBILINOGEN UR STRIP-ACNC: NEGATIVE EU/DL
WBC # BLD AUTO: 12.3 K/UL (ref 3.9–12.7)

## 2023-08-10 PROCEDURE — 84484 ASSAY OF TROPONIN QUANT: CPT

## 2023-08-10 PROCEDURE — 25000003 PHARM REV CODE 250

## 2023-08-10 PROCEDURE — 99285 EMERGENCY DEPT VISIT HI MDM: CPT | Mod: 25

## 2023-08-10 PROCEDURE — 81003 URINALYSIS AUTO W/O SCOPE: CPT | Mod: 59

## 2023-08-10 PROCEDURE — 93010 EKG 12-LEAD: ICD-10-PCS | Mod: ,,, | Performed by: INTERNAL MEDICINE

## 2023-08-10 PROCEDURE — 96361 HYDRATE IV INFUSION ADD-ON: CPT

## 2023-08-10 PROCEDURE — 96360 HYDRATION IV INFUSION INIT: CPT

## 2023-08-10 PROCEDURE — 83930 ASSAY OF BLOOD OSMOLALITY: CPT

## 2023-08-10 PROCEDURE — 80053 COMPREHEN METABOLIC PANEL: CPT | Mod: 91

## 2023-08-10 PROCEDURE — 93005 ELECTROCARDIOGRAM TRACING: CPT

## 2023-08-10 PROCEDURE — 85025 COMPLETE CBC W/AUTO DIFF WBC: CPT

## 2023-08-10 PROCEDURE — 82962 GLUCOSE BLOOD TEST: CPT

## 2023-08-10 PROCEDURE — 80307 DRUG TEST PRSMV CHEM ANLYZR: CPT

## 2023-08-10 PROCEDURE — 85610 PROTHROMBIN TIME: CPT

## 2023-08-10 PROCEDURE — 93010 ELECTROCARDIOGRAM REPORT: CPT | Mod: ,,, | Performed by: INTERNAL MEDICINE

## 2023-08-10 PROCEDURE — 84443 ASSAY THYROID STIM HORMONE: CPT

## 2023-08-10 RX ADMIN — SODIUM CHLORIDE 1000 ML: 9 INJECTION, SOLUTION INTRAVENOUS at 07:08

## 2023-08-10 RX ADMIN — SODIUM CHLORIDE 1000 ML: 9 INJECTION, SOLUTION INTRAVENOUS at 10:08

## 2023-08-10 NOTE — ED PROVIDER NOTES
"Encounter Date: 8/10/2023    SCRIBE #1 NOTE: I, Patti Bright, am scribing for, and in the presence of,  Holdsworth, Alayna, PA-C. I have scribed the following portions of the note - Other sections scribed: HPI, ROS.       History     Chief Complaint   Patient presents with    Fall     Pt presents to ED c/o unwitnessed fall that occurred around 0530 this morning.   Denies loc, head injury, ha, dizziness, weakness, cp, sob, numbness, tingling, or any other symptoms.    Pain 0/10.  Pt was seen ED on Monday for a fall with right broken arm.    Negative for blood thinners.      77 y.o. female, with a PMHx of Vertigo, UTI, and HTN, who presents to the ED for evaluation after an unwitnessed fall this morning. Patient states she loss her balance and hit the closet door on the way down in her home. Patient denies any head trauma or loss of consciousness. Patient notes she had a fall last week and ended up breaking her right arm. Patient's arm is in a sling during the assessment. Patient was prescribed Norco for the pain, but as managed the pain with Ibuprofen only as she denies any pain at this time. Additional history obtained from independent historian-Patient's son- who states when he arrived at his mother's home she was unsure how she ended up on the ground and urinated on herself. The son reports the patient was very confused and had dilated pupils. The son notes the patient was very unsteady on her feet, which the patient attributes to chronic problems with her knees. Patient also report dizzy spells that occur "sometimes" and are centralized to the right-side of her head, that is triggered by her standing and consistent with her vertigo; denies dizziness currently. No other exacerbating or alleviating factors. Patient denies fever, diaphoresis, chest pain, sob, abdominal pain, NVD, urinary symptoms, numbness, tingling, headache, dizzy, lightheaded. confusion, vision changes, or other associated symptoms. This is " "the extent of the patient's complaints today in the Emergency Department.      The history is provided by the patient and a relative. No  was used.     Review of patient's allergies indicates:   Allergen Reactions    Egg Nausea Only    Cipro [ciprofloxacin hcl]     Sulfur Rash     Past Medical History:   Diagnosis Date    Anxiety     GERD (gastroesophageal reflux disease)     HTN (hypertension)     IBS (irritable bowel syndrome)     Kidney stones     OAB (overactive bladder)     Vertigo      Past Surgical History:   Procedure Laterality Date    CATARACT EXTRACTION, BILATERAL      CHOLECYSTECTOMY      HYSTERECTOMY      LITHOTRIPSY       Family History   Problem Relation Age of Onset    Leukemia Father     Cancer Brother         Pancreatic     Social History     Tobacco Use    Smoking status: Never    Smokeless tobacco: Never   Substance Use Topics    Alcohol use: Not Currently    Drug use: Never     Review of Systems   Constitutional:  Negative for chills, diaphoresis, fatigue and fever.   HENT:  Negative for congestion, ear pain, rhinorrhea and sore throat.    Eyes:  Negative for visual disturbance.   Respiratory:  Negative for cough and shortness of breath.    Cardiovascular:  Negative for chest pain.   Gastrointestinal:  Negative for abdominal pain, constipation, diarrhea, nausea and vomiting.   Genitourinary:  Negative for decreased urine volume, difficulty urinating, dysuria, frequency, hematuria and urgency.   Musculoskeletal:  Positive for gait problem (per son). Negative for arthralgias and myalgias.   Skin:  Negative for rash and wound.   Neurological:  Positive for dizziness ("sometimes"; not currently). Negative for seizures, syncope, facial asymmetry, speech difficulty, weakness, light-headedness, numbness and headaches.   Psychiatric/Behavioral:  Positive for confusion (per son).        Physical Exam     Initial Vitals [08/10/23 0620]   BP Pulse Resp Temp SpO2   (!) 161/70 69 18 97.8 " °F (36.6 °C) 96 %      MAP       --         Physical Exam    Nursing note and vitals reviewed.  Constitutional: She appears well-developed and well-nourished. She is not diaphoretic. She is active. She does not appear ill. No distress.   HENT:   Head: Normocephalic and atraumatic.   Right Ear: External ear normal.   Left Ear: External ear normal.   Nose: Nose normal.   Eyes: Conjunctivae, EOM and lids are normal. Pupils are equal, round, and reactive to light. Right eye exhibits no discharge. Left eye exhibits no discharge.   Neck: Phonation normal. Neck supple.   Normal range of motion.   Full passive range of motion without pain.     Cardiovascular:  Normal rate and regular rhythm.           Pulses:       Radial pulses are 2+ on the right side and 2+ on the left side.   Pulmonary/Chest: Effort normal and breath sounds normal. No respiratory distress.   Abdominal: Abdomen is soft. She exhibits no distension. There is no abdominal tenderness.   Musculoskeletal:         General: Normal range of motion.      Cervical back: Full passive range of motion without pain, normal range of motion and neck supple.     Neurological: She is alert and oriented to person, place, and time. She has normal strength. No cranial nerve deficit or sensory deficit. GCS eye subscore is 4. GCS verbal subscore is 5. GCS motor subscore is 6.   Skin: Skin is dry. Capillary refill takes less than 2 seconds.   Psychiatric: She has a normal mood and affect. Her speech is normal and behavior is normal. Judgment and thought content normal. Cognition and memory are normal.         ED Course   Procedures  Labs Reviewed   CBC W/ AUTO DIFFERENTIAL - Abnormal; Notable for the following components:       Result Value    RBC 3.32 (*)     Hemoglobin 10.5 (*)     Hematocrit 31.2 (*)     MCH 31.6 (*)     Immature Granulocytes 1.1 (*)     Gran # (ANC) 9.9 (*)     Immature Grans (Abs) 0.14 (*)     Lymph # 0.8 (*)     Mono # 1.2 (*)     Gran % 80.5 (*)     Lymph  % 6.7 (*)     All other components within normal limits   COMPREHENSIVE METABOLIC PANEL - Abnormal; Notable for the following components:    Sodium 127 (*)     Chloride 94 (*)     Glucose 132 (*)     Albumin 3.2 (*)     All other components within normal limits   COMPREHENSIVE METABOLIC PANEL - Abnormal; Notable for the following components:    Sodium 133 (*)     Glucose 133 (*)     Calcium 8.0 (*)     Albumin 2.7 (*)     Anion Gap 5 (*)     All other components within normal limits   POCT GLUCOSE - Abnormal; Notable for the following components:    POCT Glucose 174 (*)     All other components within normal limits   TSH   URINALYSIS, REFLEX TO URINE CULTURE    Narrative:     Specimen Source->Urine   TROPONIN I   DRUG SCREEN PANEL, URINE EMERGENCY    Narrative:     Specimen Source->Urine   PROTIME-INR   OSMOLALITY, SERUM     EKG Readings: (Independently Interpreted)   EKG normal sinus rhythm with a rate of 67 beats per minute.  OK interval 166 milliseconds.  QRS of 84 milliseconds.  Low voltage QRS.  QTC of 433 milliseconds.  Nonspecific ST and T-wave abnormalities.  No STEMI noted.  Signed by Dr. Carpenter.         Imaging Results              CT Head Without Contrast (Final result)  Result time 08/10/23 08:01:52      Final result by Hector Son MD (08/10/23 08:01:52)                   Impression:      1. No acute intracranial findings.  2. No acute cervical spine fracture.  3. Degenerative findings in the cervical spine, as discussed.      Electronically signed by: Hector Son  Date:    08/10/2023  Time:    08:01               Narrative:    EXAMINATION:  CT HEAD WITHOUT CONTRAST; CT CERVICAL SPINE WITHOUT CONTRAST    CLINICAL HISTORY:  Neuro deficit, acute, stroke suspected;; Neck trauma (Age >= 65y);    TECHNIQUE:  Low dose axial images were obtained through the head and cervical spine.  Coronal and sagittal reformations were also performed. Contrast was not  administered.    COMPARISON:  None.    FINDINGS:  Head:    Blood: No acute intracranial hemorrhage.    Parenchyma: No definite loss of gray-white differentiation to suggest acute or subacute transcortical infarct. Age-related parenchymal volume loss.  Nonspecific areas of white matter hypoattenuation may relate to sequela of chronic small vessel ischemic disease.  Prior perivascular spaces versus cystic remote lacunar type infarcts involving the inferior basal ganglia, left larger than right.    Ventricles/Extra-axial spaces: No abnormal extra-axial fluid collection. Basal cisterns are patent.    Vessels: Moderate atherosclerotic calcifications.    Orbits: Status post bilateral lens replacements.    Scalp: Unremarkable.    Skull: There are no depressed skull fractures or destructive bone lesions.    Sinuses and mastoids: Essentially clear.    Other findings: TMJ DJD bilaterally.    Cervical spine:    Fractures: No acute fractures    Alignment: Grade 1 anterolisthesis of C3 on C4 and of C4 on C5.  Atlanto-axial and atlanto-occipital joints: Atlanto-axial and atlanto-occipital intervals are not widened.  Facet joints: There is no traumatic facet joint widening.  Degenerative set arthropathy.  Vertebral bodies: Query osseous demineralization.  Multilevel degenerative endplate change.  Discs: Disc osteophyte complex formation.  Spinal canal and foraminal narrowing: Although CT does not optimally evaluate the soft tissue contents of the spinal canal and foramina, no critical stenosis is suggested.    At C1-2, prominent retro dental soft tissue in keeping with pannus/pseudo pannus formation mildly deforms ventral thecal sac without critical canal stenosis or cord deformity.  Preserved CSF ventral to the cord.  At C6-7, mild right foraminal narrowing.      Paraspinal soft tissues: Unremarkable.    Upper Lungs:Clear                                       CT Cervical Spine Without Contrast (Final result)  Result time 08/10/23  08:01:52      Final result by Hector Son MD (08/10/23 08:01:52)                   Impression:      1. No acute intracranial findings.  2. No acute cervical spine fracture.  3. Degenerative findings in the cervical spine, as discussed.      Electronically signed by: Hector Son  Date:    08/10/2023  Time:    08:01               Narrative:    EXAMINATION:  CT HEAD WITHOUT CONTRAST; CT CERVICAL SPINE WITHOUT CONTRAST    CLINICAL HISTORY:  Neuro deficit, acute, stroke suspected;; Neck trauma (Age >= 65y);    TECHNIQUE:  Low dose axial images were obtained through the head and cervical spine.  Coronal and sagittal reformations were also performed. Contrast was not administered.    COMPARISON:  None.    FINDINGS:  Head:    Blood: No acute intracranial hemorrhage.    Parenchyma: No definite loss of gray-white differentiation to suggest acute or subacute transcortical infarct. Age-related parenchymal volume loss.  Nonspecific areas of white matter hypoattenuation may relate to sequela of chronic small vessel ischemic disease.  Prior perivascular spaces versus cystic remote lacunar type infarcts involving the inferior basal ganglia, left larger than right.    Ventricles/Extra-axial spaces: No abnormal extra-axial fluid collection. Basal cisterns are patent.    Vessels: Moderate atherosclerotic calcifications.    Orbits: Status post bilateral lens replacements.    Scalp: Unremarkable.    Skull: There are no depressed skull fractures or destructive bone lesions.    Sinuses and mastoids: Essentially clear.    Other findings: TMJ DJD bilaterally.    Cervical spine:    Fractures: No acute fractures    Alignment: Grade 1 anterolisthesis of C3 on C4 and of C4 on C5.  Atlanto-axial and atlanto-occipital joints: Atlanto-axial and atlanto-occipital intervals are not widened.  Facet joints: There is no traumatic facet joint widening.  Degenerative set arthropathy.  Vertebral bodies: Query osseous demineralization.   Multilevel degenerative endplate change.  Discs: Disc osteophyte complex formation.  Spinal canal and foraminal narrowing: Although CT does not optimally evaluate the soft tissue contents of the spinal canal and foramina, no critical stenosis is suggested.    At C1-2, prominent retro dental soft tissue in keeping with pannus/pseudo pannus formation mildly deforms ventral thecal sac without critical canal stenosis or cord deformity.  Preserved CSF ventral to the cord.  At C6-7, mild right foraminal narrowing.      Paraspinal soft tissues: Unremarkable.    Upper Lungs:Clear                                       X-Ray Chest 1 View (Final result)  Result time 08/10/23 07:09:18      Final result by Hector Son MD (08/10/23 07:09:18)                   Impression:      No convincing evidence of acute detrimental change relative prior radiograph performed 08/07/2022, 11:22 hours.    Question 4 mm solid nodule in the lateral right mid upper lung zone between the right posterior 6th and 7th ribs.  Recommend comparison with any intervening chest imaging since 08/07/2022, if available versus nonemergent follow-up chest CT.    Redemonstrated comminuted impacted proximal right humeral fracture, possibly slightly changed in alignment relative to dedicated shoulder radiograph performed 08/07/2023.      Electronically signed by: Hector Son  Date:    08/10/2023  Time:    07:09               Narrative:    EXAMINATION:  XR CHEST 1 VIEW    CLINICAL HISTORY:  Unspecified fall, initial encounter    TECHNIQUE:  Single frontal view of the chest was performed.    COMPARISON:  Chest radiograph performed 08/07/2022, 11:22 hours    FINDINGS:  Grossly unchanged cardiomediastinal contours.  Atherosclerosis of the aorta.    Mild chronic interstitial coarsening, relatively similar appearance to 08/07/2022, 1122 hours examination.  Question 4 mm solid nodule in the lateral right mid upper lung zone between the right posterior 6th and 7th  ribs.    No definite pneumothorax or large volume pleural effusion.    No acute findings in the visualized abdomen.    Soft tissue structures without definite acute change.    Redemonstrated comminuted impacted proximal right humeral fracture, possibly slightly changed in alignment relative to dedicated shoulder radiograph performed 08/07/2023.                                       Medications   sodium chloride 0.9% bolus 1,000 mL 1,000 mL (0 mLs Intravenous Stopped 8/10/23 1004)   sodium chloride 0.9% bolus 1,000 mL 1,000 mL (0 mLs Intravenous Stopped 8/10/23 1159)     Medical Decision Making:   History:   I obtained history from: someone other than patient.       <> Summary of History: Additional history obtained from independent historian-Patient's son  Old Medical Records: I decided to obtain old medical records.  Old Records Summarized: other records.       <> Summary of Records: External documents reviewed   Initial Assessment:   77 y.o. female, with a PMHx of Vertigo, UTI, and HTN, who presents to the ED for evaluation after an unwitnessed fall this morning.  Patient's chart and medical history reviewed.  Differential Diagnosis:   SAH  Epidural hematoma  Subdural hematoma  UTI  TIA  CVA  Fracture  Dislocation  Contusion  Sprain  Clinical Tests:   Lab Tests: Ordered and Reviewed  Radiological Study: Ordered and Reviewed  Medical Tests: Ordered and Reviewed  ED Management:  Patient's vitals reviewed.  She is afebrile, no respiratory distress, nontoxic-appearing in the ED. patient's neuro exam was normal.  Patient's physical exam is unremarkable.  Patient is at baseline.  Per son he is very concerned she is possibly having many strokes due to finding her on the floor and she stating that she had no idea how she got on the floor.  We will do a workup with shared decision-making.  Point care glucose is 174. EKG normal sinus rhythm with a rate of 67 beats per minute.  AR interval 166 milliseconds.  QRS of 84  milliseconds.  Low voltage QRS.  QTC of 433 milliseconds.  Nonspecific ST and T-wave abnormalities.  No STEMI noted.  Signed by Dr. Carpenter.  Chest x-ray was unremarkable per my personal interpretation. Official chest x-ray interpretation showed No convincing evidence of acute detrimental change relative prior radiograph performed 08/07/2022, 11:22 hours. Question 4 mm solid nodule in the lateral right mid upper lung zone between the right posterior 6th and 7th ribs.  Recommend comparison with any intervening chest imaging since 08/07/2022, if available versus nonemergent follow-up chest CT. Re-demonstrated comminuted impacted proximal right humeral fracture, possibly slightly changed in alignment relative to dedicated shoulder radiograph performed 08/07/2023.  UA unremarkable.  CMP showed hyponatremia of 127 and a glucose of 132.  Normal saline fluids and osmolality ordered.  Troponin in normal range, MI unlikely.  CBC showed anemia of 3.32 with an H&H of 10.5 and 31.2 respectively, no need for transfusion at this time.  No leukocytosis but 9.9 granulocyte count with 80.5% granulocyte count.  TSH in normal range. CT head and c-spine No acute intracranial findings. No acute cervical spine fracture. Degenerative findings in the cervical spine.  Urine drug screen is negative.  PT INR in normal range.  After 2 L of fluids CMP repeated.  Hypernatremia now 133.  Patient states she is feeling good.  Patient states she is ready go home.  Son requesting referral to home health.  I will do this.  Patient follow-up with the PCP. Patient agrees with this plan. Discussed with her strict return precautions, she verbalized understanding. Patient is stable for discharge.           Scribe Attestation:   Scribe #1: I performed the above scribed service and the documentation accurately describes the services I performed. I attest to the accuracy of the note.                 I, Alayna Holdsworth,PA-C, personally performed the services  described in this documentation. All medical record entries made by the scribe were at my direction and in my presence. I have reviewed the chart and agree that the record reflects my personal performance and is accurate and complete.     Clinical Impression:   Final diagnoses:  [R41.0] Confusion  [W19.XXXA, Y92.009] Fall at home, initial encounter (Primary)  [E87.1] Hyponatremia  [M17.11] Arthritis of right knee        ED Disposition Condition    Discharge Stable          ED Prescriptions    None       Follow-up Information       Follow up With Specialties Details Why Contact Info    Jefe Serrano MD Internal Medicine   [address]  [place] [address]  [phone]               Holdsworth, Alayna, PA-C  08/10/23 8039

## 2023-08-10 NOTE — DISCHARGE INSTRUCTIONS

## 2023-08-17 ENCOUNTER — OFFICE VISIT (OUTPATIENT)
Dept: ORTHOPEDICS | Facility: CLINIC | Age: 78
End: 2023-08-17
Payer: MEDICARE

## 2023-08-17 ENCOUNTER — TELEPHONE (OUTPATIENT)
Dept: ORTHOPEDICS | Facility: CLINIC | Age: 78
End: 2023-08-17

## 2023-08-17 DIAGNOSIS — S42.291A OTHER CLOSED DISPLACED FRACTURE OF PROXIMAL END OF RIGHT HUMERUS, INITIAL ENCOUNTER: Primary | ICD-10-CM

## 2023-08-17 PROCEDURE — 99213 OFFICE O/P EST LOW 20 MIN: CPT | Mod: PBBFAC,PN | Performed by: ORTHOPAEDIC SURGERY

## 2023-08-17 PROCEDURE — 99213 OFFICE O/P EST LOW 20 MIN: CPT | Mod: S$PBB,,, | Performed by: ORTHOPAEDIC SURGERY

## 2023-08-17 PROCEDURE — 99999 PR PBB SHADOW E&M-EST. PATIENT-LVL III: CPT | Mod: PBBFAC,,, | Performed by: ORTHOPAEDIC SURGERY

## 2023-08-17 PROCEDURE — 99213 PR OFFICE/OUTPT VISIT, EST, LEVL III, 20-29 MIN: ICD-10-PCS | Mod: S$PBB,,, | Performed by: ORTHOPAEDIC SURGERY

## 2023-08-17 PROCEDURE — 99999 PR PBB SHADOW E&M-EST. PATIENT-LVL III: ICD-10-PCS | Mod: PBBFAC,,, | Performed by: ORTHOPAEDIC SURGERY

## 2023-08-17 NOTE — PROGRESS NOTES
Assessment: 77 y.o. female with R proximal humerus fracture     I explained my diagnostic impression and the reasoning behind it in detail, using layman's terms.     Plan:   - New sling  - Repeat XR   - Discuss confusion, hyponatremia with PCP   -  ordered  - Return to clinic in 2 weeks with new XR. Return sooner if symptoms worsen or fail to improve.    All questions were answered in detail. The patient is in full agreement with the treatment plan and will proceed accordingly.    Chief Complaint   Patient presents with    Right Shoulder - Pain    Right Elbow - Pain       Initial visit (8/17/23): Juli Parra is a 77 y.o. female who presents today complaining of Pain of the Right Shoulder and Pain of the Right Elbow     Fall on 8/7/23 - tripped on her bat mat in the middle of the night   Went to ER, found to have R proximal humerus fracture  Placed in sling  Fell again 8/10 and went back to the ER. Her son had to try to get her up but couldn't, had to call EMS.  Reportedly confused  Na low at that time at 127, improved with IVF.   Minimal shoulder pain  Sing does not fit correctly  No numbness or tingling        This patient was seen in consultation at the request of No ref. provider found    This is the extent of the patient's complaints at this time.          Review of patient's allergies indicates:   Allergen Reactions    Egg Nausea Only    Cipro [ciprofloxacin hcl]     Sulfur Rash         Current Outpatient Medications:     chlorhexidine (PERIDEX) 0.12 % solution, SMARTSIG:By Mouth, Disp: , Rfl:     dicyclomine (BENTYL) 10 MG capsule, TAKE 1 CAPSULE BY MOUTH FOUR TIMES DAILY EVERY NIGHT BEFORE MEALS, Disp: 90 capsule, Rfl: 6    DULoxetine (CYMBALTA) 30 MG capsule, TAKE 1 CAPSULE(30 MG) BY MOUTH EVERY DAY, Disp: 30 capsule, Rfl: 6    fluticasone propionate (FLONASE) 50 mcg/actuation nasal spray, SPRAY ONCE INTO EACH NOSTRIL EVERY DAY, Disp: 48 g, Rfl: 3    hydroCHLOROthiazide (HYDRODIURIL) 25 MG  tablet, Take 1 tablet (25 mg total) by mouth once daily., Disp: 90 tablet, Rfl: 3    HYDROcodone-acetaminophen (NORCO) 5-325 mg per tablet, Take 1 tablet by mouth every 4 (four) hours as needed (Moderate to severe pain)., Disp: 18 tablet, Rfl: 0    LORazepam (ATIVAN) 1 MG tablet, Take 1 tablet (1 mg total) by mouth every evening., Disp: 33 tablet, Rfl: 5    losartan (COZAAR) 100 MG tablet, Take 1 tablet (100 mg total) by mouth once daily., Disp: 90 tablet, Rfl: 3    metoprolol succinate (TOPROL-XL) 50 MG 24 hr tablet, Take 1 tablet (50 mg total) by mouth once daily., Disp: 90 tablet, Rfl: 3    nystatin (MYCOSTATIN) powder, Apply topically 4 (four) times daily., Disp: 60 g, Rfl: 0    ondansetron (ZOFRAN-ODT) 4 MG TbDL, Take 1 tablet (4 mg total) by mouth every 6 (six) hours as needed (nausea)., Disp: 10 tablet, Rfl: 0    oxybutynin (DITROPAN XL) 15 MG TR24, Take 1 tablet (15 mg total) by mouth once daily., Disp: 90 tablet, Rfl: 3    potassium chloride SA (K-DUR,KLOR-CON M) 10 MEQ tablet, TAKE 1 TABLET BY MOUTH ONCE DAILY, Disp: 90 tablet, Rfl: 3    valACYclovir (VALTREX) 500 MG tablet, Take by mouth., Disp: , Rfl:     esomeprazole (NEXIUM) 40 MG capsule, Take 1 capsule (40 mg total) by mouth before breakfast., Disp: 90 capsule, Rfl: 11    Physical Exam:   Vitals:    08/17/23 0825   PainSc:   1       General:  Patient is alert, awake and oriented to time, place and person. Mood and affect are appropriate.  Patient does not appear to be in any distress, denies any constitutional symptoms and appears stated age.   HEENT:  Pupils are equal and round, sclera are not injected. External examination of ears and nose reveals no abnormalities. Cranial nerves II-X are grossly intact  Skin:  no rashes, abrasions or open wounds on the affected extremity   Resp:  No respiratory distress or audible wheezing   CV: 2+  pulses, all extremities warm and well perfused   Right Upper extremity   Ecchymosis over the R arm   No open  wounds  Motion deferred due to known fracture  LTSI m/u/r  2+ RP  + EPL, IO, FDS, FDP      Imagin views of the right shoulder show minimally displaced proximal humerus fracture     I personally reviewed and interpreted the patient's imaging obtained today in clinic       A note notifying No ref. provider found of my findings was sent via the electronic medical record     This note was created by combination of typed  and M-Modal dictation. Transcription and phonetic errors may be present.  If there are any questions, please contact me.    Past Medical History:   Diagnosis Date    Anxiety     GERD (gastroesophageal reflux disease)     HTN (hypertension)     IBS (irritable bowel syndrome)     Kidney stones     OAB (overactive bladder)     Vertigo        Active Problem List with Overview Notes    Diagnosis Date Noted    Current moderate episode of major depressive disorder without prior episode 2023    Coccydynia 2023    Lumbar spondylosis 2023    DDD (degenerative disc disease), lumbar 2023    Chest congestion 2022    Depression 2022    Arthritis of right knee 10/15/2020    Anxiety 2020    Bilateral sensorineural hearing loss 2019    Mixed irritable bowel syndrome 2018    Primary generalized (osteo)arthritis 2018    Essential hypertension 2018    Impaired fasting glucose 2018    Gastroesophageal reflux disease 2018       Past Surgical History:   Procedure Laterality Date    CATARACT EXTRACTION, BILATERAL      CHOLECYSTECTOMY      HYSTERECTOMY      LITHOTRIPSY         Family History   Problem Relation Age of Onset    Leukemia Father     Cancer Brother         Pancreatic

## 2023-08-17 NOTE — TELEPHONE ENCOUNTER
----- Message from Berta Conroy MD sent at 8/17/2023  1:42 PM CDT -----  Can you let her know her repeat xray looks ok, will see her in two weeks  ----- Message -----  From: Snow, Rad Results In  Sent: 8/17/2023   9:56 AM CDT  To: Berta Conroy MD

## 2023-08-24 ENCOUNTER — LAB VISIT (OUTPATIENT)
Dept: LAB | Facility: HOSPITAL | Age: 78
End: 2023-08-24
Payer: MEDICARE

## 2023-08-24 ENCOUNTER — OFFICE VISIT (OUTPATIENT)
Dept: INTERNAL MEDICINE | Facility: CLINIC | Age: 78
End: 2023-08-24
Payer: MEDICARE

## 2023-08-24 VITALS
OXYGEN SATURATION: 92 % | HEIGHT: 57 IN | SYSTOLIC BLOOD PRESSURE: 140 MMHG | WEIGHT: 115.94 LBS | BODY MASS INDEX: 25.01 KG/M2 | DIASTOLIC BLOOD PRESSURE: 60 MMHG | HEART RATE: 85 BPM

## 2023-08-24 DIAGNOSIS — F32.1 CURRENT MODERATE EPISODE OF MAJOR DEPRESSIVE DISORDER WITHOUT PRIOR EPISODE: ICD-10-CM
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
## 2023-08-24 DIAGNOSIS — S42.294D OTHER CLOSED NONDISPLACED FRACTURE OF PROXIMAL END OF RIGHT HUMERUS WITH ROUTINE HEALING, SUBSEQUENT ENCOUNTER: ICD-10-CM

## 2023-08-24 DIAGNOSIS — F41.9 ANXIETY: ICD-10-CM

## 2023-08-24 DIAGNOSIS — E87.1 HYPONATREMIA: ICD-10-CM

## 2023-08-24 DIAGNOSIS — R73.09 ELEVATED GLUCOSE: ICD-10-CM

## 2023-08-24 LAB
ALBUMIN SERPL BCP-MCNC: 3.3 G/DL (ref 3.5–5.2)
ALP SERPL-CCNC: 110 U/L (ref 55–135)
ALT SERPL W/O P-5'-P-CCNC: 13 U/L (ref 10–44)
ANION GAP SERPL CALC-SCNC: 7 MMOL/L (ref 8–16)
AST SERPL-CCNC: 18 U/L (ref 10–40)
BILIRUB SERPL-MCNC: 0.5 MG/DL (ref 0.1–1)
BUN SERPL-MCNC: 13 MG/DL (ref 8–23)
CALCIUM SERPL-MCNC: 9.3 MG/DL (ref 8.7–10.5)
CHLORIDE SERPL-SCNC: 100 MMOL/L (ref 95–110)
CO2 SERPL-SCNC: 28 MMOL/L (ref 23–29)
CREAT SERPL-MCNC: 0.7 MG/DL (ref 0.5–1.4)
EST. GFR  (NO RACE VARIABLE): >60 ML/MIN/1.73 M^2
ESTIMATED AVG GLUCOSE: 108 MG/DL (ref 68–131)
GLUCOSE SERPL-MCNC: 114 MG/DL (ref 70–110)
HBA1C MFR BLD: 5.4 % (ref 4–5.6)
POTASSIUM SERPL-SCNC: 3.5 MMOL/L (ref 3.5–5.1)
PROT SERPL-MCNC: 7.4 G/DL (ref 6–8.4)
SODIUM SERPL-SCNC: 135 MMOL/L (ref 136–145)

## 2023-08-24 PROCEDURE — 99214 PR OFFICE/OUTPT VISIT, EST, LEVL IV, 30-39 MIN: ICD-10-PCS | Mod: S$PBB,,, | Performed by: PHYSICIAN ASSISTANT

## 2023-08-24 PROCEDURE — 83036 HEMOGLOBIN GLYCOSYLATED A1C: CPT | Performed by: PHYSICIAN ASSISTANT

## 2023-08-24 PROCEDURE — 99214 OFFICE O/P EST MOD 30 MIN: CPT | Mod: S$PBB,,, | Performed by: PHYSICIAN ASSISTANT

## 2023-08-24 PROCEDURE — 99999 PR PBB SHADOW E&M-EST. PATIENT-LVL IV: CPT | Mod: PBBFAC,,, | Performed by: PHYSICIAN ASSISTANT

## 2023-08-24 PROCEDURE — 99214 OFFICE O/P EST MOD 30 MIN: CPT | Mod: PBBFAC | Performed by: PHYSICIAN ASSISTANT

## 2023-08-24 PROCEDURE — 80053 COMPREHEN METABOLIC PANEL: CPT | Performed by: PHYSICIAN ASSISTANT

## 2023-08-24 PROCEDURE — 99999 PR PBB SHADOW E&M-EST. PATIENT-LVL IV: ICD-10-PCS | Mod: PBBFAC,,, | Performed by: PHYSICIAN ASSISTANT

## 2023-08-24 PROCEDURE — 36415 COLL VENOUS BLD VENIPUNCTURE: CPT | Performed by: PHYSICIAN ASSISTANT

## 2023-08-24 RX ORDER — DULOXETIN HYDROCHLORIDE 20 MG/1
20 CAPSULE, DELAYED RELEASE ORAL DAILY
Qty: 30 CAPSULE | Refills: 1 | Status: SHIPPED | OUTPATIENT
Start: 2023-08-24 | End: 2023-10-20

## 2023-08-24 NOTE — PATIENT INSTRUCTIONS
We will recheck your sodium level    We will reduce your duloxetine 20mg a day (okay to take at night)    We are also doing a diabetes screen    I will call you with results and let you know about follow up appt pending your blood work.

## 2023-08-24 NOTE — PROGRESS NOTES
Subjective     Patient ID: Juli Parra is a 78 y.o. female.    Chief Complaint: Follow-up (HOSFU)    HPI    Established pt of Jefe Serrano MD     Here for hospital follow up.   Seen there on 8/7 after a trip/fall in the bathroom at home, suffered right humerus fx, discharge home with sling/op ortho f/u. Returned to ED 3 days later 2/2 additional fall,(pt repost lost her balance) sodium found to be low. She was treated with IV fluids, discharged home     Today she reports she is feeling well,  following with ortho. Still in sling    Concerned about low sodium. We reviewed meds, new med is Cymbalta about 2 months ago. Also on hctz 25mg for many years. Reports about 1 gallon water a day(cutting back) No etoh.    Feels Cymbalta has helped mood significantly, less crying spells, does make her more sleepy in the daytime.     Thinks she may have felt more off balance since started Cymbalta, Hx of BPPV for many years. OA of knees, uses a cane regulary.       Past Medical History:   Diagnosis Date    Anxiety     GERD (gastroesophageal reflux disease)     HTN (hypertension)     IBS (irritable bowel syndrome)     Kidney stones     OAB (overactive bladder)     Vertigo      Social History     Tobacco Use    Smoking status: Never    Smokeless tobacco: Never   Substance Use Topics    Alcohol use: Not Currently    Drug use: Never     Review of patient's allergies indicates:   Allergen Reactions    Egg Nausea Only    Cipro [ciprofloxacin hcl]     Sulfur Rash       Review of Systems   Constitutional:  Negative for chills, fever and unexpected weight change.   Respiratory:  Negative for cough and shortness of breath.    Cardiovascular:  Negative for chest pain and leg swelling.   Gastrointestinal:  Negative for abdominal pain, nausea and vomiting.   Musculoskeletal:  Positive for arthralgias.   Integumentary:  Negative for rash.   Neurological:  Negative for weakness and headaches.          Objective  BP (!) 140/60    "Pulse 85   Ht 4' 9" (1.448 m)   Wt 52.6 kg (115 lb 15.4 oz)   SpO2 (!) 92%   BMI 25.09 kg/m²       Physical Exam  Vitals reviewed.   Constitutional:       General: She is not in acute distress.     Appearance: She is well-developed.   HENT:      Head: Normocephalic and atraumatic.   Cardiovascular:      Rate and Rhythm: Normal rate and regular rhythm.      Heart sounds: No murmur heard.  Pulmonary:      Effort: Pulmonary effort is normal.      Breath sounds: Normal breath sounds. No wheezing or rales.   Abdominal:      General: Bowel sounds are normal.      Palpations: Abdomen is soft.      Tenderness: There is no abdominal tenderness.   Musculoskeletal:      Right lower leg: No edema.      Left lower leg: No edema.      Comments: RUE sling  Ambulating with cane   Skin:     General: Skin is warm and dry.      Findings: No rash.   Neurological:      Mental Status: She is alert.   Psychiatric:         Mood and Affect: Mood normal.            Assessment and Plan     1. Fall, subsequent encounter  2. Other closed nondisplaced fracture of proximal end of right humerus with routine healing, subsequent encounter  Followed by Ortho    3. Current moderate episode of major depressive disorder without prior episode  We reviewed Cymbalta as possible etiology of new onset hyponatremia (also with concomitant use of hctz)  Although pt with significant improve mood since starting  we will reduced to 20mg (try taking nightly)  -     Comprehensive Metabolic Panel; Future; Expected date: 08/24/2023  -     DULoxetine (CYMBALTA) 20 MG capsule; Take 1 capsule (20 mg total) by mouth once daily.  Dispense: 30 capsule; Refill: 1    4. Elevated glucose (noted on prior lab)  -     Hemoglobin A1C; Future; Expected date: 08/24/2023    5. Hyponatremia  As above  -     Comprehensive Metabolic Panel; Future; Expected date: 08/24/2023    6. Anxiety  As above  -     DULoxetine (CYMBALTA) 20 MG capsule; Take 1 capsule (20 mg total) by mouth once " daily.  Dispense: 30 capsule; Refill: 1      Further recs to follow pending results.   Future Appointments   Date Time Provider Department Center   8/31/2023 12:45 PM Berta Conroy MD [facility]THO [facility]   9/28/2023 10:40 AM Hien Sagastume MD [facility]   10/17/2023  1:00 PM Jefe Serrano MD [facility]       Nita Clifton PA-C

## 2023-08-29 ENCOUNTER — TELEPHONE (OUTPATIENT)
Dept: INTERNAL MEDICINE | Facility: CLINIC | Age: 78
End: 2023-08-29
Payer: MEDICARE

## 2023-08-29 DIAGNOSIS — M25.511 RIGHT SHOULDER PAIN, UNSPECIFIED CHRONICITY: Primary | ICD-10-CM

## 2023-08-29 DIAGNOSIS — I10 ESSENTIAL HYPERTENSION: ICD-10-CM

## 2023-08-29 DIAGNOSIS — E87.1 HYPONATREMIA: Primary | ICD-10-CM

## 2023-08-29 NOTE — TELEPHONE ENCOUNTER
Called pt to review lab.   All stable, sodium continues to increase.   Notes adherence with reduced dose of cymbalta to 20mg, crying spell yesterday but coping okay  We will continue current meds, repeat lab in 4 weeks, keep f/u with Dr. Serrano as schedule  Pt voiced understanding of plan    Nita Clifton PA-C      Staff: Please schedule lab only appt on 9/28 [facility]  Orders Placed This Encounter   Procedures    BASIC METABOLIC PANEL

## 2023-08-31 ENCOUNTER — OFFICE VISIT (OUTPATIENT)
Dept: ORTHOPEDICS | Facility: CLINIC | Age: 78
End: 2023-08-31
Payer: MEDICARE

## 2023-08-31 DIAGNOSIS — M17.0 PRIMARY OSTEOARTHRITIS OF BOTH KNEES: Primary | ICD-10-CM

## 2023-08-31 PROCEDURE — 99999 PR PBB SHADOW E&M-EST. PATIENT-LVL III: ICD-10-PCS | Mod: PBBFAC,,, | Performed by: ORTHOPAEDIC SURGERY

## 2023-08-31 PROCEDURE — 99213 OFFICE O/P EST LOW 20 MIN: CPT | Mod: 25,S$PBB,, | Performed by: ORTHOPAEDIC SURGERY

## 2023-08-31 PROCEDURE — 99999 PR PBB SHADOW E&M-EST. PATIENT-LVL III: CPT | Mod: PBBFAC,,, | Performed by: ORTHOPAEDIC SURGERY

## 2023-08-31 PROCEDURE — 99213 PR OFFICE/OUTPT VISIT, EST, LEVL III, 20-29 MIN: ICD-10-PCS | Mod: 25,S$PBB,, | Performed by: ORTHOPAEDIC SURGERY

## 2023-08-31 PROCEDURE — 99213 OFFICE O/P EST LOW 20 MIN: CPT | Mod: PBBFAC,PN | Performed by: ORTHOPAEDIC SURGERY

## 2023-08-31 PROCEDURE — 99999PBSHW PR PBB SHADOW TECHNICAL ONLY FILED TO HB: Mod: PBBFAC,,,

## 2023-08-31 PROCEDURE — 99999PBSHW PR PBB SHADOW TECHNICAL ONLY FILED TO HB: ICD-10-PCS | Mod: PBBFAC,,,

## 2023-08-31 PROCEDURE — 20610 LARGE JOINT ASPIRATION/INJECTION: BILATERAL KNEE: ICD-10-PCS | Mod: 50,S$PBB,, | Performed by: ORTHOPAEDIC SURGERY

## 2023-08-31 PROCEDURE — 20610 DRAIN/INJ JOINT/BURSA W/O US: CPT | Mod: 50,PBBFAC,PN | Performed by: ORTHOPAEDIC SURGERY

## 2023-08-31 RX ADMIN — TRIAMCINOLONE ACETONIDE 40 MG: 40 INJECTION, SUSPENSION INTRA-ARTICULAR; INTRAMUSCULAR at 12:08

## 2023-08-31 NOTE — PROCEDURES
Large Joint Aspiration/Injection: bilateral knee    Date/Time: 8/31/2023 12:45 PM    Performed by: Berta Conroy MD  Authorized by: Berta Conroy MD    Consent Done?:  Yes (Verbal)  Indications:  Arthritis  Timeout: prior to procedure the correct patient, procedure, and site was verified    Prep: patient was prepped and draped in usual sterile fashion      Local anesthesia used?: Yes    Local anesthetic:  Topical anesthetic    Details:  Needle Size:  22 G  Approach:  Anteromedial  Location:  Knee  Laterality:  Bilateral  Site:  Bilateral knee  Medications (Right):  40 mg triamcinolone acetonide 40 mg/mL  Medications (Left):  40 mg triamcinolone acetonide 40 mg/mL  Patient tolerance:  Patient tolerated the procedure well with no immediate complications

## 2023-08-31 NOTE — PROGRESS NOTES
Assessment: 78 y.o. female with R proximal humerus fracture, bilateral knee OA    I explained my diagnostic impression and the reasoning behind it in detail, using layman's terms.     Plan:   - PROM and FELICIA ok  - HH ordered at last visit, she declined when they called. Discussed benefits of working with PT (falls) and OT ( shoulder motion), she is interested, We will have them call her again  -Injection of the bilateral knee  performed, please see procedure note for more details.  Prior to the injection risks and benefits of corticosteroid injection were discussed with the patient including pain, infection, bleeding, skin color changes, swelling, steroid flare. We discussed that over time injections can result in chondral damage, acceleration of arthritis formation, damage to tendons and damage to joints.  The patient consented for the procedure.  Post-injection instructions were given to the patient in writing.  - Return to clinic in 4 weeks with new XR. Return sooner if symptoms worsen or fail to improve.    All questions were answered in detail. The patient is in full agreement with the treatment plan and will proceed accordingly.    Chief Complaint   Patient presents with    Right Shoulder - Pain, Injury       Initial visit (8/17/23): Juli Parra is a 78 y.o. female who presents today complaining of Pain and Injury of the Right Shoulder     Fall on 8/7/23 - tripped on her bat mat in the middle of the night   Went to ER, found to have R proximal humerus fracture  Placed in sling  Fell again 8/10 and went back to the ER. Her son had to try to get her up but couldn't, had to call EMS.  Reportedly confused  Na low at that time at 127, improved with IVF.   Minimal shoulder pain  Sing does not fit correctly  No numbness or tingling    8/31/23  Pain significantly improved   Is able to sleep on the right side without pain  Declined hh when they called      This is the extent of the patient's complaints at this  time.          Review of patient's allergies indicates:   Allergen Reactions    Egg Nausea Only    Cipro [ciprofloxacin hcl]     Sulfur Rash         Current Outpatient Medications:     chlorhexidine (PERIDEX) 0.12 % solution, SMARTSIG:By Mouth, Disp: , Rfl:     dicyclomine (BENTYL) 10 MG capsule, TAKE 1 CAPSULE BY MOUTH FOUR TIMES DAILY EVERY NIGHT BEFORE MEALS, Disp: 90 capsule, Rfl: 6    DULoxetine (CYMBALTA) 20 MG capsule, Take 1 capsule (20 mg total) by mouth once daily., Disp: 30 capsule, Rfl: 1    esomeprazole (NEXIUM) 40 MG capsule, Take 1 capsule (40 mg total) by mouth before breakfast., Disp: 90 capsule, Rfl: 11    fluticasone propionate (FLONASE) 50 mcg/actuation nasal spray, SPRAY ONCE INTO EACH NOSTRIL EVERY DAY, Disp: 48 g, Rfl: 3    hydroCHLOROthiazide (HYDRODIURIL) 25 MG tablet, Take 1 tablet (25 mg total) by mouth once daily., Disp: 90 tablet, Rfl: 3    HYDROcodone-acetaminophen (NORCO) 5-325 mg per tablet, Take 1 tablet by mouth every 4 (four) hours as needed (Moderate to severe pain)., Disp: 18 tablet, Rfl: 0    LORazepam (ATIVAN) 1 MG tablet, Take 1 tablet (1 mg total) by mouth every evening., Disp: 33 tablet, Rfl: 5    losartan (COZAAR) 100 MG tablet, Take 1 tablet (100 mg total) by mouth once daily., Disp: 90 tablet, Rfl: 3    metoprolol succinate (TOPROL-XL) 50 MG 24 hr tablet, Take 1 tablet (50 mg total) by mouth once daily., Disp: 90 tablet, Rfl: 3    nystatin (MYCOSTATIN) powder, Apply topically 4 (four) times daily., Disp: 60 g, Rfl: 0    ondansetron (ZOFRAN-ODT) 4 MG TbDL, Take 1 tablet (4 mg total) by mouth every 6 (six) hours as needed (nausea)., Disp: 10 tablet, Rfl: 0    oxybutynin (DITROPAN XL) 15 MG TR24, Take 1 tablet (15 mg total) by mouth once daily., Disp: 90 tablet, Rfl: 3    potassium chloride SA (K-DUR,KLOR-CON M) 10 MEQ tablet, TAKE 1 TABLET BY MOUTH ONCE DAILY, Disp: 90 tablet, Rfl: 3    valACYclovir (VALTREX) 500 MG tablet, Take by mouth., Disp: , Rfl:     Physical Exam:    Vitals:    23 1246   PainSc: 0-No pain   PainLoc: Shoulder     General:  Patient is alert, awake and oriented to time, place and person. Mood and affect are appropriate.  Patient does not appear to be in any distress, denies any constitutional symptoms and appears stated age.   HEENT:  Pupils are equal and round, sclera are not injected. External examination of ears and nose reveals no abnormalities. Cranial nerves II-X are grossly intact  Skin:  no rashes, abrasions or open wounds on the affected extremity   Resp:  No respiratory distress or audible wheezing   CV: 2+  pulses, all extremities warm and well perfused   Right Upper extremity   Ecchymosis over the R arm resolving  No open wounds  Tolerated passive motion with minimal pain - FE to 70  LTSI m/u/r  2+ RP  + EPL, IO, FDS, FDP      Imagin views of the right shoulder show minimally displaced proximal humerus fracture with early callus formation    I personally reviewed and interpreted the patient's imaging obtained today in clinic         This note was created by combination of typed  and M-Modal dictation. Transcription and phonetic errors may be present.  If there are any questions, please contact me.    Past Medical History:   Diagnosis Date    Anxiety     GERD (gastroesophageal reflux disease)     HTN (hypertension)     IBS (irritable bowel syndrome)     Kidney stones     OAB (overactive bladder)     Vertigo        Active Problem List with Overview Notes    Diagnosis Date Noted    Current moderate episode of major depressive disorder without prior episode 2023    Coccydynia 2023    Lumbar spondylosis 2023    DDD (degenerative disc disease), lumbar 2023    Chest congestion 2022    Depression 2022    Arthritis of right knee 10/15/2020    Anxiety 2020    Bilateral sensorineural hearing loss 2019    Mixed irritable bowel syndrome 2018    Primary generalized (osteo)arthritis 2018     Essential hypertension 03/22/2018    Impaired fasting glucose 03/22/2018    Gastroesophageal reflux disease 03/22/2018       Past Surgical History:   Procedure Laterality Date    CATARACT EXTRACTION, BILATERAL      CHOLECYSTECTOMY      HYSTERECTOMY      LITHOTRIPSY         Family History   Problem Relation Age of Onset    Leukemia Father     Cancer Brother         Pancreatic

## 2023-09-01 RX ORDER — TRIAMCINOLONE ACETONIDE 40 MG/ML
40 INJECTION, SUSPENSION INTRA-ARTICULAR; INTRAMUSCULAR
Status: DISCONTINUED | OUTPATIENT
Start: 2023-08-31 | End: 2023-09-01 | Stop reason: HOSPADM

## 2023-09-02 PROCEDURE — G0180 MD CERTIFICATION HHA PATIENT: HCPCS | Mod: ,,, | Performed by: ORTHOPAEDIC SURGERY

## 2023-09-02 PROCEDURE — G0180 PR HOME HEALTH MD CERTIFICATION: ICD-10-PCS | Mod: ,,, | Performed by: ORTHOPAEDIC SURGERY

## 2023-09-05 ENCOUNTER — TELEPHONE (OUTPATIENT)
Dept: ORTHOPEDICS | Facility: CLINIC | Age: 78
End: 2023-09-05
Payer: MEDICARE

## 2023-09-05 NOTE — TELEPHONE ENCOUNTER
----- Message from Berta Conroy MD sent at 9/5/2023  3:20 PM CDT -----  Pendulum exercises ok starting now. No limitations on elbow/hand wrist motion  Passive range of motion ok at 4 weeks post injury   Active assisted range of motion ok at 6 weeks   ----- Message -----  From: Jud Linares MA  Sent: 9/5/2023   1:18 PM CDT  To: Berta Conroy MD    The home health is asking for orders for OT/therapy for the patient can take verbal orders just let us know. Thanks   ----- Message -----  From: Archana Linares MA  Sent: 9/5/2023  11:50 AM CDT  To: Marycarmen Alvares Staff    Type: Patient Call Back    Who called: [facility] Jorgensen    What is the request in detail: home health was ordered & the Physical therapist states the pt. Would benefit from occupational therapy.. she is asking  for a verbal order for it to be added ..     Can the clinic reply by [facility]?NO    Would the patient rather a call back or a response via [facility]? Yes, call     Best call back number: [phone]

## 2023-09-07 ENCOUNTER — TELEPHONE (OUTPATIENT)
Dept: ORTHOPEDICS | Facility: CLINIC | Age: 78
End: 2023-09-07
Payer: MEDICARE

## 2023-09-07 NOTE — TELEPHONE ENCOUNTER
Spoken with patient and she said  that no one from [facility] have came back out to her home. Patient need to have  PT/OT  therapy with [facility] and no one have follow up with her have called no with no answer. Have send message to the staff with [facility] ( Shanae )

## 2023-09-08 DIAGNOSIS — I10 ESSENTIAL HYPERTENSION: ICD-10-CM

## 2023-09-08 NOTE — TELEPHONE ENCOUNTER
No care due was identified.  [facility] Embedded Care Due Messages. Reference number: [id number].   9/08/2023 1:09:14 PM CDT

## 2023-09-09 NOTE — TELEPHONE ENCOUNTER
Refill Routing Note   Medication(s) are not appropriate for processing by [facility] for the following reason(s):      Patient seen in ED/Hospital since LOV with PCP    [facility] action(s):  Route Care Due:  None identified          Appointments  past 12m or future 3m with PCP    Date Provider   Last Visit   4/17/2023 Jefe Serrano MD   Next Visit   10/17/2023 Jefe Serrano MD   ED visits in past 90 days: 2        Note composed:9:10 AM 09/09/2023

## 2023-09-11 DIAGNOSIS — I10 ESSENTIAL HYPERTENSION: ICD-10-CM

## 2023-09-11 RX ORDER — POTASSIUM CHLORIDE 750 MG/1
10 TABLET, EXTENDED RELEASE ORAL
Qty: 90 TABLET | Refills: 2 | Status: SHIPPED | OUTPATIENT
Start: 2023-09-11

## 2023-09-11 NOTE — TELEPHONE ENCOUNTER
Unable to retrieve patient chart and identify care due.  [facility] Embedded Care Due Messages. Reference number: [id number].   9/11/2023 11:52:35 AM CDT

## 2023-09-12 RX ORDER — LOSARTAN POTASSIUM 100 MG/1
100 TABLET ORAL
Qty: 90 TABLET | Refills: 2 | Status: SHIPPED | OUTPATIENT
Start: 2023-09-12

## 2023-09-12 RX ORDER — HYDROCHLOROTHIAZIDE 25 MG/1
25 TABLET ORAL
Qty: 90 TABLET | Refills: 2 | Status: SHIPPED | OUTPATIENT
Start: 2023-09-12

## 2023-09-12 RX ORDER — METOPROLOL SUCCINATE 50 MG/1
50 TABLET, EXTENDED RELEASE ORAL
Qty: 90 TABLET | Refills: 2 | Status: SHIPPED | OUTPATIENT
Start: 2023-09-12

## 2023-09-12 NOTE — TELEPHONE ENCOUNTER
Refill Routing Note   Medication(s) are not appropriate for processing by [facility] for the following reason(s):      Patient seen in ED/Hospital since LOV with PCP  Required vitals abnormal    [facility] action(s):  Route Care Due:  None identified          Appointments  past 12m or future 3m with PCP    Date Provider   Last Visit   4/17/2023 Jefe Serrano MD   Next Visit   10/17/2023 Jefe Serrano MD   ED visits in past 90 days: 2        Note composed:11:00 AM 09/12/2023

## 2023-09-18 ENCOUNTER — DOCUMENT SCAN (OUTPATIENT)
Dept: HOME HEALTH SERVICES | Facility: HOSPITAL | Age: 78
End: 2023-09-18
Payer: MEDICARE

## 2023-09-25 DIAGNOSIS — M25.511 RIGHT SHOULDER PAIN, UNSPECIFIED CHRONICITY: Primary | ICD-10-CM

## 2023-09-27 DIAGNOSIS — Z78.0 MENOPAUSE: ICD-10-CM

## 2023-09-28 ENCOUNTER — OFFICE VISIT (OUTPATIENT)
Dept: ORTHOPEDICS | Facility: CLINIC | Age: 78
End: 2023-09-28
Payer: MEDICARE

## 2023-09-28 ENCOUNTER — OFFICE VISIT (OUTPATIENT)
Dept: UROLOGY | Facility: CLINIC | Age: 78
End: 2023-09-28
Payer: MEDICARE

## 2023-09-28 DIAGNOSIS — Z87.442 HISTORY OF NEPHROLITHIASIS: ICD-10-CM

## 2023-09-28 DIAGNOSIS — N39.0 RECURRENT UTI: Primary | ICD-10-CM

## 2023-09-28 DIAGNOSIS — S42.291A OTHER CLOSED DISPLACED FRACTURE OF PROXIMAL END OF RIGHT HUMERUS, INITIAL ENCOUNTER: Primary | ICD-10-CM

## 2023-09-28 DIAGNOSIS — N32.81 OAB (OVERACTIVE BLADDER): ICD-10-CM

## 2023-09-28 DIAGNOSIS — N30.10 INTERSTITIAL CYSTITIS: ICD-10-CM

## 2023-09-28 PROCEDURE — 99214 OFFICE O/P EST MOD 30 MIN: CPT | Mod: S$PBB,,, | Performed by: UROLOGY

## 2023-09-28 PROCEDURE — 99999 PR PBB SHADOW E&M-EST. PATIENT-LVL III: ICD-10-PCS | Mod: PBBFAC,,, | Performed by: UROLOGY

## 2023-09-28 PROCEDURE — 99999 PR PBB SHADOW E&M-EST. PATIENT-LVL III: ICD-10-PCS | Mod: PBBFAC,,, | Performed by: ORTHOPAEDIC SURGERY

## 2023-09-28 PROCEDURE — 99213 OFFICE O/P EST LOW 20 MIN: CPT | Mod: PBBFAC,25 | Performed by: UROLOGY

## 2023-09-28 PROCEDURE — 99999 PR PBB SHADOW E&M-EST. PATIENT-LVL III: CPT | Mod: PBBFAC,,, | Performed by: UROLOGY

## 2023-09-28 PROCEDURE — 99214 PR OFFICE/OUTPT VISIT, EST, LEVL IV, 30-39 MIN: ICD-10-PCS | Mod: S$PBB,,, | Performed by: UROLOGY

## 2023-09-28 PROCEDURE — 99999 PR PBB SHADOW E&M-EST. PATIENT-LVL III: CPT | Mod: PBBFAC,,, | Performed by: ORTHOPAEDIC SURGERY

## 2023-09-28 PROCEDURE — 99213 PR OFFICE/OUTPT VISIT, EST, LEVL III, 20-29 MIN: ICD-10-PCS | Mod: S$PBB,,, | Performed by: ORTHOPAEDIC SURGERY

## 2023-09-28 PROCEDURE — 99213 OFFICE O/P EST LOW 20 MIN: CPT | Mod: PBBFAC,27,PN | Performed by: ORTHOPAEDIC SURGERY

## 2023-09-28 PROCEDURE — 99213 OFFICE O/P EST LOW 20 MIN: CPT | Mod: S$PBB,,, | Performed by: ORTHOPAEDIC SURGERY

## 2023-09-28 NOTE — LETTER
September 28, 2023    Juli Nash Aida  [address] [place] [address]         [facility]  [address]  [place] [address]  Phone: [phone] September 28, 2023     Patient: Juli Parra   YOB: 1945   Date of Visit: 9/28/2023       To Whom It May Concern:    OK to start AROM of the RUE and strengthening if not painful    If you have any questions or concerns, please don't hesitate to call.    Sincerely,        Berta Conroy MD

## 2023-09-28 NOTE — PROGRESS NOTES
Assessment: 78 y.o. female with R proximal humerus fracture, bilateral knee OA    I explained my diagnostic impression and the reasoning behind it in detail, using layman's terms.     Plan:   - active range of motion and pain less lifting okay  - continue home health, will prescribe outpatient therapy when they are finished  - okay to drive  - Return to clinic in 6 weeks with new XR. Return sooner if symptoms worsen or fail to improve.    All questions were answered in detail. The patient is in full agreement with the treatment plan and will proceed accordingly.    Chief Complaint   Patient presents with    Right Shoulder - Pain, Injury       Initial visit (8/17/23): Juli Parra is a 78 y.o. female who presents today complaining of Pain and Injury of the Right Shoulder     Fall on 8/7/23 - tripped on her bat mat in the middle of the night   Went to ER, found to have R proximal humerus fracture  Placed in sling  Fell again 8/10 and went back to the ER. Her son had to try to get her up but couldn't, had to call EMS.  Reportedly confused  Na low at that time at 127, improved with IVF.   Minimal shoulder pain  Sing does not fit correctly  No numbness or tingling    8/31/23  Pain significantly improved   Is able to sleep on the right side without pain  Declined hh when they called    9/28/23  No pain  Home health has been coming    This is the extent of the patient's complaints at this time.          Review of patient's allergies indicates:   Allergen Reactions    Egg Nausea Only    Cipro [ciprofloxacin hcl]     Sulfur Rash         Current Outpatient Medications:     chlorhexidine (PERIDEX) 0.12 % solution, SMARTSIG:By Mouth, Disp: , Rfl:     dicyclomine (BENTYL) 10 MG capsule, TAKE 1 CAPSULE BY MOUTH FOUR TIMES DAILY EVERY NIGHT BEFORE MEALS, Disp: 90 capsule, Rfl: 6    DULoxetine (CYMBALTA) 20 MG capsule, Take 1 capsule (20 mg total) by mouth once daily., Disp: 30 capsule, Rfl: 1    fluticasone propionate  (FLONASE) 50 mcg/actuation nasal spray, SPRAY ONCE INTO EACH NOSTRIL EVERY DAY, Disp: 48 g, Rfl: 3    hydroCHLOROthiazide (HYDRODIURIL) 25 MG tablet, TAKE 1 TABLET(25 MG) BY MOUTH EVERY DAY, Disp: 90 tablet, Rfl: 2    LORazepam (ATIVAN) 1 MG tablet, Take 1 tablet (1 mg total) by mouth every evening., Disp: 33 tablet, Rfl: 5    losartan (COZAAR) 100 MG tablet, TAKE 1 TABLET(100 MG) BY MOUTH EVERY DAY, Disp: 90 tablet, Rfl: 2    metoprolol succinate (TOPROL-XL) 50 MG 24 hr tablet, TAKE 1 TABLET(50 MG) BY MOUTH EVERY DAY, Disp: 90 tablet, Rfl: 2    nystatin (MYCOSTATIN) powder, Apply topically 4 (four) times daily., Disp: 60 g, Rfl: 0    ondansetron (ZOFRAN-ODT) 4 MG TbDL, Take 1 tablet (4 mg total) by mouth every 6 (six) hours as needed (nausea)., Disp: 10 tablet, Rfl: 0    oxybutynin (DITROPAN XL) 15 MG TR24, Take 1 tablet (15 mg total) by mouth once daily., Disp: 90 tablet, Rfl: 3    potassium chloride SA (K-DUR,KLOR-CON M) 10 MEQ tablet, TAKE 1 TABLET(10 MEQ) BY MOUTH EVERY DAY, Disp: 90 tablet, Rfl: 2    valACYclovir (VALTREX) 500 MG tablet, Take by mouth., Disp: , Rfl:     esomeprazole (NEXIUM) 40 MG capsule, Take 1 capsule (40 mg total) by mouth before breakfast., Disp: 90 capsule, Rfl: 11    HYDROcodone-acetaminophen (NORCO) 5-325 mg per tablet, Take 1 tablet by mouth every 4 (four) hours as needed (Moderate to severe pain). (Patient not taking: Reported on 9/28/2023), Disp: 18 tablet, Rfl: 0    Physical Exam:   Vitals:    09/28/23 1249   PainSc: 0-No pain   PainLoc: Shoulder     General:  Patient is alert, awake and oriented to time, place and person. Mood and affect are appropriate.  Patient does not appear to be in any distress, denies any constitutional symptoms and appears stated age.   HEENT:  Pupils are equal and round, sclera are not injected. External examination of ears and nose reveals no abnormalities. Cranial nerves II-X are grossly intact  Skin:  no rashes, abrasions or open wounds on the affected  extremity   Resp:  No respiratory distress or audible wheezing   CV: 2+  pulses, all extremities warm and well perfused   Right Upper extremity   Active range of motion course: Forward elevation to 90°, external rotation to 30 - no pain  LTSI m/u/r  2+ RP  + EPL, IO, FDS, FDP      Imagin views of the right shoulder show minimally displaced proximal humerus fracture with significant callus formation    I personally reviewed and interpreted the patient's imaging obtained today in clinic         This note was created by combination of typed  and M-Modal dictation. Transcription and phonetic errors may be present.  If there are any questions, please contact me.    Past Medical History:   Diagnosis Date    Anxiety     GERD (gastroesophageal reflux disease)     HTN (hypertension)     IBS (irritable bowel syndrome)     Kidney stones     OAB (overactive bladder)     Vertigo        Active Problem List with Overview Notes    Diagnosis Date Noted    Current moderate episode of major depressive disorder without prior episode 2023    Coccydynia 2023    Lumbar spondylosis 2023    DDD (degenerative disc disease), lumbar 2023    Chest congestion 2022    Depression 2022    Arthritis of right knee 10/15/2020    Anxiety 2020    Bilateral sensorineural hearing loss 2019    Mixed irritable bowel syndrome 2018    Primary generalized (osteo)arthritis 2018    Essential hypertension 2018    Impaired fasting glucose 2018    Gastroesophageal reflux disease 2018       Past Surgical History:   Procedure Laterality Date    CATARACT EXTRACTION, BILATERAL      CHOLECYSTECTOMY      HYSTERECTOMY      LITHOTRIPSY         Family History   Problem Relation Age of Onset    Leukemia Father     Cancer Brother         Pancreatic

## 2023-09-28 NOTE — PROGRESS NOTES
Subjective:       Juli Parra is a 78 y.o. female who is an established patient with Joey RODRIGUE[facility], though new to me was seen  for evaluation of UTI.      Has been seen for UTI. Last UTI about 6 months ago. Feels that UTIs are brought on by stress. More stress recently after the passing of her . Previously on Macrodantin prophylaxis that worked well. Using D-mannose. Not using vaginal estrogen.     Also with h/o OAB - previously given Ditropan TID, now on Ditropan XL 15mg. Working well.     Also reports h/I IC and kidney stones. Reports being diagnosed with IC in the 90s. Her symptoms have been managed well with IC diet alone for many years. She reports history of ureteroscopy ~ several years ago by urologist in [place]. She is not currently having any flank pain or gross hematuria.    CT 9/22 - no stones/renal mass/hydronephrosis    Unable to void. Recent fall with arm fracture. Vagifem cost prohibitive. No recent UTIs - using probiotic.     UCx:  7/20 - neg  3/21 - >100k Enterobacter  3/21 -  neg  3/21 - >100k Klebsiella, 50-99k Enterococcus  7/21 -  neg  7/22 - 50-99k E coli  8/22 - neg  9/22 - >100k Klebsiella  10/22 - neg  5/23 - neg      The following portions of the patient's history were reviewed and updated as appropriate: allergies, current medications, past family history, past medical history, past social history, past surgical history and problem list.    Review of Systems  12 point review of systems completed. Pertinent positive and negatives listed in HPI        Objective:    Vitals: There were no vitals taken for this visit.    Physical Exam   General: well developed, well nourished in no acute distress  Head: normocephalic, atraumatic  Neck: supple, trachea midline, no obvious enlargement of thyroid  HEENT: EOMI, mucus membranes moist, sclera anicteric, no hearing impairment  Lungs: symmetric expansion, non-labored breathing  Skin: no rashes or lesions. Arm in brace.  Neuro: alert and  oriented x 3, no gross deficits  Psych: normal judgment and insight, normal mood/affect and non-anxious  Genitourinary: deferred      Lab Review   Urine analysis today in clinic shows - unable to void    Lab Results   Component Value Date    WBC 12.30 08/10/2023    HGB 10.5 (L) 08/10/2023    HCT 31.2 (L) 08/10/2023    MCV 94 08/10/2023     08/10/2023     Lab Results   Component Value Date    CREATININE 0.7 08/24/2023    BUN 13 08/24/2023     Imaging  Images and reports were personally reviewed by me and discussed with patient  CT 9/22  reviewed        Assessment/Plan:      1. Recurrent UTI    - Discussed UTI prevention strategies.   - Adequate hydration.   - Double voiding. Consider timed voiding.    - Avoid constipation.   - CT 9/2022 - no stones/hydro   - Cystoscopy - consider   - Cranberry/probiotics/D-mannose   - Estrace cream 2x weekly - recommend. Vagifem - cost prohibitory. Consider Estrace.    - Call with UTI symptoms so UA/UCx can be sent.      2. Interstitial cystitis    - Avoid bladder irritants     3. History of nephrolithiasis    - Prior URS   - CT clear 9/2022     4. OAB (overactive bladder)    - Doing well with Ditropan 15mg. Caution use in elderly.          Follow up in 6 months

## 2023-10-02 ENCOUNTER — TELEPHONE (OUTPATIENT)
Dept: ORTHOPEDICS | Facility: CLINIC | Age: 78
End: 2023-10-02
Payer: MEDICARE

## 2023-10-02 DIAGNOSIS — S42.291A OTHER CLOSED DISPLACED FRACTURE OF PROXIMAL END OF RIGHT HUMERUS, INITIAL ENCOUNTER: Primary | ICD-10-CM

## 2023-10-02 NOTE — TELEPHONE ENCOUNTER
Called to let patient know  has put in orders for physical therapy, and they will call her to schedule. LVM

## 2023-10-02 NOTE — TELEPHONE ENCOUNTER
----- Message from Lillian Fountain sent at 10/2/2023 10:00 AM CDT -----  Regarding: self [phone]  Who called: self        What is the request in detail: pt calling in regards to PT and Home Health, pt stated she was discharge from PT and she wanted to know about OT, pt would like call back from nurse     Can the clinic reply by [facility]? No        Would the patient rather a call back or a response via [facility]? Call back        Best call back number:[phone]

## 2023-10-03 ENCOUNTER — LAB VISIT (OUTPATIENT)
Dept: LAB | Facility: HOSPITAL | Age: 78
End: 2023-10-03
Payer: MEDICARE

## 2023-10-03 DIAGNOSIS — E87.1 HYPONATREMIA: ICD-10-CM

## 2023-10-03 DIAGNOSIS — I10 ESSENTIAL HYPERTENSION: ICD-10-CM

## 2023-10-03 LAB
ANION GAP SERPL CALC-SCNC: 6 MMOL/L (ref 8–16)
BUN SERPL-MCNC: 10 MG/DL (ref 8–23)
CALCIUM SERPL-MCNC: 9 MG/DL (ref 8.7–10.5)
CHLORIDE SERPL-SCNC: 100 MMOL/L (ref 95–110)
CO2 SERPL-SCNC: 28 MMOL/L (ref 23–29)
CREAT SERPL-MCNC: 0.6 MG/DL (ref 0.5–1.4)
EST. GFR  (NO RACE VARIABLE): >60 ML/MIN/1.73 M^2
GLUCOSE SERPL-MCNC: 86 MG/DL (ref 70–110)
POTASSIUM SERPL-SCNC: 4.3 MMOL/L (ref 3.5–5.1)
SODIUM SERPL-SCNC: 134 MMOL/L (ref 136–145)

## 2023-10-03 PROCEDURE — 36415 COLL VENOUS BLD VENIPUNCTURE: CPT | Mod: PO | Performed by: PHYSICIAN ASSISTANT

## 2023-10-03 PROCEDURE — 80048 BASIC METABOLIC PNL TOTAL CA: CPT | Performed by: PHYSICIAN ASSISTANT

## 2023-10-17 ENCOUNTER — OFFICE VISIT (OUTPATIENT)
Dept: INTERNAL MEDICINE | Facility: CLINIC | Age: 78
End: 2023-10-17
Payer: MEDICARE

## 2023-10-17 VITALS
BODY MASS INDEX: 26.06 KG/M2 | WEIGHT: 120.81 LBS | OXYGEN SATURATION: 97 % | SYSTOLIC BLOOD PRESSURE: 134 MMHG | DIASTOLIC BLOOD PRESSURE: 66 MMHG | HEIGHT: 57 IN | HEART RATE: 67 BPM

## 2023-10-17 DIAGNOSIS — K21.9 GASTROESOPHAGEAL REFLUX DISEASE, UNSPECIFIED WHETHER ESOPHAGITIS PRESENT: ICD-10-CM

## 2023-10-17 DIAGNOSIS — F32.1 CURRENT MODERATE EPISODE OF MAJOR DEPRESSIVE DISORDER WITHOUT PRIOR EPISODE: Primary | ICD-10-CM

## 2023-10-17 DIAGNOSIS — M51.36 DDD (DEGENERATIVE DISC DISEASE), LUMBAR: ICD-10-CM

## 2023-10-17 DIAGNOSIS — F41.9 ANXIETY: ICD-10-CM

## 2023-10-17 DIAGNOSIS — E87.1 HYPONATREMIA: ICD-10-CM

## 2023-10-17 DIAGNOSIS — I10 ESSENTIAL HYPERTENSION: ICD-10-CM

## 2023-10-17 PROCEDURE — 99999 PR PBB SHADOW E&M-EST. PATIENT-LVL IV: CPT | Mod: PBBFAC,,, | Performed by: INTERNAL MEDICINE

## 2023-10-17 PROCEDURE — 99214 PR OFFICE/OUTPT VISIT, EST, LEVL IV, 30-39 MIN: ICD-10-PCS | Mod: S$PBB,,, | Performed by: INTERNAL MEDICINE

## 2023-10-17 PROCEDURE — 99214 OFFICE O/P EST MOD 30 MIN: CPT | Mod: S$PBB,,, | Performed by: INTERNAL MEDICINE

## 2023-10-17 PROCEDURE — 99214 OFFICE O/P EST MOD 30 MIN: CPT | Mod: PBBFAC | Performed by: INTERNAL MEDICINE

## 2023-10-17 PROCEDURE — 99999 PR PBB SHADOW E&M-EST. PATIENT-LVL IV: ICD-10-PCS | Mod: PBBFAC,,, | Performed by: INTERNAL MEDICINE

## 2023-10-17 RX ORDER — LORAZEPAM 1 MG/1
1 TABLET ORAL NIGHTLY
Qty: 30 TABLET | Refills: 5 | Status: SHIPPED | OUTPATIENT
Start: 2023-10-17

## 2023-10-17 NOTE — PROGRESS NOTES
Subjective:       Patient ID: Juli Parra is a 78 y.o. female.    Chief Complaint: Follow-up (6mo)    Here for interval follow-up of medical problems.  She also would like to refill her lorazepam for anxiety.  She would like it printed because she is going to try a different pharmacy.   reviewed.    She denies any side effects or problems.  No escalation in the dosage.  No chest pain or shortness a breath.  She did suffer a injury with fracture to the right upper arm but she saw Orthopedics and did some physical therapy.  She feels like she has recovered bone density order was placed    Follow-up  Associated symptoms include arthralgias. Pertinent negatives include no abdominal pain, chest pain, chills, coughing, fever, headaches, neck pain, rash or sore throat.     Review of Systems   Constitutional:  Negative for appetite change, chills and fever.   HENT:  Negative for nosebleeds and sore throat.    Eyes:  Negative for pain and visual disturbance.   Respiratory:  Negative for cough, shortness of breath and wheezing.    Cardiovascular:  Negative for chest pain and leg swelling.   Gastrointestinal:  Negative for abdominal pain, constipation and diarrhea.   Endocrine: Negative for polyuria.   Genitourinary:  Negative for difficulty urinating, hematuria and vaginal bleeding.   Musculoskeletal:  Positive for arthralgias. Negative for back pain, gait problem and neck pain.   Integumentary:  Negative for pallor and rash.   Neurological:  Negative for tremors, seizures and headaches.   Hematological:  Does not bruise/bleed easily.   Psychiatric/Behavioral:  Positive for dysphoric mood. The patient is nervous/anxious.            Past Medical History:   Diagnosis Date    Anxiety     GERD (gastroesophageal reflux disease)     HTN (hypertension)     IBS (irritable bowel syndrome)     Kidney stones     OAB (overactive bladder)     Vertigo      Past Surgical History:   Procedure Laterality Date    CATARACT  EXTRACTION, BILATERAL      CHOLECYSTECTOMY      HYSTERECTOMY      LITHOTRIPSY        Patient Active Problem List   Diagnosis    Mixed irritable bowel syndrome    Primary generalized (osteo)arthritis    Essential hypertension    Impaired fasting glucose    Gastroesophageal reflux disease    Bilateral sensorineural hearing loss    Anxiety    Arthritis of right knee    Chest congestion    Depression    Coccydynia    Lumbar spondylosis    DDD (degenerative disc disease), lumbar    Current moderate episode of major depressive disorder without prior episode        Objective:      Physical Exam  Constitutional:       General: She is not in acute distress.     Appearance: She is well-developed.   HENT:      Head: Normocephalic and atraumatic.      Right Ear: Tympanic membrane, ear canal and external ear normal.      Left Ear: Tympanic membrane, ear canal and external ear normal.      Mouth/Throat:      Pharynx: No oropharyngeal exudate or posterior oropharyngeal erythema.   Eyes:      General: No scleral icterus.     Conjunctiva/sclera: Conjunctivae normal.      Pupils: Pupils are equal, round, and reactive to light.   Neck:      Thyroid: No thyromegaly.   Cardiovascular:      Rate and Rhythm: Normal rate and regular rhythm.      Pulses: Normal pulses.      Heart sounds: No murmur heard.  Pulmonary:      Effort: Pulmonary effort is normal.      Breath sounds: Normal breath sounds. No wheezing.   Abdominal:      General: Bowel sounds are normal. There is no distension.      Palpations: Abdomen is soft.      Tenderness: There is no abdominal tenderness.   Musculoskeletal:         General: No tenderness.      Cervical back: Normal range of motion and neck supple.      Right lower leg: No edema.      Left lower leg: No edema.      Comments: Slight limited range of motion of the right shoulder but no tenderness noted   Lymphadenopathy:      Cervical: No cervical adenopathy.   Skin:     Coloration: Skin is not jaundiced.       "Findings: No rash.   Neurological:      General: No focal deficit present.      Mental Status: She is alert and oriented to person, place, and time.   Psychiatric:         Mood and Affect: Mood normal.         Behavior: Behavior normal.         Assessment:       Problem List Items Addressed This Visit          Neuro    DDD (degenerative disc disease), lumbar       Psychiatric    Anxiety    Relevant Medications    LORazepam (ATIVAN) 1 MG tablet    Depression - Primary       Cardiac/Vascular    Essential hypertension       GI    Gastroesophageal reflux disease     Other Visit Diagnoses       Hyponatremia                Plan:         Juli was seen today for follow-up.    Diagnoses and all orders for this visit:    Current moderate episode of major depressive disorder without prior episode    DDD (degenerative disc disease), lumbar    Essential hypertension    Gastroesophageal reflux disease, unspecified whether esophagitis present    Anxiety  -     LORazepam (ATIVAN) 1 MG tablet; Take 1 tablet (1 mg total) by mouth every evening.    Hyponatremia            reviewed.  Labs recently reviewed.  Call for any problems.  Follow-up in 6 months          Portions of this note may have been created with voice recognition software. Occasional "wrong-word" or "sound-a-like" substitutions may have occurred due to the inherent limitations of voice recognition software. Please, read the note carefully and recognize, using context, where substitutions have occurred.  "

## 2023-10-27 ENCOUNTER — EXTERNAL HOME HEALTH (OUTPATIENT)
Dept: HOME HEALTH SERVICES | Facility: HOSPITAL | Age: 78
End: 2023-10-27
Payer: MEDICARE

## 2023-11-07 DIAGNOSIS — M25.511 RIGHT SHOULDER PAIN, UNSPECIFIED CHRONICITY: ICD-10-CM

## 2023-11-09 ENCOUNTER — OFFICE VISIT (OUTPATIENT)
Dept: ORTHOPEDICS | Facility: CLINIC | Age: 78
End: 2023-11-09
Payer: MEDICARE

## 2023-11-09 DIAGNOSIS — S42.291A OTHER CLOSED DISPLACED FRACTURE OF PROXIMAL END OF RIGHT HUMERUS, INITIAL ENCOUNTER: Primary | ICD-10-CM

## 2023-11-09 PROCEDURE — 99213 OFFICE O/P EST LOW 20 MIN: CPT | Mod: PBBFAC,PN | Performed by: ORTHOPAEDIC SURGERY

## 2023-11-09 PROCEDURE — 99212 PR OFFICE/OUTPT VISIT, EST, LEVL II, 10-19 MIN: ICD-10-PCS | Mod: S$PBB,,, | Performed by: ORTHOPAEDIC SURGERY

## 2023-11-09 PROCEDURE — 99999 PR PBB SHADOW E&M-EST. PATIENT-LVL III: ICD-10-PCS | Mod: PBBFAC,,, | Performed by: ORTHOPAEDIC SURGERY

## 2023-11-09 PROCEDURE — 99212 OFFICE O/P EST SF 10 MIN: CPT | Mod: S$PBB,,, | Performed by: ORTHOPAEDIC SURGERY

## 2023-11-09 PROCEDURE — 99999 PR PBB SHADOW E&M-EST. PATIENT-LVL III: CPT | Mod: PBBFAC,,, | Performed by: ORTHOPAEDIC SURGERY

## 2023-11-09 NOTE — PROGRESS NOTES
Assessment: 78 y.o. female with R proximal humerus fracture, bilateral knee OA    I explained my diagnostic impression and the reasoning behind it in detail, using layman's terms.     Plan:   - activity as tolerated  - RTC PRN      All questions were answered in detail. The patient is in full agreement with the treatment plan and will proceed accordingly.    Chief Complaint   Patient presents with    Right Shoulder - Pain     Initial visit (8/17/23): Juli Parra is a 78 y.o. female who presents today complaining of Pain of the Right Shoulder     Fall on 8/7/23 - tripped on her bat mat in the middle of the night   Went to ER, found to have R proximal humerus fracture  Placed in sling  Fell again 8/10 and went back to the ER. Her son had to try to get her up but couldn't, had to call EMS.  Reportedly confused  Na low at that time at 127, improved with IVF.   Minimal shoulder pain  Sing does not fit correctly  No numbness or tingling    8/31/23  Pain significantly improved   Is able to sleep on the right side without pain  Declined  when they called    9/28/23  No pain  Home health has been coming    11/9/23  3 months out from her injury   No pain  Good ROM  Can lift a gallon of milk and her dog    This is the extent of the patient's complaints at this time.          Review of patient's allergies indicates:   Allergen Reactions    Egg Nausea Only    Cipro [ciprofloxacin hcl]     Sulfur Rash         Current Outpatient Medications:     chlorhexidine (PERIDEX) 0.12 % solution, SMARTSIG:By Mouth, Disp: , Rfl:     dicyclomine (BENTYL) 10 MG capsule, TAKE 1 CAPSULE BY MOUTH FOUR TIMES DAILY EVERY NIGHT BEFORE MEALS, Disp: 90 capsule, Rfl: 6    DULoxetine (CYMBALTA) 20 MG capsule, TAKE 1 CAPSULE(20 MG) BY MOUTH EVERY DAY, Disp: 30 capsule, Rfl: 1    esomeprazole (NEXIUM) 40 MG capsule, Take 1 capsule (40 mg total) by mouth before breakfast., Disp: 90 capsule, Rfl: 11    fluticasone propionate (FLONASE) 50  mcg/actuation nasal spray, SPRAY ONCE INTO EACH NOSTRIL EVERY DAY, Disp: 48 g, Rfl: 3    hydroCHLOROthiazide (HYDRODIURIL) 25 MG tablet, TAKE 1 TABLET(25 MG) BY MOUTH EVERY DAY, Disp: 90 tablet, Rfl: 2    LORazepam (ATIVAN) 1 MG tablet, Take 1 tablet (1 mg total) by mouth every evening., Disp: 30 tablet, Rfl: 5    losartan (COZAAR) 100 MG tablet, TAKE 1 TABLET(100 MG) BY MOUTH EVERY DAY, Disp: 90 tablet, Rfl: 2    metoprolol succinate (TOPROL-XL) 50 MG 24 hr tablet, TAKE 1 TABLET(50 MG) BY MOUTH EVERY DAY, Disp: 90 tablet, Rfl: 2    oxybutynin (DITROPAN XL) 15 MG TR24, Take 1 tablet (15 mg total) by mouth once daily., Disp: 90 tablet, Rfl: 3    potassium chloride SA (K-DUR,KLOR-CON M) 10 MEQ tablet, TAKE 1 TABLET(10 MEQ) BY MOUTH EVERY DAY, Disp: 90 tablet, Rfl: 2    valACYclovir (VALTREX) 500 MG tablet, Take by mouth., Disp: , Rfl:     Physical Exam:   Vitals:    23 1455   PainSc: 0-No pain       General:  Patient is alert, awake and oriented to time, place and person. Mood and affect are appropriate.  Patient does not appear to be in any distress, denies any constitutional symptoms and appears stated age.   HEENT:  Pupils are equal and round, sclera are not injected. External examination of ears and nose reveals no abnormalities. Cranial nerves II-X are grossly intact  Skin:  no rashes, abrasions or open wounds on the affected extremity   Resp:  No respiratory distress or audible wheezing   CV: 2+  pulses, all extremities warm and well perfused   Right Upper extremity   Active range of motion course: Forward elevation to 160°, external rotation to 45  No pain with ROM   LTSI m/u/r  2+ RP  + EPL, IO, FDS, FDP      Imagin views of the right shoulder show minimally displaced proximal humerus fracture with significant callus formation    I personally reviewed and interpreted the patient's imaging obtained today in clinic         This note was created by combination of typed  and M-Modal dictation.  Transcription and phonetic errors may be present.  If there are any questions, please contact me.    Past Medical History:   Diagnosis Date    Anxiety     GERD (gastroesophageal reflux disease)     HTN (hypertension)     IBS (irritable bowel syndrome)     Kidney stones     OAB (overactive bladder)     Vertigo        Active Problem List with Overview Notes    Diagnosis Date Noted    Current moderate episode of major depressive disorder without prior episode 04/17/2023    Coccydynia 03/24/2023    Lumbar spondylosis 03/24/2023    DDD (degenerative disc disease), lumbar 03/24/2023    Chest congestion 07/21/2022    Depression 07/21/2022    Arthritis of right knee 10/15/2020    Anxiety 07/21/2020    Bilateral sensorineural hearing loss 12/09/2019    Mixed irritable bowel syndrome 03/22/2018    Primary generalized (osteo)arthritis 03/22/2018    Essential hypertension 03/22/2018    Impaired fasting glucose 03/22/2018    Gastroesophageal reflux disease 03/22/2018       Past Surgical History:   Procedure Laterality Date    CATARACT EXTRACTION, BILATERAL      CHOLECYSTECTOMY      HYSTERECTOMY      LITHOTRIPSY         Family History   Problem Relation Age of Onset    Leukemia Father     Cancer Brother         Pancreatic

## 2023-11-30 ENCOUNTER — PATIENT OUTREACH (OUTPATIENT)
Dept: ADMINISTRATIVE | Facility: OTHER | Age: 78
End: 2023-11-30
Payer: MEDICARE

## 2023-11-30 ENCOUNTER — HOSPITAL ENCOUNTER (EMERGENCY)
Facility: HOSPITAL | Age: 78
Discharge: HOME | End: 2023-11-30
Attending: EMERGENCY MEDICINE
Payer: MEDICARE

## 2023-11-30 VITALS
WEIGHT: 119 LBS | SYSTOLIC BLOOD PRESSURE: 161 MMHG | DIASTOLIC BLOOD PRESSURE: 70 MMHG | BODY MASS INDEX: 25.75 KG/M2 | HEART RATE: 68 BPM | RESPIRATION RATE: 20 BRPM | OXYGEN SATURATION: 97 % | TEMPERATURE: 98 F

## 2023-11-30 DIAGNOSIS — M25.551 RIGHT HIP PAIN: ICD-10-CM
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
## 2023-11-30 PROCEDURE — 99284 EMERGENCY DEPT VISIT MOD MDM: CPT | Mod: 25

## 2023-11-30 RX ORDER — ACETAMINOPHEN 500 MG
1000 TABLET ORAL
Status: DISCONTINUED | OUTPATIENT
Start: 2023-11-30 | End: 2023-11-30 | Stop reason: HOSPADM

## 2023-11-30 NOTE — ED TRIAGE NOTES
Pt complaining of fall on yesterday hitting right forehead as she was walking out of home. Pt denies any pain at moment. Bruising noted to forehead and hip. Pt AAO x 4.

## 2023-11-30 NOTE — PROGRESS NOTES
CHW - Initial Contact    This Community Health Worker completed the Social Determinant of Health questionnaire with patient  at bedside  today.    Pt identified barriers of most importance are: pt has no needs at this time.   Referrals to community agencies completed with patient/caregiver consent outside of [facility] include: no: none at this time.  Referrals were put through [facility] - no: none at this time.  Support and Services: has no support at this time.  Other information discussed the patient needs / wants help with: Completed SDOH with patient at bedside today, pt has no needs at this time. Will follow up in one week to check pt's future needs or possible case closure.    Follow up required: yes.  Follow-up Outreach - Due: 12/6/2023

## 2023-11-30 NOTE — ED PROVIDER NOTES
Encounter Date: 11/30/2023       History     Chief Complaint   Patient presents with    Fall     Pt stated she fell when coming out of her back door, she landed on her hip. Pt is now having difficulty when ambulating. Pt stated she also hit her forehead but denies any LOC.      Pt is an 78 year old female with PMHx significant for HTN who presents for evaluation of right hip pain s/p fall, which occurred 1 day ago. She reports a mechanical trip and fall. She fell striking her right hip and right sided head. No LOC subsequently. She has been ambulatory since the fall but reports pain. No fever, chills, chest pain, shortness of breath, nausea, vomiting, or diarrhea     The history is provided by the patient.     Review of patient's allergies indicates:   Allergen Reactions    Egg Nausea Only    Cipro [ciprofloxacin hcl]     Sulfur Rash     Past Medical History:   Diagnosis Date    Anxiety     GERD (gastroesophageal reflux disease)     HTN (hypertension)     IBS (irritable bowel syndrome)     Kidney stones     OAB (overactive bladder)     Vertigo      Past Surgical History:   Procedure Laterality Date    CATARACT EXTRACTION, BILATERAL      CHOLECYSTECTOMY      HYSTERECTOMY      LITHOTRIPSY       Family History   Problem Relation Age of Onset    Leukemia Father     Cancer Brother         Pancreatic     Social History     Tobacco Use    Smoking status: Never    Smokeless tobacco: Never   Substance Use Topics    Alcohol use: Not Currently    Drug use: Never     Review of Systems    Physical Exam     Initial Vitals [11/30/23 1045]   BP Pulse Resp Temp SpO2   (!) 179/74 72 20 98.4 °F (36.9 °C) 97 %      MAP       --         Physical Exam    Nursing note and vitals reviewed.  Constitutional: She appears well-developed and well-nourished. No distress.   HENT:   Head: Normocephalic and atraumatic.   Mouth/Throat: Oropharynx is clear and moist.   Eyes: Conjunctivae and EOM are normal. Pupils are equal, round, and reactive to  light.   Neck: Neck supple. No tracheal deviation present.   Normal range of motion.  Cardiovascular:  Normal rate, regular rhythm and intact distal pulses.           No murmur heard.  Pulmonary/Chest: Breath sounds normal. No stridor. No respiratory distress. She has no wheezes. She has no rales.   Abdominal: Abdomen is soft. She exhibits no distension. There is no abdominal tenderness.   Musculoskeletal:         General: No edema.      Cervical back: Normal range of motion and neck supple.      Comments: No C, T, or L spine tenderness to palpation or deformity. FROM to the upper extremities bilaterally without tenderness, deformity or ecchymosis. FROM to the LLE without deformity or tenderness. Right hip with mild tenderness to palpation. Decreased ROM to right hip 2/2 pain. FROM to right knee and ankle without tenderness, deformity, or ecchymosis      Neurological: She is alert and oriented to person, place, and time. She has normal strength. No cranial nerve deficit or sensory deficit.   Skin: Skin is warm and dry. Capillary refill takes less than 2 seconds. No rash noted.         ED Course   Procedures  Labs Reviewed - No data to display       Imaging Results              X-Ray Hip 2 or 3 views Right (with Pelvis when performed) (Final result)  Result time 11/30/23 12:24:04      Final result by Hector Son MD (11/30/23 12:24:04)                   Impression:      Limited exam.  No definite acute displaced fracture or dislocation.      Electronically signed by: Hector Son  Date:    11/30/2023  Time:    12:24               Narrative:    EXAMINATION:  XR HIP WITH PELVIS WHEN PERFORMED, 2 OR 3  VIEWS RIGHT    CLINICAL HISTORY:  Pain in right hip    TECHNIQUE:  AP view of the pelvis and frog leg lateral view of the right hip were performed.    COMPARISON:  None    FINDINGS:  Examination limited by suspected osseous demineralization, suboptimal patient positioning, and overlying bowel gas and  stool.    Noting this, no definite acute displaced fracture or dislocations identified.    Degenerative findings involving the spine, sacroiliac joints, pubic symphysis, and hip joints.  Question chronic deformity of the right pubic symphysis.    Vascular calcifications phleboliths.  No definite rib foreign body.                                       CT Head Without Contrast (Final result)  Result time 11/30/23 11:30:39      Final result by Hector Son MD (11/30/23 11:30:39)                   Impression:      1. No acute intracranial findings.  2. No acute cervical spine fracture.  3. Sub 6 mm solid pulmonary nodules.  For multiple solid nodules all <6 mm, Fleischner Society 2017 guidelines recommend no routine follow up for a low risk patient, or follow up with non-contrast chest CT at 12 months after discovery in a high risk patient.      Electronically signed by: Hector Son  Date:    11/30/2023  Time:    11:30               Narrative:    EXAMINATION:  CT HEAD WITHOUT CONTRAST; CT CERVICAL SPINE WITHOUT CONTRAST    CLINICAL HISTORY:  fall;    TECHNIQUE:  Low dose axial images were obtained through the head and cervical spine.  Coronal and sagittal reformations were also performed. Contrast was not administered.    COMPARISON:  Head and cervical spine CT performed 08/10/2023.    FINDINGS:  Head:    Blood: No acute intracranial hemorrhage.    Parenchyma: No definite loss of gray-white differentiation to suggest acute or subacute transcortical infarct. Parenchymal volume loss.  Nonspecific white matter hypoattenuation may relate to sequela of chronic small vessel ischemic disease.  Suspect prominent perivascular space in the inferior basal ganglia.    Ventricles/Extra-axial spaces: No abnormal extra-axial fluid collection. Basal cisterns are patent.    Vessels: Moderate atherosclerotic calcifications.    Orbits: Status post bilateral lens replacements.    Scalp: Unremarkable.    Skull: There are no  depressed skull fractures or destructive bone lesions.    Sinuses and mastoids: Scattered modest paranasal sinus mucosal thickening with retention cysts.    Other findings: Partially empty sella configuration.    Cervical spine:    Fractures: No acute fractures    Alignment: 11 grossly unchanged compared to the 08/10/2023 CT.  As before, there is grade 1 anterolisthesis of C3 on C4 and of C4-C5.  Mild focal kyphosis centered at the C4-5 level.  Atlanto-axial and atlanto-occipital joints: Atlanto-axial and atlanto-occipital intervals are not widened.  Grossly unchanged hyperattenuating lobular soft tissue dorsal to the C1-2 joint and odontoid process, deforming ventral thecal sac.  Facet joints: There is no traumatic facet joint widening.  Degenerative facet arthropathy.  Vertebral bodies: Suspect osseous demineralization.  Degenerative endplate change.  Discs: Degenerative disc disease.  Spinal canal and foraminal narrowing: Although CT does not optimally evaluate the soft tissue contents of the spinal canal and foramina, no critical stenosis is suggested.  Paraspinal soft tissues: Unremarkable.    Upper Lungs:Apical pleuroparenchymal scarring.  Several sub 6 mm solid pulmonary nodules are noted, nonspecific.                                       CT Cervical Spine Without Contrast (Final result)  Result time 11/30/23 11:30:39      Final result by Hector Son MD (11/30/23 11:30:39)                   Impression:      1. No acute intracranial findings.  2. No acute cervical spine fracture.  3. Sub 6 mm solid pulmonary nodules.  For multiple solid nodules all <6 mm, Fleischner Society 2017 guidelines recommend no routine follow up for a low risk patient, or follow up with non-contrast chest CT at 12 months after discovery in a high risk patient.      Electronically signed by: Hector Son  Date:    11/30/2023  Time:    11:30               Narrative:    EXAMINATION:  CT HEAD WITHOUT CONTRAST; CT CERVICAL SPINE  WITHOUT CONTRAST    CLINICAL HISTORY:  fall;    TECHNIQUE:  Low dose axial images were obtained through the head and cervical spine.  Coronal and sagittal reformations were also performed. Contrast was not administered.    COMPARISON:  Head and cervical spine CT performed 08/10/2023.    FINDINGS:  Head:    Blood: No acute intracranial hemorrhage.    Parenchyma: No definite loss of gray-white differentiation to suggest acute or subacute transcortical infarct. Parenchymal volume loss.  Nonspecific white matter hypoattenuation may relate to sequela of chronic small vessel ischemic disease.  Suspect prominent perivascular space in the inferior basal ganglia.    Ventricles/Extra-axial spaces: No abnormal extra-axial fluid collection. Basal cisterns are patent.    Vessels: Moderate atherosclerotic calcifications.    Orbits: Status post bilateral lens replacements.    Scalp: Unremarkable.    Skull: There are no depressed skull fractures or destructive bone lesions.    Sinuses and mastoids: Scattered modest paranasal sinus mucosal thickening with retention cysts.    Other findings: Partially empty sella configuration.    Cervical spine:    Fractures: No acute fractures    Alignment: 11 grossly unchanged compared to the 08/10/2023 CT.  As before, there is grade 1 anterolisthesis of C3 on C4 and of C4-C5.  Mild focal kyphosis centered at the C4-5 level.  Atlanto-axial and atlanto-occipital joints: Atlanto-axial and atlanto-occipital intervals are not widened.  Grossly unchanged hyperattenuating lobular soft tissue dorsal to the C1-2 joint and odontoid process, deforming ventral thecal sac.  Facet joints: There is no traumatic facet joint widening.  Degenerative facet arthropathy.  Vertebral bodies: Suspect osseous demineralization.  Degenerative endplate change.  Discs: Degenerative disc disease.  Spinal canal and foraminal narrowing: Although CT does not optimally evaluate the soft tissue contents of the spinal canal and  foramina, no critical stenosis is suggested.  Paraspinal soft tissues: Unremarkable.    Upper Lungs:Apical pleuroparenchymal scarring.  Several sub 6 mm solid pulmonary nodules are noted, nonspecific.                                       Medications   acetaminophen tablet 1,000 mg (has no administration in time range)     Medical Decision Making  Pt presents for right hip pain s/p fall. Head and neck CT without acute process. Hip x-ray without evidence of fracture or dislocation. Pt well appearing and in no acute distress throughout ED course. Pt ambulatory in the ED. Plan to discharge to home. Return precautions advised     Amount and/or Complexity of Data Reviewed  Radiology: ordered.    Risk  OTC drugs.                                Resident supervising staff physician attestation:.  I examined this patient.  Small 3 cm of ecchymosis about the right temporal.  There is no spinal tenderness along the entire course of the spine.  The scalp is otherwise atraumatic.  Lungs are clear.  The heart tones are normal.  The abdomen is soft.  There is some tenderness of the right hip.  I agree with the resident diagnostic and treatment plan.      Clinical Impression:  Final diagnoses:  [M25.551] Right hip pain  [W19.XXXA] Fall, initial encounter (Primary)          ED Disposition Condition    Discharge Stable          ED Prescriptions    None       Follow-up Information       Follow up With Specialties Details Why Contact Info    Jefe Serrano MD Internal Medicine In 1 week  [address]  [place] [address]  [phone]               Bryan Tiwari Jr., MD  Resident  11/30/23 2190

## 2023-12-03 DIAGNOSIS — Z71.89 COMPLEX CARE COORDINATION: ICD-10-CM

## 2023-12-04 ENCOUNTER — HOSPITAL ENCOUNTER (EMERGENCY)
Facility: HOSPITAL | Age: 78
Discharge: HOME | End: 2023-12-04
Attending: EMERGENCY MEDICINE
Payer: MEDICARE

## 2023-12-04 VITALS
RESPIRATION RATE: 16 BRPM | HEIGHT: 57 IN | DIASTOLIC BLOOD PRESSURE: 90 MMHG | BODY MASS INDEX: 24.81 KG/M2 | SYSTOLIC BLOOD PRESSURE: 138 MMHG | TEMPERATURE: 98 F | OXYGEN SATURATION: 100 % | WEIGHT: 115 LBS | HEART RATE: 80 BPM

## 2023-12-04 DIAGNOSIS — M25.551 RIGHT HIP PAIN: Primary | ICD-10-CM

## 2023-12-04 DIAGNOSIS — S70.01XD CONTUSION OF RIGHT HIP, SUBSEQUENT ENCOUNTER: ICD-10-CM

## 2023-12-04 LAB
BILIRUB UR QL STRIP: NEGATIVE
CLARITY UR: CLEAR
COLOR UR: YELLOW
GLUCOSE UR QL STRIP: NEGATIVE
HGB UR QL STRIP: NEGATIVE
KETONES UR QL STRIP: NEGATIVE
LEUKOCYTE ESTERASE UR QL STRIP: NEGATIVE
NITRITE UR QL STRIP: NEGATIVE
PH UR STRIP: 7 [PH] (ref 5–8)
PROT UR QL STRIP: NEGATIVE
SP GR UR STRIP: 1.01 (ref 1–1.03)
URN SPEC COLLECT METH UR: NORMAL
UROBILINOGEN UR STRIP-ACNC: NEGATIVE EU/DL

## 2023-12-04 PROCEDURE — 63600175 PHARM REV CODE 636 W HCPCS

## 2023-12-04 PROCEDURE — 96372 THER/PROPH/DIAG INJ SC/IM: CPT

## 2023-12-04 PROCEDURE — 81003 URINALYSIS AUTO W/O SCOPE: CPT

## 2023-12-04 PROCEDURE — 99284 EMERGENCY DEPT VISIT MOD MDM: CPT

## 2023-12-04 RX ORDER — BACLOFEN 10 MG/1
10 TABLET ORAL 3 TIMES DAILY
Qty: 90 TABLET | Refills: 11 | Status: SHIPPED | OUTPATIENT
Start: 2023-12-04 | End: 2023-12-20

## 2023-12-04 RX ORDER — NAPROXEN 500 MG/1
500 TABLET ORAL 2 TIMES DAILY WITH MEALS
Qty: 20 TABLET | Refills: 0 | Status: SHIPPED | OUTPATIENT
Start: 2023-12-04 | End: 2023-12-20

## 2023-12-04 RX ORDER — LIDOCAINE 50 MG/G
1 PATCH TOPICAL DAILY
Qty: 5 PATCH | Refills: 0 | Status: SHIPPED | OUTPATIENT
Start: 2023-12-04

## 2023-12-04 RX ORDER — IBUPROFEN 800 MG/1
800 TABLET ORAL EVERY 6 HOURS PRN
Qty: 20 TABLET | Refills: 0 | Status: SHIPPED | OUTPATIENT
Start: 2023-12-04

## 2023-12-04 RX ORDER — KETOROLAC TROMETHAMINE 30 MG/ML
15 INJECTION, SOLUTION INTRAMUSCULAR; INTRAVENOUS
Status: COMPLETED | OUTPATIENT
Start: 2023-12-04 | End: 2023-12-04

## 2023-12-04 RX ADMIN — KETOROLAC TROMETHAMINE 15 MG: 30 INJECTION, SOLUTION INTRAMUSCULAR; INTRAVENOUS at 11:12

## 2023-12-04 NOTE — ED PROVIDER NOTES
Encounter Date: 12/4/2023    SCRIBE #1 NOTE: I, Phoebe Atkins, am scribing for, and in the presence of,  Ines Anand PA-C. I have scribed the following portions of the note - Other sections scribed: HPI, ROS, PE.       History     Chief Complaint   Patient presents with    Hip Pain     Patient endorses fall LAST Dina, seen here w DX of bruised RT hip. She refused pain meds at discharge but last night started w SIGNIFICANT spasms and pain not responding to OTC IBP. Pain varies from 1-10 dependent on if the spasms are active, also huts to lie down. PMH sig for HTN, also bilat bone on bone knees.      78 y.o. female with PMHx of hypertension, irritable bowel syndrome,  and kidney stones, presents for emergent evaluation of increased right hip pain with associated bruising beginning 2 days ago s/p fall that occurred 5 days ago . Patient states she tripped and landed her right hip on concrete. Patient reports she was seen here in the ED on 11/30/23 s/p fall.  X-ray was completed with no fractures or abnormalities noted.  She states she is not taken any medication hard of discharge and had some discomfort but noted increasing discomfort when ambulating over the last 2 days reporting cramping when putting weight onto right leg.  She is normally ambulatory without assistance at baseline but has been using a walker at home secondary to discomfort.  She is also had difficulty sleeping secondary to discomfort stating it is worse with pressure on hip.  She denies any urinary complaints, abdominal pain, nausea, vomiting.  She is eating and drinking without difficulty.   Patient reports she took Ibuprofen with minimal relief.  She denies any secondary injury.      The history is provided by the patient. No  was used.     Review of patient's allergies indicates:   Allergen Reactions    Egg Nausea Only    Cipro [ciprofloxacin hcl]     Sulfur Rash     Past Medical History:   Diagnosis Date    Anxiety     GERD  (gastroesophageal reflux disease)     HTN (hypertension)     IBS (irritable bowel syndrome)     Kidney stones     OAB (overactive bladder)     Vertigo      Past Surgical History:   Procedure Laterality Date    CATARACT EXTRACTION, BILATERAL      CHOLECYSTECTOMY      HYSTERECTOMY      LITHOTRIPSY       Family History   Problem Relation Age of Onset    Leukemia Father     Cancer Brother         Pancreatic     Social History     Tobacco Use    Smoking status: Never    Smokeless tobacco: Never   Substance Use Topics    Alcohol use: Not Currently    Drug use: Never     Review of Systems   Constitutional:  Negative for chills and fever.   HENT:  Negative for congestion, ear pain, nosebleeds, rhinorrhea, sore throat and trouble swallowing.    Eyes:  Negative for redness.   Respiratory:  Negative for cough, shortness of breath and stridor.    Cardiovascular:  Negative for chest pain.   Gastrointestinal:  Negative for abdominal distention, abdominal pain, diarrhea, nausea and vomiting.   Genitourinary:  Negative for decreased urine volume, dysuria, frequency, hematuria and urgency.        (+) right-sided groin pain   Musculoskeletal:  Positive for arthralgias (right hip pain), gait problem and myalgias. Negative for back pain, neck pain and neck stiffness.   Skin:  Positive for color change (bruising to right hip). Negative for rash and wound.   Neurological:  Negative for dizziness, speech difficulty, weakness, light-headedness, numbness and headaches.   Psychiatric/Behavioral:  Negative for confusion.        Physical Exam     Initial Vitals [12/04/23 0919]   BP Pulse Resp Temp SpO2   (!) 158/67 73 16 98.2 °F (36.8 °C) 97 %      MAP       --         Physical Exam    Nursing note and vitals reviewed.  Constitutional: She appears well-developed and well-nourished. She is not diaphoretic. No distress.   HENT:   Head: Normocephalic.   Right Ear: External ear normal.   Left Ear: External ear normal.   Eyes: Conjunctivae and EOM  are normal. Pupils are equal, round, and reactive to light. Right eye exhibits no discharge. Left eye exhibits no discharge. No scleral icterus.   Neck: No tracheal deviation present.   Cardiovascular:  Normal rate, regular rhythm, normal heart sounds and intact distal pulses.           Pulmonary/Chest: Breath sounds normal. No stridor. No respiratory distress. She has no wheezes. She exhibits no tenderness.   Abdominal: Abdomen is soft. Bowel sounds are normal. She exhibits no distension. There is no abdominal tenderness.   Musculoskeletal:         General: Tenderness present. No edema. Normal range of motion.      Comments: 5/5 strength in all extremities with no decrease in sensation.  Patient has full range of motion of all extremities.  2+ radial pulses.  2+ dorsalis pedis pulse.  2+ posterior tibial pulse.  Ambulating without difficulty.     No cervical, thoracic or lumbar midline tenderness or deformity. Right gluteal region with tenderness to palpation.  No notable bruising or skin discoloration.  hip with mild tenderness to palpation.  Small, healing bruise noted to posterior thigh under right glute.  Tenderness to right inguinal region to palpation.  No hernias palpated.  FROM to right knee and ankle without tenderness, deformity, or ecchymosis      Neurological: She is alert and oriented to person, place, and time. She has normal strength. No cranial nerve deficit or sensory deficit.   Skin: Skin is warm and dry. No rash noted. No erythema.   Psychiatric: She has a normal mood and affect. Thought content normal.         ED Course   Procedures  Labs Reviewed   URINALYSIS, REFLEX TO URINE CULTURE    Narrative:     Specimen Source->Urine          Imaging Results              X-Ray Hip 2 or 3 views Right (with Pelvis when performed) (Final result)  Result time 12/04/23 11:37:19      Final result by Ronni Emerson MD (12/04/23 11:37:19)                   Impression:      No acute fracture  dislocation.  Additional findings above.      Electronically signed by: Ronni Emerson MD  Date:    12/04/2023  Time:    11:37               Narrative:    EXAMINATION:  XR HIP WITH PELVIS WHEN PERFORMED, 2 OR 3  VIEWS RIGHT    CLINICAL HISTORY:  Pain in right hip    TECHNIQUE:  AP view of the pelvis and frog leg lateral view of the right hip were performed.    COMPARISON:  11/30/2023, lumbar spine, sacrum dating back 03/22/2023,    FINDINGS:  Remote healed posttraumatic change right symphysis pubis compared with 03/22/2023.  Prominent degenerative disc spondylosis facet joint arthropathy particularly L4-5 with dextroscoliosis lumbar spine, elevation right hemipelvis.  Osteopenia, hypertrophic change cortex greater trochanters.  Minimal mild DJD change of hip joints.                                    X-Rays:   Independently Interpreted Readings:   Other Readings:  No notable bony fracture or dislocation    Medications   ketorolac injection 15 mg (15 mg Intramuscular Given 12/4/23 1105)     Medical Decision Making  Patient is a afebrile, well appearing 78 y.o.  female who presents for evaluation of right hip pain s/p fall 5 days ago where she was seen in this ED. Patient is able to ambulate but using walker for assistance secondary to pain. Denies cyanosis, pallor, decreased strength or sensation. Pulses normal. Neurovascularly intact. 5/5 strength in all extremities with no decrease in sensation.  Patient has full range of motion of all extremities.  2+ radial pulses.  2+ dorsalis pedis pulse.  2+ posterior tibial pulse.  Ambulating without difficulty. No cervical, thoracic or lumbar midline tenderness or deformity. Right gluteal region with tenderness to palpation.  No notable bruising or skin discoloration.  hip with mild tenderness to palpation.  Small, healing bruise noted to posterior thigh under right glute.  Tenderness to right inguinal region to palpation.  No hernias palpated.  FROM to right knee and  ankle without tenderness, deformity, or ecchymosis.    Differential Diagnosis includes, but is not limited to: Fracture, dislocation, cellulitis, bursitis, muscle strain, ligament tear/sprain, soft tissue contusion, osteoarthritis    IM Toradol given in ED with notable relief of discomfort.  Urinating without difficulty.  UA with no signs of infection.  Repeat x-ray obtained with no notable bony abnormalities.  All historical, clinical, and radiographic findings reviewed and discussed with patient.  Patient has been advised of the diagnosis of contusion.  There are no concerning features on physical exam to suggest an emergent or life threatening condition. No further intervention is indicated at this time, the patient is at low risk for an emergent/life threatening medical condition at this time and I am of the belief that that it is safe to discharge the patient from the emergency department.     Patient has been counseled regarding the need for follow-up as well as the indications to return to the emergency room should new or worrisome developments (including but not limited to worsening pain, cyanosis, or loss of strength or sensation) occur. Patient instructed to follow up with PCP for recheck of today's complaints if symptoms persist or worsen.    Discharge with anti-inflammatories and muscle relaxers.  Advised rest and ice to the hip.  Also informed that muscle relaxers may cause sedation not to drink alcohol, drive or operate heavy machinery when taking this medication.  Advised cautionary use.  Patient expressed understanding and willingness to comply with recommendations. Patient discharged from the emergency department in stable condition, in no acute distress.     Amount and/or Complexity of Data Reviewed  External Data Reviewed: labs, radiology and notes.  Radiology: ordered. Decision-making details documented in ED Course.    Risk  OTC drugs.  Prescription drug management.  Diagnosis or treatment  significantly limited by social determinants of health.            Scribe Attestation:   Scribe #1: I performed the above scribed service and the documentation accurately describes the services I performed. I attest to the accuracy of the note.        ED Course as of 12/04/23 1528   Mon Dec 04, 2023   1144 X-Ray Hip 2 or 3 views Right (with Pelvis when performed)  Minimal mild DJD change of hip joints.  No acute fracture or dislocation. [CC]      ED Course User Index  [CC] Ines Anand PA-C                           Clinical Impression:  Final diagnoses:  [M25.551] Right hip pain (Primary)  [S70.01XD] Contusion of right hip, subsequent encounter          ED Disposition Condition    Discharge Stable          ED Prescriptions       Medication Sig Dispense Start Date End Date Auth. Provider    LIDOcaine (LIDODERM) 5 % Place 1 patch onto the skin once daily. Remove & Discard patch within 12 hours or as directed by MD 5 patch 12/4/2023 -- Ines Anand PA-C    baclofen (LIORESAL) 10 MG tablet Take 1 tablet (10 mg total) by mouth 3 (three) times daily. 90 tablet 12/4/2023 12/3/2024 Ines Anand PA-C    naproxen (NAPROSYN) 500 MG tablet Take 1 tablet (500 mg total) by mouth 2 (two) times daily with meals. 20 tablet 12/4/2023 -- Ines Anand PA-C    ibuprofen (ADVIL,MOTRIN) 800 MG tablet Take 1 tablet (800 mg total) by mouth every 6 (six) hours as needed for Pain. 20 tablet 12/4/2023 -- Ines Anand PA-C          Follow-up Information       Follow up With Specialties Details Why Contact [facility] - Emergency Dept Emergency Medicine Go to  For new or worsening symptoms [address] [facility] [place] [address] [phone]    Jefe Serrano MD Internal Medicine   [address]  [place] [address]  [phone]            [facility]AN[facility] ESTR[facility] Emergency [facility] LASHA , personally performed the services described in this documentation.  All medical record entries made by the scribe were at my direction and in my presence. I have reviewed the chart and agree that the record reflects my personal performance and is accurate and complete.       Ines Anand PA-C  12/04/23 1527

## 2023-12-04 NOTE — DISCHARGE INSTRUCTIONS
Drink lots of fluids, stay well hydrated. Alternate Tylenol/Ibuprofen as needed for pain/ fever; go back and forth between these two medications every 4 hrs as needed for temp greater than or equal to 100.4F.  You may take 800 ibuprofen and 500 to a 1000 mg of Tylenol at 1 time.  Maximum dose of Tylenol and 1 day is 3000 mg in the maximum dose of ibuprofen and 1 day is 1200mg.      Rotate between Tylenol and naproxen.  Baclofen as a muscle relaxer.  Take as prescribed.  Be aware that this may make you sleepy.  Use intermittent heat and ice the area.  Follow up with your primary care for further evaluation and management as needed.    Thank you for coming to our Emergency Department today. It is important to remember that some problems or medical conditions are difficult to diagnose and may not be found or addressed during your Emergency Department visit.  These conditions often start with non-specific symptoms and can only be diagnosed on follow up visits with your primary care physician or specialist when the symptoms continue or change. Please remember that all medical conditions can change, and we cannot predict how you will be feeling tomorrow or the next day. Return to the ER with any questions/concerns, new/concerning symptoms, worsening or failure to improve.     Please return to ER if you experience severe dizziness, fever higher then 100.4 that persist after medication administration, uncontrolled nausea/vomiting or diarrhea or any other major concern like increased pain, chest pain, shortness of breath, inability to pass stool or gas, or difficulty breathing or swelling of the throat/mouth/tongue.    Be sure to follow up with your primary care doctor and review all labs/imaging/tests that were performed during your ER visit with them. It is very common for us to identify non-emergent incidental findings which must be followed up with your primary care physician.  Some labs/imaging/tests may be outside of  the normal range, and require non-emergent follow-up and/or further investigation/treatment/procedures/testing to help diagnose/exclude/prevent complications or other potentially serious medical conditions. Some abnormalities may not have been discussed or addressed during your ER visit.     An ER visit does not replace a primary care visit, and many screening tests or follow-up tests cannot be ordered by an ER doctor or performed by the ER. Some tests may even require pre-approval.    If you do not have a primary care doctor, you may contact the one listed on your discharge paperwork or you may also call the [facility] Appointment Desk at [phone] , or [facility] at  [phone] to schedule an appointment, or establish care with a primary care doctor or even a specialist and to obtain information about local resources. It is important to your health that you have a primary care doctor.    Please take all medications as directed. We have done our best to select a medication for you that will treat your condition however, all medications may potentially have side-effects and it is impossible to predict which medications may give you side-effects or what those side-effects (if any) those medications may give you.  If you feel that you are having a negative effect or side-effect of any medication you should stop taking those medications immediately and seek medical attention. If you feel that you are having a life-threatening reaction call 911.      Do not drive, swim, climb to height, take a bath, operate heavy machinery, drink alcohol or take potentially sedating medications, sign any legal documents or make any important decisions for 24 hours if you have received any pain medications, sedatives or mood altering drugs during your ER visit or within 24 hours of taking them if they have been prescribed to you.     You can find additional resources for Dentists, hearing aids, durable medical equipment, low  cost pharmacies and other resources at [email or web address]

## 2023-12-12 ENCOUNTER — PATIENT OUTREACH (OUTPATIENT)
Dept: ADMINISTRATIVE | Facility: OTHER | Age: 78
End: 2023-12-12
Payer: MEDICARE

## 2023-12-12 ENCOUNTER — HOSPITAL ENCOUNTER (EMERGENCY)
Facility: HOSPITAL | Age: 78
Discharge: HOME | End: 2023-12-12
Attending: STUDENT IN AN ORGANIZED HEALTH CARE EDUCATION/TRAINING PROGRAM
Payer: MEDICARE

## 2023-12-12 VITALS
DIASTOLIC BLOOD PRESSURE: 74 MMHG | BODY MASS INDEX: 24.81 KG/M2 | SYSTOLIC BLOOD PRESSURE: 170 MMHG | HEIGHT: 57 IN | WEIGHT: 115 LBS | HEART RATE: 70 BPM | TEMPERATURE: 98 F | OXYGEN SATURATION: 99 % | RESPIRATION RATE: 18 BRPM

## 2023-12-12 DIAGNOSIS — S32.591A CLOSED FRACTURE OF MULTIPLE PUBIC RAMI, RIGHT, INITIAL ENCOUNTER: Primary | ICD-10-CM

## 2023-12-12 PROCEDURE — 99284 EMERGENCY DEPT VISIT MOD MDM: CPT | Mod: 25

## 2023-12-12 RX ORDER — ACETAMINOPHEN 325 MG/1
650 TABLET ORAL
Status: DISCONTINUED | OUTPATIENT
Start: 2023-12-12 | End: 2023-12-12 | Stop reason: HOSPADM

## 2023-12-12 NOTE — ED TRIAGE NOTES
Pt complaining of worsening right hip pain from previous fall 2 weeks ago. Pt unable to bear weight on leg. Pt recently seen in ED with no relief. Pt AAO x 4.    18-Oct-2018 16:02

## 2023-12-12 NOTE — CONSULTS
Juli Parra is a 78 y.o. female, with a PMHx of GERD, HTN, who presents to the ED with right hip pain following a mechanical fall approximately 2 weeks ago. Patient reports falling outside when going to check on her dogs in the backyard and being seen in ED immediately after. XR with old fractures of the right rami compared with previous films in March. 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 Minimal DDD of hip joint. She had continued weight bearing pain which appeared to get worse recently so she presented again to ED today. She had a CT scan today in the ED with right sided superior and inferior rami fractures that appear acute on chronic. No hip fracture seen. This is a stable injury. No surgery. She can be weight-bearing as tolerated, use of walker to help assist with ambulation.  This bone will heal over the next 6-8 weeks.  It has really pain control and attempting to stay as mobile as possible.  We will see her in outpatient follow-up.

## 2023-12-12 NOTE — ED PROVIDER NOTES
Encounter Date: 12/12/2023    SCRIBE #1 NOTE: I, Gabby Moffett, am scribing for, and in the presence of,  Za Begum DO.       History     Chief Complaint   Patient presents with    Leg Pain     Patient is 77yo female that fell 2 weeks ago and has been seen twice at the ED and is still having right hip pain and unable to walk well or bear weight on the leg.      Juli Parra is a 78 y.o. female, with a PMHx of GERD, HTN, who presents to the ED with right hip pain following a mechanical fall approximately 2 weeks ago. Patient reports falling outside when going to check on her dogs in the backyard and being seen in ED immediately after with no relief of pain following treatment. Reports pain is 10/10 and only present with bearing weight or upon ambulation. Denies any other injuries at the time of fall. Endorses daily compliance with antihypertensives and OTC Nexium. Denies use of anticoagulants. Reports allergy to Sulfa, Cipro and eggs. No other exacerbating or alleviating factors. Denies CP, SOB, LOC, numbness, weakness, paresthesia, nausea, vomiting, fever, chills, knee pain or other associated symptoms. Reports appointment with PCP scheduled next week.     Per chart review, patient was initially seen at this facility for right hip pain after reported fall on 11/30/2023 by Dr. Tiwari. Head and neck CT, hip X-ray revealed no evidence of fracture, dislocatino or acute process. Pt ambulatory in the ED. Patient discharged home, return precautions advised. Patient was seen at this facility again on 12/4/2023 by ROSIE Salguero complaining of right hip pain and diagnosed with right hip pain and contusion of right hip, subsequent encounter. X-ray showed minimal mild DJD change of hip joints.  No acute fracture or dislocation.     The history is provided by the patient. No  was used.     Review of patient's allergies indicates:   Allergen Reactions    Egg Nausea Only    Cipro  [ciprofloxacin hcl]     Sulfur Rash     Past Medical History:   Diagnosis Date    Anxiety     GERD (gastroesophageal reflux disease)     HTN (hypertension)     IBS (irritable bowel syndrome)     Kidney stones     OAB (overactive bladder)     Vertigo      Past Surgical History:   Procedure Laterality Date    CATARACT EXTRACTION, BILATERAL      CHOLECYSTECTOMY      HYSTERECTOMY      LITHOTRIPSY       Family History   Problem Relation Age of Onset    Leukemia Father     Cancer Brother         Pancreatic     Social History     Tobacco Use    Smoking status: Never    Smokeless tobacco: Never   Substance Use Topics    Alcohol use: Not Currently    Drug use: Never     Review of Systems   Constitutional:  Negative for chills and fever.   HENT:  Negative for congestion and sore throat.    Eyes:  Negative for visual disturbance.   Respiratory:  Negative for cough and shortness of breath.    Cardiovascular:  Negative for chest pain.   Gastrointestinal:  Negative for abdominal pain, nausea and vomiting.   Genitourinary:  Negative for dysuria and vaginal discharge.   Musculoskeletal:  Negative for back pain, neck pain and neck stiffness.        (+) right hip pain   (-) right knee pain   Skin:  Negative for rash.   Neurological:  Negative for syncope, weakness, numbness and headaches.        (-) paresthesia   Hematological:  Does not bruise/bleed easily.   Psychiatric/Behavioral:  Negative for decreased concentration.        Physical Exam     Initial Vitals [12/12/23 0855]   BP Pulse Resp Temp SpO2   (!) 166/70 64 18 98 °F (36.7 °C) 99 %      MAP       --         Physical Exam    Vitals reviewed.  Constitutional: Vital signs are normal. She appears well-developed and well-nourished. She is not diaphoretic.  Non-toxic appearance. She does not have a sickly appearance. She does not appear ill.   HENT:   Head: Normocephalic and atraumatic.   Mouth/Throat: Mucous membranes are normal.   Eyes: Conjunctivae and EOM are normal. Pupils  are equal, round, and reactive to light.   Neck: Neck supple.   Normal range of motion.  Cardiovascular:  Normal rate, regular rhythm, normal heart sounds and intact distal pulses.           Pulses:       Radial pulses are 2+ on the right side and 2+ on the left side.        Dorsalis pedis pulses are 2+ on the right side and 2+ on the left side.   Pulmonary/Chest: Effort normal and breath sounds normal. No respiratory distress.   Abdominal: Abdomen is soft. Bowel sounds are normal. There is no abdominal tenderness.   No right CVA tenderness.  No left CVA tenderness. There is no guarding.   Genitourinary:    Genitourinary Comments: Right inguinal tenderness with palpation with no overlying skin changes, masses or induration.     Musculoskeletal:         General: Tenderness present.      Cervical back: Normal, normal range of motion and neck supple.      Thoracic back: Normal.      Lumbar back: Normal.      Right hip: Normal. No tenderness or bony tenderness.      Right lower leg: No edema.      Left lower leg: No edema.     Neurological: She is alert. She has normal strength. No sensory deficit. She displays a negative Romberg sign.         ED Course   Critical Care    Date/Time: 12/12/2023 4:12 PM    Performed by: Za Begum DO  Authorized by: Za Begum DO  Direct patient critical care time: 35 minutes  Additional history critical care time: 5 minutes  Ordering / reviewing critical care time: 5 minutes  Documentation critical care time: 5 minutes  Consulting other physicians critical care time: 5 minutes  Consult with family critical care time: 5 minutes  Total critical care time (exclusive of procedural time) : 60 minutes  Critical care time was exclusive of separately billable procedures and treating other patients and teaching time.  Critical care was necessary to treat or prevent imminent or life-threatening deterioration of the following conditions: trauma.  Critical care was  time spent personally by me on the following activities: development of treatment plan with patient or surrogate, discussions with consultants, examination of patient, obtaining history from patient or surrogate, ordering and performing treatments and interventions, ordering and review of laboratory studies, ordering and review of radiographic studies, pulse oximetry, evaluation of patient's response to treatment, re-evaluation of patient's condition and review of old charts.        Labs Reviewed - No data to display       Imaging Results              CT Hip Without Contrast Right (Final result)  Result time 12/12/23 10:26:32      Final result by Juan Manuel Arguelles MD (12/12/23 10:26:32)                   Impression:      1. Fractures of the right inferior and superior pubic rami as described noting some degree of cortication about the inferior pubic ramus fracture.  Clinical correlation recommended for timing of injury.      Electronically signed by: Juan Manuel Arguelles MD  Date:    12/12/2023  Time:    10:26               Narrative:    EXAMINATION:  CT HIP WITHOUT CONTRAST RIGHT    CLINICAL HISTORY:  Hip trauma, fracture suspected, xray done;    TECHNIQUE:  Axial images of the right hip were obtained at 1.25 mm intervals without administration of IV contrast.  Coronal and sagittal reformatted images were reviewed.    COMPARISON:  Radiograph 12/04/2023    FINDINGS:  There is fracture of the superior pubic ramus at the pubic symphysis, extending to the inferior pubic ramus.  The pubic symphysis is intact noting degenerative changes.  Fracture planes may involve the right aspect of the symphysis.  There is some cortication about the inferior pubic ramus fracture.  The right femoral head maintains appropriate relationship with the acetabulum.  No acute displaced fracture or dislocation of the femur.  There is osteopenia.  No significant inguinal lymphadenopathy.  No radiopaque foreign body.  The visualized intrapelvic  content is grossly unremarkable.                                       Medications - No data to display    Medical Decision Making   MDM  This is an emergent evaluation of a 78 y.o. female with right hip pain. Initial vitals in the ED [12/12/23 0855]  BP: (!) 166/70  Pulse: 64  Resp: 18  Temp: 98 °F (36.7 °C)  SpO2: 99 % .     Physical exam noted above. DDx includes but is not limited to occult fracture, dislocation, musculoskeletal pain, ligamentous injury. Also considered but clinically less likely to be septic joint, osteomyelitis, vascular injury. Will obtain labs and imaging including CT of right hip. Will also provide PO tylenol for pain. Will continue to monitor and frequently reassess pending results of labs, treatments and final disposition.    Patient is aware of plan and is amenable.     Za Begum D.O  EMERGENCY MEDICINE  10:23 AM 12/12/2023    UPDATE:  CT reveals a fractures of the right inferior and superior pubic rami as described noting some degree of cortication about the inferior pubic ramus fracture. Patient ordered tylenol but deferred. Patient was consulted to orthopedic surgery and case was discussed with Dr. Sam Dubois, who recommended that patient weight bare as tolerated, using a walker at home and treated with pain control. Plan discussed with the patient and her son at bedside. They both are aware and agreeable.     This chart was completed using dictation software, as a result there may be some transcription errors      Amount and/or Complexity of Data Reviewed  External Data Reviewed: radiology and notes.     Details: See HPI   Radiology: ordered and independent interpretation performed. Decision-making details documented in ED Course.    Risk  OTC drugs.            Scribe Attestation:   Scribe #1: I performed the above scribed service and the documentation accurately describes the services I performed. I attest to the accuracy of the note.                         IZa  CANDACE Begum DO  , personally performed the services described in this documentation.  All medical record entries made by the scribe were at my direction and in my presence.  I have reviewed the chart and agree that the record reflects my personal performance and is accurate and complete.        Clinical Impression:  Final diagnoses:  [S37.162K] Closed fracture of multiple pubic rami, right, initial encounter (Primary)          ED Disposition Condition    Discharge Stable          ED Prescriptions    None       Follow-up Information       Follow up With Specialties Details Why Contact Info    Sam Dubois MD Orthopedic Surgery Schedule an appointment as soon as possible for a visit in 1 week Emergency Room Follow-up [address] [facility] [address]  [phone]      [facility] Emergency Medicine Go to  If symptoms worsen [address] [facility] [place] [address]  [phone]             Za Begum, DO  12/21/23 2115       Za eBgum,   12/21/23 2115

## 2023-12-12 NOTE — DISCHARGE INSTRUCTIONS
Thank you for coming to our Emergency Department today. It is important to remember that some problems or medical conditions are difficult to diagnose and may not be found during your Emergency Department visit.     Be sure to follow up with your primary care doctor and review all labs/imaging/tests that were performed during your ER visit with them. Some labs/tests may be outside of the normal range and require non-emergent follow-up and further investigation to help diagnose/exclude/prevent complications or other potentially serious medical conditions that were not addressed during your ER visit.    If you do not have a primary care doctor, you may contact the one listed on your discharge paperwork or you may also call the [facility] Appointment Desk at [phone] to schedule an appointment and establish care with one. It is important to your health that you have a primary care doctor.    Please take all medications as directed. All medications may potentially have side-effects and it is impossible to predict which medications may give you side-effects or what side-effects (if any) they will give you.. If you feel that you are having a negative effect or side-effect of any medication you should immediately stop taking them and seek medical attention. If you feel that you are having a life-threatening reaction call 911.    Return to the ER with any questions/concerns, new/concerning symptoms, worsening or failure to improve.     Do not drive, swim, climb to height, take a bath, operate heavy machinery, drink alcohol or take potentially sedating medications, sign any legal documents or make any important decisions for 24 hours if you have received any pain medications, sedatives or mood altering drugs during your ER visit or within 24 hours of taking them if they have been prescribed to you.     You can find additional resources for Dentists, hearing aids, durable medical equipment, low cost pharmacies and  other resources at [email or web address]    BELOW THIS LINE ONLY APPLIES IF YOU HAVE A COVID TEST PENDING OR IF YOU HAVE BEEN DIAGNOSED WITH COVID:  Please access [facility] to review the results of your test. Until the results of your COVID test return, you should isolate yourself so as not to potentially spread illness to others.   If your COVID test returns positive, you should isolate yourself so as not to spread illness to others. After five full days, if you are feeling better and you have not had fever for 24 hours, you can return to your typical daily activities, but you must wear a mask around others for an additional 5 days.   If your COVID test returns negative and you are either unvaccinated or more than six months out from your two-dose vaccine and are not yet boosted, you should still quarantine for 5 full days followed by strict mask use for an additional 5 full days.   If your COVID test returns negative and you have received your 2-dose initial vaccine as well as a booster, you should continue strict mask use for 10 full days after the exposure.  For all those exposed, best practice includes a test at day 5 after the exposure. This can be a home test or a test through one of the many testing centers throughout our community.   Masking is always advised to limit the spread of COVID. Cdc.gov is an excellent site to obtain the latest up to date recommendations regarding COVID and COVID testing.     CDC Testing and Quarantine Guidelines for patients with exposure to a known-positive COVID-19 person:  A close exposure is defined as anyone who has had an exposure (masked or unmasked) to a known COVID -19 positive person within 6 feet of someone for a cumulative total of 15 minutes or more over a 24-hour period.   Vaccinated and/or if you recently had a positive covid test within 90 days do NOT need to quarantine after contact with someone who had COVID-19 unless you develop symptoms.   Fully vaccinated  people who have not had a positive test within 90 days, should get tested 3-5 days after their exposure, even if they don't have symptoms and wear a mask indoors in public for 14 days following exposure or until their test result is negative.      Unvaccinated and/or NOT had a positive test within 90 days and meet close exposure  You are required by CDC guidelines to quarantine for at least 5 days from time of exposure followed by 5 days of strict masking. It is recommended, but not required to test after 5 days, unless you develop symptoms, in which case you should test at that time.  If you get tested after 5 days and your test is positive, your 5 day period of isolation starts the day of the positive test.    If your exposure does not meet the above definition, you can return to your normal daily activities to include social distancing, wearing a mask and frequent handwashing.      Here is a link to guidance from the CDC:  https://www.cdc.gov/media/releases/2021/s1227-isolation-quarantine-guidance.html      [place] Dept Of Health Testing Sites:  https://ldh.la.gov/page/3934      [facility] website with testing locations and guidance:  [email or web address]

## 2023-12-12 NOTE — PROGRESS NOTES
CHW - Case Closure    This Community Health Worker spoke to patient  at bedside  today.   Pt reported: Patient states she has no needs or requests any assistance at this time. Patient agreed to case closure.   Pt denied any additional needs at this time and agrees with episode closure at this time.  Provided patient with Community Health Worker's contact information and encouraged her to contact this Community Health Worker if additional needs arise.

## 2023-12-19 ENCOUNTER — OFFICE VISIT (OUTPATIENT)
Dept: ORTHOPEDICS | Facility: CLINIC | Age: 78
End: 2023-12-19
Payer: MEDICARE

## 2023-12-19 ENCOUNTER — TELEPHONE (OUTPATIENT)
Dept: PAIN MEDICINE | Facility: CLINIC | Age: 78
End: 2023-12-19
Payer: MEDICARE

## 2023-12-19 VITALS — BODY MASS INDEX: 24.81 KG/M2 | WEIGHT: 115 LBS | HEIGHT: 57 IN

## 2023-12-19 DIAGNOSIS — S32.591A FRACTURE OF MULTIPLE PUBIC RAMI, RIGHT, CLOSED, INITIAL ENCOUNTER: ICD-10-CM

## 2023-12-19 DIAGNOSIS — M51.36 DDD (DEGENERATIVE DISC DISEASE), LUMBAR: Primary | ICD-10-CM

## 2023-12-19 PROCEDURE — 99214 OFFICE O/P EST MOD 30 MIN: CPT | Mod: S$PBB,,, | Performed by: ORTHOPAEDIC SURGERY

## 2023-12-19 PROCEDURE — 99213 OFFICE O/P EST LOW 20 MIN: CPT | Mod: PBBFAC,PN | Performed by: ORTHOPAEDIC SURGERY

## 2023-12-19 PROCEDURE — 99214 PR OFFICE/OUTPT VISIT, EST, LEVL IV, 30-39 MIN: ICD-10-PCS | Mod: S$PBB,,, | Performed by: ORTHOPAEDIC SURGERY

## 2023-12-19 PROCEDURE — 99999 PR PBB SHADOW E&M-EST. PATIENT-LVL III: CPT | Mod: PBBFAC,,, | Performed by: ORTHOPAEDIC SURGERY

## 2023-12-19 PROCEDURE — 99999 PR PBB SHADOW E&M-EST. PATIENT-LVL III: ICD-10-PCS | Mod: PBBFAC,,, | Performed by: ORTHOPAEDIC SURGERY

## 2023-12-19 RX ORDER — TRAMADOL HYDROCHLORIDE 50 MG/1
50 TABLET ORAL EVERY 6 HOURS PRN
Qty: 30 TABLET | Refills: 0 | Status: ON HOLD | OUTPATIENT
Start: 2023-12-19 | End: 2024-01-03 | Stop reason: HOSPADM

## 2023-12-19 NOTE — PROGRESS NOTES
NEW PATIENT ORTHOPAEDIC: HIP    PRIMARY CARE PHYSICIAN: Jefe Serrano MD   REFERRING PROVIDER: No referring provider defined for this encounter.     ASSESSMENT & PLAN:    Impression:  Right Hip Superior and Inferior Rami Fracture  Lumbar DDD    Follow Up Plan: PRN     Non operative care:    Juli Parra has physical exam evidence of above and wishes to pursue an non-operative care. I am recommending the following:  Protected weight-bearing to right lower extremity for the next 4-6 weeks.  For her lumbar spine pain obtain x-rays to ensure no compression deformity.  Follow up with pain management    Patient comes in with a 2 week history of having right-sided hip and back pain.  This is associated with a fall.  She presented the emergency room on 2 occasions where both hip radiographs and CT scans were obtained.  They demonstrated concern for superior inferior rami fractures on the right.  She does report a deep-seated groin pain with weight-bearing however this is not her primary complaint today.  Her primary complaint is more right-sided back pain.  She has known degenerative changes of her lower lumbar spine which is followed by pain management.  I will obtain x-rays of her lower back to ensure no compression deformity as this was not visualized in the emergency room.  She can follow-up with Dr. Aceves for further management of her lumbar spine pain.  I think for her rami fractures over the next 4-6 weeks she will have improvement in that pain and can transition off of the walker.  We will prescribe her Ultram for pain control as over-the-counter anti-inflammatories are no longer effective.  Patient would also benefit in this acute setting with the use of Rollator as she needs to take multiple breaks for sitting.    The patient has been ordered:  [facility] (DME)  Pain Prescription    CONSULTS:   Pain Management     ACTIVE PROBLEM LIST  Patient Active Problem List   Diagnosis    Mixed irritable bowel  syndrome    Primary generalized (osteo)arthritis    Essential hypertension    Impaired fasting glucose    Gastroesophageal reflux disease    Bilateral sensorineural hearing loss    Anxiety    Arthritis of right knee    Chest congestion    Depression    Coccydynia    Lumbar spondylosis    DDD (degenerative disc disease), lumbar    Current moderate episode of major depressive disorder without prior episode           SUBJECTIVE    CHIEF COMPLAINT: Hip Pain    HPI:   Juli Parra is a 78 y.o. female here for evaluation and management of right hip pain. There is a specific incident that brought about this pain. she has had progressive problems with the hip(s) starting 2 weeks ago and is progressing which is now interfering with activities which include: walking and functional household ADL's    Currently the pain in the joint is moderate with activity.  The pain is intermittent and is located in buttocks and groin.  The pain is described as aching and sharp. Relieving factors include ambulatory device, rest, over the counter medication , and repositioning. No associated Catching, Clicking, and Popping.     They do not report leg length inequality.     Juli Parra has no additional complaints.     PROGRESSIVE SYMPTOMS:  Pain worsened by weight bearing  Pain effecting living situation    FUNCTIONAL STATUS:   Walk a block or two on level ground     PREVIOUS TREATMENTS:  Medical: OTC NSAIDS  Physical Therapy: Use of Ambulatory Aid and Activities Modified   Previous Orthopaedic Surgery: None    REVIEW OF SYSTEMS:  PAIN ASSESSMENT:  See HPI.  MUSCULOSKELETAL: See HPI.  OTHER 10 point review of systems is negative except as stated in HPI above    PAST MEDICAL HISTORY   has a past medical history of Anxiety, GERD (gastroesophageal reflux disease), HTN (hypertension), IBS (irritable bowel syndrome), Kidney stones, OAB (overactive bladder), and Vertigo.     PAST SURGICAL HISTORY   has a past surgical history that  includes Hysterectomy; Cholecystectomy; Cataract extraction, bilateral; and Lithotripsy.     FAMILY HISTORY  family history includes Cancer in her brother; Leukemia in her father.     SOCIAL HISTORY   reports that she has never smoked. She has never used smokeless tobacco. She reports that she does not currently use alcohol. She reports that she does not use drugs.     ALLERGIES   Review of patient's allergies indicates:   Allergen Reactions    Egg Nausea Only    Cipro [ciprofloxacin hcl]     Sulfur Rash        MEDICATIONS   Current Outpatient Medications on File Prior to Visit   Medication Sig Dispense Refill    baclofen (LIORESAL) 10 MG tablet Take 1 tablet (10 mg total) by mouth 3 (three) times daily. 90 tablet 11    chlorhexidine (PERIDEX) 0.12 % solution SMARTSIG:By Mouth      dicyclomine (BENTYL) 10 MG capsule TAKE 1 CAPSULE BY MOUTH FOUR TIMES DAILY EVERY NIGHT BEFORE MEALS 90 capsule 6    DULoxetine (CYMBALTA) 20 MG capsule TAKE 1 CAPSULE(20 MG) BY MOUTH EVERY DAY 30 capsule 1    fluticasone propionate (FLONASE) 50 mcg/actuation nasal spray SPRAY ONCE INTO EACH NOSTRIL EVERY DAY 48 g 3    hydroCHLOROthiazide (HYDRODIURIL) 25 MG tablet TAKE 1 TABLET(25 MG) BY MOUTH EVERY DAY 90 tablet 2    ibuprofen (ADVIL,MOTRIN) 800 MG tablet Take 1 tablet (800 mg total) by mouth every 6 (six) hours as needed for Pain. 20 tablet 0    LIDOcaine (LIDODERM) 5 % Place 1 patch onto the skin once daily. Remove & Discard patch within 12 hours or as directed by MD 5 patch 0    LORazepam (ATIVAN) 1 MG tablet Take 1 tablet (1 mg total) by mouth every evening. 30 tablet 5    losartan (COZAAR) 100 MG tablet TAKE 1 TABLET(100 MG) BY MOUTH EVERY DAY 90 tablet 2    metoprolol succinate (TOPROL-XL) 50 MG 24 hr tablet TAKE 1 TABLET(50 MG) BY MOUTH EVERY DAY 90 tablet 2    naproxen (NAPROSYN) 500 MG tablet Take 1 tablet (500 mg total) by mouth 2 (two) times daily with meals. 20 tablet 0    oxybutynin (DITROPAN XL) 15 MG TR24 Take 1 tablet  "(15 mg total) by mouth once daily. 90 tablet 3    potassium chloride SA (K-DUR,KLOR-CON M) 10 MEQ tablet TAKE 1 TABLET(10 MEQ) BY MOUTH EVERY DAY 90 tablet 2    valACYclovir (VALTREX) 500 MG tablet Take by mouth.      esomeprazole (NEXIUM) 40 MG capsule Take 1 capsule (40 mg total) by mouth before breakfast. 90 capsule 11     No current facility-administered medications on file prior to visit.          PHYSICAL EXAM   height is 4' 9" (1.448 m) and weight is 52.2 kg (115 lb).   Body mass index is 24.89 kg/m².      All other systems deferred.  GENERAL:  No acute distress  HABITUS: Normal  GAIT: Antalgic  SKIN: Normal     HIP EXAM:    right:   ROM:   Flexion: 120 degrees    Internal Rotation: 30 degrees    External Rotation: 30 degrees    Abduction: 45 degrees    Adduction: 20 degrees  No apparent leg length discrepancy    Palpation: Yes tenderness over paraspinal area, not over hip   Pain is not reproduced with IR or ER of the hip  Strength: 5/5 hip flexion, abduction, knee flexion and extension   Straight leg raise: Negative   Neurovascular Status: Sensation intact to light touch in Sural, saphenous, SPN, DPN, Tibial nerve distribution  2+ pulse DP/PT, normal capillary refill, foot has normal warmth    DATA:  Diagnostic tests reviewed for today's visit:     AP pelvis, radiographs shows superior and inferior rami fractures that are comminuted and close to the pubic symphysis. No fracture    CT Scan: CT images and report reviewed which demonstrate superior inferior rami fractures.  No evidence of hip fracture.      "

## 2023-12-19 NOTE — TELEPHONE ENCOUNTER
Offered pt an appt this week- she declined.  Pt scheduled 1/2/24 at 10:30a- requested a Tuesday. Address provided.

## 2023-12-19 NOTE — TELEPHONE ENCOUNTER
----- Message from ST Ken sent at 12/19/2023  2:14 PM CST -----  Can you please call this patient to schedule a f/u   Thanks,  angélica

## 2023-12-20 ENCOUNTER — LAB VISIT (OUTPATIENT)
Dept: LAB | Facility: HOSPITAL | Age: 78
End: 2023-12-20
Attending: INTERNAL MEDICINE
Payer: MEDICARE

## 2023-12-20 ENCOUNTER — OFFICE VISIT (OUTPATIENT)
Dept: INTERNAL MEDICINE | Facility: CLINIC | Age: 78
End: 2023-12-20
Payer: MEDICARE

## 2023-12-20 VITALS
HEIGHT: 57 IN | WEIGHT: 122.81 LBS | SYSTOLIC BLOOD PRESSURE: 154 MMHG | BODY MASS INDEX: 26.5 KG/M2 | DIASTOLIC BLOOD PRESSURE: 56 MMHG

## 2023-12-20 DIAGNOSIS — I10 ESSENTIAL HYPERTENSION: ICD-10-CM

## 2023-12-20 DIAGNOSIS — Z78.0 ASYMPTOMATIC MENOPAUSAL STATE: ICD-10-CM

## 2023-12-20 DIAGNOSIS — Z09 HOSPITAL DISCHARGE FOLLOW-UP: Primary | ICD-10-CM

## 2023-12-20 DIAGNOSIS — S32.591D CLOSED FRACTURE OF RIGHT INFERIOR PUBIC RAMUS WITH ROUTINE HEALING, SUBSEQUENT ENCOUNTER: Primary | ICD-10-CM

## 2023-12-20 DIAGNOSIS — S32.591D CLOSED FRACTURE OF RIGHT INFERIOR PUBIC RAMUS WITH ROUTINE HEALING, SUBSEQUENT ENCOUNTER: ICD-10-CM

## 2023-12-20 LAB
ALBUMIN SERPL BCP-MCNC: 3.3 G/DL (ref 3.5–5.2)
ALP SERPL-CCNC: 127 U/L (ref 55–135)
ALT SERPL W/O P-5'-P-CCNC: 12 U/L (ref 10–44)
ANION GAP SERPL CALC-SCNC: 9 MMOL/L (ref 8–16)
AST SERPL-CCNC: 20 U/L (ref 10–40)
BASOPHILS # BLD AUTO: 0.06 K/UL (ref 0–0.2)
BASOPHILS NFR BLD: 0.6 % (ref 0–1.9)
BILIRUB SERPL-MCNC: 0.4 MG/DL (ref 0.1–1)
BUN SERPL-MCNC: 12 MG/DL (ref 8–23)
CALCIUM SERPL-MCNC: 9.5 MG/DL (ref 8.7–10.5)
CHLORIDE SERPL-SCNC: 96 MMOL/L (ref 95–110)
CO2 SERPL-SCNC: 27 MMOL/L (ref 23–29)
CREAT SERPL-MCNC: 0.7 MG/DL (ref 0.5–1.4)
DIFFERENTIAL METHOD: ABNORMAL
EOSINOPHIL # BLD AUTO: 0.3 K/UL (ref 0–0.5)
EOSINOPHIL NFR BLD: 3.2 % (ref 0–8)
ERYTHROCYTE [DISTWIDTH] IN BLOOD BY AUTOMATED COUNT: 12.8 % (ref 11.5–14.5)
EST. GFR  (NO RACE VARIABLE): >60 ML/MIN/1.73 M^2
GLUCOSE SERPL-MCNC: 97 MG/DL (ref 70–110)
HCT VFR BLD AUTO: 34.5 % (ref 37–48.5)
HGB BLD-MCNC: 12.5 G/DL (ref 12–16)
IMM GRANULOCYTES # BLD AUTO: 0.06 K/UL (ref 0–0.04)
IMM GRANULOCYTES NFR BLD AUTO: 0.6 % (ref 0–0.5)
LYMPHOCYTES # BLD AUTO: 0.8 K/UL (ref 1–4.8)
LYMPHOCYTES NFR BLD: 7.8 % (ref 18–48)
MCH RBC QN AUTO: 31.4 PG (ref 27–31)
MCHC RBC AUTO-ENTMCNC: 36.2 G/DL (ref 32–36)
MCV RBC AUTO: 87 FL (ref 82–98)
MONOCYTES # BLD AUTO: 0.8 K/UL (ref 0.3–1)
MONOCYTES NFR BLD: 7.8 % (ref 4–15)
NEUTROPHILS # BLD AUTO: 8.2 K/UL (ref 1.8–7.7)
NEUTROPHILS NFR BLD: 80 % (ref 38–73)
NRBC BLD-RTO: 0 /100 WBC
PLATELET # BLD AUTO: 341 K/UL (ref 150–450)
PMV BLD AUTO: 8.5 FL (ref 9.2–12.9)
POTASSIUM SERPL-SCNC: 3.5 MMOL/L (ref 3.5–5.1)
PROT SERPL-MCNC: 7.1 G/DL (ref 6–8.4)
RBC # BLD AUTO: 3.98 M/UL (ref 4–5.4)
SODIUM SERPL-SCNC: 132 MMOL/L (ref 136–145)
WBC # BLD AUTO: 10.18 K/UL (ref 3.9–12.7)

## 2023-12-20 PROCEDURE — 99214 OFFICE O/P EST MOD 30 MIN: CPT | Mod: PBBFAC | Performed by: PHYSICIAN ASSISTANT

## 2023-12-20 PROCEDURE — 99999 PR PBB SHADOW E&M-EST. PATIENT-LVL IV: CPT | Mod: PBBFAC,,, | Performed by: PHYSICIAN ASSISTANT

## 2023-12-20 PROCEDURE — 99999 PR PBB SHADOW E&M-EST. PATIENT-LVL IV: ICD-10-PCS | Mod: PBBFAC,,, | Performed by: PHYSICIAN ASSISTANT

## 2023-12-20 PROCEDURE — 85025 COMPLETE CBC W/AUTO DIFF WBC: CPT | Performed by: INTERNAL MEDICINE

## 2023-12-20 PROCEDURE — 99214 PR OFFICE/OUTPT VISIT, EST, LEVL IV, 30-39 MIN: ICD-10-PCS | Mod: S$PBB,,, | Performed by: PHYSICIAN ASSISTANT

## 2023-12-20 PROCEDURE — 80053 COMPREHEN METABOLIC PANEL: CPT | Performed by: INTERNAL MEDICINE

## 2023-12-20 PROCEDURE — 36415 COLL VENOUS BLD VENIPUNCTURE: CPT | Performed by: INTERNAL MEDICINE

## 2023-12-20 PROCEDURE — 99214 OFFICE O/P EST MOD 30 MIN: CPT | Mod: S$PBB,,, | Performed by: PHYSICIAN ASSISTANT

## 2023-12-20 NOTE — PROGRESS NOTES
"Subjective     Patient ID: Juli Parra is a 78 y.o. female.    Chief Complaint: Fall    HPI    Established pt of Jefe Serrano MD     Here to follow up after a recent fall on 11/30  Pt was out on the patio, looking for dog, thinks her foot got caught on threshold causing a mechanical fall. Seen in the ED same day of fall CT Head, CT cervical and xray hip obtained, unremarkable    See again in the ED on 12/4 as pain persisted repeated xray were negative. Discharged with baclofen, ibuprofen and naproxen    Issues with continued pain thus seen in the ED again on 12/12. CT of Hip revealed Right Hip Superior and Inferior Rami Fracture. Saw ortho yesterday, plans to monitor, no surgical interventional at this time, Rx tramadol and referred to pain clinic.     Today she note pian mainly with walking at night, no issues while still/sitting/at rest. Hasn't tried tramadol yet.     Notes dizziness with sitting to standing or moving too fast, chronic issues for years, hx of vertigo for years. No spinning sensation. She is careful, slow movement and positional changes.     Inquiries about senior meal assistance.           Past Medical History:   Diagnosis Date    Anxiety     GERD (gastroesophageal reflux disease)     HTN (hypertension)     IBS (irritable bowel syndrome)     Kidney stones     OAB (overactive bladder)     Vertigo      Social History     Tobacco Use    Smoking status: Never    Smokeless tobacco: Never   Substance Use Topics    Alcohol use: Not Currently    Drug use: Never     Review of patient's allergies indicates:   Allergen Reactions    Egg Nausea Only    Cipro [ciprofloxacin hcl]     Sulfur Rash     ROS  As above       Objective  BP (!) 154/56 (BP Location: Left arm, Patient Position: Sitting, BP Method: Medium (Manual))   Ht 4' 9" (1.448 m)   Wt 55.7 kg (122 lb 12.7 oz)   BMI 26.57 kg/m²       Physical Exam  Vitals reviewed.   Constitutional:       General: She is not in acute distress.     " Appearance: She is well-developed. She is not ill-appearing.   HENT:      Head: Normocephalic and atraumatic.   Cardiovascular:      Rate and Rhythm: Normal rate and regular rhythm.      Heart sounds: No murmur heard.  Pulmonary:      Effort: Pulmonary effort is normal.      Breath sounds: Normal breath sounds. No wheezing or rales.   Abdominal:      General: Bowel sounds are normal.   Musculoskeletal:      Right lower leg: No edema.      Left lower leg: No edema.      Comments: Ambulating with walker   Skin:     General: Skin is warm and dry.      Findings: No rash.   Neurological:      Mental Status: She is alert.   Psychiatric:         Mood and Affect: Mood normal.            Assessment and Plan     1. Hospital discharge follow-up  -     Ambulatory referral/consult to Outpatient Case Management    2. Asymptomatic menopausal state    3. Closed fracture of right inferior pubic ramus with routine healing, subsequent encounter  -     Ambulatory referral/consult to Outpatient Case Management    4. Essential hypertension  -     Ambulatory referral/consult to Outpatient Case Management      EPIC ordering issues at time of appt  Plans for CBC/CMP and bone density scan  Discussed med profile of tramadol, pt will HOLD lorazepam if she takes  Advised to try lidocaine patch, may continue NSAID prn  Keep appts with Pain and Ortho for f/u  Consult social work for (Meals on Wheals or other meal prgm for elderly)  Further recs to follow pending results    Nita Clifton PA-C

## 2023-12-27 ENCOUNTER — PATIENT OUTREACH (OUTPATIENT)
Dept: ADMINISTRATIVE | Facility: HOSPITAL | Age: 78
End: 2023-12-27
Payer: MEDICARE

## 2023-12-27 ENCOUNTER — OUTPATIENT CASE MANAGEMENT (OUTPATIENT)
Dept: ADMINISTRATIVE | Facility: OTHER | Age: 78
End: 2023-12-27
Payer: MEDICARE

## 2023-12-27 ENCOUNTER — TELEPHONE (OUTPATIENT)
Dept: ADMINISTRATIVE | Facility: HOSPITAL | Age: 78
End: 2023-12-27
Payer: MEDICARE

## 2023-12-27 VITALS — SYSTOLIC BLOOD PRESSURE: 130 MMHG | DIASTOLIC BLOOD PRESSURE: 80 MMHG

## 2023-12-27 NOTE — PROGRESS NOTES

## 2023-12-27 NOTE — PROGRESS NOTES
12/27/23 Briefly spoke with patient and explained reason for call. Patient says she currently has company and is unable to continue with call at this time. Will attempt to contact patient at a later time per request.

## 2023-12-28 ENCOUNTER — HOSPITAL ENCOUNTER (INPATIENT)
Facility: HOSPITAL | Age: 78
LOS: 4 days | Discharge: NURSING, REHAB OR LONG-TERM CARE | DRG: 536 | End: 2024-01-03
Attending: INTERNAL MEDICINE | Admitting: HOSPITALIST
Payer: MEDICARE

## 2023-12-28 DIAGNOSIS — S32.9XXA PELVIC FRACTURE: ICD-10-CM

## 2023-12-28 DIAGNOSIS — S32.10XA CLOSED FRACTURE OF SACRUM, UNSPECIFIED PORTION OF SACRUM, INITIAL ENCOUNTER: ICD-10-CM

## 2023-12-28 DIAGNOSIS — S32.591G: ICD-10-CM

## 2023-12-28 DIAGNOSIS — S32.009A CLOSED FRACTURE OF TRANSVERSE PROCESS OF LUMBAR VERTEBRA, INITIAL ENCOUNTER: ICD-10-CM

## 2023-12-28 DIAGNOSIS — S32.592A CLOSED FRACTURE OF RAMUS OF LEFT PUBIS, INITIAL ENCOUNTER: Primary | ICD-10-CM

## 2023-12-28 DIAGNOSIS — R07.9 CHEST PAIN: ICD-10-CM

## 2023-12-28 DIAGNOSIS — S32.009A CLOSED FRACTURE OF SPINOUS PROCESS OF LUMBAR VERTEBRA, INITIAL ENCOUNTER: ICD-10-CM

## 2023-12-28 PROBLEM — S32.591A CLOSED FRACTURE OF RIGHT INFERIOR PUBIC RAMUS: Status: ACTIVE | Noted: 2023-12-28

## 2023-12-28 PROBLEM — E87.6 HYPOKALEMIA: Status: ACTIVE | Noted: 2023-12-28

## 2023-12-28 LAB
ALBUMIN SERPL BCP-MCNC: 3.1 G/DL (ref 3.5–5.2)
ALP SERPL-CCNC: 121 U/L (ref 55–135)
ALT SERPL W/O P-5'-P-CCNC: 12 U/L (ref 10–44)
ANION GAP SERPL CALC-SCNC: 9 MMOL/L (ref 8–16)
AST SERPL-CCNC: 22 U/L (ref 10–40)
BASOPHILS # BLD AUTO: 0.06 K/UL (ref 0–0.2)
BASOPHILS NFR BLD: 0.7 % (ref 0–1.9)
BILIRUB SERPL-MCNC: 0.6 MG/DL (ref 0.1–1)
BUN SERPL-MCNC: 16 MG/DL (ref 8–23)
CALCIUM SERPL-MCNC: 9.1 MG/DL (ref 8.7–10.5)
CHLORIDE SERPL-SCNC: 96 MMOL/L (ref 95–110)
CO2 SERPL-SCNC: 26 MMOL/L (ref 23–29)
CREAT SERPL-MCNC: 0.6 MG/DL (ref 0.5–1.4)
DIFFERENTIAL METHOD BLD: ABNORMAL
EOSINOPHIL # BLD AUTO: 0.4 K/UL (ref 0–0.5)
EOSINOPHIL NFR BLD: 4.6 % (ref 0–8)
ERYTHROCYTE [DISTWIDTH] IN BLOOD BY AUTOMATED COUNT: 12.5 % (ref 11.5–14.5)
EST. GFR  (NO RACE VARIABLE): >60 ML/MIN/1.73 M^2
GLUCOSE SERPL-MCNC: 128 MG/DL (ref 70–110)
HCT VFR BLD AUTO: 34 % (ref 37–48.5)
HGB BLD-MCNC: 11.4 G/DL (ref 12–16)
IMM GRANULOCYTES # BLD AUTO: 0.09 K/UL (ref 0–0.04)
IMM GRANULOCYTES NFR BLD AUTO: 1.1 % (ref 0–0.5)
LYMPHOCYTES # BLD AUTO: 0.8 K/UL (ref 1–4.8)
LYMPHOCYTES NFR BLD: 9.9 % (ref 18–48)
MAGNESIUM SERPL-MCNC: 1.9 MG/DL (ref 1.6–2.6)
MCH RBC QN AUTO: 30.4 PG (ref 27–31)
MCHC RBC AUTO-ENTMCNC: 33.5 G/DL (ref 32–36)
MCV RBC AUTO: 91 FL (ref 82–98)
MONOCYTES # BLD AUTO: 0.9 K/UL (ref 0.3–1)
MONOCYTES NFR BLD: 10.2 % (ref 4–15)
NEUTROPHILS # BLD AUTO: 6.2 K/UL (ref 1.8–7.7)
NEUTROPHILS NFR BLD: 73.5 % (ref 38–73)
NRBC BLD-RTO: 0 /100 WBC
PHOSPHATE SERPL-MCNC: 3.1 MG/DL (ref 2.7–4.5)
PLATELET # BLD AUTO: 281 K/UL (ref 150–450)
PMV BLD AUTO: 8.5 FL (ref 9.2–12.9)
POTASSIUM SERPL-SCNC: 2.8 MMOL/L (ref 3.5–5.1)
PROT SERPL-MCNC: 6.7 G/DL (ref 6–8.4)
RBC # BLD AUTO: 3.75 M/UL (ref 4–5.4)
SODIUM SERPL-SCNC: 131 MMOL/L (ref 136–145)
WBC # BLD AUTO: 8.41 K/UL (ref 3.9–12.7)

## 2023-12-28 PROCEDURE — G0378 HOSPITAL OBSERVATION PER HR: HCPCS

## 2023-12-28 PROCEDURE — 94761 N-INVAS EAR/PLS OXIMETRY MLT: CPT

## 2023-12-28 PROCEDURE — 25000003 PHARM REV CODE 250

## 2023-12-28 PROCEDURE — 25000003 PHARM REV CODE 250: Performed by: STUDENT IN AN ORGANIZED HEALTH CARE EDUCATION/TRAINING PROGRAM

## 2023-12-28 PROCEDURE — 80053 COMPREHEN METABOLIC PANEL: CPT

## 2023-12-28 PROCEDURE — 25000003 PHARM REV CODE 250: Performed by: INTERNAL MEDICINE

## 2023-12-28 PROCEDURE — 83735 ASSAY OF MAGNESIUM: CPT

## 2023-12-28 PROCEDURE — 63600175 PHARM REV CODE 636 W HCPCS: Performed by: INTERNAL MEDICINE

## 2023-12-28 PROCEDURE — 25000003 PHARM REV CODE 250: Performed by: NURSE PRACTITIONER

## 2023-12-28 PROCEDURE — 96372 THER/PROPH/DIAG INJ SC/IM: CPT | Performed by: INTERNAL MEDICINE

## 2023-12-28 PROCEDURE — 85025 COMPLETE CBC W/AUTO DIFF WBC: CPT

## 2023-12-28 PROCEDURE — 99291 CRITICAL CARE FIRST HOUR: CPT

## 2023-12-28 PROCEDURE — 25000003 PHARM REV CODE 250: Performed by: PHYSICIAN ASSISTANT

## 2023-12-28 PROCEDURE — 84100 ASSAY OF PHOSPHORUS: CPT

## 2023-12-28 RX ORDER — DICYCLOMINE HYDROCHLORIDE 10 MG/1
10 CAPSULE ORAL
Status: DISCONTINUED | OUTPATIENT
Start: 2023-12-28 | End: 2024-01-03 | Stop reason: HOSPADM

## 2023-12-28 RX ORDER — DULOXETIN HYDROCHLORIDE 20 MG/1
20 CAPSULE, DELAYED RELEASE ORAL DAILY
Status: DISCONTINUED | OUTPATIENT
Start: 2023-12-28 | End: 2024-01-03 | Stop reason: HOSPADM

## 2023-12-28 RX ORDER — HYDROCODONE BITARTRATE AND ACETAMINOPHEN 5; 325 MG/1; MG/1
1 TABLET ORAL
Status: COMPLETED | OUTPATIENT
Start: 2023-12-28 | End: 2023-12-28

## 2023-12-28 RX ORDER — LORAZEPAM 0.5 MG/1
1 TABLET ORAL NIGHTLY
Status: DISCONTINUED | OUTPATIENT
Start: 2023-12-28 | End: 2024-01-03 | Stop reason: HOSPADM

## 2023-12-28 RX ORDER — PROCHLORPERAZINE EDISYLATE 5 MG/ML
5 INJECTION INTRAMUSCULAR; INTRAVENOUS EVERY 6 HOURS PRN
Status: DISCONTINUED | OUTPATIENT
Start: 2023-12-28 | End: 2024-01-03 | Stop reason: HOSPADM

## 2023-12-28 RX ORDER — TALC
6 POWDER (GRAM) TOPICAL NIGHTLY PRN
Status: DISCONTINUED | OUTPATIENT
Start: 2023-12-28 | End: 2024-01-03 | Stop reason: HOSPADM

## 2023-12-28 RX ORDER — OXYBUTYNIN CHLORIDE 5 MG/1
15 TABLET, EXTENDED RELEASE ORAL DAILY
Status: DISCONTINUED | OUTPATIENT
Start: 2023-12-28 | End: 2023-12-28

## 2023-12-28 RX ORDER — POLYETHYLENE GLYCOL 3350 17 G/17G
17 POWDER, FOR SOLUTION ORAL DAILY
Status: DISCONTINUED | OUTPATIENT
Start: 2023-12-28 | End: 2024-01-03 | Stop reason: HOSPADM

## 2023-12-28 RX ORDER — HYDROCHLOROTHIAZIDE 25 MG/1
25 TABLET ORAL DAILY
Status: DISCONTINUED | OUTPATIENT
Start: 2023-12-28 | End: 2023-12-28

## 2023-12-28 RX ORDER — POTASSIUM CHLORIDE 20 MEQ/1
40 TABLET, EXTENDED RELEASE ORAL ONCE
Status: COMPLETED | OUTPATIENT
Start: 2023-12-28 | End: 2023-12-28

## 2023-12-28 RX ORDER — GLUCAGON 1 MG
1 KIT INJECTION
Status: DISCONTINUED | OUTPATIENT
Start: 2023-12-28 | End: 2024-01-03 | Stop reason: HOSPADM

## 2023-12-28 RX ORDER — IBUPROFEN 200 MG
24 TABLET ORAL
Status: DISCONTINUED | OUTPATIENT
Start: 2023-12-28 | End: 2024-01-03 | Stop reason: HOSPADM

## 2023-12-28 RX ORDER — POTASSIUM CHLORIDE 20 MEQ/1
20 TABLET, EXTENDED RELEASE ORAL ONCE
Status: COMPLETED | OUTPATIENT
Start: 2023-12-28 | End: 2023-12-28

## 2023-12-28 RX ORDER — ONDANSETRON 2 MG/ML
4 INJECTION INTRAMUSCULAR; INTRAVENOUS EVERY 8 HOURS PRN
Status: DISCONTINUED | OUTPATIENT
Start: 2023-12-28 | End: 2024-01-03 | Stop reason: HOSPADM

## 2023-12-28 RX ORDER — ACETAMINOPHEN 325 MG/1
650 TABLET ORAL EVERY 4 HOURS PRN
Status: DISCONTINUED | OUTPATIENT
Start: 2023-12-28 | End: 2024-01-03 | Stop reason: HOSPADM

## 2023-12-28 RX ORDER — SODIUM CHLORIDE 0.9 % (FLUSH) 0.9 %
10 SYRINGE (ML) INJECTION EVERY 12 HOURS PRN
Status: DISCONTINUED | OUTPATIENT
Start: 2023-12-28 | End: 2024-01-03 | Stop reason: HOSPADM

## 2023-12-28 RX ORDER — ONDANSETRON 4 MG/1
4 TABLET, ORALLY DISINTEGRATING ORAL
Status: COMPLETED | OUTPATIENT
Start: 2023-12-28 | End: 2023-12-28

## 2023-12-28 RX ORDER — NALOXONE HCL 0.4 MG/ML
0.02 VIAL (ML) INJECTION
Status: DISCONTINUED | OUTPATIENT
Start: 2023-12-28 | End: 2024-01-03 | Stop reason: HOSPADM

## 2023-12-28 RX ORDER — LOSARTAN POTASSIUM 25 MG/1
100 TABLET ORAL DAILY
Status: DISCONTINUED | OUTPATIENT
Start: 2023-12-28 | End: 2024-01-03 | Stop reason: HOSPADM

## 2023-12-28 RX ORDER — KETOROLAC TROMETHAMINE 30 MG/ML
30 INJECTION, SOLUTION INTRAMUSCULAR; INTRAVENOUS
Status: COMPLETED | OUTPATIENT
Start: 2023-12-28 | End: 2023-12-28

## 2023-12-28 RX ORDER — HYDROCODONE BITARTRATE AND ACETAMINOPHEN 5; 325 MG/1; MG/1
1 TABLET ORAL EVERY 6 HOURS PRN
Status: DISCONTINUED | OUTPATIENT
Start: 2023-12-28 | End: 2024-01-03 | Stop reason: HOSPADM

## 2023-12-28 RX ORDER — HYDROCODONE BITARTRATE AND ACETAMINOPHEN 10; 325 MG/1; MG/1
1 TABLET ORAL EVERY 6 HOURS PRN
Status: DISCONTINUED | OUTPATIENT
Start: 2023-12-28 | End: 2024-01-03 | Stop reason: HOSPADM

## 2023-12-28 RX ORDER — IBUPROFEN 200 MG
16 TABLET ORAL
Status: DISCONTINUED | OUTPATIENT
Start: 2023-12-28 | End: 2024-01-03 | Stop reason: HOSPADM

## 2023-12-28 RX ORDER — METOPROLOL SUCCINATE 50 MG/1
50 TABLET, EXTENDED RELEASE ORAL DAILY
Status: DISCONTINUED | OUTPATIENT
Start: 2023-12-28 | End: 2024-01-02

## 2023-12-28 RX ADMIN — DICYCLOMINE HYDROCHLORIDE 10 MG: 10 CAPSULE ORAL at 08:12

## 2023-12-28 RX ADMIN — ONDANSETRON 4 MG: 4 TABLET, ORALLY DISINTEGRATING ORAL at 01:12

## 2023-12-28 RX ADMIN — POTASSIUM CHLORIDE 40 MEQ: 1500 TABLET, EXTENDED RELEASE ORAL at 10:12

## 2023-12-28 RX ADMIN — MELATONIN TAB 3 MG 6 MG: 3 TAB at 08:12

## 2023-12-28 RX ADMIN — METOPROLOL SUCCINATE 50 MG: 50 TABLET, EXTENDED RELEASE ORAL at 10:12

## 2023-12-28 RX ADMIN — POTASSIUM CHLORIDE 20 MEQ: 1500 TABLET, EXTENDED RELEASE ORAL at 12:12

## 2023-12-28 RX ADMIN — HYDROCODONE BITARTRATE AND ACETAMINOPHEN 1 TABLET: 5; 325 TABLET ORAL at 08:12

## 2023-12-28 RX ADMIN — HYDROCODONE BITARTRATE AND ACETAMINOPHEN 1 TABLET: 5; 325 TABLET ORAL at 01:12

## 2023-12-28 RX ADMIN — LOSARTAN POTASSIUM 100 MG: 25 TABLET, FILM COATED ORAL at 10:12

## 2023-12-28 RX ADMIN — DULOXETINE HYDROCHLORIDE 20 MG: 20 CAPSULE, DELAYED RELEASE ORAL at 10:12

## 2023-12-28 RX ADMIN — POTASSIUM CHLORIDE 40 MEQ: 1500 TABLET, EXTENDED RELEASE ORAL at 06:12

## 2023-12-28 RX ADMIN — HYDROCODONE BITARTRATE AND ACETAMINOPHEN 1 TABLET: 10; 325 TABLET ORAL at 10:12

## 2023-12-28 RX ADMIN — OXYBUTYNIN CHLORIDE 15 MG: 5 TABLET, EXTENDED RELEASE ORAL at 10:12

## 2023-12-28 RX ADMIN — KETOROLAC TROMETHAMINE 30 MG: 30 INJECTION, SOLUTION INTRAMUSCULAR; INTRAVENOUS at 01:12

## 2023-12-28 RX ADMIN — LORAZEPAM 1 MG: 0.5 TABLET ORAL at 08:12

## 2023-12-28 NOTE — H&P
[facility]  [facility] Medicine  History & Physical    Patient Name: Juli Parra  MRN: [id number]  Patient Class: OP- Observation  Admission Date: 12/28/2023  Attending Physician: Shahriar Moya MD   Primary Care Provider: Jefe Serrano MD         Patient information was obtained from patient, past medical records, and ER records.     Subjective:     Principal Problem:Closed fracture of ramus of left pubis    Chief Complaint:   Chief Complaint   Patient presents with    Fall     Pt seen for a fall 12/12 and hurt left hip at that time, fell again tonight and hurt left hip again, still endorses pain to that hip unrelieved by tramadol        HPI: Juli Parra is a 78 y.o. with a pmh hypotension, GERD, anxiety, depression, and IBS presents to the hospital with complaints of left hip pain after slip and fall this morning. Patient has slipped and fell and landed on her left hip when she was trying to get up to change channel on her TV. Patient did not hit her head or lose consciousness.  Patient states that on 12/12/2023 she fell in similar fashion and hurt her left hip. She was told that she has a hairline fracture of her left hip. Patient takes tramadol relief of her pain. Patient denies any other alleviating or exacerbating factors. Patient is not on any blood thinners.  Patient denies fever, headache, chest pain, shortness on breath, nausea, vomiting, diarrhea, abdominal pain, hematuria, or any other associated symptoms.    In the ED: Patient afebrile without leukocytosis, hypotensive on presentation 147/69, H&H 11.4/34, sodium 131, potassium 2.8, albumin 3.1, CT pelvis shows (1) acute fracture deformity involving the superior and inferior pubic ramus on the right again noted, (2) acute fracture involving the superior and inferior pubic ramus on the left noted, (3) acute fracture involving the sacral ala bilaterally noted, (4) acute fracture involving the transverse process of L4  on the right and L5 bilaterally noted.  Patient given hydrocodone 5 mg, ketorolac 30 mg IV, and Zofran 4 mg IV.    Case discussed with ED provider.    Juli Parra we will be placed under observation for further medical management of her pelvic fracture.    Past Medical History:   Diagnosis Date    Anxiety     GERD (gastroesophageal reflux disease)     HTN (hypertension)     IBS (irritable bowel syndrome)     Kidney stones     OAB (overactive bladder)     Vertigo        Past Surgical History:   Procedure Laterality Date    CATARACT EXTRACTION, BILATERAL      CHOLECYSTECTOMY      HYSTERECTOMY      LITHOTRIPSY         Review of patient's allergies indicates:   Allergen Reactions    Egg Nausea Only    Cipro [ciprofloxacin hcl]     Sulfur Rash       No current facility-administered medications on file prior to encounter.     Current Outpatient Medications on File Prior to Encounter   Medication Sig    chlorhexidine (PERIDEX) 0.12 % solution SMARTSIG:By Mouth    dicyclomine (BENTYL) 10 MG capsule TAKE 1 CAPSULE BY MOUTH FOUR TIMES DAILY EVERY NIGHT BEFORE MEALS    DULoxetine (CYMBALTA) 20 MG capsule TAKE 1 CAPSULE(20 MG) BY MOUTH EVERY DAY    esomeprazole (NEXIUM) 40 MG capsule Take 1 capsule (40 mg total) by mouth before breakfast.    fluticasone propionate (FLONASE) 50 mcg/actuation nasal spray SPRAY ONCE INTO EACH NOSTRIL EVERY DAY (Patient not taking: Reported on 12/20/2023)    hydroCHLOROthiazide (HYDRODIURIL) 25 MG tablet TAKE 1 TABLET(25 MG) BY MOUTH EVERY DAY    ibuprofen (ADVIL,MOTRIN) 800 MG tablet Take 1 tablet (800 mg total) by mouth every 6 (six) hours as needed for Pain.    LIDOcaine (LIDODERM) 5 % Place 1 patch onto the skin once daily. Remove & Discard patch within 12 hours or as directed by MD (Patient not taking: Reported on 12/20/2023)    LORazepam (ATIVAN) 1 MG tablet Take 1 tablet (1 mg total) by mouth every evening.    losartan (COZAAR) 100 MG tablet TAKE 1 TABLET(100 MG) BY MOUTH EVERY DAY     metoprolol succinate (TOPROL-XL) 50 MG 24 hr tablet TAKE 1 TABLET(50 MG) BY MOUTH EVERY DAY    oxybutynin (DITROPAN XL) 15 MG TR24 Take 1 tablet (15 mg total) by mouth once daily.    potassium chloride SA (K-DUR,KLOR-CON M) 10 MEQ tablet TAKE 1 TABLET(10 MEQ) BY MOUTH EVERY DAY    traMADoL (ULTRAM) 50 mg tablet Take 1 tablet (50 mg total) by mouth every 6 (six) hours as needed for Pain. (Patient not taking: Reported on 12/20/2023)    valACYclovir (VALTREX) 500 MG tablet Take by mouth.     Family History       Problem Relation (Age of Onset)    Cancer Brother    Leukemia Father          Tobacco Use    Smoking status: Never    Smokeless tobacco: Never   Substance and Sexual Activity    Alcohol use: Not Currently    Drug use: Never    Sexual activity: Not Currently     Review of Systems   Constitutional: Negative.    HENT: Negative.     Respiratory: Negative.     Cardiovascular: Negative.    Gastrointestinal: Negative.    Genitourinary: Negative.    Musculoskeletal:  Positive for arthralgias and gait problem.   Skin: Negative.    Psychiatric/Behavioral: Negative.       Objective:     Vital Signs (Most Recent):  Temp: 98 °F (36.7 °C) (12/28/23 0131)  Pulse: 60 (12/28/23 0318)  Resp: 16 (12/28/23 0154)  BP: (!) 145/65 (12/28/23 0318)  SpO2: 96 % (12/28/23 0320) Vital Signs (24h Range):  Temp:  [98 °F (36.7 °C)] 98 °F (36.7 °C)  Pulse:  [60-63] 60  Resp:  [16] 16  SpO2:  [95 %-96 %] 96 %  BP: (130-161)/(65-80) 145/65     Weight: 54.4 kg (120 lb)  Body mass index is 25.97 kg/m².     Physical Exam  Constitutional:       General: She is not in acute distress.     Appearance: Normal appearance. She is not ill-appearing.   HENT:      Head: Normocephalic and atraumatic.      Mouth/Throat:      Mouth: Mucous membranes are moist.   Eyes:      Extraocular Movements: Extraocular movements intact.   Cardiovascular:      Rate and Rhythm: Normal rate and regular rhythm.      Pulses: Normal pulses.   Pulmonary:      Effort:  Pulmonary effort is normal. No respiratory distress.   Abdominal:      General: Abdomen is flat.      Palpations: Abdomen is soft.   Musculoskeletal:      Left hip: Tenderness present. Decreased range of motion. Decreased strength.      Right lower leg: No edema.      Left lower leg: No edema.      Comments: Left hip pain upon movement   Skin:     General: Skin is warm.   Neurological:      General: No focal deficit present.      Mental Status: She is alert. Mental status is at baseline.   Psychiatric:         Mood and Affect: Mood normal.             Significant Labs: All pertinent labs within the past 24 hours have been reviewed.  A1C:   Recent Labs   Lab 08/24/23  1516   HGBA1C 5.4     Bilirubin:   Recent Labs   Lab 12/20/23  1001   BILITOT 0.4     TSH:   Recent Labs   Lab 08/10/23  0653   TSH 1.606     Significant Imaging: I have reviewed all pertinent imaging results/findings within the past 24 hours.  Imaging Results               CT Pelvis Without Contrast (Final result)  Result time 12/28/23 02:50:44      Final result by Valdez Earl MD (12/28/23 02:50:44)                   Impression:      Acute fracture deformity involving the superior and inferior pubic ramus on the right again noted.    Acute fracture involving the superior and inferior pubic ramus on the left noted.    Acute fracture involving the sacral ala bilaterally noted.    Acute fracture involving the transverse process of L4 on the right and L5 bilaterally noted.    This report was flagged in Epic as abnormal.      Electronically signed by: Valdez Earl  Date:    12/28/2023  Time:    02:50               Narrative:    EXAMINATION:  CT PELVIS WITHOUT CONTRAST    CLINICAL HISTORY:  Pelvic trauma;Left hip pain status post fall;    TECHNIQUE:  CT examination of the pelvis was performed.  Intravenous contrast and oral contrast was not utilized.  Axial imaging, sagittal and coronal reconstruction imaging is submitted.    COMPARISON:  CT  examination of the abdomen and pelvis September 17, 2022, CT examination of the right hip December 12, 2023    FINDINGS:  As on the CT examination of the right hip of December 12, 2023, acute fracture involving the superior and inferior pubic ramus on the right is again noted.    There is acute fracture involving the inferior and superior pubic ramus on the left as well.    There is acute fracture involving the transverse process of L4 on the right, there is acute fracture involving the transverse process of L5 bilaterally, and there are fractures involving the sacral ala bilaterally.    The hip joints otherwise appear intact, there is no evidence for glenohumeral dislocation in the visualized proximal femurs appear intact.  The osseous structures otherwise demonstrate chronic change with diminished mineralization and degenerative change noted.                                    Assessment/Plan:     * Closed fracture of ramus of left pubis  -Patient complaining of left hip pain after a slip and fall earlier when she was trying to get out of her chair. Patient landed on left hip, denies any head injury, and loss of consciousness.  Patient has a previous history of hairline pelvic fracture on 12/12/2023.  -CT of pelvis was ordered: (1) acute fracture involving superior and inferior pubic rami bilaterally (2) acute fracture of sacral ala bilaterally (3) acute fracture involving transverse process of L4 and L5 bilaterally  -Patient received Norco/Zofran in the emergency department and states pain is much improved  -ED provided consulted Orthopedic surgery Dr. Jimenez who recommended that patient be admitted to the hospital for pain control, PT/OT, and orthopedic consultation.     Plan:  -PT/OT consulted  -Orthopedic consulted and would appreciate recommendations  -p.r.n. pain medications ordered  -continue supportive care    Closed fracture of right inferior pubic ramus  -see above    Closed fracture of sacrum  -see  above    Hypokalemia  Patient has hypokalemia which is Acute and currently uncontrolled. Most recent potassium levels reviewed-   Lab Results   Component Value Date    K 2.8 (L) 12/28/2023     Ordered potassium chloride 40 mEq  Will continue potassium replacement per protocol and recheck repeat levels after replacement completed    Closed fracture of transverse process of lumbar vertebra  -see above    Essential hypertension  Chronic, controlled. Latest blood pressure and vitals reviewed-     Temp:  [98 °F (36.7 °C)]   Pulse:  [60-63]   Resp:  [16]   BP: (130-161)/(65-80)   SpO2:  [95 %-96 %] .   Home meds for hypertension were reviewed and noted below.   Hypertension Medications               hydroCHLOROthiazide (HYDRODIURIL) 25 MG tablet TAKE 1 TABLET(25 MG) BY MOUTH EVERY DAY    losartan (COZAAR) 100 MG tablet TAKE 1 TABLET(100 MG) BY MOUTH EVERY DAY    metoprolol succinate (TOPROL-XL) 50 MG 24 hr tablet TAKE 1 TABLET(50 MG) BY MOUTH EVERY DAY            While in the hospital, will manage blood pressure as follows; Continue home antihypertensive regimen with parameters    Will utilize p.r.n. blood pressure medication only if patient's blood pressure greater than 180/110 and she develops symptoms such as worsening chest pain or shortness of breath.    Gastroesophageal reflux disease  Stable and continue home therapy with Nexium      VTE Risk Mitigation (From admission, onward)           Ordered     IP VTE HIGH RISK PATIENT  Once         12/28/23 0339     Place sequential compression device  Until discontinued         12/28/23 0339                    On 12/28/2023, patient should be placed in hospital observation services under my care in collaboration with Giovanni Moreno MD.      AdmissionCare    Guideline: General Observation, Observation    Based on the indications selected for the patient, the bed status of Admit to Observation was determined to be MET    The following indications were selected as present at the  time of evaluation of the patient:      - Clinical care (eg, testing, monitoring, or treatment) needed beyond the usual emergency department time frame (eg, 3 to 4 hours)   - Clinical care needed is not appropriate for a lower level of care (ie, discharge to outpatient setting not appropriate).   Patient has clinical condition for which observation care is needed, as indicated by 1 or more of the following:   -     - Musculoskeletal condition or finding (eg, injury, dislocation, fracture, suspected joint or bone infection, trauma, exacerbation of rheumatologic disease, suspected rhabdomyolysis, suspected compartment syndrome, frostbite)    AdmissionCare documentation entered by: Giselle Piper    [facility], 27th edition, Copyright © 2023 [facility], [facility] All Rights Reserved.  [id number]:21:48-06:00    Urvashi Pineda PA-C  Department of Hospital Medicine  [facility] [place] - Emergency Dept

## 2023-12-28 NOTE — ASSESSMENT & PLAN NOTE
Chronic, controlled. Latest blood pressure and vitals reviewed-     Temp:  [98 °F (36.7 °C)]   Pulse:  [60-63]   Resp:  [16]   BP: (130-161)/(65-80)   SpO2:  [95 %-96 %] .   Home meds for hypertension were reviewed and noted below.   Hypertension Medications               hydroCHLOROthiazide (HYDRODIURIL) 25 MG tablet TAKE 1 TABLET(25 MG) BY MOUTH EVERY DAY    losartan (COZAAR) 100 MG tablet TAKE 1 TABLET(100 MG) BY MOUTH EVERY DAY    metoprolol succinate (TOPROL-XL) 50 MG 24 hr tablet TAKE 1 TABLET(50 MG) BY MOUTH EVERY DAY            While in the hospital, will manage blood pressure as follows; Continue home antihypertensive regimen with parameters    Will utilize p.r.n. blood pressure medication only if patient's blood pressure greater than 180/110 and she develops symptoms such as worsening chest pain or shortness of breath.

## 2023-12-28 NOTE — CARE UPDATE
I agree with ROSIE Roque assessment and plan. Admission to hospital for acute left sided rami fractures and sacral ala insufficiency fractures and new transverse process fractures L4 L5 after the recliner slip back while patient was in sitting position which resulted in a mechanical fall.   No pain as long as patient in not turning in bed. When turning pain Right hip >left hip. Denies leg pain or numbness.   Encourage turning every 2 hours. All extremities strength 5/5   Patient known to ortho surgery Dr. Sam Dubois outpatient for right rami fracture a month ago. Francis for NWBAT for rami and sacral fractures  Discussed with Neurosurgery plan of care. Will get CT lumbar without contrast.  PT/OT consult   Pain control    Sherrell Malone NP  Staff: Shahriar Moya MD

## 2023-12-28 NOTE — PT/OT/SLP PROGRESS
Physical Therapy      Patient Name:  Juli Parra   MRN:  [id number]    Patient not seen today (10:00) secondary to  (Awaiting Ortho consult).     13:07 Awaiting NeuroSx consult, Will Follow up

## 2023-12-28 NOTE — PT/OT/SLP PROGRESS
Occupational Therapy      Patient Name:  Juli Parra   MRN:  [id number]    Patient not seen today secondary to  (Awaiting Ortho consult) in am      13:07 Awaiting NeuroSx consult in pm, Will Follow up when medically available,     1444: patient awaiting Lumbar CT per RN     12/28/2023

## 2023-12-28 NOTE — ED TRIAGE NOTES
Juli Parra, a 78 y.o. female presents to the ED w/ complaint of worsened pain to left hip after mechanical fall at home. Pt states she tripped over a rug at home while trying to get out of chair while watching TV. Pt fell on same hip with recent previous dx of fracture (left). Pt denies tenderness with palpation. Took home pain meds with no relief. Pulses WNL and equal bilaterally in lower extremities. Denies hitting head or LOC    Triage note:  Chief Complaint   Patient presents with    Fall     Pt seen for a fall 12/12 and hurt left hip at that time, fell again tonight and hurt left hip again, still endorses pain to that hip unrelieved by tramadol     Review of patient's allergies indicates:   Allergen Reactions    Egg Nausea Only    Cipro [ciprofloxacin hcl]     Sulfur Rash     Past Medical History:   Diagnosis Date    Anxiety     GERD (gastroesophageal reflux disease)     HTN (hypertension)     IBS (irritable bowel syndrome)     Kidney stones     OAB (overactive bladder)     Vertigo

## 2023-12-28 NOTE — ED PROVIDER NOTES
"Encounter Date: 12/28/2023       History     Chief Complaint   Patient presents with    Fall     Pt seen for a fall 12/12 and hurt left hip at that time, fell again tonight and hurt left hip again, still endorses pain to that hip unrelieved by tramadol     78-year-old female with a past medical history significant for anxiety, GERD, hypertension, IBS, kidney stones and vertigo presents to the emergency department complaining of left hip pain after slip and fall this morning.  Patient states she was seated in a chair and was trying to get up to change the channel on her TV when the chair slipped on a rug and she landed on her left hip.  She denies head injury and loss of consciousness.  She states she has been seen in the emergency department previously for left hip pain and was told she has a "hairline" pelvic fracture.  She endorses taking tramadol after re-injuring her left hip without relief of pain.    The history is provided by the patient. No  was used.     Review of patient's allergies indicates:   Allergen Reactions    Egg Nausea Only    Cipro [ciprofloxacin hcl]     Sulfur Rash     Past Medical History:   Diagnosis Date    Anxiety     GERD (gastroesophageal reflux disease)     HTN (hypertension)     IBS (irritable bowel syndrome)     Kidney stones     OAB (overactive bladder)     Vertigo      Past Surgical History:   Procedure Laterality Date    CATARACT EXTRACTION, BILATERAL      CHOLECYSTECTOMY      HYSTERECTOMY      LITHOTRIPSY       Family History   Problem Relation Age of Onset    Leukemia Father     Cancer Brother         Pancreatic     Social History     Tobacco Use    Smoking status: Never    Smokeless tobacco: Never   Substance Use Topics    Alcohol use: Not Currently    Drug use: Never     Review of Systems   Constitutional:  Negative for chills and fever.   Respiratory:  Negative for shortness of breath.    Cardiovascular:  Negative for chest pain.   Musculoskeletal:  Positive " for arthralgias. Negative for back pain and neck pain.   Neurological:  Negative for syncope.   All other systems reviewed and are negative.      Physical Exam     Initial Vitals [12/28/23 0131]   BP Pulse Resp Temp SpO2   (!) 147/69 63 16 98 °F (36.7 °C) 96 %      MAP       --         Physical Exam    Nursing note and vitals reviewed.  Constitutional: She is not diaphoretic. No distress.   HENT:   Head: Normocephalic and atraumatic.   Right Ear: External ear normal.   Left Ear: External ear normal.   Mouth/Throat: Oropharynx is clear and moist.   Eyes: Conjunctivae are normal.   Neck:   Normal range of motion.  Cardiovascular:  Normal rate, regular rhythm and normal heart sounds.           Pulmonary/Chest: Breath sounds normal. No respiratory distress.   Abdominal: Abdomen is soft. Bowel sounds are normal. There is no abdominal tenderness.   Musculoskeletal:      Cervical back: Normal range of motion.      Comments: Left hip pain upon movement without obvious left lower extremity shortening or left hip/pelvis tenderness to palpation     Neurological: She is alert. She has normal strength.   Skin: Skin is warm and dry. Capillary refill takes less than 2 seconds.   Psychiatric: She has a normal mood and affect. Thought content normal.         ED Course   Critical Care    Date/Time: 12/28/2023 3:12 AM    Performed by: Carroll Frazier MD  Authorized by: Carroll Frazier MD  Direct patient critical care time: 10 minutes  Additional history critical care time: 10 minutes  Ordering / reviewing critical care time: 10 minutes  Documentation critical care time: 10 minutes  Consulting other physicians critical care time: 10 minutes  Consult with family critical care time: 10 minutes  Total critical care time (exclusive of procedural time) : 60 minutes  Critical care was necessary to treat or prevent imminent or life-threatening deterioration of the following conditions: trauma.  Critical care was time spent personally by  me on the following activities: development of treatment plan with patient or surrogate, discussions with consultants, evaluation of patient's response to treatment, examination of patient, obtaining history from patient or surrogate, ordering and performing treatments and interventions, ordering and review of laboratory studies, ordering and review of radiographic studies, re-evaluation of patient's condition and review of old charts.        Labs Reviewed   COMPREHENSIVE METABOLIC PANEL - Abnormal; Notable for the following components:       Result Value    Sodium 131 (*)     Potassium 2.8 (*)     Glucose 128 (*)     Albumin 3.1 (*)     All other components within normal limits   CBC W/ AUTO DIFFERENTIAL - Abnormal; Notable for the following components:    RBC 3.75 (*)     Hemoglobin 11.4 (*)     Hematocrit 34.0 (*)     MPV 8.5 (*)     Immature Granulocytes 1.1 (*)     Immature Grans (Abs) 0.09 (*)     Lymph # 0.8 (*)     Gran % 73.5 (*)     Lymph % 9.9 (*)     All other components within normal limits   MAGNESIUM   PHOSPHORUS          Imaging Results               CT Pelvis Without Contrast (Final result)  Result time 12/28/23 02:50:44      Final result by Valdez Earl MD (12/28/23 02:50:44)                   Impression:      Acute fracture deformity involving the superior and inferior pubic ramus on the right again noted.    Acute fracture involving the superior and inferior pubic ramus on the left noted.    Acute fracture involving the sacral ala bilaterally noted.    Acute fracture involving the transverse process of L4 on the right and L5 bilaterally noted.    This report was flagged in Epic as abnormal.      Electronically signed by: Valdez Earl  Date:    12/28/2023  Time:    02:50               Narrative:    EXAMINATION:  CT PELVIS WITHOUT CONTRAST    CLINICAL HISTORY:  Pelvic trauma;Left hip pain status post fall;    TECHNIQUE:  CT examination of the pelvis was performed.  Intravenous contrast and  oral contrast was not utilized.  Axial imaging, sagittal and coronal reconstruction imaging is submitted.    COMPARISON:  CT examination of the abdomen and pelvis September 17, 2022, CT examination of the right hip December 12, 2023    FINDINGS:  As on the CT examination of the right hip of December 12, 2023, acute fracture involving the superior and inferior pubic ramus on the right is again noted.    There is acute fracture involving the inferior and superior pubic ramus on the left as well.    There is acute fracture involving the transverse process of L4 on the right, there is acute fracture involving the transverse process of L5 bilaterally, and there are fractures involving the sacral ala bilaterally.    The hip joints otherwise appear intact, there is no evidence for glenohumeral dislocation in the visualized proximal femurs appear intact.  The osseous structures otherwise demonstrate chronic change with diminished mineralization and degenerative change noted.                                       Medications   sodium chloride 0.9% flush 10 mL (has no administration in time range)   naloxone 0.4 mg/mL injection 0.02 mg (has no administration in time range)   glucose chewable tablet 16 g (has no administration in time range)   glucose chewable tablet 24 g (has no administration in time range)   dextrose 10% bolus 125 mL 125 mL (has no administration in time range)   dextrose 10% bolus 250 mL 250 mL (has no administration in time range)   glucagon (human recombinant) injection 1 mg (has no administration in time range)   acetaminophen tablet 650 mg (has no administration in time range)   HYDROcodone-acetaminophen 5-325 mg per tablet 1 tablet (1 tablet Oral Given 12/29/23 0435)   HYDROcodone-acetaminophen  mg per tablet 1 tablet (1 tablet Oral Given 12/28/23 1002)   ondansetron injection 4 mg (has no administration in time range)   polyethylene glycol packet 17 g (17 g Oral Not Given 12/28/23 0900)  "  prochlorperazine injection Soln 5 mg (has no administration in time range)   melatonin tablet 6 mg (6 mg Oral Given 12/28/23 2019)   DULoxetine DR capsule 20 mg (20 mg Oral Given 12/28/23 1002)   losartan tablet 100 mg (100 mg Oral Given 12/28/23 1002)   metoprolol succinate (TOPROL-XL) 24 hr tablet 50 mg (50 mg Oral Given 12/28/23 1003)   dicyclomine capsule 10 mg (10 mg Oral Given 12/28/23 2019)   LORazepam tablet 1 mg (1 mg Oral Given 12/28/23 2019)   HYDROcodone-acetaminophen 5-325 mg per tablet 1 tablet (1 tablet Oral Given 12/28/23 0154)   ondansetron disintegrating tablet 4 mg (4 mg Oral Given 12/28/23 0154)   ketorolac injection 30 mg (30 mg Intramuscular Given 12/28/23 0154)   potassium chloride SA CR tablet 40 mEq (40 mEq Oral Given 12/28/23 0638)   potassium chloride SA CR tablet 40 mEq (40 mEq Oral Given 12/28/23 1003)   potassium chloride SA CR tablet 20 mEq (20 mEq Oral Given 12/28/23 1241)     Medical Decision Making  78-year-old female with a past medical history significant for anxiety, GERD, hypertension, IBS, kidney stones and vertigo presents to the emergency department complaining of left hip pain after slip and fall this morning.  Patient states she was seated in a chair and was trying to get up to change the channel on her TV when the chair slipped on a rug and she landed on her left hip.  She denies head injury and loss of consciousness.  She states she has been seen in the emergency department previously for left hip pain and was told she has a "hairline" pelvic fracture.  She endorses taking tramadol after re-injuring her left hip without relief of pain.  Course of ED stay:   CT of pelvis was ordered and shows acute fracture involving superior and inferior pubic rami bilaterally, acute fracture of sacral ala bilaterally, acute fracture involving transverse process of L4 and L5 bilaterally.  Patient received Norco/Zofran in the emergency department and states pain is much " improved.  Consulted Orthopedic surgery on-call (Dr. Jimenez) who recommends admission to hospital medicine for pain control, PT/OT and orthopedic consultation.    Amount and/or Complexity of Data Reviewed  Radiology: ordered.    Risk  Prescription drug management.                                      Clinical Impression:  Final diagnoses:  [S32.592A] Closed fracture of ramus of left pubis, initial encounter (Primary)  [S32.591G] Closed fracture of right inferior pubic ramus with delayed healing, subsequent encounter  [S32.9XXA] Pelvic fracture  [S32.009A] Closed fracture of spinous process of lumbar vertebra, initial encounter  [S32.10XA] Closed fracture of sacrum, unspecified portion of sacrum, initial encounter          ED Disposition Condition    Observation                 Carroll Frazier MD  12/29/23 3955

## 2023-12-28 NOTE — HOSPITAL COURSE
Closed fracture of ramus of left pubis  -Patient complaining of left hip pain after a slip and fall earlier when she was trying to get out of her chair. Patient landed on left hip, denies any head injury, and loss of consciousness.  Patient has a previous history of hairline pelvic fracture on 12/12/2023.  -CT of pelvis was ordered: (1) acute fracture involving superior and inferior pubic rami bilaterally (2) acute fracture of sacral ala bilaterally (3) acute fracture involving transverse process of L4 and L5 bilaterally  -Patient received Norco/Zofran in the emergency department and states pain is much improved  -ED provided consulted Orthopedic surgery Dr. Jimenez who recommended that patient be admitted to the hospital for pain control, PT/OT, and orthopedic consultation.      Plan:  -PT/OT consulted  -Orthopedic consulted. No surgery needed at this time per ortho  -p.r.n. pain medications   -DC to SNF        Slow transit constipation  -bowel reg ordered   -milk of mag, dulcolax suppository ordered   -abdominal cramping on 1/2 after suppository  -pt had a BM prior to DC           Closed fracture of transverse process of lumbar vertebra  -see above  -NSGY consulted.   -per NSGY, continue to wear TLSO brace, marcos when getting up  -obtain MRIs of the thoracic and lumbar spine to better characterize the patient's fractures  -MRI shows:   1.Recent burst compression fracture of the T12 vertebral body with questionable involvement of the right pedicle, 50% height loss and 3 mm of osseous retropulsion contributing to mild spinal canal stenosis.  No cord compression.  No marrow replacement process.  No epidural or paraspinal fluid collection or enhancing mass.   2. Recent insufficiency fractures of the bilateral sacral ala with extension into the S3 segment.   3. Recent nondisplaced fractures of the bilateral L5 transverse processes.   4. Marrow edema within the right T11 pedicle, possibly sequela of a nondisplaced  fracture.   5. Multilevel spondylosis as detailed above.      -per NSGY recs, ordered upright spinal XR which shows Similar appearance of the T12 compression deformity.      -continue to work with PT/OT, DC to SNF  -OP f/u with ortho and NSGY     Closed fracture of sacrum  -see above  -NSGY consulted. Pending recs      Closed fracture of right inferior pubic ramus  -see above     Gastroesophageal reflux disease  Stable and continue home therapy with Nexium

## 2023-12-28 NOTE — CONSULTS
Patient known to me.  New orthopedic consult for new fall.  Last saw the patient a week ago.  She had known rami fractures on the right.  Her pain overall was doing fair with regard to the healing of those fractures.  She had symptoms at that time that were more consistent with a lumbar based pain.  I obtained radiographs of her lumbar spine which demonstrated worsening compression deformity her lower thoracic area.  I would send her to outpatient pain management for further workup of that.  Unfortunately she sustained a another fall.  She has new left-sided rami fractures on CT.  She also has some sacral ala insufficiency fractures and new transverse process fractures of her lower lumbar segment.  I have asked for neurosurgery evaluation of the sacral fractures as well as these new transverse process and previous compression fractures.  For her new left-sided rami fractures.  She can be weight-bearing as tolerated with a walker and protective weight-bearing on this side.  This not require surgical intervention.  This will be more of a pain management and pain control along with whatever recommendations neurosurgery may have.  Will see the patient again in the outpatient setting for these new fractures into follow along with her healing.

## 2023-12-28 NOTE — ASSESSMENT & PLAN NOTE
-Patient complaining of left hip pain after a slip and fall earlier when she was trying to get out of her chair. Patient landed on left hip, denies any head injury, and loss of consciousness.  Patient has a previous history of hairline pelvic fracture on 12/12/2023.  -CT of pelvis was ordered: (1) acute fracture involving superior and inferior pubic rami bilaterally (2) acute fracture of sacral ala bilaterally (3) acute fracture involving transverse process of L4 and L5 bilaterally  -Patient received Norco/Zofran in the emergency department and states pain is much improved  -ED provided consulted Orthopedic surgery Dr. Jimenez who recommended that patient be admitted to the hospital for pain control, PT/OT, and orthopedic consultation.     Plan:  -PT/OT consulted  -Orthopedic consulted and would appreciate recommendations  -p.r.n. pain medications ordered  -continue supportive care

## 2023-12-28 NOTE — SUBJECTIVE & OBJECTIVE
Past Medical History:   Diagnosis Date    Anxiety     GERD (gastroesophageal reflux disease)     HTN (hypertension)     IBS (irritable bowel syndrome)     Kidney stones     OAB (overactive bladder)     Vertigo        Past Surgical History:   Procedure Laterality Date    CATARACT EXTRACTION, BILATERAL      CHOLECYSTECTOMY      HYSTERECTOMY      LITHOTRIPSY         Review of patient's allergies indicates:   Allergen Reactions    Egg Nausea Only    Cipro [ciprofloxacin hcl]     Sulfur Rash       No current facility-administered medications on file prior to encounter.     Current Outpatient Medications on File Prior to Encounter   Medication Sig    chlorhexidine (PERIDEX) 0.12 % solution SMARTSIG:By Mouth    dicyclomine (BENTYL) 10 MG capsule TAKE 1 CAPSULE BY MOUTH FOUR TIMES DAILY EVERY NIGHT BEFORE MEALS    DULoxetine (CYMBALTA) 20 MG capsule TAKE 1 CAPSULE(20 MG) BY MOUTH EVERY DAY    esomeprazole (NEXIUM) 40 MG capsule Take 1 capsule (40 mg total) by mouth before breakfast.    fluticasone propionate (FLONASE) 50 mcg/actuation nasal spray SPRAY ONCE INTO EACH NOSTRIL EVERY DAY (Patient not taking: Reported on 12/20/2023)    hydroCHLOROthiazide (HYDRODIURIL) 25 MG tablet TAKE 1 TABLET(25 MG) BY MOUTH EVERY DAY    ibuprofen (ADVIL,MOTRIN) 800 MG tablet Take 1 tablet (800 mg total) by mouth every 6 (six) hours as needed for Pain.    LIDOcaine (LIDODERM) 5 % Place 1 patch onto the skin once daily. Remove & Discard patch within 12 hours or as directed by MD (Patient not taking: Reported on 12/20/2023)    LORazepam (ATIVAN) 1 MG tablet Take 1 tablet (1 mg total) by mouth every evening.    losartan (COZAAR) 100 MG tablet TAKE 1 TABLET(100 MG) BY MOUTH EVERY DAY    metoprolol succinate (TOPROL-XL) 50 MG 24 hr tablet TAKE 1 TABLET(50 MG) BY MOUTH EVERY DAY    oxybutynin (DITROPAN XL) 15 MG TR24 Take 1 tablet (15 mg total) by mouth once daily.    potassium chloride SA (K-DUR,KLOR-CON M) 10 MEQ tablet TAKE 1 TABLET(10 MEQ) BY  MOUTH EVERY DAY    traMADoL (ULTRAM) 50 mg tablet Take 1 tablet (50 mg total) by mouth every 6 (six) hours as needed for Pain. (Patient not taking: Reported on 12/20/2023)    valACYclovir (VALTREX) 500 MG tablet Take by mouth.     Family History       Problem Relation (Age of Onset)    Cancer Brother    Leukemia Father          Tobacco Use    Smoking status: Never    Smokeless tobacco: Never   Substance and Sexual Activity    Alcohol use: Not Currently    Drug use: Never    Sexual activity: Not Currently     Review of Systems   Constitutional: Negative.    HENT: Negative.     Respiratory: Negative.     Cardiovascular: Negative.    Gastrointestinal: Negative.    Genitourinary: Negative.    Musculoskeletal:  Positive for arthralgias and gait problem.   Skin: Negative.    Psychiatric/Behavioral: Negative.       Objective:     Vital Signs (Most Recent):  Temp: 98 °F (36.7 °C) (12/28/23 0131)  Pulse: 60 (12/28/23 0318)  Resp: 16 (12/28/23 0154)  BP: (!) 145/65 (12/28/23 0318)  SpO2: 96 % (12/28/23 0320) Vital Signs (24h Range):  Temp:  [98 °F (36.7 °C)] 98 °F (36.7 °C)  Pulse:  [60-63] 60  Resp:  [16] 16  SpO2:  [95 %-96 %] 96 %  BP: (130-161)/(65-80) 145/65     Weight: 54.4 kg (120 lb)  Body mass index is 25.97 kg/m².     Physical Exam  Constitutional:       General: She is not in acute distress.     Appearance: Normal appearance. She is not ill-appearing.   HENT:      Head: Normocephalic and atraumatic.      Mouth/Throat:      Mouth: Mucous membranes are moist.   Eyes:      Extraocular Movements: Extraocular movements intact.   Cardiovascular:      Rate and Rhythm: Normal rate and regular rhythm.      Pulses: Normal pulses.   Pulmonary:      Effort: Pulmonary effort is normal. No respiratory distress.   Abdominal:      General: Abdomen is flat.      Palpations: Abdomen is soft.   Musculoskeletal:      Left hip: Tenderness present. Decreased range of motion. Decreased strength.      Right lower leg: No edema.      Left  lower leg: No edema.      Comments: Left hip pain upon movement   Skin:     General: Skin is warm.   Neurological:      General: No focal deficit present.      Mental Status: She is alert. Mental status is at baseline.   Psychiatric:         Mood and Affect: Mood normal.             Significant Labs: All pertinent labs within the past 24 hours have been reviewed.  A1C:   Recent Labs   Lab 08/24/23  1516   HGBA1C 5.4     Bilirubin:   Recent Labs   Lab 12/20/23  1001   BILITOT 0.4     TSH:   Recent Labs   Lab 08/10/23  0653   TSH 1.606     Significant Imaging: I have reviewed all pertinent imaging results/findings within the past 24 hours.  Imaging Results               CT Pelvis Without Contrast (Final result)  Result time 12/28/23 02:50:44      Final result by Valdez Earl MD (12/28/23 02:50:44)                   Impression:      Acute fracture deformity involving the superior and inferior pubic ramus on the right again noted.    Acute fracture involving the superior and inferior pubic ramus on the left noted.    Acute fracture involving the sacral ala bilaterally noted.    Acute fracture involving the transverse process of L4 on the right and L5 bilaterally noted.    This report was flagged in Epic as abnormal.      Electronically signed by: Valdez Earl  Date:    12/28/2023  Time:    02:50               Narrative:    EXAMINATION:  CT PELVIS WITHOUT CONTRAST    CLINICAL HISTORY:  Pelvic trauma;Left hip pain status post fall;    TECHNIQUE:  CT examination of the pelvis was performed.  Intravenous contrast and oral contrast was not utilized.  Axial imaging, sagittal and coronal reconstruction imaging is submitted.    COMPARISON:  CT examination of the abdomen and pelvis September 17, 2022, CT examination of the right hip December 12, 2023    FINDINGS:  As on the CT examination of the right hip of December 12, 2023, acute fracture involving the superior and inferior pubic ramus on the right is again  noted.    There is acute fracture involving the inferior and superior pubic ramus on the left as well.    There is acute fracture involving the transverse process of L4 on the right, there is acute fracture involving the transverse process of L5 bilaterally, and there are fractures involving the sacral ala bilaterally.    The hip joints otherwise appear intact, there is no evidence for glenohumeral dislocation in the visualized proximal femurs appear intact.  The osseous structures otherwise demonstrate chronic change with diminished mineralization and degenerative change noted.

## 2023-12-28 NOTE — ADMISSIONCARE
AdmissionCare    Guideline: General Observation, Observation    Based on the indications selected for the patient, the bed status of Admit to Observation was determined to be MET    The following indications were selected as present at the time of evaluation of the patient:      - Clinical care (eg, testing, monitoring, or treatment) needed beyond the usual emergency department time frame (eg, 3 to 4 hours)   - Clinical care needed is not appropriate for a lower level of care (ie, discharge to outpatient setting not appropriate).   Patient has clinical condition for which observation care is needed, as indicated by 1 or more of the following:   -     - Musculoskeletal condition or finding (eg, injury, dislocation, fracture, suspected joint or bone infection, trauma, exacerbation of rheumatologic disease, suspected rhabdomyolysis, suspected compartment syndrome, frostbite)    AdmissionCare documentation entered by: Giselle Piper    [facility], 27th edition, Copyright © 2023 [facility], [facility] All Rights Reserved.  [id number]:00

## 2023-12-28 NOTE — HPI
Juli Parra is a 78 y.o. with a pmh hypotension, GERD, anxiety, depression, and IBS presents to the hospital with complaints of left hip pain after slip and fall this morning. Patient has slipped and fell and landed on her left hip when she was trying to get up to change channel on her TV. Patient did not hit her head or lose consciousness.  Patient states that on 12/12/2023 she fell in similar fashion and hurt her left hip. She was told that she has a hairline fracture of her left hip. Patient takes tramadol relief of her pain. Patient denies any other alleviating or exacerbating factors. Patient is not on any blood thinners.  Patient denies fever, headache, chest pain, shortness on breath, nausea, vomiting, diarrhea, abdominal pain, hematuria, or any other associated symptoms.    In the ED: Patient afebrile without leukocytosis, hypotensive on presentation 147/69, H&H 11.4/34, sodium 131, potassium 2.8, albumin 3.1, CT pelvis shows (1) acute fracture deformity involving the superior and inferior pubic ramus on the right again noted, (2) acute fracture involving the superior and inferior pubic ramus on the left noted, (3) acute fracture involving the sacral ala bilaterally noted, (4) acute fracture involving the transverse process of L4 on the right and L5 bilaterally noted.  Patient given hydrocodone 5 mg, ketorolac 30 mg IV, and Zofran 4 mg IV.    Case discussed with ED provider.    Juli Parra we will be placed under observation for further medical management of her pelvic fracture.

## 2023-12-28 NOTE — NURSING
Patient arrived to floor via stretcher. Patient transferred from stretcher to bed with 4 assist. Patient educated to use call light for assistance. Bed locked in lowest position, call light in reach. Pt verbalizes understanding of all.

## 2023-12-28 NOTE — ASSESSMENT & PLAN NOTE
Patient has hypokalemia which is Acute and currently uncontrolled. Most recent potassium levels reviewed-   Lab Results   Component Value Date    K 2.8 (L) 12/28/2023     Ordered potassium chloride 40 mEq  Will continue potassium replacement per protocol and recheck repeat levels after replacement completed

## 2023-12-29 PROBLEM — K59.01 SLOW TRANSIT CONSTIPATION: Status: ACTIVE | Noted: 2023-12-29

## 2023-12-29 LAB
ALBUMIN SERPL BCP-MCNC: 2.9 G/DL (ref 3.5–5.2)
ALP SERPL-CCNC: 119 U/L (ref 55–135)
ALT SERPL W/O P-5'-P-CCNC: 11 U/L (ref 10–44)
ANION GAP SERPL CALC-SCNC: 7 MMOL/L (ref 8–16)
AST SERPL-CCNC: 17 U/L (ref 10–40)
BASOPHILS # BLD AUTO: 0.06 K/UL (ref 0–0.2)
BASOPHILS NFR BLD: 1 % (ref 0–1.9)
BILIRUB SERPL-MCNC: 0.5 MG/DL (ref 0.1–1)
BUN SERPL-MCNC: 14 MG/DL (ref 8–23)
CALCIUM SERPL-MCNC: 9.1 MG/DL (ref 8.7–10.5)
CHLORIDE SERPL-SCNC: 101 MMOL/L (ref 95–110)
CO2 SERPL-SCNC: 27 MMOL/L (ref 23–29)
CREAT SERPL-MCNC: 0.6 MG/DL (ref 0.5–1.4)
DIFFERENTIAL METHOD BLD: ABNORMAL
EOSINOPHIL # BLD AUTO: 0.4 K/UL (ref 0–0.5)
EOSINOPHIL NFR BLD: 7.1 % (ref 0–8)
ERYTHROCYTE [DISTWIDTH] IN BLOOD BY AUTOMATED COUNT: 12.9 % (ref 11.5–14.5)
EST. GFR  (NO RACE VARIABLE): >60 ML/MIN/1.73 M^2
GLUCOSE SERPL-MCNC: 102 MG/DL (ref 70–110)
HCT VFR BLD AUTO: 36 % (ref 37–48.5)
HGB BLD-MCNC: 11.7 G/DL (ref 12–16)
IMM GRANULOCYTES # BLD AUTO: 0.06 K/UL (ref 0–0.04)
IMM GRANULOCYTES NFR BLD AUTO: 1 % (ref 0–0.5)
LYMPHOCYTES # BLD AUTO: 1.1 K/UL (ref 1–4.8)
LYMPHOCYTES NFR BLD: 17 % (ref 18–48)
MAGNESIUM SERPL-MCNC: 1.7 MG/DL (ref 1.6–2.6)
MCH RBC QN AUTO: 30.3 PG (ref 27–31)
MCHC RBC AUTO-ENTMCNC: 32.5 G/DL (ref 32–36)
MCV RBC AUTO: 93 FL (ref 82–98)
MONOCYTES # BLD AUTO: 0.7 K/UL (ref 0.3–1)
MONOCYTES NFR BLD: 11 % (ref 4–15)
NEUTROPHILS # BLD AUTO: 3.9 K/UL (ref 1.8–7.7)
NEUTROPHILS NFR BLD: 62.9 % (ref 38–73)
NRBC BLD-RTO: 0 /100 WBC
PHOSPHATE SERPL-MCNC: 3.1 MG/DL (ref 2.7–4.5)
PLATELET # BLD AUTO: 292 K/UL (ref 150–450)
PMV BLD AUTO: 8.4 FL (ref 9.2–12.9)
POTASSIUM SERPL-SCNC: 3.8 MMOL/L (ref 3.5–5.1)
PROT SERPL-MCNC: 6.3 G/DL (ref 6–8.4)
RBC # BLD AUTO: 3.86 M/UL (ref 4–5.4)
SODIUM SERPL-SCNC: 135 MMOL/L (ref 136–145)
WBC # BLD AUTO: 6.17 K/UL (ref 3.9–12.7)

## 2023-12-29 PROCEDURE — 25000003 PHARM REV CODE 250

## 2023-12-29 PROCEDURE — 99222 1ST HOSP IP/OBS MODERATE 55: CPT | Mod: ,,, | Performed by: STUDENT IN AN ORGANIZED HEALTH CARE EDUCATION/TRAINING PROGRAM

## 2023-12-29 PROCEDURE — 36415 COLL VENOUS BLD VENIPUNCTURE: CPT

## 2023-12-29 PROCEDURE — 25000003 PHARM REV CODE 250: Performed by: PHYSICIAN ASSISTANT

## 2023-12-29 PROCEDURE — 83735 ASSAY OF MAGNESIUM: CPT

## 2023-12-29 PROCEDURE — 84100 ASSAY OF PHOSPHORUS: CPT

## 2023-12-29 PROCEDURE — 80053 COMPREHEN METABOLIC PANEL: CPT

## 2023-12-29 PROCEDURE — G0378 HOSPITAL OBSERVATION PER HR: HCPCS

## 2023-12-29 PROCEDURE — 85025 COMPLETE CBC W/AUTO DIFF WBC: CPT

## 2023-12-29 RX ORDER — FACIAL-BODY WIPES
10 EACH TOPICAL DAILY PRN
Status: DISCONTINUED | OUTPATIENT
Start: 2023-12-29 | End: 2024-01-03 | Stop reason: HOSPADM

## 2023-12-29 RX ORDER — AMOXICILLIN 250 MG
1 CAPSULE ORAL DAILY PRN
Status: DISCONTINUED | OUTPATIENT
Start: 2023-12-29 | End: 2023-12-31

## 2023-12-29 RX ORDER — ADHESIVE BANDAGE
30 BANDAGE TOPICAL DAILY PRN
Status: DISCONTINUED | OUTPATIENT
Start: 2023-12-29 | End: 2024-01-03 | Stop reason: HOSPADM

## 2023-12-29 RX ADMIN — MELATONIN TAB 3 MG 6 MG: 3 TAB at 08:12

## 2023-12-29 RX ADMIN — DICYCLOMINE HYDROCHLORIDE 10 MG: 10 CAPSULE ORAL at 04:12

## 2023-12-29 RX ADMIN — LOSARTAN POTASSIUM 100 MG: 25 TABLET, FILM COATED ORAL at 11:12

## 2023-12-29 RX ADMIN — DULOXETINE HYDROCHLORIDE 20 MG: 20 CAPSULE, DELAYED RELEASE ORAL at 11:12

## 2023-12-29 RX ADMIN — DICYCLOMINE HYDROCHLORIDE 10 MG: 10 CAPSULE ORAL at 10:12

## 2023-12-29 RX ADMIN — LORAZEPAM 1 MG: 0.5 TABLET ORAL at 08:12

## 2023-12-29 RX ADMIN — DICYCLOMINE HYDROCHLORIDE 10 MG: 10 CAPSULE ORAL at 08:12

## 2023-12-29 RX ADMIN — HYDROCODONE BITARTRATE AND ACETAMINOPHEN 1 TABLET: 5; 325 TABLET ORAL at 04:12

## 2023-12-29 RX ADMIN — DICYCLOMINE HYDROCHLORIDE 10 MG: 10 CAPSULE ORAL at 06:12

## 2023-12-29 RX ADMIN — METOPROLOL SUCCINATE 50 MG: 50 TABLET, EXTENDED RELEASE ORAL at 11:12

## 2023-12-29 RX ADMIN — HYDROCODONE BITARTRATE AND ACETAMINOPHEN 1 TABLET: 5; 325 TABLET ORAL at 08:12

## 2023-12-29 RX ADMIN — HYDROCODONE BITARTRATE AND ACETAMINOPHEN 1 TABLET: 10; 325 TABLET ORAL at 10:12

## 2023-12-29 NOTE — PT/OT/SLP PROGRESS
Occupational Therapy      Patient Name:  Juli Parra   MRN:  [id number]    Patient not seen today secondary to  (Patient not seen today secondary to  (Awaiting NeuroSx consult to proceed). Will follow-up  when medically able.   1537: Neurosurgery has recommended a TLSO brace, so awaiting DME.     12/29/2023

## 2023-12-29 NOTE — PLAN OF CARE
Case Management Assessment     PCP: Jefe Serrano MD    Pharmacy:   [id number]  [address] [place] [facility] [place] [address] [place] [address]  Phone: [phone] Fax: [phone]        Patient Arrived From: [facility]  Existing Help at Home: son    Barriers to Discharge: Patient lives at home alone. Patient has a son that will help her recover at home. Patient interested in SNF. SW informed patient that she can't go to SNF because she is in Obs. SW explained that she has to be inpatient for 3 midnights before she can go to SNF. Patient inquired why she was not inpatient. SW informed patient that her medical condition to this point only requires that she be observed for some hours. SW informed patient that she can have home health and that they would see her 3 times a week. SW informed patient that she had [facility] in the past and they have accepted her again.     Discharge Plan:    A. Home Health   B. Home Health       12/29/23 1534   Discharge Planning   Assessment Type Discharge Planning Brief Assessment   Resource/Environmental Concerns none   Support Systems Family members   Equipment Currently Used at Home walker, rolling   Current Living Arrangements home   Patient/Family Anticipates Transition to home   Patient/Family Anticipated Services at Transition none   DME Needed Upon Discharge  none   Discharge Plan A Home Health   Discharge Plan B Home Health

## 2023-12-29 NOTE — PT/OT/SLP PROGRESS
Physical Therapy      Patient Name:  Juli Parra   MRN:  [id number]    Patient not seen today secondary to  (Awaiting NeuroSx consult to proceed). Will follow-up .

## 2023-12-29 NOTE — PROGRESS NOTES
[facility] Medicine  Telemedicine Progress Note    Patient Name: Juli Parra  MRN: [id number]  Patient Class: OP- Observation   Admission Date: 12/28/2023  Length of Stay: 0 days  Attending Physician: Yaritza Manning MD  Primary Care Provider: Jefe Serrano MD          Subjective:     Principal Problem:Closed fracture of ramus of left pubis        HPI:  Juli Parra is a 78 y.o. with a pmh hypotension, GERD, anxiety, depression, and IBS presents to the hospital with complaints of left hip pain after slip and fall this morning. Patient has slipped and fell and landed on her left hip when she was trying to get up to change channel on her TV. Patient did not hit her head or lose consciousness.  Patient states that on 12/12/2023 she fell in similar fashion and hurt her left hip. She was told that she has a hairline fracture of her left hip. Patient takes tramadol relief of her pain. Patient denies any other alleviating or exacerbating factors. Patient is not on any blood thinners.  Patient denies fever, headache, chest pain, shortness on breath, nausea, vomiting, diarrhea, abdominal pain, hematuria, or any other associated symptoms.    In the ED: Patient afebrile without leukocytosis, hypotensive on presentation 147/69, H&H 11.4/34, sodium 131, potassium 2.8, albumin 3.1, CT pelvis shows (1) acute fracture deformity involving the superior and inferior pubic ramus on the right again noted, (2) acute fracture involving the superior and inferior pubic ramus on the left noted, (3) acute fracture involving the sacral ala bilaterally noted, (4) acute fracture involving the transverse process of L4 on the right and L5 bilaterally noted.  Patient given hydrocodone 5 mg, ketorolac 30 mg IV, and Zofran 4 mg IV.    Case discussed with ED provider.    Juli Parra we will be placed under observation for further medical management of her pelvic fracture.    Overview/Hospital  Course:  Admission to hospital for multiple fractures after sliding    Interval History: Pt noted to be awake, alert, conversant and calm. HDS and afebrile. No acute events overnight. No new complaints this morning. Pain controlled on current reg. Eating and drinking well. Constipated, bowel reg ordered.       Review of Systems   Constitutional:  Positive for activity change and fatigue. Negative for fever.   Respiratory:  Negative for shortness of breath.    Cardiovascular:  Negative for chest pain.   Gastrointestinal:  Positive for constipation. Negative for abdominal pain.   Musculoskeletal:  Positive for arthralgias and back pain.   Neurological:  Negative for dizziness and headaches.   All other systems reviewed and are negative.    Objective:     Vital Signs (Most Recent):  Temp: 98 °F (36.7 °C) (12/29/23 1159)  Pulse: 63 (12/29/23 1159)  Resp: 20 (12/29/23 1159)  BP: (!) 129/54 (12/29/23 1159)  SpO2: 96 % (12/29/23 1159) Vital Signs (24h Range):  Temp:  [97.6 °F (36.4 °C)-98.4 °F (36.9 °C)] 98 °F (36.7 °C)  Pulse:  [60-69] 63  Resp:  [17-20] 20  SpO2:  [93 %-97 %] 96 %  BP: (129-161)/(54-91) 129/54     Weight: 54.4 kg (120 lb)  Body mass index is 25.97 kg/m².    Intake/Output Summary (Last 24 hours) at 12/29/2023 1200  Last data filed at 12/29/2023 0854  Gross per 24 hour   Intake 240 ml   Output 700 ml   Net -460 ml         Physical Exam  Vitals and nursing note reviewed.   Constitutional:       Appearance: Normal appearance.   HENT:      Head: Normocephalic and atraumatic.   Eyes:      Extraocular Movements: Extraocular movements intact.      Pupils: Pupils are equal, round, and reactive to light.   Cardiovascular:      Rate and Rhythm: Normal rate and regular rhythm.   Pulmonary:      Effort: Pulmonary effort is normal. No respiratory distress.   Abdominal:      General: There is no distension.   Musculoskeletal:         General: Normal range of motion.      Cervical back: Normal range of motion.  "  Neurological:      General: No focal deficit present.      Mental Status: She is alert and oriented to person, place, and time.   Psychiatric:         Mood and Affect: Mood normal.         Behavior: Behavior normal.             Significant Labs: All pertinent labs within the past 24 hours have been reviewed.  CBC:   Recent Labs   Lab 12/28/23 0418 12/29/23 0421   WBC 8.41 6.17   HGB 11.4* 11.7*   HCT 34.0* 36.0*    292     CMP:   Recent Labs   Lab 12/28/23 0418 12/29/23 0421   * 135*   K 2.8* 3.8   CL 96 101   CO2 26 27   * 102   BUN 16 14   CREATININE 0.6 0.6   CALCIUM 9.1 9.1   PROT 6.7 6.3   ALBUMIN 3.1* 2.9*   BILITOT 0.6 0.5   ALKPHOS 121 119   AST 22 17   ALT 12 11   ANIONGAP 9 7*     Cardiac Markers: No results for input(s): "CKMB", "MYOGLOBIN", "BNP", "TROPISTAT" in the last 48 hours.    Significant Imaging: I have reviewed all pertinent imaging results/findings within the past 24 hours.    Assessment/Plan:      * Closed fracture of ramus of left pubis  -Patient complaining of left hip pain after a slip and fall earlier when she was trying to get out of her chair. Patient landed on left hip, denies any head injury, and loss of consciousness.  Patient has a previous history of hairline pelvic fracture on 12/12/2023.  -CT of pelvis was ordered: (1) acute fracture involving superior and inferior pubic rami bilaterally (2) acute fracture of sacral ala bilaterally (3) acute fracture involving transverse process of L4 and L5 bilaterally  -Patient received Norco/Zofran in the emergency department and states pain is much improved  -ED provided consulted Orthopedic surgery Dr. Jimenez who recommended that patient be admitted to the hospital for pain control, PT/OT, and orthopedic consultation.     Plan:  -PT/OT consulted  -Orthopedic consulted. No surgery needed at this time per ortho  -p.r.n. pain medications ordered  -continue supportive care    Slow transit constipation  -bowel reg ordered "       Hypokalemia  Patient has hypokalemia which is Acute and currently uncontrolled. Most recent potassium levels reviewed-   Lab Results   Component Value Date    K 3.8 12/29/2023     Ordered potassium chloride 40 mEq  Will continue potassium replacement per protocol and recheck repeat levels after replacement completed    Closed fracture of transverse process of lumbar vertebra  -see above  -NSGY consulted. Pending recs     Closed fracture of sacrum  -see above  -NSGY consulted. Pending recs     Closed fracture of right inferior pubic ramus  -see above    Gastroesophageal reflux disease  Stable and continue home therapy with Nexium    Essential hypertension  Chronic, controlled. Latest blood pressure and vitals reviewed-     Temp:  [98 °F (36.7 °C)]   Pulse:  [60-63]   Resp:  [16]   BP: (130-161)/(65-80)   SpO2:  [95 %-96 %] .   Home meds for hypertension were reviewed and noted below.   Hypertension Medications               hydroCHLOROthiazide (HYDRODIURIL) 25 MG tablet TAKE 1 TABLET(25 MG) BY MOUTH EVERY DAY    losartan (COZAAR) 100 MG tablet TAKE 1 TABLET(100 MG) BY MOUTH EVERY DAY    metoprolol succinate (TOPROL-XL) 50 MG 24 hr tablet TAKE 1 TABLET(50 MG) BY MOUTH EVERY DAY            While in the hospital, will manage blood pressure as follows; Continue home antihypertensive regimen with parameters    Will utilize p.r.n. blood pressure medication only if patient's blood pressure greater than 180/110 and she develops symptoms such as worsening chest pain or shortness of breath.      VTE Risk Mitigation (From admission, onward)           Ordered     IP VTE HIGH RISK PATIENT  Once         12/28/23 0339     Place sequential compression device  Until discontinued         12/28/23 0339                          I have completed this tele-visit without the assistance of a telepresenter.    The attending portion of this evaluation, treatment, and documentation was performed per Yaritza Manning MD via Telemedicine  AudioVisual using the secure TraceLink software platform with 2 way audio/video. The provider was located off-site and the patient is located in the hospital. The aforementioned video software was utilized to document the relevant history and physical exam    Yaritza Manning MD  Department of [facility] Medicine   [facility] Surg

## 2023-12-29 NOTE — CONSULTS
[facility] Medicine  Telemedicine Consult Note       Juli Parra has been accepted for transfer to [facility] and will be followed through telemedicine services beginning at 7 AM.      Yaritza Manning MD  [facility] Medicine Staff

## 2023-12-29 NOTE — ASSESSMENT & PLAN NOTE
Patient has hypokalemia which is Acute and currently uncontrolled. Most recent potassium levels reviewed-   Lab Results   Component Value Date    K 3.8 12/29/2023     Ordered potassium chloride 40 mEq  Will continue potassium replacement per protocol and recheck repeat levels after replacement completed

## 2023-12-29 NOTE — ASSESSMENT & PLAN NOTE
-Patient complaining of left hip pain after a slip and fall earlier when she was trying to get out of her chair. Patient landed on left hip, denies any head injury, and loss of consciousness.  Patient has a previous history of hairline pelvic fracture on 12/12/2023.  -CT of pelvis was ordered: (1) acute fracture involving superior and inferior pubic rami bilaterally (2) acute fracture of sacral ala bilaterally (3) acute fracture involving transverse process of L4 and L5 bilaterally  -Patient received Norco/Zofran in the emergency department and states pain is much improved  -ED provided consulted Orthopedic surgery Dr. Jimenez who recommended that patient be admitted to the hospital for pain control, PT/OT, and orthopedic consultation.     Plan:  -PT/OT consulted  -Orthopedic consulted. No surgery needed at this time per ortho  -p.r.n. pain medications ordered  -continue supportive care

## 2023-12-29 NOTE — CONSULTS
[facility]  Neurosurgery    SUBJECTIVE:     History of Present Illness:  Juli Parra is a 78 y.o. female who presents after a fall.  Information was obtained from the patient as well as her son.  She has had several falls this year.  She had a known fracture of her sacrum for which she had been seeing the interventional pain team on the [facility].  This was likely sustained in March.  This was seeming to improve in April of this year.  She had a fall in August which resulted in a right humerus fracture.  Unfortunately, she had another fall yesterday which led to the current admission.  On imaging, she is now found to have right-sided L4 transverse process fracture, bilateral L5 transverse process fractures, and bilateral sacral fractures.  She is awaiting delivery of a TLSO brace and has not mobilized since admission.    She also has a T12 compression fracture which has been present since at least an x-ray taken in March of this year, which now appears slightly worse.    She denies neurological symptoms, such as numbness/weakness/tingling in the legs.  Her only complaint is low back and hip pain.    PTA Medications   Medication Sig    chlorhexidine (PERIDEX) 0.12 % solution SMARTSIG:By Mouth    dicyclomine (BENTYL) 10 MG capsule TAKE 1 CAPSULE BY MOUTH FOUR TIMES DAILY EVERY NIGHT BEFORE MEALS    DULoxetine (CYMBALTA) 20 MG capsule TAKE 1 CAPSULE(20 MG) BY MOUTH EVERY DAY    esomeprazole (NEXIUM) 40 MG capsule Take 1 capsule (40 mg total) by mouth before breakfast.    fluticasone propionate (FLONASE) 50 mcg/actuation nasal spray SPRAY ONCE INTO EACH NOSTRIL EVERY DAY (Patient not taking: Reported on 12/20/2023)    hydroCHLOROthiazide (HYDRODIURIL) 25 MG tablet TAKE 1 TABLET(25 MG) BY MOUTH EVERY DAY    ibuprofen (ADVIL,MOTRIN) 800 MG tablet Take 1 tablet (800 mg total) by mouth every 6 (six) hours as needed for Pain.    LIDOcaine (LIDODERM) 5 % Place 1 patch onto the skin once daily. Remove &  Discard patch within 12 hours or as directed by MD (Patient not taking: Reported on 12/20/2023)    LORazepam (ATIVAN) 1 MG tablet Take 1 tablet (1 mg total) by mouth every evening.    losartan (COZAAR) 100 MG tablet TAKE 1 TABLET(100 MG) BY MOUTH EVERY DAY    metoprolol succinate (TOPROL-XL) 50 MG 24 hr tablet TAKE 1 TABLET(50 MG) BY MOUTH EVERY DAY    oxybutynin (DITROPAN XL) 15 MG TR24 Take 1 tablet (15 mg total) by mouth once daily.    potassium chloride SA (K-DUR,KLOR-CON M) 10 MEQ tablet TAKE 1 TABLET(10 MEQ) BY MOUTH EVERY DAY    traMADoL (ULTRAM) 50 mg tablet Take 1 tablet (50 mg total) by mouth every 6 (six) hours as needed for Pain. (Patient not taking: Reported on 12/20/2023)    valACYclovir (VALTREX) 500 MG tablet Take by mouth.       Review of patient's allergies indicates:   Allergen Reactions    Egg Nausea Only    Cipro [ciprofloxacin hcl]     Sulfur Rash       Past Medical History:   Diagnosis Date    Anxiety     GERD (gastroesophageal reflux disease)     HTN (hypertension)     IBS (irritable bowel syndrome)     Kidney stones     OAB (overactive bladder)     Vertigo      Past Surgical History:   Procedure Laterality Date    CATARACT EXTRACTION, BILATERAL      CHOLECYSTECTOMY      HYSTERECTOMY      LITHOTRIPSY       Family History   Problem Relation Age of Onset    Leukemia Father     Cancer Brother         Pancreatic     Social History     Tobacco Use    Smoking status: Never    Smokeless tobacco: Never   Substance Use Topics    Alcohol use: Not Currently    Drug use: Never        Review of Systems:  As noted in HPI    OBJECTIVE:     Vital Signs (Most Recent):  Temp: 98.6 °F (37 °C) (12/29/23 1620)  Pulse: 75 (12/29/23 1620)  Resp: 20 (12/29/23 1620)  BP: (!) 166/71 (12/29/23 1620)  SpO2: 98 % (12/29/23 1620)    Physical Exam:  General: well developed, well nourished, no distress  Head: normocephalic, atraumatic  Neurologic: Alert and oriented. Thought content appropriate  GCS: Motor: 6/Verbal:  5/Eyes: 4 GCS Total: 15  Language: No aphasia  Speech: No dysarthria  Cranial nerves: face symmetric, tongue midline, CN II-XII grossly intact.   Eyes: pupils equal, round, reactive to light with accommodation, EOMI.   Pulmonary: normal respirations, not labored, no accessory muscles used  Sensory: intact to light touch throughout  Motor Strength: Moves all extremities spontaneously with good tone.  Full strength upper and lower extremities. No abnormal movements seen.      HF KE EHL DF PF  RLE   5    5     5     5    5  LLE:  5    5     5     5    5    Diagnostic Results:  I have personally reviewed imaging. My impression is as follows.    CT of the lumbar spine performed 12/28/2023 shows low bone density.  There is a degenerative scoliosis.  There is degenerative disc disease with a vacuum disc phenomenon at T12/L1 as well as L4/L5.  There is a grade 1 spondylolisthesis at L4/5.  There has a right-sided transverse process fracture at L4.  There are bilateral transverse process fractures at L5.  There is a linear, displaced right-sided sacral alar fracture as well as a minimally displaced left-sided sacral alar fracture.  The known T12 compression fracture is redemonstrated and does look slightly worse compared to the x-rays from March.      ASSESSMENT/PLAN:     Juli Parra is a 78 y.o. female who presents with right-sided L4 transverse process fracture, bilateral L5 transverse process fractures, bilateral sacral alar fractures after a fall  -no acute intervention is indicated  -TLSO brace has been ordered and will be delivered to the patient's room this evening  -have discussed this case with the admitting hospital doctor.  Recommend trying to get patient up in her TLSO brace.  If she is able to ambulate on her own, she may be able to go home with home health assistance  -if patient can not ambulate and has too much pain, we should proceed with getting MRIs of the thoracic and lumbar spine to better  characterize the patient's fractures.  -imaging and plan was reviewed in detail with the patient and her son    Please feel free to call with any further questions.      Alton KIRBY Mangham [facility]  Department of Neurosurgery  [phone]    Disclaimer: This note was dictated by speech recognition. Minor errors in transcription may be present.  Please call with any questions.

## 2023-12-29 NOTE — NURSING
[facility]  Nurses Note -- 4 Eyes      12/29/2023       Skin assessed on: Q Shift      [x] No Pressure Injuries Present    []Prevention Measures Documented    [] Yes LDA  for Pressure Injury Previously documented     [] Yes New Pressure Injury Discovered   [] LDA for New Pressure Injury Added      Attending RN:  Cynthia Palacios RN     Second RN:  KATRINA Barcenas

## 2023-12-29 NOTE — NURSING
Called placed to [facility] to let them know pt has an order for a TLSO brace. She stated she will deliver as soon as she can.

## 2023-12-29 NOTE — CARE UPDATE
Inpatient Upgrade Note    Juli Parra has warranted treatment spanning two or more midnights of hospital level care for the management of  pain and fractures . She continues to require further evaluation by consultants. She needs neurosurgery eval and then PT.Her condition is also complicated by the following comorbidities:  pt  has recent falls and old right pubic rami fx.also new left sided rami fx .

## 2023-12-29 NOTE — PLAN OF CARE
Problem: Adult Inpatient Plan of Care  Goal: Plan of Care Review  Outcome: Ongoing, Progressing  Flowsheets (Taken 12/29/2023 1408)  Plan of Care Reviewed With: patient  Goal: Patient-Specific Goal (Individualized)  Outcome: Ongoing, Progressing  Goal: Absence of Hospital-Acquired Illness or Injury  Outcome: Ongoing, Progressing  Intervention: Identify and Manage Fall Risk  Flowsheets (Taken 12/29/2023 1408)  Safety Promotion/Fall Prevention:   assistive device/personal item within reach   side rails raised x 2   instructed to call staff for mobility  Intervention: Prevent Infection  Flowsheets (Taken 12/29/2023 1408)  Infection Prevention:   hand hygiene promoted   rest/sleep promoted   single patient room provided  Goal: Optimal Comfort and Wellbeing  Outcome: Ongoing, Progressing  Intervention: Monitor Pain and Promote Comfort  Flowsheets (Taken 12/29/2023 1408)  Pain Management Interventions:   around-the-clock dosing utilized   medication offered   pain management plan reviewed with patient/caregiver   quiet environment facilitated  Intervention: Provide Person-Centered Care  Flowsheets (Taken 12/29/2023 1408)  Trust Relationship/Rapport:   care explained   choices provided   emotional support provided   empathic listening provided   questions answered   questions encouraged   reassurance provided   thoughts/feelings acknowledged  Goal: Readiness for Transition of Care  Outcome: Ongoing, Progressing     Problem: Fall Injury Risk  Goal: Absence of Fall and Fall-Related Injury  Outcome: Ongoing, Progressing  Intervention: Promote Injury-Free Environment  Flowsheets (Taken 12/29/2023 1408)  Safety Promotion/Fall Prevention:   assistive device/personal item within reach   side rails raised x 2   instructed to call staff for mobility     Problem: Skin Injury Risk Increased  Goal: Skin Health and Integrity  Outcome: Ongoing, Progressing  Intervention: Optimize Skin Protection  Flowsheets (Taken 12/29/2023  Scheduled procedure with DaughterKaya at      Telephone Information:   Mobile [phone]      Scheduled Via: Case Request Order for  [facility] Case# [id number]  Procedure date: 011922   Procedure time: Someone from the hospital will call you 2-3 business days prior with your procedure and arrival times.  Did patient request this specific time? n/a    -If non-ambulatory location, select reason: Medically necessary  -Did patient request a specific facility other than MD's preferred facility? No; If so, which facility?   -If a MAC pt is scheduled, pt was notified a family member/friend is need to stay with the patient over night after their procedure: Yes                Notified patient about receiving an animated Diane program? Yes    Was patient informed of COVID testing Yes; Scheduled [id number] Mitra Owen    The following have been confirmed:  Insurance name confirmed as [employer] Medicare, will be the same at time of procedure?: Yes Ins Accepted at Facility? Yes  Latex Allergy No  Diabetic No  Sleep Apnea No  Diuretic/Water pill No  Defibrillator/Pacemaker No  MRSA hx No  Blood thinners: Coumadin (Warfarin) or Plavix No      Aspirin No      Phentermine (diet pill) No  Constipation No;    Pre-Op testing required Yes, Patient informed Yes  Prep required? n/a; Briefly reviewed? n/a; Prep cost range discussed? n/a  If procedure is scheduled 7 days or less, patient was told to  prep letter?: n/a  Prep instructions mailed.   1408)  Pressure Reduction Techniques:   frequent weight shift encouraged   weight shift assistance provided  Skin Protection:   adhesive use limited   tubing/devices free from skin contact  Head of Bed (HOB) Positioning: HOB elevated

## 2023-12-29 NOTE — SUBJECTIVE & OBJECTIVE
Interval History: Pt noted to be awake, alert, conversant and calm. HDS and afebrile. No acute events overnight. No new complaints this morning. Pain controlled on current reg. Eating and drinking well. Constipated, bowel reg ordered.       Review of Systems   Constitutional:  Positive for activity change and fatigue. Negative for fever.   Respiratory:  Negative for shortness of breath.    Cardiovascular:  Negative for chest pain.   Gastrointestinal:  Positive for constipation. Negative for abdominal pain.   Musculoskeletal:  Positive for arthralgias and back pain.   Neurological:  Negative for dizziness and headaches.   All other systems reviewed and are negative.    Objective:     Vital Signs (Most Recent):  Temp: 98 °F (36.7 °C) (12/29/23 1159)  Pulse: 63 (12/29/23 1159)  Resp: 20 (12/29/23 1159)  BP: (!) 129/54 (12/29/23 1159)  SpO2: 96 % (12/29/23 1159) Vital Signs (24h Range):  Temp:  [97.6 °F (36.4 °C)-98.4 °F (36.9 °C)] 98 °F (36.7 °C)  Pulse:  [60-69] 63  Resp:  [17-20] 20  SpO2:  [93 %-97 %] 96 %  BP: (129-161)/(54-91) 129/54     Weight: 54.4 kg (120 lb)  Body mass index is 25.97 kg/m².    Intake/Output Summary (Last 24 hours) at 12/29/2023 1200  Last data filed at 12/29/2023 0854  Gross per 24 hour   Intake 240 ml   Output 700 ml   Net -460 ml         Physical Exam  Vitals and nursing note reviewed.   Constitutional:       Appearance: Normal appearance.   HENT:      Head: Normocephalic and atraumatic.   Eyes:      Extraocular Movements: Extraocular movements intact.      Pupils: Pupils are equal, round, and reactive to light.   Cardiovascular:      Rate and Rhythm: Normal rate and regular rhythm.   Pulmonary:      Effort: Pulmonary effort is normal. No respiratory distress.   Abdominal:      General: There is no distension.   Musculoskeletal:         General: Normal range of motion.      Cervical back: Normal range of motion.   Neurological:      General: No focal deficit present.      Mental Status: She  "is alert and oriented to person, place, and time.   Psychiatric:         Mood and Affect: Mood normal.         Behavior: Behavior normal.             Significant Labs: All pertinent labs within the past 24 hours have been reviewed.  CBC:   Recent Labs   Lab 12/28/23 0418 12/29/23 0421   WBC 8.41 6.17   HGB 11.4* 11.7*   HCT 34.0* 36.0*    292     CMP:   Recent Labs   Lab 12/28/23 0418 12/29/23 0421   * 135*   K 2.8* 3.8   CL 96 101   CO2 26 27   * 102   BUN 16 14   CREATININE 0.6 0.6   CALCIUM 9.1 9.1   PROT 6.7 6.3   ALBUMIN 3.1* 2.9*   BILITOT 0.6 0.5   ALKPHOS 121 119   AST 22 17   ALT 12 11   ANIONGAP 9 7*     Cardiac Markers: No results for input(s): "CKMB", "MYOGLOBIN", "BNP", "TROPISTAT" in the last 48 hours.    Significant Imaging: I have reviewed all pertinent imaging results/findings within the past 24 hours.  "

## 2023-12-29 NOTE — PLAN OF CARE
Patient accepted by Delma at [facility]. [facility] to pull plan of care when put in Epic.        12/29/23 2828   Post-Acute Status   Post-Acute Authorization Home Health   Home Health Status Set-up Complete/Auth obtained   Discharge Delays None known at this time   Discharge Plan   Discharge Plan A Home Health   Discharge Plan B Home Health

## 2023-12-30 LAB
ALBUMIN SERPL BCP-MCNC: 2.6 G/DL (ref 3.5–5.2)
ALP SERPL-CCNC: 111 U/L (ref 55–135)
ALT SERPL W/O P-5'-P-CCNC: 9 U/L (ref 10–44)
ANION GAP SERPL CALC-SCNC: 9 MMOL/L (ref 8–16)
AST SERPL-CCNC: 15 U/L (ref 10–40)
BASOPHILS # BLD AUTO: 0.04 K/UL (ref 0–0.2)
BASOPHILS NFR BLD: 0.6 % (ref 0–1.9)
BILIRUB SERPL-MCNC: 0.4 MG/DL (ref 0.1–1)
BUN SERPL-MCNC: 12 MG/DL (ref 8–23)
CALCIUM SERPL-MCNC: 8.6 MG/DL (ref 8.7–10.5)
CHLORIDE SERPL-SCNC: 103 MMOL/L (ref 95–110)
CO2 SERPL-SCNC: 25 MMOL/L (ref 23–29)
CREAT SERPL-MCNC: 0.5 MG/DL (ref 0.5–1.4)
DIFFERENTIAL METHOD BLD: ABNORMAL
EOSINOPHIL # BLD AUTO: 0.4 K/UL (ref 0–0.5)
EOSINOPHIL NFR BLD: 6.5 % (ref 0–8)
ERYTHROCYTE [DISTWIDTH] IN BLOOD BY AUTOMATED COUNT: 12.6 % (ref 11.5–14.5)
EST. GFR  (NO RACE VARIABLE): >60 ML/MIN/1.73 M^2
GLUCOSE SERPL-MCNC: 109 MG/DL (ref 70–110)
HCT VFR BLD AUTO: 32.8 % (ref 37–48.5)
HGB BLD-MCNC: 10.8 G/DL (ref 12–16)
IMM GRANULOCYTES # BLD AUTO: 0.07 K/UL (ref 0–0.04)
IMM GRANULOCYTES NFR BLD AUTO: 1.1 % (ref 0–0.5)
LYMPHOCYTES # BLD AUTO: 1.1 K/UL (ref 1–4.8)
LYMPHOCYTES NFR BLD: 17.7 % (ref 18–48)
MAGNESIUM SERPL-MCNC: 1.5 MG/DL (ref 1.6–2.6)
MCH RBC QN AUTO: 30.1 PG (ref 27–31)
MCHC RBC AUTO-ENTMCNC: 32.9 G/DL (ref 32–36)
MCV RBC AUTO: 91 FL (ref 82–98)
MONOCYTES # BLD AUTO: 0.6 K/UL (ref 0.3–1)
MONOCYTES NFR BLD: 9.8 % (ref 4–15)
NEUTROPHILS # BLD AUTO: 4.1 K/UL (ref 1.8–7.7)
NEUTROPHILS NFR BLD: 64.3 % (ref 38–73)
NRBC BLD-RTO: 0 /100 WBC
PHOSPHATE SERPL-MCNC: 3.1 MG/DL (ref 2.7–4.5)
PLATELET # BLD AUTO: 312 K/UL (ref 150–450)
PMV BLD AUTO: 8.6 FL (ref 9.2–12.9)
POTASSIUM SERPL-SCNC: 3.6 MMOL/L (ref 3.5–5.1)
PROT SERPL-MCNC: 5.9 G/DL (ref 6–8.4)
RBC # BLD AUTO: 3.59 M/UL (ref 4–5.4)
SODIUM SERPL-SCNC: 137 MMOL/L (ref 136–145)
WBC # BLD AUTO: 6.32 K/UL (ref 3.9–12.7)

## 2023-12-30 PROCEDURE — 84100 ASSAY OF PHOSPHORUS: CPT

## 2023-12-30 PROCEDURE — 25000003 PHARM REV CODE 250

## 2023-12-30 PROCEDURE — 97530 THERAPEUTIC ACTIVITIES: CPT | Performed by: PHYSICAL THERAPIST

## 2023-12-30 PROCEDURE — 83735 ASSAY OF MAGNESIUM: CPT

## 2023-12-30 PROCEDURE — 36415 COLL VENOUS BLD VENIPUNCTURE: CPT

## 2023-12-30 PROCEDURE — A9585 GADOBUTROL INJECTION: HCPCS | Performed by: HOSPITALIST

## 2023-12-30 PROCEDURE — 25000003 PHARM REV CODE 250: Performed by: PHYSICIAN ASSISTANT

## 2023-12-30 PROCEDURE — 97535 SELF CARE MNGMENT TRAINING: CPT

## 2023-12-30 PROCEDURE — 25000003 PHARM REV CODE 250: Performed by: HOSPITALIST

## 2023-12-30 PROCEDURE — 97530 THERAPEUTIC ACTIVITIES: CPT

## 2023-12-30 PROCEDURE — 80053 COMPREHEN METABOLIC PANEL: CPT

## 2023-12-30 PROCEDURE — 97165 OT EVAL LOW COMPLEX 30 MIN: CPT

## 2023-12-30 PROCEDURE — 63600175 PHARM REV CODE 636 W HCPCS: Performed by: HOSPITALIST

## 2023-12-30 PROCEDURE — 25500020 PHARM REV CODE 255: Performed by: HOSPITALIST

## 2023-12-30 PROCEDURE — 85025 COMPLETE CBC W/AUTO DIFF WBC: CPT

## 2023-12-30 PROCEDURE — 11000001 HC ACUTE MED/SURG PRIVATE ROOM

## 2023-12-30 PROCEDURE — 97162 PT EVAL MOD COMPLEX 30 MIN: CPT | Performed by: PHYSICAL THERAPIST

## 2023-12-30 RX ORDER — POTASSIUM CHLORIDE 20 MEQ/1
40 TABLET, EXTENDED RELEASE ORAL ONCE
Status: COMPLETED | OUTPATIENT
Start: 2023-12-30 | End: 2023-12-30

## 2023-12-30 RX ORDER — MAGNESIUM SULFATE HEPTAHYDRATE 40 MG/ML
2 INJECTION, SOLUTION INTRAVENOUS ONCE
Status: COMPLETED | OUTPATIENT
Start: 2023-12-30 | End: 2023-12-30

## 2023-12-30 RX ORDER — LORAZEPAM 2 MG/ML
1 INJECTION INTRAMUSCULAR ONCE
Status: COMPLETED | OUTPATIENT
Start: 2023-12-30 | End: 2023-12-30

## 2023-12-30 RX ORDER — GADOBUTROL 604.72 MG/ML
6 INJECTION INTRAVENOUS
Status: COMPLETED | OUTPATIENT
Start: 2023-12-30 | End: 2023-12-30

## 2023-12-30 RX ADMIN — DULOXETINE HYDROCHLORIDE 20 MG: 20 CAPSULE, DELAYED RELEASE ORAL at 09:12

## 2023-12-30 RX ADMIN — LORAZEPAM 1 MG: 0.5 TABLET ORAL at 08:12

## 2023-12-30 RX ADMIN — LOSARTAN POTASSIUM 100 MG: 25 TABLET, FILM COATED ORAL at 09:12

## 2023-12-30 RX ADMIN — MELATONIN TAB 3 MG 6 MG: 3 TAB at 08:12

## 2023-12-30 RX ADMIN — HYDROCODONE BITARTRATE AND ACETAMINOPHEN 1 TABLET: 10; 325 TABLET ORAL at 04:12

## 2023-12-30 RX ADMIN — MAGNESIUM SULFATE IN WATER 2 G: 40 INJECTION, SOLUTION INTRAVENOUS at 02:12

## 2023-12-30 RX ADMIN — HYDROCODONE BITARTRATE AND ACETAMINOPHEN 1 TABLET: 5; 325 TABLET ORAL at 08:12

## 2023-12-30 RX ADMIN — METOPROLOL SUCCINATE 50 MG: 50 TABLET, EXTENDED RELEASE ORAL at 09:12

## 2023-12-30 RX ADMIN — DOCUSATE SODIUM AND SENNOSIDES 1 TABLET: 8.6; 5 TABLET, FILM COATED ORAL at 06:12

## 2023-12-30 RX ADMIN — DICYCLOMINE HYDROCHLORIDE 10 MG: 10 CAPSULE ORAL at 08:12

## 2023-12-30 RX ADMIN — LORAZEPAM 1 MG: 2 INJECTION INTRAMUSCULAR; INTRAVENOUS at 02:12

## 2023-12-30 RX ADMIN — DICYCLOMINE HYDROCHLORIDE 10 MG: 10 CAPSULE ORAL at 04:12

## 2023-12-30 RX ADMIN — POTASSIUM CHLORIDE 40 MEQ: 1500 TABLET, EXTENDED RELEASE ORAL at 06:12

## 2023-12-30 RX ADMIN — GADOBUTROL 6 ML: 604.72 INJECTION INTRAVENOUS at 05:12

## 2023-12-30 RX ADMIN — HYDROCODONE BITARTRATE AND ACETAMINOPHEN 1 TABLET: 10; 325 TABLET ORAL at 10:12

## 2023-12-30 NOTE — NURSING
Pt off unit via bed to MRI, Ativan 1mg IV given for pts claustrophobic. All jewelry removed. LUANNE

## 2023-12-30 NOTE — PT/OT/SLP EVAL
Physical Therapy Evaluation    Patient Name:  Juli Parra   MRN:  [id number]    Recommendations:     Discharge Recommendations: Moderate Intensity Therapy   Discharge Equipment Recommendations: none   Barriers to discharge: Decreased caregiver support    Assessment:     Juli Parra is a 78 y.o. female admitted with a medical diagnosis of Closed fracture of ramus of left pubis.  She presents with the following impairments/functional limitations: weakness, impaired endurance, gait instability, impaired balance, decreased lower extremity function, decreased safety awareness, pain, orthopedic precautions.    Rehab Prognosis: Good; patient would benefit from acute skilled PT services to address these deficits and reach maximum level of function.    Recent Surgery: * No surgery found *      Plan:     During this hospitalization, patient to be seen 5 x/week to address the identified rehab impairments via gait training, therapeutic activities, therapeutic exercises and progress toward the following goals:    Plan of Care Expires:  01/13/24    Subjective     Chief Complaint: I live alone and need to go some where for therapy  Patient/Family Comments/goals: to return to PLOF  Pain/Comfort:  Pain Rating 1: 8/10  Location - Side 1: Bilateral  Location - Orientation 1: lower  Location 1: back  Pain Addressed 1: Cessation of Activity  Pain Rating Post-Intervention 1: 8/10  Location - Side 2: Right  Location 2: hip  Pain Addressed 2: Cessation of Activity    Patients cultural, spiritual, Latter day conflicts given the current situation: no    Living Environment:  Patient lives alone in a [facility] with 2 Short Steps to enter.    Prior to admission, patients level of function was Mod I.  Equipment used at home: shower chair, grab bar, walker, rolling, raised toilet.  DME owned (not currently used): none.  Upon discharge, patient will have assistance from Son and Self.    Objective:     Communicated with Nurse prior to  session.  Patient found supine with peripheral IV, PureWick, telemetry  upon PT entry to room.    General Precautions: Standard, fall  Orthopedic Precautions:RLE weight bearing as tolerated, LLE weight bearing as tolerated (- precaution = RW mandatory at all times with weight bearing task.)   Braces: TLSO (- Patient educated on [place] of TLSO.  Reinforcement Required.)  Respiratory Status: Room air    Exams:  Cognitive Exam:  Patient is oriented to Person, Place, and Situation  Gross Motor Coordination:  WFL except for B LE do to increase LBP and Hip pain with AROM/PROM  Postural Exam:  Patient presented with the following abnormalities:    -       Rounded shoulders  -       Forward head  -       Abnormal trunk flexion  Skin Integrity/Edema:      -       Skin integrity: Visible skin intact  -       Edema: None noted Tony LE  RLE ROM: WFL except self limiting due to increase pain  RLE Strength: WFL  LLE ROM: WFL except except self limiting due to increase pain  LLE Strength: WFL    Functional Mobility:  Bed Mobility:     Supine to Sit: maximal assistance  Sit to Supine: maximal assistance  Transfers:     Sit to Stand:  maximal assistance and of 2 persons with rolling walker  Gait: 5' from EOB to Bed-Side Chair with RW and Max A x's 2 with TC's for weight shifting.       AM-PAC 6 CLICK MOBILITY  Total Score:11       Treatment & Education:  Eduation on Log Rolling and [place] of TLSO.     Sit<->Stand Transfers with RW and TLSO with Max A x's 2 x's 2 trials.      Patient left up in chair with all lines intact, call button in reach, and Nurse notified.    GOALS:   Multidisciplinary Problems       Physical Therapy Goals          Problem: Physical Therapy    Goal Priority Disciplines Outcome Goal Variances Interventions   Physical Therapy Goal     PT, PT/OT Ongoing, Progressing     Description: Goals to be met by:  2024    Patient will increase functional independence with mobility by performin.  Supine to sit with Modified [place]  2. Sit to supine with Modified [place]  3. Sit to stand transfer with Modified [place]  4. Gait  x 400 feet with Modified [place] using Rolling Walker.    Recommend: Moderate Intensity Therapy at time of discharge to home with family.                             History:     Past Medical History:   Diagnosis Date    Anxiety     GERD (gastroesophageal reflux disease)     HTN (hypertension)     IBS (irritable bowel syndrome)     Kidney stones     OAB (overactive bladder)     Vertigo        Past Surgical History:   Procedure Laterality Date    CATARACT EXTRACTION, BILATERAL      CHOLECYSTECTOMY      HYSTERECTOMY      LITHOTRIPSY         Time Tracking:     PT Received On: 12/30/23  PT Start Time: 0800     PT Stop Time: 0830  PT Total Time (min): 30 min     Billable Minutes: Evaluation 15 min and Therapeutic Activity 15 min    Benoit Allan, PT  12/30/2023

## 2023-12-30 NOTE — PLAN OF CARE
Problem: Adult Inpatient Plan of Care  Goal: Plan of Care Review  Outcome: Ongoing, Progressing  Flowsheets (Taken 12/30/2023 1522)  Plan of Care Reviewed With: patient  Goal: Patient-Specific Goal (Individualized)  Outcome: Ongoing, Progressing  Goal: Absence of Hospital-Acquired Illness or Injury  Outcome: Ongoing, Progressing  Intervention: Prevent Skin Injury  Flowsheets (Taken 12/30/2023 1522)  Body Position:   turned   weight shifting  Skin Protection:   adhesive use limited   tubing/devices free from skin contact  Intervention: Prevent and Manage VTE (Venous Thromboembolism) Risk  Flowsheets (Taken 12/30/2023 1522)  Activity Management: Rolling - L1  VTE Prevention/Management: remove, assess skin, and reapply sequential compression device  Range of Motion: active ROM (range of motion) encouraged  Intervention: Prevent Infection  Flowsheets (Taken 12/30/2023 1522)  Infection Prevention:   hand hygiene promoted   rest/sleep promoted   single patient room provided  Goal: Optimal Comfort and Wellbeing  Outcome: Ongoing, Progressing  Intervention: Monitor Pain and Promote Comfort  Flowsheets (Taken 12/30/2023 1522)  Pain Management Interventions:   care clustered   pillow support provided   quiet environment facilitated  Intervention: Provide Person-Centered Care  Flowsheets (Taken 12/30/2023 1522)  Trust Relationship/Rapport:   care explained   choices provided   emotional support provided   empathic listening provided   questions answered   questions encouraged   reassurance provided   thoughts/feelings acknowledged  Goal: Readiness for Transition of Care  Outcome: Ongoing, Progressing     Problem: Fall Injury Risk  Goal: Absence of Fall and Fall-Related Injury  Outcome: Ongoing, Progressing  Intervention: Promote Injury-Free Environment  Flowsheets (Taken 12/30/2023 1522)  Safety Promotion/Fall Prevention:   assistive device/personal item within reach   bed alarm set   side rails raised x 2   instructed to  call staff for mobility   medications reviewed     Problem: Skin Injury Risk Increased  Goal: Skin Health and Integrity  Outcome: Ongoing, Progressing  Intervention: Optimize Skin Protection  Flowsheets (Taken 12/30/2023 1522)  Pressure Reduction Techniques:   frequent weight shift encouraged   weight shift assistance provided  Skin Protection:   adhesive use limited   tubing/devices free from skin contact  Head of Bed (HOB) Positioning: HOB elevated

## 2023-12-30 NOTE — PT/OT/SLP EVAL
"Occupational Therapy   Evaluation    Name: Juli Parra  MRN: [id number]  Admitting Diagnosis: Closed fracture of ramus of left pubis  Recent Surgery: * No surgery found *      Recommendations:     Discharge Recommendations: Moderate Intensity Therapy  Discharge Equipment Recommendations:  to be determined by next level of care  Barriers to discharge:   (Pt w/ hx of frequent falls; pt is at HIGH risk for readmission anfd morbidity if d/c home.)    Assessment:     Juli Parra is a 78 y.o. female with a medical diagnosis of Closed fracture of ramus of left pubis.  Performance deficits affecting function: weakness, impaired endurance, impaired self care skills, impaired functional mobility, gait instability, impaired balance, decreased safety awareness, decreased lower extremity function, decreased upper extremity function, decreased ROM, pain, orthopedic precautions.      Rehab Prognosis: Good; patient would benefit from acute skilled OT services to address these deficits and reach maximum level of function.       Plan:     Patient to be seen  (5-6x/wk) to address the above listed problems via self-care/home management, therapeutic activities, therapeutic exercises  Plan of Care Expires: 01/12/24  Plan of Care Reviewed with: patient    Subjective     Chief Complaint: "I just can't believe I did this to myself."   Patient/Family Comments/goals: "I cannot go home like this."     Occupational Profile:  Living Environment: Patient lives alone in a H with 2 small steps to enter. Pt has access to a WIS w/ SC ans grab bars.   Previous level of function: Pt was Mod I w/ ADLs and functional mobility; pt no longer drives and relies on son for transportation. Reports she has vertigo and has been falling frequently.   Equipment Used at Home: shower chair, grab bar, walker, rolling, raised toilet  Assistance upon Discharge: son     Pain/Comfort:  Pain Rating 1: 0/10  Pain Rating Post-Intervention 1: 8/10  Location " "2:  (bileral hips and lower back)  Pain Addressed 2: Reposition, Nurse notified, Distraction, Cessation of Activity (nurse entered room mid-session upon request to administer meds)    Patients cultural, spiritual, Anglican conflicts given the current situation: yes    Objective:     Communicated with: Nurse prior to session.  Patient found HOB elevated with peripheral IV upon OT entry to room.    General Precautions: Standard, fall  Orthopedic Precautions:  (Per secure chat w/ ortho, WBAT/"protective weight-bearing" to BLEs w/ use of RW for all OOB activity  Braces: TLSO  Respiratory Status: Room air    Occupational Performance:    Bed Mobility:  Pt w/ reports of pain w/ rolling/turning w/ nursing care; pt educated on spinal precautions w/ emphasis on log-rolling technique. Once sitting EOB, pt w/ posterior lean, initially requiring mod A for shifting weight anteriorly; pt bearing full weight through BUEs to hold self-upright. With time, pt required CGA-SBA for safety; CGA-min A required when releasing 1 hand from bed rail to perform grooming.  Patient completed Rolling/Turning to Right with maximal assistance and use of bed rail   Patient completed Scooting hips to EOB with maximal assistance  Patient completed Supine to Sit with maximal assistance x 2 persons     Functional Mobility/Transfers:  Patient completed Sit <> Stand Transfer x 2 trials from EOB with moderate assistance, maximal assistance, and of 2 persons  with  rolling walker; pt w/ immediate urge to sit back down due to pain but was able to tolerate static standing by bearing weight through RW using BUEs   Patient completed Bed <> Chair Transfer using Step Transfer technique with moderate assistance, maximal assistance, and of 2 persons with rolling walker; pt required assist for weight shifting to clear each LE for taking steps     Activities of Daily Living:  Grooming: minimum assistance for maintaining sitting balance while using RUE for washing " pierce   Upper Body Dressing: maximal assistance for donning gown over back as pt was relying on BUEs for support/balance; total A for managing/donning TLSO   Lower Body Dressing: total assistance for donning socks for BLEs 2* spinal precautions     Cognitive/Visual Perceptual:  Cognitive/Psychosocial Skills:     -       Oriented to: Person, Place, Time, and Situation   -       Follows Commands/attention:Follows multistep  commands  -       Communication: clear/fluent  -       Memory: No Deficits noted  -       Safety awareness/insight to disability: impaired     Physical Exam:  Balance:    -       fair- sitting balance; fair standing balance w/ RW   Postural examination/scapula alignment:    -       Rounded shoulders  Skin integrity: Visible skin intact  Edema:  None noted  Sensation:    -       Intact  Upper Extremity Range of Motion:     -       Right Upper Extremity: WFL  -       Left Upper Extremity: WFL  Upper Extremity Strength:    -       Right Upper Extremity: WFL  -       Left Upper Extremity: WFL   Strength:    -       Right Upper Extremity: WFL  -       Left Upper Extremity: WFL    AMPAC 6 Click ADL:  AMPAC Total Score: 14    Treatment & Education:  -Initial eval complete.   -Pt educated on BLE WB status per ortho; pt verbalized understanding.   -Pt educated on spinal precautions and importance of being compliant w/ TLSO brace; pt will benefit from reinforcement.   -Pt educated on importance/benefits of log-rolling to maintain integrity of spine and comply w/ spinal precautions.   -Pt performed ADLs and functional mobility as noted above.   -Pt educated on safe functional mobility/transfers.    -Questions and concerns addressed within scope.     Patient left up in chair with all lines intact, call button in reach, and nurse notified    GOALS:   Multidisciplinary Problems       Occupational Therapy Goals          Problem: Occupational Therapy    Goal Priority Disciplines Outcome Interventions    Occupational Therapy Goal     OT, PT/OT Ongoing, Progressing    Description: Goals to be met by: 1/12/24     Patient will increase functional independence with ADLs by performing:    UE Dressing with Modified [place].  LE Dressing with Supervision and use of AE for complying w/ spinal precautions.   Grooming while standing at sink (w/ seated rest breaks as needed) with Stand-by Assistance.  Toileting from bedside commode with Stand-by Assistance for hygiene and clothing management.   Sitting at edge of bed x25 minutes with Stand-by Assistance.  Rolling to Bilateral with Minimal Assistance.   Supine to sit with Minimal Assistance.  Step transfer with Stand-by Assistance  Toilet transfer to bedside commode with Stand-by Assistance.  Upper extremity exercise program x15 reps per handout, with independence.  Able to recall 3/3 spinal precautions w/o cueing.                          History:     Past Medical History:   Diagnosis Date    Anxiety     GERD (gastroesophageal reflux disease)     HTN (hypertension)     IBS (irritable bowel syndrome)     Kidney stones     OAB (overactive bladder)     Vertigo          Past Surgical History:   Procedure Laterality Date    CATARACT EXTRACTION, BILATERAL      CHOLECYSTECTOMY      HYSTERECTOMY      LITHOTRIPSY         Time Tracking:     OT Date of Treatment: 12/30/23  OT Start Time: 0944  OT Stop Time: 1024  OT Total Time (min): 40 min    Billable Minutes:Evaluation 15  Self Care/Home Management 10  Therapeutic Activity 15  Total Time 40 (co-eval w/ PT)     12/30/2023

## 2023-12-30 NOTE — NURSING
[facility], [facility] [place]  Nurses Note -- 4 Eyes      12/30/2023       Skin assessed on: Q Shift      [x] No Pressure Injuries Present    []Prevention Measures Documented    [] Yes LDA  for Pressure Injury Previously documented     [] Yes New Pressure Injury Discovered   [] LDA for New Pressure Injury Added      Attending RN:  Cynthia Palacios RN     Second RN:  KATRINA Barcenas

## 2023-12-30 NOTE — PLAN OF CARE
SW attempted to follow-up with patient to provide SNF resources and to complete IMM.  Patient is off the floor for a procedure.

## 2023-12-30 NOTE — PLAN OF CARE
Problem: Physical Therapy  Goal: Physical Therapy Goal  Description: Goals to be met by:  2024    Patient will increase functional independence with mobility by performin. Supine to sit with Modified [place]  2. Sit to supine with Modified [place]  3. Sit to stand transfer with Modified [place]  4. Gait  x 400 feet with Modified [place] using Rolling Walker.    Recommend: Moderate Intensity Therapy at time of discharge to home with family.        Outcome: Ongoing, Progressing

## 2023-12-30 NOTE — NURSING
[facility], [facility] [place]  Nurses Note -- 4 Eyes      12/29/2023       Skin assessed on: Q Shift      [x] No Pressure Injuries Present    [x]Prevention Measures Documented    [] Yes LDA  for Pressure Injury Previously documented     [] Yes New Pressure Injury Discovered   [] LDA for New Pressure Injury Added      Attending RN:  Swapna Rondon RN     Second RN:  KATRINA Marie

## 2023-12-30 NOTE — ASSESSMENT & PLAN NOTE
-see above  -NSGY consulted.   -per NSGY, continue to wear TLSO brace, marcos when getting up  -obtain MRIs of the thoracic and lumbar spine to better characterize the patient's fractures  -continue to work with PT/OT, pending SNF

## 2023-12-30 NOTE — SUBJECTIVE & OBJECTIVE
Interval History: Pt noted to be awake, alert, conversant and calm. HDS and afebrile. No acute events overnight. Able to work with PT/OT, rec SNF. Pain controlled on current reg. Still Constipated, bowel reg ordered.       Review of Systems   Constitutional:  Positive for activity change and fatigue. Negative for fever.   Respiratory:  Negative for shortness of breath.    Cardiovascular:  Negative for chest pain.   Gastrointestinal:  Positive for constipation. Negative for abdominal pain.   Musculoskeletal:  Positive for arthralgias and back pain.   Neurological:  Negative for dizziness and headaches.   All other systems reviewed and are negative.    Objective:     Vital Signs (Most Recent):  Temp: 97.9 °F (36.6 °C) (12/30/23 1205)  Pulse: 62 (12/30/23 1205)  Resp: 18 (12/30/23 1205)  BP: (!) 151/69 (12/30/23 1205)  SpO2: 96 % (12/30/23 1205) Vital Signs (24h Range):  Temp:  [97.9 °F (36.6 °C)-99 °F (37.2 °C)] 97.9 °F (36.6 °C)  Pulse:  [62-75] 62  Resp:  [18-20] 18  SpO2:  [92 %-98 %] 96 %  BP: (151-187)/(65-75) 151/69     Weight: 54.4 kg (120 lb)  Body mass index is 25.97 kg/m².    Intake/Output Summary (Last 24 hours) at 12/30/2023 1324  Last data filed at 12/29/2023 2002  Gross per 24 hour   Intake 120 ml   Output 500 ml   Net -380 ml           Physical Exam  Vitals and nursing note reviewed.   Constitutional:       Appearance: Normal appearance.   HENT:      Head: Normocephalic and atraumatic.   Eyes:      Extraocular Movements: Extraocular movements intact.      Pupils: Pupils are equal, round, and reactive to light.   Cardiovascular:      Rate and Rhythm: Normal rate and regular rhythm.   Pulmonary:      Effort: Pulmonary effort is normal. No respiratory distress.   Abdominal:      General: There is no distension.   Musculoskeletal:         General: Normal range of motion.      Cervical back: Normal range of motion.   Neurological:      General: No focal deficit present.      Mental Status: She is alert and  "oriented to person, place, and time.   Psychiatric:         Mood and Affect: Mood normal.         Behavior: Behavior normal.             Significant Labs: All pertinent labs within the past 24 hours have been reviewed.  CBC:   Recent Labs   Lab 12/29/23 0421 12/30/23  0355   WBC 6.17 6.32   HGB 11.7* 10.8*   HCT 36.0* 32.8*    312       CMP:   Recent Labs   Lab 12/29/23 0421 12/30/23  0355   * 137   K 3.8 3.6    103   CO2 27 25    109   BUN 14 12   CREATININE 0.6 0.5   CALCIUM 9.1 8.6*   PROT 6.3 5.9*   ALBUMIN 2.9* 2.6*   BILITOT 0.5 0.4   ALKPHOS 119 111   AST 17 15   ALT 11 9*   ANIONGAP 7* 9       Cardiac Markers: No results for input(s): "CKMB", "MYOGLOBIN", "BNP", "TROPISTAT" in the last 48 hours.    Significant Imaging: I have reviewed all pertinent imaging results/findings within the past 24 hours.  "

## 2023-12-30 NOTE — PLAN OF CARE
Problem: Occupational Therapy  Goal: Occupational Therapy Goal  Description: Goals to be met by: 1/12/24     Patient will increase functional independence with ADLs by performing:    UE Dressing with Modified Rogers.  LE Dressing with Supervision and use of AE for complying w/ spinal precautions.   Grooming while standing at sink (w/ seated rest breaks as needed) with Stand-by Assistance.  Toileting from bedside commode with Stand-by Assistance for hygiene and clothing management.   Sitting at edge of bed x25 minutes with Stand-by Assistance.  Rolling to Bilateral with Minimal Assistance.   Supine to sit with Minimal Assistance.  Step transfer with Stand-by Assistance  Toilet transfer to bedside commode with Stand-by Assistance.  Upper extremity exercise program x15 reps per handout, with independence.  Able to recall 3/3 spinal precautions w/o cueing.     Outcome: Ongoing, Progressing

## 2023-12-30 NOTE — PROGRESS NOTES
[facility] Medicine  Telemedicine Progress Note    Patient Name: Juli Parra  MRN: [id number]  Patient Class: IP- Inpatient   Admission Date: 12/28/2023  Length of Stay: 0 days  Attending Physician: Yaritza Manning MD  Primary Care Provider: Jefe Serrano MD          Subjective:     Principal Problem:Closed fracture of ramus of left pubis        HPI:  Juli Parra is a 78 y.o. with a pmh hypotension, GERD, anxiety, depression, and IBS presents to the hospital with complaints of left hip pain after slip and fall this morning. Patient has slipped and fell and landed on her left hip when she was trying to get up to change channel on her TV. Patient did not hit her head or lose consciousness.  Patient states that on 12/12/2023 she fell in similar fashion and hurt her left hip. She was told that she has a hairline fracture of her left hip. Patient takes tramadol relief of her pain. Patient denies any other alleviating or exacerbating factors. Patient is not on any blood thinners.  Patient denies fever, headache, chest pain, shortness on breath, nausea, vomiting, diarrhea, abdominal pain, hematuria, or any other associated symptoms.    In the ED: Patient afebrile without leukocytosis, hypotensive on presentation 147/69, H&H 11.4/34, sodium 131, potassium 2.8, albumin 3.1, CT pelvis shows (1) acute fracture deformity involving the superior and inferior pubic ramus on the right again noted, (2) acute fracture involving the superior and inferior pubic ramus on the left noted, (3) acute fracture involving the sacral ala bilaterally noted, (4) acute fracture involving the transverse process of L4 on the right and L5 bilaterally noted.  Patient given hydrocodone 5 mg, ketorolac 30 mg IV, and Zofran 4 mg IV.    Case discussed with ED provider.    Juli Parra we will be placed under observation for further medical management of her pelvic fracture.    Overview/Hospital  Course:  Admission to hospital for multiple fractures after sliding    Interval History: Pt noted to be awake, alert, conversant and calm. HDS and afebrile. No acute events overnight. Able to work with PT/OT, rec SNF. Pain controlled on current reg. Still Constipated, bowel reg ordered.       Review of Systems   Constitutional:  Positive for activity change and fatigue. Negative for fever.   Respiratory:  Negative for shortness of breath.    Cardiovascular:  Negative for chest pain.   Gastrointestinal:  Positive for constipation. Negative for abdominal pain.   Musculoskeletal:  Positive for arthralgias and back pain.   Neurological:  Negative for dizziness and headaches.   All other systems reviewed and are negative.    Objective:     Vital Signs (Most Recent):  Temp: 97.9 °F (36.6 °C) (12/30/23 1205)  Pulse: 62 (12/30/23 1205)  Resp: 18 (12/30/23 1205)  BP: (!) 151/69 (12/30/23 1205)  SpO2: 96 % (12/30/23 1205) Vital Signs (24h Range):  Temp:  [97.9 °F (36.6 °C)-99 °F (37.2 °C)] 97.9 °F (36.6 °C)  Pulse:  [62-75] 62  Resp:  [18-20] 18  SpO2:  [92 %-98 %] 96 %  BP: (151-187)/(65-75) 151/69     Weight: 54.4 kg (120 lb)  Body mass index is 25.97 kg/m².    Intake/Output Summary (Last 24 hours) at 12/30/2023 1324  Last data filed at 12/29/2023 2002  Gross per 24 hour   Intake 120 ml   Output 500 ml   Net -380 ml           Physical Exam  Vitals and nursing note reviewed.   Constitutional:       Appearance: Normal appearance.   HENT:      Head: Normocephalic and atraumatic.   Eyes:      Extraocular Movements: Extraocular movements intact.      Pupils: Pupils are equal, round, and reactive to light.   Cardiovascular:      Rate and Rhythm: Normal rate and regular rhythm.   Pulmonary:      Effort: Pulmonary effort is normal. No respiratory distress.   Abdominal:      General: There is no distension.   Musculoskeletal:         General: Normal range of motion.      Cervical back: Normal range of motion.   Neurological:       "General: No focal deficit present.      Mental Status: She is alert and oriented to person, place, and time.   Psychiatric:         Mood and Affect: Mood normal.         Behavior: Behavior normal.             Significant Labs: All pertinent labs within the past 24 hours have been reviewed.  CBC:   Recent Labs   Lab 12/29/23 0421 12/30/23  0355   WBC 6.17 6.32   HGB 11.7* 10.8*   HCT 36.0* 32.8*    312       CMP:   Recent Labs   Lab 12/29/23 0421 12/30/23  0355   * 137   K 3.8 3.6    103   CO2 27 25    109   BUN 14 12   CREATININE 0.6 0.5   CALCIUM 9.1 8.6*   PROT 6.3 5.9*   ALBUMIN 2.9* 2.6*   BILITOT 0.5 0.4   ALKPHOS 119 111   AST 17 15   ALT 11 9*   ANIONGAP 7* 9       Cardiac Markers: No results for input(s): "CKMB", "MYOGLOBIN", "BNP", "TROPISTAT" in the last 48 hours.    Significant Imaging: I have reviewed all pertinent imaging results/findings within the past 24 hours.    Assessment/Plan:      * Closed fracture of ramus of left pubis  -Patient complaining of left hip pain after a slip and fall earlier when she was trying to get out of her chair. Patient landed on left hip, denies any head injury, and loss of consciousness.  Patient has a previous history of hairline pelvic fracture on 12/12/2023.  -CT of pelvis was ordered: (1) acute fracture involving superior and inferior pubic rami bilaterally (2) acute fracture of sacral ala bilaterally (3) acute fracture involving transverse process of L4 and L5 bilaterally  -Patient received Norco/Zofran in the emergency department and states pain is much improved  -ED provided consulted Orthopedic surgery Dr. Jimenez who recommended that patient be admitted to the hospital for pain control, PT/OT, and orthopedic consultation.     Plan:  -PT/OT consulted  -Orthopedic consulted. No surgery needed at this time per ortho  -p.r.n. pain medications ordered  -continue supportive care    Slow transit constipation  -bowel reg ordered "       Hypokalemia  Patient has hypokalemia which is Acute and currently uncontrolled. Most recent potassium levels reviewed-   Lab Results   Component Value Date    K 3.8 12/29/2023     Ordered potassium chloride 40 mEq  Will continue potassium replacement per protocol and recheck repeat levels after replacement completed    Closed fracture of transverse process of lumbar vertebra  -see above  -NSGY consulted.   -per NSGY, continue to wear TLSO brace, marcos when getting up  -obtain MRIs of the thoracic and lumbar spine to better characterize the patient's fractures  -continue to work with PT/OT, pending SNF    Closed fracture of sacrum  -see above  -NSGY consulted. Pending recs     Closed fracture of right inferior pubic ramus  -see above    Gastroesophageal reflux disease  Stable and continue home therapy with Nexium    Essential hypertension  Chronic, controlled. Latest blood pressure and vitals reviewed-     Temp:  [98 °F (36.7 °C)]   Pulse:  [60-63]   Resp:  [16]   BP: (130-161)/(65-80)   SpO2:  [95 %-96 %] .   Home meds for hypertension were reviewed and noted below.   Hypertension Medications               hydroCHLOROthiazide (HYDRODIURIL) 25 MG tablet TAKE 1 TABLET(25 MG) BY MOUTH EVERY DAY    losartan (COZAAR) 100 MG tablet TAKE 1 TABLET(100 MG) BY MOUTH EVERY DAY    metoprolol succinate (TOPROL-XL) 50 MG 24 hr tablet TAKE 1 TABLET(50 MG) BY MOUTH EVERY DAY            While in the hospital, will manage blood pressure as follows; Continue home antihypertensive regimen with parameters    Will utilize p.r.n. blood pressure medication only if patient's blood pressure greater than 180/110 and she develops symptoms such as worsening chest pain or shortness of breath.      VTE Risk Mitigation (From admission, onward)           Ordered     IP VTE HIGH RISK PATIENT  Once         12/28/23 0339     Place sequential compression device  Until discontinued         12/28/23 0339                          I have completed this  tele-visit without the assistance of a telepresenter.    The attending portion of this evaluation, treatment, and documentation was performed per Yaritza Manning MD via Telemedicine AudioVisual using the secure [employer] software platform with 2 way audio/video. The provider was located off-site and the patient is located in the hospital. The aforementioned video software was utilized to document the relevant history and physical exam    Yaritza Manning MD  Department of [facility] Medicine   [facility]

## 2023-12-31 PROBLEM — R53.81 DEBILITY: Status: ACTIVE | Noted: 2023-12-31

## 2023-12-31 PROBLEM — Z75.8 DISCHARGE PLANNING ISSUES: Status: ACTIVE | Noted: 2023-12-31

## 2023-12-31 PROBLEM — Z02.9 DISCHARGE PLANNING ISSUES: Status: ACTIVE | Noted: 2023-12-31

## 2023-12-31 PROCEDURE — 11000001 HC ACUTE MED/SURG PRIVATE ROOM

## 2023-12-31 PROCEDURE — 97535 SELF CARE MNGMENT TRAINING: CPT

## 2023-12-31 PROCEDURE — 25000003 PHARM REV CODE 250: Performed by: PHYSICIAN ASSISTANT

## 2023-12-31 PROCEDURE — 97530 THERAPEUTIC ACTIVITIES: CPT

## 2023-12-31 PROCEDURE — 25000003 PHARM REV CODE 250

## 2023-12-31 PROCEDURE — 25000003 PHARM REV CODE 250: Performed by: HOSPITALIST

## 2023-12-31 RX ORDER — AMOXICILLIN 250 MG
2 CAPSULE ORAL DAILY
Status: DISCONTINUED | OUTPATIENT
Start: 2024-01-01 | End: 2024-01-03 | Stop reason: HOSPADM

## 2023-12-31 RX ORDER — ADHESIVE BANDAGE
30 BANDAGE TOPICAL ONCE
Status: COMPLETED | OUTPATIENT
Start: 2023-12-31 | End: 2023-12-31

## 2023-12-31 RX ADMIN — HYDROCODONE BITARTRATE AND ACETAMINOPHEN 1 TABLET: 10; 325 TABLET ORAL at 12:12

## 2023-12-31 RX ADMIN — HYDROCODONE BITARTRATE AND ACETAMINOPHEN 1 TABLET: 10; 325 TABLET ORAL at 04:12

## 2023-12-31 RX ADMIN — LORAZEPAM 1 MG: 0.5 TABLET ORAL at 08:12

## 2023-12-31 RX ADMIN — DICYCLOMINE HYDROCHLORIDE 10 MG: 10 CAPSULE ORAL at 04:12

## 2023-12-31 RX ADMIN — DULOXETINE HYDROCHLORIDE 20 MG: 20 CAPSULE, DELAYED RELEASE ORAL at 09:12

## 2023-12-31 RX ADMIN — MELATONIN TAB 3 MG 6 MG: 3 TAB at 08:12

## 2023-12-31 RX ADMIN — DICYCLOMINE HYDROCHLORIDE 10 MG: 10 CAPSULE ORAL at 06:12

## 2023-12-31 RX ADMIN — DICYCLOMINE HYDROCHLORIDE 10 MG: 10 CAPSULE ORAL at 08:12

## 2023-12-31 RX ADMIN — LOSARTAN POTASSIUM 100 MG: 25 TABLET, FILM COATED ORAL at 09:12

## 2023-12-31 RX ADMIN — MAGNESIUM HYDROXIDE 2400 MG: 400 SUSPENSION ORAL at 04:12

## 2023-12-31 RX ADMIN — METOPROLOL SUCCINATE 50 MG: 50 TABLET, EXTENDED RELEASE ORAL at 09:12

## 2023-12-31 RX ADMIN — HYDROCODONE BITARTRATE AND ACETAMINOPHEN 1 TABLET: 10; 325 TABLET ORAL at 08:12

## 2023-12-31 NOTE — ASSESSMENT & PLAN NOTE
-see above  -NSGY consulted.   -per NSGY, continue to wear TLSO brace, marcos when getting up  -obtain MRIs of the thoracic and lumbar spine to better characterize the patient's fractures  -MRI shows:   1.Recent burst compression fracture of the T12 vertebral body with questionable involvement of the right pedicle, 50% height loss and 3 mm of osseous retropulsion contributing to mild spinal canal stenosis.  No cord compression.  No marrow replacement process.  No epidural or paraspinal fluid collection or enhancing mass.   2. Recent insufficiency fractures of the bilateral sacral ala with extension into the S3 segment.   3. Recent nondisplaced fractures of the bilateral L5 transverse processes.   4. Marrow edema within the right T11 pedicle, possibly sequela of a nondisplaced fracture.   5. Multilevel spondylosis as detailed above.   -continue to work with PT/OT, pending SNF

## 2023-12-31 NOTE — PLAN OF CARE
ELIJAH met with patient and provided her with a list of skilled  nursing facilities obtained from [facility].  Patient will review with her son and followup with CM on Tuesday.  At first glance, [facility] would be the first choice.

## 2023-12-31 NOTE — NURSING
[facility], [facility]  Nurses Note -- 4 Eyes      12/30/2023       Skin assessed on: Q Shift      [x] No Pressure Injuries Present    []Prevention Measures Documented    [] Yes LDA  for Pressure Injury Previously documented     [] Yes New Pressure Injury Discovered   [] LDA for New Pressure Injury Added      Attending RN:  Kristi Juan, RN     Second RN:  Cynthia Palacios RN

## 2023-12-31 NOTE — PLAN OF CARE
12/31/23 1612   Post-Acute Status   Post-Acute Authorization Other;Placement   Post-Acute Placement Status Patient List Provided   Discharge Delays None known at this time   Discharge Plan   Discharge Plan A Skilled Nursing Facility   Discharge Plan B Home Health     1st preference is [facility].  Will review list of facilities with family and followup with case managment.

## 2023-12-31 NOTE — SUBJECTIVE & OBJECTIVE
Interval History: Pt noted to be awake, alert, conversant and calm. HDS and afebrile. No acute events overnight. Able to work with PT/OT, rec SNF. Pain controlled on current reg. Still Constipated, bowel reg ordered. Trial of milk of mag today. Now pending SNF placement.       Review of Systems   Constitutional:  Positive for activity change and fatigue. Negative for fever.   Respiratory:  Negative for shortness of breath.    Cardiovascular:  Negative for chest pain.   Gastrointestinal:  Positive for constipation. Negative for abdominal pain.   Musculoskeletal:  Positive for arthralgias and back pain.   Neurological:  Negative for dizziness and headaches.   All other systems reviewed and are negative.    Objective:     Vital Signs (Most Recent):  Temp: 98.3 °F (36.8 °C) (12/31/23 1210)  Pulse: 70 (12/31/23 1210)  Resp: 18 (12/31/23 1218)  BP: (!) 158/67 (12/31/23 1210)  SpO2: 97 % (12/31/23 1210) Vital Signs (24h Range):  Temp:  [97.8 °F (36.6 °C)-98.5 °F (36.9 °C)] 98.3 °F (36.8 °C)  Pulse:  [67-73] 70  Resp:  [16-20] 18  SpO2:  [94 %-97 %] 97 %  BP: (158-195)/(67-81) 158/67     Weight: 54.4 kg (120 lb)  Body mass index is 25.97 kg/m².    Intake/Output Summary (Last 24 hours) at 12/31/2023 1408  Last data filed at 12/31/2023 0639  Gross per 24 hour   Intake 360 ml   Output 900 ml   Net -540 ml           Physical Exam  Vitals and nursing note reviewed.   Constitutional:       Appearance: Normal appearance.   HENT:      Head: Normocephalic and atraumatic.   Eyes:      Extraocular Movements: Extraocular movements intact.      Pupils: Pupils are equal, round, and reactive to light.   Cardiovascular:      Rate and Rhythm: Normal rate and regular rhythm.   Pulmonary:      Effort: Pulmonary effort is normal. No respiratory distress.   Abdominal:      General: There is no distension.   Musculoskeletal:         General: Normal range of motion.      Cervical back: Normal range of motion.   Neurological:      General: No  "focal deficit present.      Mental Status: She is alert and oriented to person, place, and time.   Psychiatric:         Mood and Affect: Mood normal.         Behavior: Behavior normal.             Significant Labs: All pertinent labs within the past 24 hours have been reviewed.  CBC:   Recent Labs   Lab 12/30/23  0355   WBC 6.32   HGB 10.8*   HCT 32.8*          CMP:   Recent Labs   Lab 12/30/23  0355      K 3.6      CO2 25      BUN 12   CREATININE 0.5   CALCIUM 8.6*   PROT 5.9*   ALBUMIN 2.6*   BILITOT 0.4   ALKPHOS 111   AST 15   ALT 9*   ANIONGAP 9       Cardiac Markers: No results for input(s): "CKMB", "MYOGLOBIN", "BNP", "TROPISTAT" in the last 48 hours.    Significant Imaging: I have reviewed all pertinent imaging results/findings within the past 24 hours.  "

## 2023-12-31 NOTE — PLAN OF CARE
12/31/23 1610   Medicare Message   Important Message from Medicare regarding Discharge Appeal Rights Given to patient/caregiver;Explained to patient/caregiver;Signed/date by patient/caregiver   Date IMM was signed 12/31/23   Time IMM was signed 1600

## 2023-12-31 NOTE — NURSING
[facility], [facility]  Nurses Note -- 4 Eyes      12/31/2023       Skin assessed on: Q Shift      [x] No Pressure Injuries Present    [x]Prevention Measures Documented    [] Yes LDA  for Pressure Injury Previously documented     [] Yes New Pressure Injury Discovered   [] LDA for New Pressure Injury Added      Attending RN:  Jesus Sloan, RN     Second RN:  Kristi

## 2023-12-31 NOTE — PROGRESS NOTES
[facility]  Telemedicine Progress Note    Patient Name: Juli Parra  MRN: [id number]  Patient Class: IP- Inpatient   Admission Date: 12/28/2023  Length of Stay: 1 days  Attending Physician: Yaritza Manning MD  Primary Care Provider: Jefe Serrano MD          Subjective:     Principal Problem:Closed fracture of ramus of left pubis        HPI:  Juli Parra is a 78 y.o. with a pmh hypotension, GERD, anxiety, depression, and IBS presents to the hospital with complaints of left hip pain after slip and fall this morning. Patient has slipped and fell and landed on her left hip when she was trying to get up to change channel on her TV. Patient did not hit her head or lose consciousness.  Patient states that on 12/12/2023 she fell in similar fashion and hurt her left hip. She was told that she has a hairline fracture of her left hip. Patient takes tramadol relief of her pain. Patient denies any other alleviating or exacerbating factors. Patient is not on any blood thinners.  Patient denies fever, headache, chest pain, shortness on breath, nausea, vomiting, diarrhea, abdominal pain, hematuria, or any other associated symptoms.    In the ED: Patient afebrile without leukocytosis, hypotensive on presentation 147/69, H&H 11.4/34, sodium 131, potassium 2.8, albumin 3.1, CT pelvis shows (1) acute fracture deformity involving the superior and inferior pubic ramus on the right again noted, (2) acute fracture involving the superior and inferior pubic ramus on the left noted, (3) acute fracture involving the sacral ala bilaterally noted, (4) acute fracture involving the transverse process of L4 on the right and L5 bilaterally noted.  Patient given hydrocodone 5 mg, ketorolac 30 mg IV, and Zofran 4 mg IV.    Case discussed with ED provider.    Juli Parra we will be placed under observation for further medical management of her pelvic fracture.    Overview/Hospital  Course:  Admission to hospital for multiple fractures after sliding    Interval History: Pt noted to be awake, alert, conversant and calm. HDS and afebrile. No acute events overnight. Able to work with PT/OT, rec SNF. Pain controlled on current reg. Still Constipated, bowel reg ordered. Trial of milk of mag today. Now pending SNF placement.       Review of Systems   Constitutional:  Positive for activity change and fatigue. Negative for fever.   Respiratory:  Negative for shortness of breath.    Cardiovascular:  Negative for chest pain.   Gastrointestinal:  Positive for constipation. Negative for abdominal pain.   Musculoskeletal:  Positive for arthralgias and back pain.   Neurological:  Negative for dizziness and headaches.   All other systems reviewed and are negative.    Objective:     Vital Signs (Most Recent):  Temp: 98.3 °F (36.8 °C) (12/31/23 1210)  Pulse: 70 (12/31/23 1210)  Resp: 18 (12/31/23 1218)  BP: (!) 158/67 (12/31/23 1210)  SpO2: 97 % (12/31/23 1210) Vital Signs (24h Range):  Temp:  [97.8 °F (36.6 °C)-98.5 °F (36.9 °C)] 98.3 °F (36.8 °C)  Pulse:  [67-73] 70  Resp:  [16-20] 18  SpO2:  [94 %-97 %] 97 %  BP: (158-195)/(67-81) 158/67     Weight: 54.4 kg (120 lb)  Body mass index is 25.97 kg/m².    Intake/Output Summary (Last 24 hours) at 12/31/2023 1408  Last data filed at 12/31/2023 0639  Gross per 24 hour   Intake 360 ml   Output 900 ml   Net -540 ml           Physical Exam  Vitals and nursing note reviewed.   Constitutional:       Appearance: Normal appearance.   HENT:      Head: Normocephalic and atraumatic.   Eyes:      Extraocular Movements: Extraocular movements intact.      Pupils: Pupils are equal, round, and reactive to light.   Cardiovascular:      Rate and Rhythm: Normal rate and regular rhythm.   Pulmonary:      Effort: Pulmonary effort is normal. No respiratory distress.   Abdominal:      General: There is no distension.   Musculoskeletal:         General: Normal range of motion.       "Cervical back: Normal range of motion.   Neurological:      General: No focal deficit present.      Mental Status: She is alert and oriented to person, place, and time.   Psychiatric:         Mood and Affect: Mood normal.         Behavior: Behavior normal.             Significant Labs: All pertinent labs within the past 24 hours have been reviewed.  CBC:   Recent Labs   Lab 12/30/23  0355   WBC 6.32   HGB 10.8*   HCT 32.8*          CMP:   Recent Labs   Lab 12/30/23  0355      K 3.6      CO2 25      BUN 12   CREATININE 0.5   CALCIUM 8.6*   PROT 5.9*   ALBUMIN 2.6*   BILITOT 0.4   ALKPHOS 111   AST 15   ALT 9*   ANIONGAP 9       Cardiac Markers: No results for input(s): "CKMB", "MYOGLOBIN", "BNP", "TROPISTAT" in the last 48 hours.    Significant Imaging: I have reviewed all pertinent imaging results/findings within the past 24 hours.    Assessment/Plan:      * Closed fracture of ramus of left pubis  -Patient complaining of left hip pain after a slip and fall earlier when she was trying to get out of her chair. Patient landed on left hip, denies any head injury, and loss of consciousness.  Patient has a previous history of hairline pelvic fracture on 12/12/2023.  -CT of pelvis was ordered: (1) acute fracture involving superior and inferior pubic rami bilaterally (2) acute fracture of sacral ala bilaterally (3) acute fracture involving transverse process of L4 and L5 bilaterally  -Patient received Norco/Zofran in the emergency department and states pain is much improved  -ED provided consulted Orthopedic surgery Dr. Jimenez who recommended that patient be admitted to the hospital for pain control, PT/OT, and orthopedic consultation.     Plan:  -PT/OT consulted  -Orthopedic consulted. No surgery needed at this time per ortho  -p.r.n. pain medications ordered  -continue supportive care    Debility  -Patient noted to be medically stable for discharge at this time  -Has been able to work with OT/PT " who recommend placement for further rehabilitation  -Patient pending placement, continue in-hospital care as stated above in the meantime  -More than 20 minutes of my time has been spent discussing and planning just her safe disposition with patient/family/CM/SW/Nursing staff (not including other time spent in clinical discussion and management)  -CM/SW following, appreciate input   -Will place DC orders once placement has been secured      Discharge planning issues    -Patient noted to be medically stable for discharge at this time  -Has been able to work with OT/PT who recommend placement for further rehabilitation  -Patient pending placement, continue in-hospital care as stated above in the meantime  -More than 20 minutes of my time has been spent discussing and planning just her safe disposition with patient/family/CM/SW/Nursing staff (not including other time spent in clinical discussion and management)  -CM/SW following, appreciate input   -Will place DC orders once placement has been secured      Slow transit constipation  -bowel reg ordered   -milk of mag, dulcolax suppository ordered       Hypokalemia  Patient has hypokalemia which is Acute and currently uncontrolled. Most recent potassium levels reviewed-   Lab Results   Component Value Date    K 3.8 12/29/2023     Ordered potassium chloride 40 mEq  Will continue potassium replacement per protocol and recheck repeat levels after replacement completed    Closed fracture of transverse process of lumbar vertebra  -see above  -NSGY consulted.   -per NSGY, continue to wear TLSO brace, marcos when getting up  -obtain MRIs of the thoracic and lumbar spine to better characterize the patient's fractures  -MRI shows:   1.Recent burst compression fracture of the T12 vertebral body with questionable involvement of the right pedicle, 50% height loss and 3 mm of osseous retropulsion contributing to mild spinal canal stenosis.  No cord compression.  No marrow replacement  process.  No epidural or paraspinal fluid collection or enhancing mass.   2. Recent insufficiency fractures of the bilateral sacral ala with extension into the S3 segment.   3. Recent nondisplaced fractures of the bilateral L5 transverse processes.   4. Marrow edema within the right T11 pedicle, possibly sequela of a nondisplaced fracture.   5. Multilevel spondylosis as detailed above.   -continue to work with PT/OT, pending SNF    Closed fracture of sacrum  -see above  -NSGY consulted. Pending recs     Closed fracture of right inferior pubic ramus  -see above    Gastroesophageal reflux disease  Stable and continue home therapy with Nexium    Essential hypertension  Chronic, controlled. Latest blood pressure and vitals reviewed-     Temp:  [98 °F (36.7 °C)]   Pulse:  [60-63]   Resp:  [16]   BP: (130-161)/(65-80)   SpO2:  [95 %-96 %] .   Home meds for hypertension were reviewed and noted below.   Hypertension Medications               hydroCHLOROthiazide (HYDRODIURIL) 25 MG tablet TAKE 1 TABLET(25 MG) BY MOUTH EVERY DAY    losartan (COZAAR) 100 MG tablet TAKE 1 TABLET(100 MG) BY MOUTH EVERY DAY    metoprolol succinate (TOPROL-XL) 50 MG 24 hr tablet TAKE 1 TABLET(50 MG) BY MOUTH EVERY DAY            While in the hospital, will manage blood pressure as follows; Continue home antihypertensive regimen with parameters    Will utilize p.r.n. blood pressure medication only if patient's blood pressure greater than 180/110 and she develops symptoms such as worsening chest pain or shortness of breath.      VTE Risk Mitigation (From admission, onward)           Ordered     IP VTE HIGH RISK PATIENT  Once         12/28/23 0339     Place sequential compression device  Until discontinued         12/28/23 0339                          I have completed this tele-visit without the assistance of a telepresenter.    The attending portion of this evaluation, treatment, and documentation was performed per Yaritza Manning MD via  Telemedicine AudioVisual using the secure [employer] software platform with 2 way audio/video. The provider was located off-site and the patient is located in the hospital. The aforementioned video software was utilized to document the relevant history and physical exam    Yaritza Manning MD  Department of [facility]   [facility]

## 2023-12-31 NOTE — ASSESSMENT & PLAN NOTE
-Patient noted to be medically stable for discharge at this time  -Has been able to work with OT/PT who recommend placement for further rehabilitation  -Patient pending placement, continue in-hospital care as stated above in the meantime  -More than 20 minutes of my time has been spent discussing and planning just her safe disposition with patient/family/CM/SW/Nursing staff (not including other time spent in clinical discussion and management)  -CM/SW following, appreciate input   -Will place DC orders once placement has been secured

## 2023-12-31 NOTE — PLAN OF CARE
Problem: Occupational Therapy  Goal: Occupational Therapy Goal  Description: Goals to be met by: 1/12/24     Patient will increase functional independence with ADLs by performing:    UE Dressing with Modified [place].  LE Dressing with Supervision and use of AE   Grooming while standing at sink with Stand-by Assistance.  Toileting from bedside commode with Stand-by Assistance for hygiene and clothing management.   Rolling to Bilateral with Modified [place]    Supine to sit with Minimal Assistance.  Step transfer with Stand-by Assistance  Toilet transfer to bedside commode with Stand-by Assistance.  Upper extremity exercise program x15 reps per handout, with independence.  Able to identify 3/3 spinal precautions independently in order to increase safety awareness with mobility/transfers     Outcome: Ongoing, Progressing

## 2023-12-31 NOTE — PT/OT/SLP PROGRESS
"Occupational Therapy   Treatment    Name: Juli Parra  MRN: [id number]  Admitting Diagnosis:  Closed fracture of ramus of left pubis       Recommendations:     Discharge Recommendations: Moderate Intensity Therapy  Discharge Equipment Recommendations:   (TBD)  Barriers to discharge:   (frequent falls and fractures; decreased mobility and increased assist with ADLs; high risk of further falls, unplanned readmission, and morbidity if d/c home at this time)    Assessment:     Juli Parra is a 78 y.o. female with a medical diagnosis of Closed fracture of ramus of left pubis. Performance deficits affecting function are weakness, gait instability, decreased upper extremity function, decreased ROM, impaired balance, impaired endurance, decreased lower extremity function, decreased safety awareness, pain, impaired self care skills, impaired functional mobility, decreased coordination, impaired skin, orthopedic precautions.     Pt appears in increased pain with weightbearing through LLE (more than RLE) with ambulation, but c/o 10/10 pain to B/L LE and lower back with activity, but does reduce to 5-8/10 afterwards.     Rehab Prognosis:  Good; patient would benefit from acute skilled OT services to address these deficits and reach maximum level of function.       Plan:     Patient to be seen  (5-6x/wk) to address the above listed problems via self-care/home management, therapeutic activities, therapeutic exercises  Plan of Care Expires: 01/12/24  Plan of Care Reviewed with: patient    Subjective     Chief Complaint: pain, current level of function, can't go home like this   Patient/Family Comments/goals: motivated to get to the chair; "am I ever going to walk again?"    Pain/Comfort:  Pain Rating 1:  (no pain at rest; 10/10 pain to B/L LE and lower back with movement; 8/10 pain afterwards (pt appeared in more pain with WB through LLE with ambulation))  Pain Addressed 1: Pre-medicate for activity, Reposition, " Distraction, Cessation of Activity, Nurse notified    Objective:     Communicated with: nurseJesus, prior to session.  Patient found  HOB elevated L sidelying with B/L heels offloaded by pillow  with peripheral IV, telemetry, PureWick, bed alarm upon OT entry to room.    General Precautions: Standard, fall    Orthopedic Precautions:spinal precautions, RLE weight bearing as tolerated, LLE weight bearing as tolerated (B/L LE protective weightbearing per ortho)  Braces: TLSO  Respiratory Status: Room air     Occupational Performance:     Bed Mobility:    Patient completed Log Rolling/Turning to Right with moderate assistance  Patient completed Scooting anteriorly with maximal assistance  Patient completed Supine to Sit with maximal assistance, with side rail, and HOB elevated      Functional Mobility/Transfers:  Patient completed Sit <> Stand Transfer with moderate assistance  with  rolling walker   Patient completed Bed <> Chair Transfer using Step Transfer technique with minimum assistance with rolling walker  Functional Mobility: Gait belt donned prior to transfer for safety during mobility/transfers. TLSO in place. Pt completed ~6 small steps bed>chair using RW with MIN A and MAX cueing for B/L LE and RW sequencing and BUE positioning on RW. Increased time required.     Activities of Daily Living:  Upper Body Dressing: maximal assistance to don TLSO with MIN A for dynamic sit balance   Lower Body Dressing: total assistance to adjust B/L socks       AMPA 6 Click ADL: 15    Treatment & Education:  Pt re-educated on OT role/POC  Pt unable to recall any spinal precautions, requiring re-edu of precautions and log roll technique   Importance of OOB activity with staff assistance  Safety during functional t/f and mobility   Edu with donning of how to don/doff TLSO   Multiple self-care tasks/functional mobility completed- assistance level noted above   All questions/concerns answered within OT scope of practice        Patient left seated on a pillow reclined chair with TLSO in place and B/L heels offloaded by pillow and tray table in front of pt with all lines intact, call button in reach, nurse, Deniceyn, notified, and all needs met/within reach; purewick in place.     Second visit:   Pt found reclined in the chair with TLSO in place with c/o 5/10 overall pain. Pt able to scoot anteriorly and laterally in the chair with SBA and extra time required. Pt completed stand pivot transfer chair>bed with maximal assistance with BUE supported. Pt scooted posteriorly in the bed with CGA and extra time required. MAX A sit>supine. MOD A to bridge in the bed for repositioning. Log roll L<>R multiple times with MIN-MOD A to doff soiled brief, pericare, and don clean brief (total A for x3 ADLs).     Patient left R sidelying with B/L heels offloaded by pillow, purewick in place, tray table in front of pt, B/L SCDs in place, all lines intact, call button in reach, and all needs met/within reach. Door left open, lights on.     GOALS:   Multidisciplinary Problems       Occupational Therapy Goals          Problem: Occupational Therapy    Goal Priority Disciplines Outcome Interventions   Occupational Therapy Goal     OT, PT/OT Ongoing, Progressing    Description: Goals to be met by: 1/12/24     Patient will increase functional independence with ADLs by performing:    UE Dressing with Modified [place].  LE Dressing with Supervision and use of AE   Grooming while standing at sink with Stand-by Assistance.  Toileting from bedside commode with Stand-by Assistance for hygiene and clothing management.   Rolling to Bilateral with Modified [place]    Supine to sit with Minimal Assistance.  Step transfer with Stand-by Assistance  Toilet transfer to bedside commode with Stand-by Assistance.  Upper extremity exercise program x15 reps per handout, with independence.  Able to identify 3/3 spinal precautions independently in order to increase safety  awareness with mobility/transfers                          Time Tracking:     OT Date of Treatment: 12/31/23  OT Start Time: 1452  OT Stop Time: 1515  OT Total Time (min): 23 min    Billable Minutes:Self Care/Home Management 10 min  Therapeutic Activity 13 min  Total Time 23 min     Second session: 2449-9725  Billable Minutes: 18 min (TA)    OT/JHON: OT     Number of JHON visits since last OT visit: 0    12/31/2023

## 2023-12-31 NOTE — PLAN OF CARE
Problem: Adult Inpatient Plan of Care  Goal: Plan of Care Review  Outcome: Ongoing, Progressing  Goal: Patient-Specific Goal (Individualized)  Outcome: Ongoing, Progressing  Goal: Optimal Comfort and Wellbeing  Outcome: Ongoing, Progressing  Goal: Readiness for Transition of Care  Outcome: Ongoing, Progressing     Problem: Fall Injury Risk  Goal: Absence of Fall and Fall-Related Injury  Outcome: Ongoing, Progressing     Problem: Pain Acute  Goal: Acceptable Pain Control and Functional Ability  Outcome: Ongoing, Progressing

## 2024-01-01 LAB
ANION GAP SERPL CALC-SCNC: 7 MMOL/L (ref 8–16)
BASOPHILS # BLD AUTO: 0.05 K/UL (ref 0–0.2)
BASOPHILS NFR BLD: 0.8 % (ref 0–1.9)
BUN SERPL-MCNC: 9 MG/DL (ref 8–23)
CALCIUM SERPL-MCNC: 8.6 MG/DL (ref 8.7–10.5)
CHLORIDE SERPL-SCNC: 104 MMOL/L (ref 95–110)
CO2 SERPL-SCNC: 26 MMOL/L (ref 23–29)
CREAT SERPL-MCNC: 0.5 MG/DL (ref 0.5–1.4)
DIFFERENTIAL METHOD BLD: ABNORMAL
EOSINOPHIL # BLD AUTO: 0.5 K/UL (ref 0–0.5)
EOSINOPHIL NFR BLD: 7.5 % (ref 0–8)
ERYTHROCYTE [DISTWIDTH] IN BLOOD BY AUTOMATED COUNT: 12.6 % (ref 11.5–14.5)
EST. GFR  (NO RACE VARIABLE): >60 ML/MIN/1.73 M^2
GLUCOSE SERPL-MCNC: 107 MG/DL (ref 70–110)
HCT VFR BLD AUTO: 32.9 % (ref 37–48.5)
HGB BLD-MCNC: 10.7 G/DL (ref 12–16)
IMM GRANULOCYTES # BLD AUTO: 0.06 K/UL (ref 0–0.04)
IMM GRANULOCYTES NFR BLD AUTO: 1 % (ref 0–0.5)
LYMPHOCYTES # BLD AUTO: 1 K/UL (ref 1–4.8)
LYMPHOCYTES NFR BLD: 16.5 % (ref 18–48)
MAGNESIUM SERPL-MCNC: 1.8 MG/DL (ref 1.6–2.6)
MCH RBC QN AUTO: 30.1 PG (ref 27–31)
MCHC RBC AUTO-ENTMCNC: 32.5 G/DL (ref 32–36)
MCV RBC AUTO: 92 FL (ref 82–98)
MONOCYTES # BLD AUTO: 0.6 K/UL (ref 0.3–1)
MONOCYTES NFR BLD: 9.6 % (ref 4–15)
NEUTROPHILS # BLD AUTO: 4 K/UL (ref 1.8–7.7)
NEUTROPHILS NFR BLD: 64.6 % (ref 38–73)
NRBC BLD-RTO: 0 /100 WBC
PHOSPHATE SERPL-MCNC: 3.3 MG/DL (ref 2.7–4.5)
PLATELET # BLD AUTO: 317 K/UL (ref 150–450)
PMV BLD AUTO: 8.6 FL (ref 9.2–12.9)
POTASSIUM SERPL-SCNC: 4.2 MMOL/L (ref 3.5–5.1)
RBC # BLD AUTO: 3.56 M/UL (ref 4–5.4)
SODIUM SERPL-SCNC: 137 MMOL/L (ref 136–145)
WBC # BLD AUTO: 6.17 K/UL (ref 3.9–12.7)

## 2024-01-01 PROCEDURE — 63600175 PHARM REV CODE 636 W HCPCS: Performed by: HOSPITALIST

## 2024-01-01 PROCEDURE — 97535 SELF CARE MNGMENT TRAINING: CPT

## 2024-01-01 PROCEDURE — 80048 BASIC METABOLIC PNL TOTAL CA: CPT | Performed by: HOSPITALIST

## 2024-01-01 PROCEDURE — 25000003 PHARM REV CODE 250: Performed by: HOSPITALIST

## 2024-01-01 PROCEDURE — 36415 COLL VENOUS BLD VENIPUNCTURE: CPT | Performed by: HOSPITALIST

## 2024-01-01 PROCEDURE — 25000003 PHARM REV CODE 250: Performed by: PHYSICIAN ASSISTANT

## 2024-01-01 PROCEDURE — 97530 THERAPEUTIC ACTIVITIES: CPT

## 2024-01-01 PROCEDURE — 85025 COMPLETE CBC W/AUTO DIFF WBC: CPT | Performed by: HOSPITALIST

## 2024-01-01 PROCEDURE — 11000001 HC ACUTE MED/SURG PRIVATE ROOM

## 2024-01-01 PROCEDURE — 97110 THERAPEUTIC EXERCISES: CPT | Mod: CQ

## 2024-01-01 PROCEDURE — 25000003 PHARM REV CODE 250

## 2024-01-01 PROCEDURE — 63600175 PHARM REV CODE 636 W HCPCS

## 2024-01-01 PROCEDURE — 83735 ASSAY OF MAGNESIUM: CPT | Performed by: HOSPITALIST

## 2024-01-01 PROCEDURE — 84100 ASSAY OF PHOSPHORUS: CPT | Performed by: HOSPITALIST

## 2024-01-01 PROCEDURE — 97116 GAIT TRAINING THERAPY: CPT | Mod: CQ

## 2024-01-01 RX ORDER — LACTULOSE 10 G/15ML
15 SOLUTION ORAL 3 TIMES DAILY
Status: COMPLETED | OUTPATIENT
Start: 2024-01-01 | End: 2024-01-02

## 2024-01-01 RX ORDER — HYDRALAZINE HYDROCHLORIDE 20 MG/ML
10 INJECTION INTRAMUSCULAR; INTRAVENOUS EVERY 6 HOURS PRN
Status: DISCONTINUED | OUTPATIENT
Start: 2024-01-01 | End: 2024-01-03 | Stop reason: HOSPADM

## 2024-01-01 RX ORDER — AMLODIPINE BESYLATE 5 MG/1
5 TABLET ORAL DAILY
Status: DISCONTINUED | OUTPATIENT
Start: 2024-01-01 | End: 2024-01-02

## 2024-01-01 RX ORDER — ADHESIVE BANDAGE
30 BANDAGE TOPICAL DAILY
Status: DISCONTINUED | OUTPATIENT
Start: 2024-01-01 | End: 2024-01-03 | Stop reason: HOSPADM

## 2024-01-01 RX ADMIN — HYDROCODONE BITARTRATE AND ACETAMINOPHEN 1 TABLET: 10; 325 TABLET ORAL at 06:01

## 2024-01-01 RX ADMIN — DULOXETINE HYDROCHLORIDE 20 MG: 20 CAPSULE, DELAYED RELEASE ORAL at 08:01

## 2024-01-01 RX ADMIN — DICYCLOMINE HYDROCHLORIDE 10 MG: 10 CAPSULE ORAL at 09:01

## 2024-01-01 RX ADMIN — LACTULOSE 15 G: 20 SOLUTION ORAL at 02:01

## 2024-01-01 RX ADMIN — MAGNESIUM HYDROXIDE 2400 MG: 400 SUSPENSION ORAL at 03:01

## 2024-01-01 RX ADMIN — AMLODIPINE BESYLATE 5 MG: 5 TABLET ORAL at 02:01

## 2024-01-01 RX ADMIN — LORAZEPAM 1 MG: 0.5 TABLET ORAL at 09:01

## 2024-01-01 RX ADMIN — HYDROCODONE BITARTRATE AND ACETAMINOPHEN 1 TABLET: 10; 325 TABLET ORAL at 11:01

## 2024-01-01 RX ADMIN — LACTULOSE 15 G: 20 SOLUTION ORAL at 09:01

## 2024-01-01 RX ADMIN — DOCUSATE SODIUM 50MG AND SENNOSIDES 8.6MG 2 TABLET: 8.6; 5 TABLET, FILM COATED ORAL at 08:01

## 2024-01-01 RX ADMIN — MELATONIN TAB 3 MG 6 MG: 3 TAB at 09:01

## 2024-01-01 RX ADMIN — POLYETHYLENE GLYCOL 3350 17 G: 17 POWDER, FOR SOLUTION ORAL at 08:01

## 2024-01-01 RX ADMIN — HYDROCODONE BITARTRATE AND ACETAMINOPHEN 1 TABLET: 5; 325 TABLET ORAL at 05:01

## 2024-01-01 RX ADMIN — DICYCLOMINE HYDROCHLORIDE 10 MG: 10 CAPSULE ORAL at 10:01

## 2024-01-01 RX ADMIN — LOSARTAN POTASSIUM 100 MG: 25 TABLET, FILM COATED ORAL at 08:01

## 2024-01-01 RX ADMIN — ONDANSETRON 4 MG: 2 INJECTION INTRAMUSCULAR; INTRAVENOUS at 06:01

## 2024-01-01 RX ADMIN — DICYCLOMINE HYDROCHLORIDE 10 MG: 10 CAPSULE ORAL at 03:01

## 2024-01-01 RX ADMIN — HYDRALAZINE HYDROCHLORIDE 10 MG: 20 INJECTION INTRAMUSCULAR; INTRAVENOUS at 06:01

## 2024-01-01 RX ADMIN — METOPROLOL SUCCINATE 50 MG: 50 TABLET, EXTENDED RELEASE ORAL at 08:01

## 2024-01-01 NOTE — PT/OT/SLP PROGRESS
Occupational Therapy   Treatment    Name: Juli Parra  MRN: [id number]  Admitting Diagnosis:  Closed fracture of ramus of left pubis       Recommendations:     Discharge Recommendations: Moderate Intensity Therapy  Discharge Equipment Recommendations:   (TBD)  Barriers to discharge:   (frequent falls and fractures; decreased mobility and increased assist with ADLs; high risk of further falls, unplanned readmission, and morbidity if d/c home at this time)    Assessment:     Juli Parra is a 78 y.o. female with a medical diagnosis of Closed fracture of ramus of left pubis. Performance deficits affecting function are weakness, gait instability, decreased upper extremity function, decreased ROM, impaired endurance, impaired balance, decreased lower extremity function, decreased safety awareness, pain, orthopedic precautions, impaired skin, impaired self care skills, impaired functional mobility, edema.     Rehab Prognosis:  Good; patient would benefit from acute skilled OT services to address these deficits and reach maximum level of function.       Plan:     Patient to be seen  (5-6x/wk) to address the above listed problems via self-care/home management, therapeutic activities, therapeutic exercises  Plan of Care Expires: 01/12/24  Plan of Care Reviewed with: patient    Subjective     Chief Complaint: frustrated that it is hard and painful to move   Patient/Family Comments/goals: pain, agreeable to try to sit on the BSC     Pain/Comfort:  Pain Rating 1:  (no pain at rest; tearful/in pain with mobility, mostly in LLE)  Pain Addressed 1: Pre-medicate for activity, Reposition, Distraction, Cessation of Activity, Nurse notified    Objective:     Communicated with: nurse, Marissa, prior to session.  Patient found HOB elevated with B/L heels offloaded by pillow with peripheral IV, SCD, telemetry, PureWick upon OT entry to room.    General Precautions: Standard, fall    Orthopedic Precautions:spinal precautions,  LLE weight bearing as tolerated, RLE weight bearing as tolerated (Protective WB to B/L LE)  Braces: TLSO  Respiratory Status: Room air     Occupational Performance:     Bed Mobility:    Patient completed Rolling/Turning to Right with maximal assistance and with side rail  Patient completed Scooting anteriorly with minimum assistance  Patient completed Supine>sit with maximal assistance     Functional Mobility/Transfers: gait belt and TLSO in place.   Patient completed Sit <> Stand Transfer with moderate assistance and of 2 persons  with  rolling walker from the bed   Then, pt completed ~5 steps bed>BSC using RW with MIN A.   After attempt for BM, but no void, pt required MAX A x2 for sit>stand from BSC with R lateral lean d/t L hip pain.   Then, pt completed ~4 ft using RW with MOD A and chair closely following. Max cueing for safe hand placement with each transfer.     Activities of Daily Living:  Upper Body Dressing: maximal assistance to don TLSO seated EOB; pt guarded and fearful to move UE off the bed unsupported sitting EOB for ADL  Lower Body Dressing: total assistance to doff brief then don clean one partially standing then seated   Toileting: total assistance for pericare while standing statically with MIN A for balance   Grooming: total assistance for lotion to backside while seated EOB as per pt request d/t frequent itchy skin       AMPAC 6 Click ADL: 14    Treatment & Education:  Pt re-educated on OT role/POC.   Importance of OOB activity with staff assistance.  Pt unable to recall spinal precautions, requiring re-edu with OT demo   Safety during functional t/f and mobility   Calm cueing throughout session with relaxing music played at times for attempt in distraction   Multiple self-care tasks/functional mobility completed- assistance level noted above   All questions/concerns answered within OT scope of practice       Patient left  reclined in the chair seated on air cushion with B/L heels offloaded by  pillow, new purewick in place, and tray table in front of pt  with all lines intact, call button in reach, nurse, Marissa, notified, CNA present, and all needs met/within reach; lights on, blinds opened, door open.     GOALS:   Multidisciplinary Problems       Occupational Therapy Goals          Problem: Occupational Therapy    Goal Priority Disciplines Outcome Interventions   Occupational Therapy Goal     OT, PT/OT Ongoing, Progressing    Description: Goals to be met by: 1/12/24     Patient will increase functional independence with ADLs by performing:    UE Dressing with Modified [place].  LE Dressing with Supervision and use of AE   Grooming while standing at sink with Stand-by Assistance.  Toileting from bedside commode with Stand-by Assistance for hygiene and clothing management.   Rolling to Bilateral with Modified [place]    Supine to sit with Minimal Assistance.  Step transfer with Stand-by Assistance  Toilet transfer to bedside commode with Stand-by Assistance.  Upper extremity exercise program x15 reps per handout, with independence.  Able to identify 3/3 spinal precautions independently in order to increase safety awareness with mobility/transfers                          Time Tracking:     OT Date of Treatment: 01/01/24  OT Start Time: 1115  OT Stop Time: 1142  OT Total Time (min): 27 min    Billable Minutes:Self Care/Home Management 14 min  Therapeutic Activity 13 min  Total Time 27 min (co-treat with PTA)     OT/JHON: OT     Number of JHON visits since last OT visit: 0    1/1/2024

## 2024-01-01 NOTE — NURSING
[facility], [facility]  Nurses Note -- 4 Eyes      1/1/2024       Skin assessed on: Q Shift      [x] No Pressure Injuries Present    [x]Prevention Measures Documented    [] Yes LDA  for Pressure Injury Previously documented     [] Yes New Pressure Injury Discovered   [] LDA for New Pressure Injury Added      Attending RN:  Swapna Rondon RN     Second RN:  KATRINA Lee

## 2024-01-01 NOTE — PT/OT/SLP PROGRESS
Physical Therapy Treatment    Patient Name:  Juli Parra   MRN:  [id number]    Recommendations:     Discharge Recommendations: Moderate Intensity Therapy  Discharge Equipment Recommendations: none  Barriers to discharge: Decreased caregiver support, decreased safety awareness, and decreased ambulation    Assessment:     Juli Parra is a 78 y.o. female admitted with a medical diagnosis of Closed fracture of ramus of left pubis.  She presents with the following impairments/functional limitations: weakness, gait instability, impaired endurance, impaired self care skills, impaired functional mobility, impaired balance, decreased safety awareness, decreased lower extremity function, impaired skin, decreased upper extremity function, decreased coordination, orthopedic precautions, decreased ROM, pain.    Pt limited with ambulation due to increased pain. Pt requires Max A for bed mobility, Mod-Max A of 2 persons for transfers and Mod A to ambulate ~4ft with RW. Pt is a fall risk and requires assistance for all OOB activity 2/2 safety. Pt continues to make progress towards goals.     Rehab Prognosis: Fair; patient would benefit from acute skilled PT services to address these deficits and reach maximum level of function.    Recent Surgery: * No surgery found *      Plan:     During this hospitalization, patient to be seen 5 x/week to address the identified rehab impairments via gait training, therapeutic activities, therapeutic exercises and progress toward the following goals:    Plan of Care Expires:  01/13/24    Subjective     Chief Complaint: pain with sitting and movement  Patient/Family Comments/goals: Pt reports she has pain with sitting especially to the L side.   Pain/Comfort:  Pain Rating 1:  (no pain at rest, yes with movement)  Location - Side 1: Bilateral  Location 1: back (and BLEs, L>R)  Pt medicated prior, pt's nurse notified    Objective:     Communicated with pt's nurseMarissa prior to  session.  Patient found HOB elevated with telemetry, peripheral IV, PureWick, bed alarm upon PT entry to room.     General Precautions: Standard, fall  Orthopedic Precautions: spinal precautions, RLE weight bearing as tolerated, LLE weight bearing as tolerated (Protective WB to BLEs)  Braces: TLSO  Respiratory Status: Room air   Donned back gown, non-skid socks and gait belt prior to OOB activity.     Functional Mobility:  Bed Mobility:     Rolling Right: maximal assistance  Scooting: Min A to scoot to EOB for foot placement  Supine to Sit: Max A for trunk and BLEs   Transfers: from EOB and BScommode     Sit to Stand:  moderate assistance and of 2 persons with rolling walker from EOB, Max A of 2 persons from BScommode  Bed to BScommode: minimum assistance with  rolling walker  using  Step Transfer  Gait: Pt ambulated ~5 steps from EOB>BScommode with RW, Min A and ~4ft with Mod A, RW requiring assistance for RW steering. Verbal tactile cues for upright posture, RW mgt/safety/sequencing. Pt displays 3 pt gait pattern with decreased gaye, step length, velocity of limb, postural control, endurance and balance. Requires cues for proper WB distribution throughout BUEs.   Balance: fair sitting balance, poor standing      AM-PAC 6 CLICK MOBILITY  Turning over in bed (including adjusting bedclothes, sheets and blankets)?: 2  Sitting down on and standing up from a chair with arms (e.g., wheelchair, bedside commode, etc.): 2  Moving from lying on back to sitting on the side of the bed?: 2  Moving to and from a bed to a chair (including a wheelchair)?: 2  Need to walk in hospital room?: 2  Climbing 3-5 steps with a railing?: 1  Basic Mobility Total Score: 11       Treatment & Education:  Therex to BLEs x15 reps AROM: glute sets, HRs,TRs, Quad sets and LAQs    Pt educated on PTA role/POC.   Importance of OOB activity with staff assistance.  Importance of sitting up in the chair throughout the day as tolerated, especially for  meals   Safety during functional t/f and mobility with use of AD and staff  White board updated   Multiple self-care tasks/functional mobility completed- assistance level noted above   All questions/concerns answered within scope of practice  Pt encouraged to use call button for assistance for all OOB/OOchair activity 2/2 fall risk. Pt verbalized understanding. Tray table and all needs within reach.      Patient left  reclined in B[place] with pressure cushion, B heels elevated, tray table in front, cell phone and tablet within reach  with all lines intact, call button in reach, and pt's nurse, Marissa notified..    GOALS:   Multidisciplinary Problems       Physical Therapy Goals          Problem: Physical Therapy    Goal Priority Disciplines Outcome Goal Variances Interventions   Physical Therapy Goal     PT, PT/OT Ongoing, Progressing     Description: Goals to be met by:  2024    Patient will increase functional independence with mobility by performin. Supine to sit with Modified [place]  2. Sit to supine with Modified [place]  3. Sit to stand transfer with Modified [place]  4. Gait  x 400 feet with Modified [place] using Rolling Walker.    Recommend: Moderate Intensity Therapy at time of discharge to home with family.                             Time Tracking:     PT Received On: 24  PT Start Time: 1113     PT Stop Time: 1142  PT Total Time (min): 29 min   Co-treatment with HARRIS/ OT secondary to need for two skilled therapists for safety of clinicians and patient.    Billable Minutes: Gait Training 12 and Therapeutic Exercise 11    Treatment Type: Treatment  PT/PTA: PTA     Number of PTA visits since last PT visit: 2024

## 2024-01-01 NOTE — PLAN OF CARE
Problem: Physical Therapy  Goal: Physical Therapy Goal  Description: Goals to be met by:  2024    Patient will increase functional independence with mobility by performin. Supine to sit with Modified [place]  2. Sit to supine with Modified [place]  3. Sit to stand transfer with Modified [place]  4. Gait  x 400 feet with Modified [place] using Rolling Walker.    Recommend: Moderate Intensity Therapy at time of discharge to home with family.        Outcome: Ongoing, Progressing     Pt limited with ambulation due to increased pain. Pt requires Max A for bed mobility, Mod-Max A of 2 persons for transfers and Mod A to ambulate ~4ft with RW. Pt is a fall risk and requires assistance for all OOB activity 2/2 safety. Pt continues to make progress towards goals.

## 2024-01-01 NOTE — PLAN OF CARE
Laxative given. Toileted several times without bowel movement  Problem: Constipation  Goal: Effective Bowel Elimination  Outcome: Ongoing, Progressing  Intervention: Promote Effective Bowel Elimination  Flowsheets (Taken 1/1/2024 1623)  Bowel Function Promotion: prompt response to urge promoted  Bowel Elimination Management:   relaxation techniques promoted   toileting offered

## 2024-01-02 ENCOUNTER — OUTPATIENT CASE MANAGEMENT (OUTPATIENT)
Dept: ADMINISTRATIVE | Facility: OTHER | Age: 79
End: 2024-01-02
Payer: MEDICARE

## 2024-01-02 PROCEDURE — 25000003 PHARM REV CODE 250: Performed by: HOSPITALIST

## 2024-01-02 PROCEDURE — 25000003 PHARM REV CODE 250

## 2024-01-02 PROCEDURE — 97116 GAIT TRAINING THERAPY: CPT | Mod: CQ

## 2024-01-02 PROCEDURE — 94761 N-INVAS EAR/PLS OXIMETRY MLT: CPT

## 2024-01-02 PROCEDURE — 11000001 HC ACUTE MED/SURG PRIVATE ROOM

## 2024-01-02 PROCEDURE — 25000003 PHARM REV CODE 250: Performed by: PHYSICIAN ASSISTANT

## 2024-01-02 PROCEDURE — 97535 SELF CARE MNGMENT TRAINING: CPT

## 2024-01-02 PROCEDURE — 97530 THERAPEUTIC ACTIVITIES: CPT | Mod: CQ

## 2024-01-02 RX ORDER — CARVEDILOL 12.5 MG/1
12.5 TABLET ORAL 2 TIMES DAILY
Status: DISCONTINUED | OUTPATIENT
Start: 2024-01-02 | End: 2024-01-03 | Stop reason: HOSPADM

## 2024-01-02 RX ORDER — AMLODIPINE BESYLATE 5 MG/1
10 TABLET ORAL DAILY
Status: DISCONTINUED | OUTPATIENT
Start: 2024-01-02 | End: 2024-01-03 | Stop reason: HOSPADM

## 2024-01-02 RX ADMIN — CARVEDILOL 12.5 MG: 12.5 TABLET, FILM COATED ORAL at 08:01

## 2024-01-02 RX ADMIN — HYDROCODONE BITARTRATE AND ACETAMINOPHEN 1 TABLET: 10; 325 TABLET ORAL at 07:01

## 2024-01-02 RX ADMIN — HYDROCODONE BITARTRATE AND ACETAMINOPHEN 1 TABLET: 10; 325 TABLET ORAL at 01:01

## 2024-01-02 RX ADMIN — LORAZEPAM 1 MG: 0.5 TABLET ORAL at 08:01

## 2024-01-02 RX ADMIN — DICYCLOMINE HYDROCHLORIDE 10 MG: 10 CAPSULE ORAL at 11:01

## 2024-01-02 RX ADMIN — BISACODYL 10 MG: 10 SUPPOSITORY RECTAL at 11:01

## 2024-01-02 RX ADMIN — DULOXETINE HYDROCHLORIDE 20 MG: 20 CAPSULE, DELAYED RELEASE ORAL at 08:01

## 2024-01-02 RX ADMIN — MAGNESIUM HYDROXIDE 2400 MG: 400 SUSPENSION ORAL at 08:01

## 2024-01-02 RX ADMIN — LOSARTAN POTASSIUM 100 MG: 25 TABLET, FILM COATED ORAL at 08:01

## 2024-01-02 RX ADMIN — DOCUSATE SODIUM 50MG AND SENNOSIDES 8.6MG 2 TABLET: 8.6; 5 TABLET, FILM COATED ORAL at 08:01

## 2024-01-02 RX ADMIN — LACTULOSE 15 G: 20 SOLUTION ORAL at 08:01

## 2024-01-02 RX ADMIN — DICYCLOMINE HYDROCHLORIDE 10 MG: 10 CAPSULE ORAL at 04:01

## 2024-01-02 RX ADMIN — HYDROCODONE BITARTRATE AND ACETAMINOPHEN 1 TABLET: 10; 325 TABLET ORAL at 02:01

## 2024-01-02 RX ADMIN — Medication 1 ENEMA: at 02:01

## 2024-01-02 RX ADMIN — AMLODIPINE BESYLATE 10 MG: 5 TABLET ORAL at 08:01

## 2024-01-02 RX ADMIN — DICYCLOMINE HYDROCHLORIDE 10 MG: 10 CAPSULE ORAL at 07:01

## 2024-01-02 RX ADMIN — HYDROCODONE BITARTRATE AND ACETAMINOPHEN 1 TABLET: 10; 325 TABLET ORAL at 08:01

## 2024-01-02 RX ADMIN — DICYCLOMINE HYDROCHLORIDE 10 MG: 10 CAPSULE ORAL at 08:01

## 2024-01-02 RX ADMIN — POLYETHYLENE GLYCOL 3350 17 G: 17 POWDER, FOR SOLUTION ORAL at 08:01

## 2024-01-02 NOTE — PLAN OF CARE
Problem: Adult Inpatient Plan of Care  Goal: Plan of Care Review  Outcome: Ongoing, Progressing  Goal: Patient-Specific Goal (Individualized)  Outcome: Ongoing, Progressing  Goal: Optimal Comfort and Wellbeing  Outcome: Ongoing, Progressing  Goal: Readiness for Transition of Care  Outcome: Ongoing, Progressing     Problem: Fall Injury Risk  Goal: Absence of Fall and Fall-Related Injury  Outcome: Ongoing, Progressing     Problem: Skin Injury Risk Increased  Goal: Skin Health and Integrity  Outcome: Ongoing, Progressing     Problem: Pain Acute  Goal: Acceptable Pain Control and Functional Ability  Outcome: Ongoing, Progressing     Problem: Constipation  Goal: Effective Bowel Elimination  Outcome: Ongoing, Progressing

## 2024-01-02 NOTE — SUBJECTIVE & OBJECTIVE
Interval History: Pt noted to be awake, alert, conversant and calm. HDS and afebrile. No acute events overnight. Still Constipated, bowel reg ordered. pending SNF placement.       Review of Systems   Constitutional:  Positive for activity change and fatigue. Negative for fever.   Respiratory:  Negative for shortness of breath.    Cardiovascular:  Negative for chest pain.   Gastrointestinal:  Positive for constipation. Negative for abdominal pain.   Musculoskeletal:  Positive for arthralgias and back pain.   Neurological:  Negative for dizziness and headaches.   All other systems reviewed and are negative.    Objective:     Vital Signs (Most Recent):  Temp: 98.4 °F (36.9 °C) (01/02/24 0011)  Pulse: 74 (01/02/24 0011)  Resp: 16 (01/02/24 0101)  BP: (!) 157/68 (01/02/24 0011)  SpO2: 95 % (01/02/24 0011) Vital Signs (24h Range):  Temp:  [97.6 °F (36.4 °C)-98.9 °F (37.2 °C)] 98.4 °F (36.9 °C)  Pulse:  [64-83] 74  Resp:  [16-18] 16  SpO2:  [92 %-95 %] 95 %  BP: (143-200)/(65-84) 157/68     Weight: 54.4 kg (120 lb)  Body mass index is 25.97 kg/m².    Intake/Output Summary (Last 24 hours) at 1/2/2024 0206  Last data filed at 1/1/2024 1330  Gross per 24 hour   Intake 245 ml   Output 550 ml   Net -305 ml           Physical Exam  Vitals and nursing note reviewed.   Constitutional:       Appearance: Normal appearance.   HENT:      Head: Normocephalic and atraumatic.   Eyes:      Extraocular Movements: Extraocular movements intact.      Pupils: Pupils are equal, round, and reactive to light.   Cardiovascular:      Rate and Rhythm: Normal rate and regular rhythm.   Pulmonary:      Effort: Pulmonary effort is normal. No respiratory distress.   Abdominal:      General: There is no distension.   Musculoskeletal:         General: Normal range of motion.      Cervical back: Normal range of motion.   Neurological:      General: No focal deficit present.      Mental Status: She is alert and oriented to person, place, and time.  "  Psychiatric:         Mood and Affect: Mood normal.         Behavior: Behavior normal.             Significant Labs: All pertinent labs within the past 24 hours have been reviewed.  CBC:   Recent Labs   Lab 01/01/24  0504   WBC 6.17   HGB 10.7*   HCT 32.9*          CMP:   Recent Labs   Lab 01/01/24  0504      K 4.2      CO2 26      BUN 9   CREATININE 0.5   CALCIUM 8.6*   ANIONGAP 7*       Cardiac Markers: No results for input(s): "CKMB", "MYOGLOBIN", "BNP", "TROPISTAT" in the last 48 hours.    Significant Imaging: I have reviewed all pertinent imaging results/findings within the past 24 hours.  "

## 2024-01-02 NOTE — NURSING
Report received and care assumed. Discussed plan of care and safety with patient . Reviewed call system. [facility] [place]  [facility] -- 4 Eyes      1/2/2024       Skin assessed on: Q Shift      [x] No Pressure Injuries Present    [x]Prevention Measures Documented    [] Yes LDA  for Pressure Injury Previously documented     [] Yes New Pressure Injury Discovered   [] LDA for New Pressure Injury Added      Attending RN:  Marissa Bright RN     Second RN:  Kristi Juan RN

## 2024-01-02 NOTE — PT/OT/SLP PROGRESS
Physical Therapy Treatment    Patient Name:  Juli Parra   MRN:  [id number]    Recommendations:     Discharge Recommendations: Moderate Intensity Therapy  Discharge Equipment Recommendations: none  Barriers to discharge: None    Assessment:     Juli Parra is a 78 y.o. female admitted with a medical diagnosis of Closed fracture of ramus of left pubis.  She presents with the following impairments/functional limitations: weakness, impaired endurance, impaired self care skills, impaired functional mobility, gait instability, impaired balance, decreased coordination, decreased lower extremity function, decreased upper extremity function, pain, decreased safety awareness, decreased ROM, orthopedic precautions .    Rehab Prognosis: Good; patient would benefit from acute skilled PT services to address these deficits and reach maximum level of function.    Recent Surgery: * No surgery found *      Plan:     During this hospitalization, patient to be seen 5 x/week to address the identified rehab impairments via gait training, therapeutic activities, therapeutic exercises and progress toward the following goals:    Plan of Care Expires:  01/13/24    Subjective     Chief Complaint: pain   Patient/Family Comments/goals: pt is agreeable to therapy , requested to get back in bed   Pain/Comfort:  Pain Rating 1: 10/10  Location - Orientation 1: lower  Pain Addressed 1: Pre-medicate for activity, Nurse notified  Pain Rating Post-Intervention 1: 10/10      Objective:     Communicated with nurse prior to session.  Patient found up in chair with oxygen, peripheral IV, TLSO, PureWick upon PT entry to room.     General Precautions: Standard, fall  Orthopedic Precautions: spinal precautions, RLE weight bearing as tolerated, LLE weight bearing as tolerated (Protective WB to BLEs)  Braces: TLSO  Respiratory Status: Room air     Functional Mobility:  Bed Mobility:     Scooting: contact guard assistance  Sit to Supine: moderate  assistance and of 2 persons  Transfers:     Sit to Stand: from chair and bedside commode minimum assistance and of 2 persons with rolling walker.V/T cues for safety technique and walker management.   Chair to bedside commode : minimum assistance with  rolling walker  using  Step Transfer  Bedside commode to bed : minimum assistance with  rolling walker  using  Step Transfer  Gait: trained  5 ft and ~ 6 ft on level tile with Rolling Walker with Minimal Assistance (TLSO brace) .  Pt with decreased gaye, increased time in double stance, decreased velocity of limb motion, decreased step length, decreased swing-to-stance ratio, decreased toe-to-floor clearance and decreased weight-shifting ability.Impairments contributing to gait deviations include impaired balance, decreased flexibility, pain, impaired postural control, decreased ROM and decreased strength. V/T cues for safety technique and walker management.   Balance:  good in sitting, fair in standing with RW       AM-PAC 6 CLICK MOBILITY  Turning over in bed (including adjusting bedclothes, sheets and blankets)?: 4  Sitting down on and standing up from a chair with arms (e.g., wheelchair, bedside commode, etc.): 3  Moving from lying on back to sitting on the side of the bed?: 3  Moving to and from a bed to a chair (including a wheelchair)?: 3  Need to walk in hospital room?: 3  Climbing 3-5 steps with a railing?: 1  Basic Mobility Total Score: 17       Treatment & Education:  Pt tolerated static standing with RW, CGA for balance from PTA while receiving total A for posterior wipe and diaper placement from OT .     Patient left HOB elevated with SCD placed , heels offloading  all lines intact, call button in reach, bed alarm on, and nurse Marissa  notified..    GOALS:   Multidisciplinary Problems       Physical Therapy Goals          Problem: Physical Therapy    Goal Priority Disciplines Outcome Goal Variances Interventions   Physical Therapy Goal     PT, PT/OT  Ongoing, Progressing     Description: Goals to be met by:  2024    Patient will increase functional independence with mobility by performin. Supine to sit with Modified [place]  2. Sit to supine with Modified [place]  3. Sit to stand transfer with Modified [place]  4. Gait  x 400 feet with Modified [place] using Rolling Walker.    Recommend: Moderate Intensity Therapy at time of discharge to home with family.                             Time Tracking:     PT Received On: 24  PT Start Time: 1544     PT Stop Time: 1615  PT Total Time (min): 31 min     Billable Minutes: Gait Training 15, Therapeutic Activity 16 , and Total Time 31 with OT     Treatment Type: Treatment  PT/PTA: PTA     Number of PTA visits since last PT visit: 2     2024

## 2024-01-02 NOTE — NURSING
[facility], [facility]  Nurses Note -- 4 Eyes      1/1/2024       Skin assessed on: Q Shift      [x] No Pressure Injuries Present    []Prevention Measures Documented    [] Yes LDA  for Pressure Injury Previously documented     [] Yes New Pressure Injury Discovered   [] LDA for New Pressure Injury Added      Attending RN:  Kristi Juan RN     Second RN:  Marissa Bright RN

## 2024-01-02 NOTE — PT/OT/SLP PROGRESS
Physical Therapy      Patient Name:  Juli Parra   MRN:  [id number]    Patient not seen today secondary to  Off the floor for X-ray . Will follow-up as able later hour/day .

## 2024-01-02 NOTE — PROGRESS NOTES
[facility] Medicine  Telemedicine Progress Note    Patient Name: Juli Parra  MRN: [id number]  Patient Class: IP- Inpatient   Admission Date: 12/28/2023  Length of Stay: 3 days  Attending Physician: Yaritza Manning MD  Primary Care Provider: Jefe Serrano MD          Subjective:     Principal Problem:Closed fracture of ramus of left pubis        HPI:  Juli Parra is a 78 y.o. with a pmh hypotension, GERD, anxiety, depression, and IBS presents to the hospital with complaints of left hip pain after slip and fall this morning. Patient has slipped and fell and landed on her left hip when she was trying to get up to change channel on her TV. Patient did not hit her head or lose consciousness.  Patient states that on 12/12/2023 she fell in similar fashion and hurt her left hip. She was told that she has a hairline fracture of her left hip. Patient takes tramadol relief of her pain. Patient denies any other alleviating or exacerbating factors. Patient is not on any blood thinners.  Patient denies fever, headache, chest pain, shortness on breath, nausea, vomiting, diarrhea, abdominal pain, hematuria, or any other associated symptoms.    In the ED: Patient afebrile without leukocytosis, hypotensive on presentation 147/69, H&H 11.4/34, sodium 131, potassium 2.8, albumin 3.1, CT pelvis shows (1) acute fracture deformity involving the superior and inferior pubic ramus on the right again noted, (2) acute fracture involving the superior and inferior pubic ramus on the left noted, (3) acute fracture involving the sacral ala bilaterally noted, (4) acute fracture involving the transverse process of L4 on the right and L5 bilaterally noted.  Patient given hydrocodone 5 mg, ketorolac 30 mg IV, and Zofran 4 mg IV.    Case discussed with ED provider.    Juli Parra we will be placed under observation for further medical management of her pelvic fracture.    Overview/Hospital  Course:  Admission to hospital for multiple fractures after sliding    Interval History: Pt noted to be awake, alert, conversant and calm. HDS and afebrile. No acute events overnight. Still Constipated, bowel reg ordered. pending SNF placement.       Review of Systems   Constitutional:  Positive for activity change and fatigue. Negative for fever.   Respiratory:  Negative for shortness of breath.    Cardiovascular:  Negative for chest pain.   Gastrointestinal:  Positive for constipation. Negative for abdominal pain.   Musculoskeletal:  Positive for arthralgias and back pain.   Neurological:  Negative for dizziness and headaches.   All other systems reviewed and are negative.    Objective:     Vital Signs (Most Recent):  Temp: 98.4 °F (36.9 °C) (01/02/24 0011)  Pulse: 74 (01/02/24 0011)  Resp: 16 (01/02/24 0101)  BP: (!) 157/68 (01/02/24 0011)  SpO2: 95 % (01/02/24 0011) Vital Signs (24h Range):  Temp:  [97.6 °F (36.4 °C)-98.9 °F (37.2 °C)] 98.4 °F (36.9 °C)  Pulse:  [64-83] 74  Resp:  [16-18] 16  SpO2:  [92 %-95 %] 95 %  BP: (143-200)/(65-84) 157/68     Weight: 54.4 kg (120 lb)  Body mass index is 25.97 kg/m².    Intake/Output Summary (Last 24 hours) at 1/2/2024 0206  Last data filed at 1/1/2024 1330  Gross per 24 hour   Intake 245 ml   Output 550 ml   Net -305 ml           Physical Exam  Vitals and nursing note reviewed.   Constitutional:       Appearance: Normal appearance.   HENT:      Head: Normocephalic and atraumatic.   Eyes:      Extraocular Movements: Extraocular movements intact.      Pupils: Pupils are equal, round, and reactive to light.   Cardiovascular:      Rate and Rhythm: Normal rate and regular rhythm.   Pulmonary:      Effort: Pulmonary effort is normal. No respiratory distress.   Abdominal:      General: There is no distension.   Musculoskeletal:         General: Normal range of motion.      Cervical back: Normal range of motion.   Neurological:      General: No focal deficit present.      Mental  "Status: She is alert and oriented to person, place, and time.   Psychiatric:         Mood and Affect: Mood normal.         Behavior: Behavior normal.             Significant Labs: All pertinent labs within the past 24 hours have been reviewed.  CBC:   Recent Labs   Lab 01/01/24  0504   WBC 6.17   HGB 10.7*   HCT 32.9*          CMP:   Recent Labs   Lab 01/01/24  0504      K 4.2      CO2 26      BUN 9   CREATININE 0.5   CALCIUM 8.6*   ANIONGAP 7*       Cardiac Markers: No results for input(s): "CKMB", "MYOGLOBIN", "BNP", "TROPISTAT" in the last 48 hours.    Significant Imaging: I have reviewed all pertinent imaging results/findings within the past 24 hours.    Assessment/Plan:      * Closed fracture of ramus of left pubis  -Patient complaining of left hip pain after a slip and fall earlier when she was trying to get out of her chair. Patient landed on left hip, denies any head injury, and loss of consciousness.  Patient has a previous history of hairline pelvic fracture on 12/12/2023.  -CT of pelvis was ordered: (1) acute fracture involving superior and inferior pubic rami bilaterally (2) acute fracture of sacral ala bilaterally (3) acute fracture involving transverse process of L4 and L5 bilaterally  -Patient received Norco/Zofran in the emergency department and states pain is much improved  -ED provided consulted Orthopedic surgery Dr. Jimenez who recommended that patient be admitted to the hospital for pain control, PT/OT, and orthopedic consultation.     Plan:  -PT/OT consulted  -Orthopedic consulted. No surgery needed at this time per ortho  -p.r.n. pain medications ordered  -continue supportive care    Debility  -Patient noted to be medically stable for discharge at this time  -Has been able to work with OT/PT who recommend placement for further rehabilitation  -Patient pending placement, continue in-hospital care as stated above in the meantime  -More than 20 minutes of my time has been " spent discussing and planning just her safe disposition with patient/family/CM/SW/Nursing staff (not including other time spent in clinical discussion and management)  -CM/SW following, appreciate input   -Will place DC orders once placement has been secured      Discharge planning issues    -Patient noted to be medically stable for discharge at this time  -Has been able to work with OT/PT who recommend placement for further rehabilitation  -Patient pending placement, continue in-hospital care as stated above in the meantime  -More than 20 minutes of my time has been spent discussing and planning just her safe disposition with patient/family/CM/SW/Nursing staff (not including other time spent in clinical discussion and management)  -CM/SW following, appreciate input   -Will place DC orders once placement has been secured      Slow transit constipation  -bowel reg ordered   -milk of mag, dulcolax suppository ordered       Hypokalemia  Patient has hypokalemia which is Acute and currently uncontrolled. Most recent potassium levels reviewed-   Lab Results   Component Value Date    K 3.8 12/29/2023     Ordered potassium chloride 40 mEq  Will continue potassium replacement per protocol and recheck repeat levels after replacement completed    Closed fracture of transverse process of lumbar vertebra  -see above  -NSGY consulted.   -per NSGY, continue to wear TLSO brace, marcos when getting up  -obtain MRIs of the thoracic and lumbar spine to better characterize the patient's fractures  -MRI shows:   1.Recent burst compression fracture of the T12 vertebral body with questionable involvement of the right pedicle, 50% height loss and 3 mm of osseous retropulsion contributing to mild spinal canal stenosis.  No cord compression.  No marrow replacement process.  No epidural or paraspinal fluid collection or enhancing mass.   2. Recent insufficiency fractures of the bilateral sacral ala with extension into the S3 segment.   3. Recent  nondisplaced fractures of the bilateral L5 transverse processes.   4. Marrow edema within the right T11 pedicle, possibly sequela of a nondisplaced fracture.   5. Multilevel spondylosis as detailed above.   -continue to work with PT/OT, pending SNF    Closed fracture of sacrum  -see above  -NSGY consulted. Pending recs     Closed fracture of right inferior pubic ramus  -see above    Gastroesophageal reflux disease  Stable and continue home therapy with Nexium    Essential hypertension  Chronic, controlled. Latest blood pressure and vitals reviewed-     Temp:  [98 °F (36.7 °C)]   Pulse:  [60-63]   Resp:  [16]   BP: (130-161)/(65-80)   SpO2:  [95 %-96 %] .   Home meds for hypertension were reviewed and noted below.   Hypertension Medications               hydroCHLOROthiazide (HYDRODIURIL) 25 MG tablet TAKE 1 TABLET(25 MG) BY MOUTH EVERY DAY    losartan (COZAAR) 100 MG tablet TAKE 1 TABLET(100 MG) BY MOUTH EVERY DAY    metoprolol succinate (TOPROL-XL) 50 MG 24 hr tablet TAKE 1 TABLET(50 MG) BY MOUTH EVERY DAY            While in the hospital, will manage blood pressure as follows; Continue home antihypertensive regimen with parameters    Will utilize p.r.n. blood pressure medication only if patient's blood pressure greater than 180/110 and she develops symptoms such as worsening chest pain or shortness of breath.      VTE Risk Mitigation (From admission, onward)           Ordered     IP VTE HIGH RISK PATIENT  Once         12/28/23 0339     Place sequential compression device  Until discontinued         12/28/23 0339                          I have completed this tele-visit without the assistance of a telepresenter.    The attending portion of this evaluation, treatment, and documentation was performed per Yaritza Manning MD via Telemedicine AudioVisual using the secure [employer] software platform with 2 way audio/video. The provider was located off-site and the patient is located in the hospital. The aforementioned video  software was utilized to document the relevant history and physical exam    Yaritza Manning MD  Department of Hospital Medicine   [facility]

## 2024-01-02 NOTE — PROGRESS NOTES
Outpatient Care Management  Patient Not Qualified    Patient: Juli Parra  MRN:  [id number]  Date of Service:  1/2/2024  Completed by:  Layne Braswell RN    Chief Complaint   Patient presents with    OPCM Chart Review     1/2/24    Case Closure     1/2/24       Patient Summary           Reason Not Qualified:  Currently inpatient    1/2/24 Patient has been readmitted to the hospital and discharge plan is for patient to be discharged to SNF facility. Closing case.

## 2024-01-02 NOTE — PLAN OF CARE
Problem: Occupational Therapy  Goal: Occupational Therapy Goal  Description: Goals to be met by: 1/12/24     Patient will increase functional independence with ADLs by performing:    UE Dressing with Modified [place].  LE Dressing with Supervision and use of AE   Grooming while standing at sink with Stand-by Assistance.  Toileting from bedside commode with Stand-by Assistance for hygiene and clothing management.   Rolling to Bilateral with Modified [place]    Supine to sit with Minimal Assistance.  Step transfer with Stand-by Assistance  Toilet transfer to bedside commode with Stand-by Assistance.  Upper extremity exercise program x15 reps per handout, with independence.  Able to identify 3/3 spinal precautions independently in order to increase safety awareness with mobility/transfers     Outcome: Ongoing, Progressing

## 2024-01-02 NOTE — PT/OT/SLP PROGRESS
Occupational Therapy      Patient Name:  Juli Parra   MRN:  [id number]    Patient not seen today secondary to pt seen being taken TODD to x-ray via W/C. Will follow-up as able.    1/2/2024

## 2024-01-02 NOTE — NURSING
No results from laxatives .Offered suppository prn for constipation. Patient refused states will take it in am.She prefers not to be up all night trying to have BM

## 2024-01-02 NOTE — PT/OT/SLP PROGRESS
Occupational Therapy   Treatment    Name: Juli Parra  MRN: [id number]  Admitting Diagnosis:  Closed fracture of ramus of left pubis       Recommendations:     Discharge Recommendations: Moderate Intensity Therapy  Discharge Equipment Recommendations:  to be determined by next level of care  Barriers to discharge:   (Pt w/ hx of frequent falls; pt is at HIGH risk for readmission anfd morbidity if d/c home.)    Assessment:     Juli Parra is a 78 y.o. female with a medical diagnosis of Closed fracture of ramus of left pubis. Performance deficits affecting function are weakness, impaired endurance, impaired self care skills, impaired functional mobility, gait instability, impaired balance, decreased upper extremity function, decreased lower extremity function, decreased coordination, decreased safety awareness, decreased ROM, pain, orthopedic precautions.     Pt w/ pain throughout tx session this date but was able to perform functional transfers and ambulate short distances w/ min A and use of RW. Pt required increased time due to pain; pt will continue to benefit from skilled acute OT services to maximize functional capacity.     Rehab Prognosis:  Good; patient would benefit from acute skilled OT services to address these deficits and reach maximum level of function.       Plan:     Patient to be seen  (5-6x/wk) to address the above listed problems via self-care/home management, therapeutic activities, therapeutic exercises  Plan of Care Expires: 01/12/24  Plan of Care Reviewed with: patient    Subjective     Chief Complaint: pain   Patient/Family Comments/goals: needing to use BSC   Pain/Comfort:  Pain Rating 1: 10/10  Location - Side 1: Bilateral  Location 1: hip  Pain Addressed 1: Reposition, Distraction, Cessation of Activity, Pre-medicate for activity    Objective:     Communicated with: Nurse prior to session.  Patient found up in chair with peripheral IV, telemetry upon OT entry to  room.    General Precautions: Standard, fall    Orthopedic Precautions:spinal precautions, LLE weight bearing as tolerated, RLE weight bearing as tolerated (protective WB to BLEs)  Braces: TLSO  Respiratory Status: Room air     Occupational Performance:     Bed Mobility:    Patient completed Sit to Supine with moderate assistance and 2 persons     Functional Mobility/Transfers:  Patient completed Sit <> [facility] A x 2 persons and x 1 trial from BS x 1 trial with minimum assistance  with  kartik rolling walker; pt required v/t cueing for safe hand placement   Patient completed Chair <> BSC Transfer using Step Transfer technique with minimum assistance with rolling walker  Patient completed BSC <> EOB Step Transfer technique with minimum assistance with rolling walker  Functional Mobility: Pt was able to ambulate functional distances in room w/ min A ans use of RW; refer to PT note for assessment.     Activities of Daily Living:  Upper Body Dressing: maximal assistance for managing TLSO; min A for donning gown   Toileting: dependence for standing lore-care w/ BUE support on RW       AMPA 6 Click ADL: 14    Treatment & Education:  Pt re-educated on OT role/POC.   Importance of OOB activity with staff assistance.  Pt unable to recall spinal precautions, requiring re-edu with OT demo   Safety during functional t/f and mobility   Multiple self-care tasks/functional mobility completed- assistance level noted above   All questions/concerns answered within OT scope of practice     Patient left HOB elevated with all lines intact and call button in reach    GOALS:   Multidisciplinary Problems       Occupational Therapy Goals          Problem: Occupational Therapy    Goal Priority Disciplines Outcome Interventions   Occupational Therapy Goal     OT, PT/OT Ongoing, Progressing    Description: Goals to be met by: 1/12/24     Patient will increase functional independence with ADLs by  performing:    UE Dressing with Modified [place].  LE Dressing with Supervision and use of AE   Grooming while standing at sink with Stand-by Assistance.  Toileting from bedside commode with Stand-by Assistance for hygiene and clothing management.   Rolling to Bilateral with Modified Belt    Supine to sit with Minimal Assistance.  Step transfer with Stand-by Assistance  Toilet transfer to bedside commode with Stand-by Assistance.  Upper extremity exercise program x15 reps per handout, with independence.  Able to identify 3/3 spinal precautions independently in order to increase safety awareness with mobility/transfers                          Time Tracking:     OT Date of Treatment: 01/02/24  OT Start Time: 1544  OT Stop Time: 1615  OT Total Time (min): 31 min    Billable Minutes:Self Care/Home Management 31  Total Time 31 (co-tx w/ PT)     OT/JHON: OT     Number of JHON visits since last OT visit: 0    1/2/2024

## 2024-01-03 VITALS
BODY MASS INDEX: 25.89 KG/M2 | HEIGHT: 57 IN | HEART RATE: 71 BPM | RESPIRATION RATE: 18 BRPM | TEMPERATURE: 98 F | SYSTOLIC BLOOD PRESSURE: 145 MMHG | DIASTOLIC BLOOD PRESSURE: 65 MMHG | OXYGEN SATURATION: 94 % | WEIGHT: 120 LBS

## 2024-01-03 LAB — SARS-COV-2 RDRP RESP QL NAA+PROBE: NEGATIVE

## 2024-01-03 PROCEDURE — 97535 SELF CARE MNGMENT TRAINING: CPT | Mod: CO

## 2024-01-03 PROCEDURE — 30200315 PPD INTRADERMAL TEST REV CODE 302: Performed by: HOSPITALIST

## 2024-01-03 PROCEDURE — 25000003 PHARM REV CODE 250

## 2024-01-03 PROCEDURE — 97530 THERAPEUTIC ACTIVITIES: CPT | Mod: CQ

## 2024-01-03 PROCEDURE — 25000003 PHARM REV CODE 250: Performed by: PHYSICIAN ASSISTANT

## 2024-01-03 PROCEDURE — 97530 THERAPEUTIC ACTIVITIES: CPT | Mod: CO

## 2024-01-03 PROCEDURE — 97116 GAIT TRAINING THERAPY: CPT | Mod: CQ

## 2024-01-03 PROCEDURE — 25000003 PHARM REV CODE 250: Performed by: HOSPITALIST

## 2024-01-03 PROCEDURE — U0002 COVID-19 LAB TEST NON-CDC: HCPCS | Performed by: HOSPITALIST

## 2024-01-03 PROCEDURE — 86580 TB INTRADERMAL TEST: CPT | Performed by: HOSPITALIST

## 2024-01-03 RX ORDER — AMOXICILLIN 250 MG
2 CAPSULE ORAL DAILY
Qty: 30 TABLET | Refills: 11
Start: 2024-01-04

## 2024-01-03 RX ORDER — SIMETHICONE 80 MG
1 TABLET,CHEWABLE ORAL 3 TIMES DAILY PRN
Status: DISCONTINUED | OUTPATIENT
Start: 2024-01-03 | End: 2024-01-03 | Stop reason: HOSPADM

## 2024-01-03 RX ORDER — POLYETHYLENE GLYCOL 3350 17 G/17G
17 POWDER, FOR SOLUTION ORAL DAILY
Refills: 0
Start: 2024-01-04

## 2024-01-03 RX ORDER — HYDROCODONE BITARTRATE AND ACETAMINOPHEN 5; 325 MG/1; MG/1
1 TABLET ORAL EVERY 6 HOURS PRN
Qty: 10 TABLET | Refills: 0
Start: 2024-01-03 | End: 2024-01-03

## 2024-01-03 RX ORDER — HYDROCODONE BITARTRATE AND ACETAMINOPHEN 5; 325 MG/1; MG/1
1 TABLET ORAL EVERY 6 HOURS PRN
Qty: 10 TABLET | Refills: 0 | Status: SHIPPED | OUTPATIENT
Start: 2024-01-03

## 2024-01-03 RX ADMIN — DULOXETINE HYDROCHLORIDE 20 MG: 20 CAPSULE, DELAYED RELEASE ORAL at 09:01

## 2024-01-03 RX ADMIN — DICYCLOMINE HYDROCHLORIDE 10 MG: 10 CAPSULE ORAL at 06:01

## 2024-01-03 RX ADMIN — HYDROCODONE BITARTRATE AND ACETAMINOPHEN 1 TABLET: 10; 325 TABLET ORAL at 06:01

## 2024-01-03 RX ADMIN — DICYCLOMINE HYDROCHLORIDE 10 MG: 10 CAPSULE ORAL at 05:01

## 2024-01-03 RX ADMIN — MAGNESIUM HYDROXIDE 2400 MG: 400 SUSPENSION ORAL at 09:01

## 2024-01-03 RX ADMIN — DICYCLOMINE HYDROCHLORIDE 10 MG: 10 CAPSULE ORAL at 11:01

## 2024-01-03 RX ADMIN — CARVEDILOL 12.5 MG: 12.5 TABLET, FILM COATED ORAL at 08:01

## 2024-01-03 RX ADMIN — AMLODIPINE BESYLATE 10 MG: 5 TABLET ORAL at 09:01

## 2024-01-03 RX ADMIN — HYDROCODONE BITARTRATE AND ACETAMINOPHEN 1 TABLET: 10; 325 TABLET ORAL at 12:01

## 2024-01-03 RX ADMIN — DICYCLOMINE HYDROCHLORIDE 10 MG: 10 CAPSULE ORAL at 08:01

## 2024-01-03 RX ADMIN — TUBERCULIN PURIFIED PROTEIN DERIVATIVE 5 UNITS: 5 INJECTION, SOLUTION INTRADERMAL at 03:01

## 2024-01-03 RX ADMIN — HYDROCODONE BITARTRATE AND ACETAMINOPHEN 1 TABLET: 10; 325 TABLET ORAL at 07:01

## 2024-01-03 RX ADMIN — LOSARTAN POTASSIUM 100 MG: 25 TABLET, FILM COATED ORAL at 09:01

## 2024-01-03 RX ADMIN — CARVEDILOL 12.5 MG: 12.5 TABLET, FILM COATED ORAL at 09:01

## 2024-01-03 RX ADMIN — LORAZEPAM 1 MG: 0.5 TABLET ORAL at 08:01

## 2024-01-03 NOTE — PLAN OF CARE
"  Problem: Occupational Therapy  Goal: Occupational Therapy Goal  Description: Goals to be met by: 1/12/24     Patient will increase functional independence with ADLs by performing:    UE Dressing with Modified [place].  LE Dressing with Supervision and use of AE   Grooming while standing at sink with Stand-by Assistance.  Toileting from bedside commode with Stand-by Assistance for hygiene and clothing management.   Rolling to Bilateral with Modified [place]    Supine to sit with Minimal Assistance.  Step transfer with Stand-by Assistance  Toilet transfer to bedside commode with Stand-by Assistance.  Upper extremity exercise program x15 reps per handout, with independence.  Able to identify 3/3 spinal precautions independently in order to increase safety awareness with mobility/transfers     Outcome: Ongoing, Progressing    Pt tolerated Tx session well. Pt demo'd increased tolerance for OOB Ax and improved bed level mobility. Pt still unable to recall 3/3 spinal precautions. Even with provided verbal cues "BLT"...bending, lifting, twisting. Pt frequently self-limiting, 2* fearful of falls/pain. Pt will cont to benefit from skilled therapy services while in hospital to maximize return to PLOF, increase safety & independence w/ performance of ADLs and decrease burden of care on caregiver(s).     "

## 2024-01-03 NOTE — NURSING
Report received from off going nurse, KATRINA Alfred. Patient AAO. No signs of distress noted. Call light in reach. Bed low and locked. Will continue plan of care.

## 2024-01-03 NOTE — PROGRESS NOTES
[facility] Medicine  Telemedicine Progress Note    Patient Name: Juli Parra  MRN: [id number]  Patient Class: IP- Inpatient   Admission Date: 12/28/2023  Length of Stay: 3 days  Attending Physician: Yaritza Manning MD  Primary Care Provider: Jefe Serrano MD          Subjective:     Principal Problem:Closed fracture of ramus of left pubis        HPI:  Juli Parra is a 78 y.o. with a pmh hypotension, GERD, anxiety, depression, and IBS presents to the hospital with complaints of left hip pain after slip and fall this morning. Patient has slipped and fell and landed on her left hip when she was trying to get up to change channel on her TV. Patient did not hit her head or lose consciousness.  Patient states that on 12/12/2023 she fell in similar fashion and hurt her left hip. She was told that she has a hairline fracture of her left hip. Patient takes tramadol relief of her pain. Patient denies any other alleviating or exacerbating factors. Patient is not on any blood thinners.  Patient denies fever, headache, chest pain, shortness on breath, nausea, vomiting, diarrhea, abdominal pain, hematuria, or any other associated symptoms.    In the ED: Patient afebrile without leukocytosis, hypotensive on presentation 147/69, H&H 11.4/34, sodium 131, potassium 2.8, albumin 3.1, CT pelvis shows (1) acute fracture deformity involving the superior and inferior pubic ramus on the right again noted, (2) acute fracture involving the superior and inferior pubic ramus on the left noted, (3) acute fracture involving the sacral ala bilaterally noted, (4) acute fracture involving the transverse process of L4 on the right and L5 bilaterally noted.  Patient given hydrocodone 5 mg, ketorolac 30 mg IV, and Zofran 4 mg IV.    Case discussed with ED provider.    Juli Parra we will be placed under observation for further medical management of her pelvic fracture.    Overview/Hospital  Course:  Admission to hospital for multiple fractures after sliding    Interval History: Pt noted to be awake, alert, conversant and calm. HDS and afebrile. No acute events overnight. Still Constipated, received a suppository today and has had some abdominal cramping since then. Upright spinal XR ordered per NSGY recs. Pending SNF placement.       Review of Systems   Constitutional:  Positive for activity change and fatigue. Negative for fever.   Respiratory:  Negative for shortness of breath.    Cardiovascular:  Negative for chest pain.   Gastrointestinal:  Positive for constipation. Negative for abdominal pain.   Musculoskeletal:  Positive for arthralgias and back pain.   Neurological:  Negative for dizziness and headaches.   All other systems reviewed and are negative.    Objective:     Vital Signs (Most Recent):  Temp: 98.2 °F (36.8 °C) (01/02/24 2332)  Pulse: 71 (01/02/24 2332)  Resp: 20 (01/02/24 2332)  BP: 124/60 (01/02/24 2332)  SpO2: (!) 93 % (01/02/24 2332) Vital Signs (24h Range):  Temp:  [98 °F (36.7 °C)-98.9 °F (37.2 °C)] 98.2 °F (36.8 °C)  Pulse:  [70-83] 71  Resp:  [16-20] 20  SpO2:  [92 %-96 %] 93 %  BP: (124-186)/(60-72) 124/60     Weight: 54.4 kg (120 lb)  Body mass index is 25.97 kg/m².    Intake/Output Summary (Last 24 hours) at 1/2/2024 2332  Last data filed at 1/2/2024 1725  Gross per 24 hour   Intake 840 ml   Output 500 ml   Net 340 ml           Physical Exam  Vitals and nursing note reviewed.   Constitutional:       Appearance: Normal appearance.   HENT:      Head: Normocephalic and atraumatic.   Eyes:      Extraocular Movements: Extraocular movements intact.      Pupils: Pupils are equal, round, and reactive to light.   Cardiovascular:      Rate and Rhythm: Normal rate and regular rhythm.   Pulmonary:      Effort: Pulmonary effort is normal. No respiratory distress.   Abdominal:      General: There is no distension.   Musculoskeletal:         General: Normal range of motion.      Cervical  "back: Normal range of motion.   Neurological:      General: No focal deficit present.      Mental Status: She is alert and oriented to person, place, and time.   Psychiatric:         Mood and Affect: Mood normal.         Behavior: Behavior normal.             Significant Labs: All pertinent labs within the past 24 hours have been reviewed.  CBC:   Recent Labs   Lab 01/01/24  0504   WBC 6.17   HGB 10.7*   HCT 32.9*          CMP:   Recent Labs   Lab 01/01/24  0504      K 4.2      CO2 26      BUN 9   CREATININE 0.5   CALCIUM 8.6*   ANIONGAP 7*       Cardiac Markers: No results for input(s): "CKMB", "MYOGLOBIN", "BNP", "TROPISTAT" in the last 48 hours.    Significant Imaging: I have reviewed all pertinent imaging results/findings within the past 24 hours.    Assessment/Plan:      * Closed fracture of ramus of left pubis  -Patient complaining of left hip pain after a slip and fall earlier when she was trying to get out of her chair. Patient landed on left hip, denies any head injury, and loss of consciousness.  Patient has a previous history of hairline pelvic fracture on 12/12/2023.  -CT of pelvis was ordered: (1) acute fracture involving superior and inferior pubic rami bilaterally (2) acute fracture of sacral ala bilaterally (3) acute fracture involving transverse process of L4 and L5 bilaterally  -Patient received Norco/Zofran in the emergency department and states pain is much improved  -ED provided consulted Orthopedic surgery Dr. Jimenez who recommended that patient be admitted to the hospital for pain control, PT/OT, and orthopedic consultation.     Plan:  -PT/OT consulted  -Orthopedic consulted. No surgery needed at this time per ortho  -p.r.n. pain medications ordered  -continue supportive care    Debility  -Patient noted to be medically stable for discharge at this time  -Has been able to work with OT/PT who recommend placement for further rehabilitation  -Patient pending placement, " continue in-hospital care as stated above in the meantime  -More than 20 minutes of my time has been spent discussing and planning just her safe disposition with patient/family/CM/SW/Nursing staff (not including other time spent in clinical discussion and management)  -CM/SW following, appreciate input   -Will place DC orders once placement has been secured      Discharge planning issues    -Patient noted to be medically stable for discharge at this time  -Has been able to work with OT/PT who recommend placement for further rehabilitation  -Patient pending placement, continue in-hospital care as stated above in the meantime  -More than 20 minutes of my time has been spent discussing and planning just her safe disposition with patient/family/CM/SW/Nursing staff (not including other time spent in clinical discussion and management)  -CM/SW following, appreciate input   -Will place DC orders once placement has been secured      Slow transit constipation  -bowel reg ordered   -milk of mag, dulcolax suppository ordered   -abdominal cramping on 1/2 after suppository      Hypokalemia  Patient has hypokalemia which is Acute and currently uncontrolled. Most recent potassium levels reviewed-   Lab Results   Component Value Date    K 3.8 12/29/2023     Ordered potassium chloride 40 mEq  Will continue potassium replacement per protocol and recheck repeat levels after replacement completed    Closed fracture of transverse process of lumbar vertebra  -see above  -NSGY consulted.   -per NSGY, continue to wear TLSO brace, marcos when getting up  -obtain MRIs of the thoracic and lumbar spine to better characterize the patient's fractures  -MRI shows:   1.Recent burst compression fracture of the T12 vertebral body with questionable involvement of the right pedicle, 50% height loss and 3 mm of osseous retropulsion contributing to mild spinal canal stenosis.  No cord compression.  No marrow replacement process.  No epidural or paraspinal  fluid collection or enhancing mass.   2. Recent insufficiency fractures of the bilateral sacral ala with extension into the S3 segment.   3. Recent nondisplaced fractures of the bilateral L5 transverse processes.   4. Marrow edema within the right T11 pedicle, possibly sequela of a nondisplaced fracture.   5. Multilevel spondylosis as detailed above.     -per NSGY recs, ordered upright spinal XR which shows Similar appearance of the T12 compression deformity.     -continue to work with PT/OT, pending SNF    Closed fracture of sacrum  -see above  -NSGY consulted. Pending recs     Closed fracture of right inferior pubic ramus  -see above    Gastroesophageal reflux disease  Stable and continue home therapy with Nexium    Essential hypertension  Chronic, controlled. Latest blood pressure and vitals reviewed-     Temp:  [98 °F (36.7 °C)]   Pulse:  [60-63]   Resp:  [16]   BP: (130-161)/(65-80)   SpO2:  [95 %-96 %] .   Home meds for hypertension were reviewed and noted below.   Hypertension Medications               hydroCHLOROthiazide (HYDRODIURIL) 25 MG tablet TAKE 1 TABLET(25 MG) BY MOUTH EVERY DAY    losartan (COZAAR) 100 MG tablet TAKE 1 TABLET(100 MG) BY MOUTH EVERY DAY    metoprolol succinate (TOPROL-XL) 50 MG 24 hr tablet TAKE 1 TABLET(50 MG) BY MOUTH EVERY DAY            While in the hospital, will manage blood pressure as follows; Continue home antihypertensive regimen with parameters    Will utilize p.r.n. blood pressure medication only if patient's blood pressure greater than 180/110 and she develops symptoms such as worsening chest pain or shortness of breath.      VTE Risk Mitigation (From admission, onward)           Ordered     IP VTE HIGH RISK PATIENT  Once         12/28/23 0339     Place sequential compression device  Until discontinued         12/28/23 0339                          I have completed this tele-visit without the assistance of a telepresenter.    The attending portion of this evaluation,  treatment, and documentation was performed per Yaritza Manning MD via Telemedicine AudioVisual using the secure [employer] software platform with 2 way audio/video. The provider was located off-site and the patient is located in the hospital. The aforementioned video software was utilized to document the relevant history and physical exam    Yaritza Manning MD  Department of [facility] Medicine   [facility]

## 2024-01-03 NOTE — NURSING
[facility], [facility]  Nurses Note -- 4 Eyes      1/2/2024       Skin assessed on: Q Shift      [x] No Pressure Injuries Present    []Prevention Measures Documented    [] Yes LDA  for Pressure Injury Previously documented     [] Yes New Pressure Injury Discovered   [] LDA for New Pressure Injury Added      Attending RN:  Tima Osborne RN     Second RN:  Marissa Bright RN

## 2024-01-03 NOTE — PLAN OF CARE
Bowel today after interventions  Problem: Constipation  Goal: Effective Bowel Elimination  Outcome: Ongoing, Progressing  Intervention: Promote Effective Bowel Elimination  Flowsheets (Taken 1/2/2024 6926)  Bowel Function Promotion:   prompt response to urge promoted   toileting schedule established  Bowel Elimination Management:   enema given   toileting offered   suppository given

## 2024-01-03 NOTE — SUBJECTIVE & OBJECTIVE
Interval History: Pt noted to be awake, alert, conversant and calm. HDS and afebrile. No acute events overnight. Still Constipated, received a suppository today and has had some abdominal cramping since then. Upright spinal XR ordered per NSGY recs. Pending SNF placement.       Review of Systems   Constitutional:  Positive for activity change and fatigue. Negative for fever.   Respiratory:  Negative for shortness of breath.    Cardiovascular:  Negative for chest pain.   Gastrointestinal:  Positive for constipation. Negative for abdominal pain.   Musculoskeletal:  Positive for arthralgias and back pain.   Neurological:  Negative for dizziness and headaches.   All other systems reviewed and are negative.    Objective:     Vital Signs (Most Recent):  Temp: 98.2 °F (36.8 °C) (01/02/24 2332)  Pulse: 71 (01/02/24 2332)  Resp: 20 (01/02/24 2332)  BP: 124/60 (01/02/24 2332)  SpO2: (!) 93 % (01/02/24 2332) Vital Signs (24h Range):  Temp:  [98 °F (36.7 °C)-98.9 °F (37.2 °C)] 98.2 °F (36.8 °C)  Pulse:  [70-83] 71  Resp:  [16-20] 20  SpO2:  [92 %-96 %] 93 %  BP: (124-186)/(60-72) 124/60     Weight: 54.4 kg (120 lb)  Body mass index is 25.97 kg/m².    Intake/Output Summary (Last 24 hours) at 1/2/2024 2332  Last data filed at 1/2/2024 1725  Gross per 24 hour   Intake 840 ml   Output 500 ml   Net 340 ml           Physical Exam  Vitals and nursing note reviewed.   Constitutional:       Appearance: Normal appearance.   HENT:      Head: Normocephalic and atraumatic.   Eyes:      Extraocular Movements: Extraocular movements intact.      Pupils: Pupils are equal, round, and reactive to light.   Cardiovascular:      Rate and Rhythm: Normal rate and regular rhythm.   Pulmonary:      Effort: Pulmonary effort is normal. No respiratory distress.   Abdominal:      General: There is no distension.   Musculoskeletal:         General: Normal range of motion.      Cervical back: Normal range of motion.   Neurological:      General: No focal  "deficit present.      Mental Status: She is alert and oriented to person, place, and time.   Psychiatric:         Mood and Affect: Mood normal.         Behavior: Behavior normal.             Significant Labs: All pertinent labs within the past 24 hours have been reviewed.  CBC:   Recent Labs   Lab 01/01/24  0504   WBC 6.17   HGB 10.7*   HCT 32.9*          CMP:   Recent Labs   Lab 01/01/24  0504      K 4.2      CO2 26      BUN 9   CREATININE 0.5   CALCIUM 8.6*   ANIONGAP 7*       Cardiac Markers: No results for input(s): "CKMB", "MYOGLOBIN", "BNP", "TROPISTAT" in the last 48 hours.    Significant Imaging: I have reviewed all pertinent imaging results/findings within the past 24 hours.  "

## 2024-01-03 NOTE — PLAN OF CARE
Problem: Adult Inpatient Plan of Care  Goal: Plan of Care Review  Outcome: Ongoing, Progressing  Flowsheets (Taken 1/3/2024 0632)  Plan of Care Reviewed With: patient  Goal: Absence of Hospital-Acquired Illness or Injury  Outcome: Ongoing, Progressing  Goal: Optimal Comfort and Wellbeing  Outcome: Ongoing, Progressing  Intervention: Monitor Pain and Promote Comfort  Flowsheets (Taken 1/3/2024 0632)  Pain Management Interventions:   position adjusted   pillow support provided   quiet environment facilitated     Problem: Pain Acute  Goal: Acceptable Pain Control and Functional Ability  Outcome: Ongoing, Progressing     Problem: Constipation  Goal: Effective Bowel Elimination  Outcome: Ongoing, Progressing

## 2024-01-03 NOTE — PLAN OF CARE
Problem: Adult Inpatient Plan of Care  Goal: Plan of Care Review  Outcome: Met  Goal: Patient-Specific Goal (Individualized)  Outcome: Met  Goal: Absence of Hospital-Acquired Illness or Injury  Outcome: Met  Goal: Optimal Comfort and Wellbeing  Outcome: Met  Goal: Readiness for Transition of Care  Outcome: Met     Problem: Fall Injury Risk  Goal: Absence of Fall and Fall-Related Injury  Outcome: Met     Problem: Skin Injury Risk Increased  Goal: Skin Health and Integrity  Outcome: Met     Problem: Pain Acute  Goal: Acceptable Pain Control and Functional Ability  Outcome: Met     Problem: Constipation  Goal: Effective Bowel Elimination  Outcome: Met

## 2024-01-03 NOTE — PLAN OF CARE
01/03/24 1008   Medicare Message   Important Message from Medicare regarding Discharge Appeal Rights Given to patient/caregiver;Explained to patient/caregiver;Signed/date by patient/caregiver;Other (comments)   Date IMM was signed 01/03/24   Time IMM was signed 1008     Copy to chart

## 2024-01-03 NOTE — ASSESSMENT & PLAN NOTE
-see above  -NSGY consulted.   -per NSGY, continue to wear TLSO brace, marcos when getting up  -obtain MRIs of the thoracic and lumbar spine to better characterize the patient's fractures  -MRI shows:   1.Recent burst compression fracture of the T12 vertebral body with questionable involvement of the right pedicle, 50% height loss and 3 mm of osseous retropulsion contributing to mild spinal canal stenosis.  No cord compression.  No marrow replacement process.  No epidural or paraspinal fluid collection or enhancing mass.   2. Recent insufficiency fractures of the bilateral sacral ala with extension into the S3 segment.   3. Recent nondisplaced fractures of the bilateral L5 transverse processes.   4. Marrow edema within the right T11 pedicle, possibly sequela of a nondisplaced fracture.   5. Multilevel spondylosis as detailed above.     -per NSGY recs, ordered upright spinal XR which shows Similar appearance of the T12 compression deformity.     -continue to work with PT/OT, pending SNF

## 2024-01-03 NOTE — PLAN OF CARE
[facility]     Department of Hospital Medicine     [address] [place] [address]     [phone] after hours  [phone] fax       NURSING HOME ORDERS    01/03/2024    Admit to Nursing Home:  Skilled Bed                                                  Diagnoses:  Active Hospital Problems    Diagnosis  POA    *Closed fracture of ramus of left pubis [S32.592A]  Yes    Discharge planning issues [Z02.9]  Not Applicable    Debility [R53.81]  No    Slow transit constipation [K59.01]  No    Closed fracture of right inferior pubic ramus [S32.591A]  Yes    Closed fracture of sacrum [S32.10XA]  Yes    Closed fracture of transverse process of lumbar vertebra [S32.009A]  Yes    Hypokalemia [E87.6]  Yes    Essential hypertension [I10]  Yes    Gastroesophageal reflux disease [K21.9]  Yes      Resolved Hospital Problems   No resolved problems to display.       Allergies:  Review of patient's allergies indicates:   Allergen Reactions    Egg Nausea Only    Cipro [ciprofloxacin hcl]     Sulfur Rash       Vitals:  Every shift (Skilled Nursing patients)    Diet: cardiac   Supplement:  1 can every three times a day with meals                         Type:  House         Acitivities: Per PT  Wear TLSO brace when our of bed.      LABS:  Per facility protocol    Nursing Precautions:      - Fall precautions per nursing home protocol   - Decubitus precautions:        -  for positioning   - Pressure reducing foam mattress   - Turn patient every two hours. Use wedge pillows to anchor patient   - Aspiration precautions        CONSULTS:      Physical Therapy to evaluate and treat     Occupational Therapy to evaluate and treat     Nutrition to evaluate and recommend diet     Speech Therapy  to evaluate and treat     Psychiatry to evaluate and follow patients for delirium    MISCELLANEOUS CARE:     Routine Skin for Bedridden Patients:  Apply moisture barrier cream to all    skin  folds and wet areas in perineal area daily and after baths and                           all bowel movements.                    DIABETES CARE:      Check blood sugar:       Fingerstick blood sugar AC and HS   Fingerstick blood sugar every 6 hours if unable to eat      Report CBG < 60 or > 400 to physician.                                          Insulin Sliding Scale          Glucose  Novolog Insulin Subcutaneous        0 - 60   Orange juice or glucose tablet, hold insulin      No insulin   201-250  2 units   251-300  4 units   301-350  6 units   351-400  8 units   >400   10 units then call physician      Medications: Discontinue all previous medication orders, if any. See new list below.    Current Discharge Medication List        START taking these medications    Details   HYDROcodone-acetaminophen (NORCO) 5-325 mg per tablet Take 1 tablet by mouth every 6 (six) hours as needed for Pain.  Qty: 10 tablet, Refills: 0    Comments: Quantity prescribed more than 7 day supply? No      polyethylene glycol (GLYCOLAX) 17 gram PwPk Take 17 g by mouth once daily.  Refills: 0      senna-docusate 8.6-50 mg (PERICOLACE) 8.6-50 mg per tablet Take 2 tablets by mouth once daily.  Qty: 30 tablet, Refills: 11           CONTINUE these medications which have NOT CHANGED    Details   dicyclomine (BENTYL) 10 MG capsule TAKE 1 CAPSULE BY MOUTH FOUR TIMES DAILY EVERY NIGHT BEFORE MEALS  Qty: 90 capsule, Refills: 6    Associated Diagnoses: Irritable bowel syndrome, unspecified type      DULoxetine (CYMBALTA) 20 MG capsule TAKE 1 CAPSULE(20 MG) BY MOUTH EVERY DAY  Qty: 30 capsule, Refills: 1    Associated Diagnoses: Current moderate episode of major depressive disorder without prior episode; Anxiety      esomeprazole (NEXIUM) 40 MG capsule Take 1 capsule (40 mg total) by mouth before breakfast.  Qty: 90 capsule, Refills: 11      fluticasone propionate (FLONASE) 50 mcg/actuation nasal spray SPRAY ONCE INTO EACH NOSTRIL EVERY  DAY  Qty: 48 g, Refills: 3      hydroCHLOROthiazide (HYDRODIURIL) 25 MG tablet TAKE 1 TABLET(25 MG) BY MOUTH EVERY DAY  Qty: 90 tablet, Refills: 2    Comments: .  Associated Diagnoses: Essential hypertension      ibuprofen (ADVIL,MOTRIN) 800 MG tablet Take 1 tablet (800 mg total) by mouth every 6 (six) hours as needed for Pain.  Qty: 20 tablet, Refills: 0      LIDOcaine (LIDODERM) 5 % Place 1 patch onto the skin once daily. Remove & Discard patch within 12 hours or as directed by MD  Qty: 5 patch, Refills: 0                 losartan (COZAAR) 100 MG tablet TAKE 1 TABLET(100 MG) BY MOUTH EVERY DAY  Qty: 90 tablet, Refills: 2    Comments: .  Associated Diagnoses: Essential hypertension      metoprolol succinate (TOPROL-XL) 50 MG 24 hr tablet TAKE 1 TABLET(50 MG) BY MOUTH EVERY DAY  Qty: 90 tablet, Refills: 2    Comments: .  Associated Diagnoses: Essential hypertension      oxybutynin (DITROPAN XL) 15 MG TR24 Take 1 tablet (15 mg total) by mouth once daily.  Qty: 90 tablet, Refills: 3    Associated Diagnoses: OAB (overactive bladder)      potassium chloride SA (K-DUR,KLOR-CON M) 10 MEQ tablet TAKE 1 TABLET(10 MEQ) BY MOUTH EVERY DAY  Qty: 90 tablet, Refills: 2    Associated Diagnoses: Essential hypertension           STOP taking these medications       traMADoL (ULTRAM) 50 mg tablet Comments:   Reason for Stopping:                       _________________________________  Yaritza Manning MD  01/03/2024

## 2024-01-03 NOTE — PLAN OF CARE
TN sent a secure chat to med surg nurse Maryann:Please call report to [facility] [phone] [address] nurse Anyi. Ambulance 5:30 PM with oxygen. TN addressed all discharge needs from case management's viewpoint.  ADT 30 order placed for Stretcher Transportation.  Requested  time:5:30 PM  To [facility] [address] [address]    If transportation does not arrive at ETA time nurse will be instructed to follow protocol for transportation below:  How can I get in touch directly with dispatch, if needed?                 Non-emergent (stretcher): [phone]  option 6     ++NURSING:  If Stretcher does not arrive at requested time please call the above Non Emergent Dispatcher.  If issue not resolved please escalate to your charge nurse for further instructions.

## 2024-01-03 NOTE — PT/OT/SLP PROGRESS
Physical Therapy Treatment    Patient Name:  Juli Parra   MRN:  [id number]    Recommendations:     Discharge Recommendations: Moderate Intensity Therapy  Discharge Equipment Recommendations: none  Barriers to discharge: None    Assessment:     Juli Parra is a 78 y.o. female admitted with a medical diagnosis of Closed fracture of ramus of left pubis.  She presents with the following impairments/functional limitations: weakness, impaired endurance, impaired self care skills, impaired functional mobility, gait instability, impaired balance, decreased upper extremity function, decreased coordination, decreased lower extremity function, decreased safety awareness, pain, decreased ROM, orthopedic precautions .  Progressive Mobility Level 3: Recommend patient be assisted out of bed to chair, toilet, and/or bedside commode with  rolling walker, TLSO brace </= minimum assistance.   Rehab Prognosis: Good; patient would benefit from acute skilled PT services to address these deficits and reach maximum level of function.    Recent Surgery: * No surgery found *      Plan:     During this hospitalization, patient to be seen 5 x/week to address the identified rehab impairments via gait training, therapeutic activities, therapeutic exercises and progress toward the following goals:    Plan of Care Expires:  01/13/24    Subjective     Chief Complaint: pain   Patient/Family Comments/goals: pt is agreeable to therapy   Pain/Comfort:  Location - Side 1: Bilateral  Location 1: leg  Pain Addressed 1: Pre-medicate for activity, Nurse notified      Objective:     Communicated with nurse prior to session.  Patient found HOB elevated with telemetry, bed alarm, peripheral IV, PureWick upon PT entry to room.     General Precautions: Standard, fall  Orthopedic Precautions: spinal precautions, RLE weight bearing as tolerated, LLE weight bearing as tolerated (Protective WB to BLEs)  Braces: TLSO  Respiratory Status: Room air      Functional Mobility:  Bed Mobility: V/T cues to perform log roll technique for bed mobility .    Rolling Right: stand by assistance  Scooting: contact guard assistance to scoot anteriorly EOB and max A x 2 to scoot back in chair .  Supine to Sit: minimum assistance and of 2 persons, HOB elevated , bedside rail   Transfers:     Sit to Stand: from bed and bedside commode  minimum assistance with rolling walker  Gait: trained  10 ft and 12 ft  on level tile with Rolling Walker with Contact Guard Assistance (TLSO brace) .  Pt with decreased gaye, increased time in double stance, decreased velocity of limb motion, decreased step length, decreased swing-to-stance ratio, decreased toe-to-floor clearance and decreased weight-shifting ability.Impairments contributing to gait deviations include impaired balance, decreased flexibility, pain, impaired postural control, decreased ROM and decreased strength. V/T cues for safety technique and walker management.   Balance:  good in sitting, fair in standing with RW      AM-PAC 6 CLICK MOBILITY  Turning over in bed (including adjusting bedclothes, sheets and blankets)?: 4  Sitting down on and standing up from a chair with arms (e.g., wheelchair, bedside commode, etc.): 3  Moving from lying on back to sitting on the side of the bed?: 2  Moving to and from a bed to a chair (including a wheelchair)?: 3  Need to walk in hospital room?: 3  Climbing 3-5 steps with a railing?: 2  Basic Mobility Total Score: 17       Treatment & Education:  Pt tolerated static standing with RW, CGA for balance from PTA while receiving total A for diaper placement from OT .    Instructed pt to perform seated BLE x 10 reps x 2 : AP , encouraged pt to perform BLE AP throughout he day.   Patient left up in chair on green air cushion , lunch tray table set up  with all lines intact, call button in reach, and nurse notified..    GOALS:   Multidisciplinary Problems       Physical Therapy Goals           Problem: Physical Therapy    Goal Priority Disciplines Outcome Goal Variances Interventions   Physical Therapy Goal     PT, PT/OT Ongoing, Progressing     Description: Goals to be met by:  2024    Patient will increase functional independence with mobility by performin. Supine to sit with Modified [place]  2. Sit to supine with Modified [place]  3. Sit to stand transfer with Modified [place]  4. Gait  x 400 feet with Modified [place] using Rolling Walker.    Recommend: Moderate Intensity Therapy at time of discharge to home with family.                             Time Tracking:     PT Received On: 24  PT Start Time: 1228     PT Stop Time: 1302  PT Total Time (min): 34 min     Billable Minutes: Gait Training 17, Therapeutic Activity 17 , and Total Time 34 min with OT     Treatment Type: Treatment  PT/PTA: PTA     Number of PTA visits since last PT visit: 2     2024

## 2024-01-03 NOTE — PT/OT/SLP PROGRESS
"Occupational Therapy   Treatment    Name: Juli Parra  MRN: [id number]  Admitting Diagnosis:  Closed fracture of ramus of left pubis       Recommendations:     Discharge Recommendations: Moderate Intensity Therapy  Discharge Equipment Recommendations:  to be determined by next level of care  Barriers to discharge:       Assessment:     Juli Parra is a 78 y.o. female with a medical diagnosis of Closed fracture of ramus of left pubis. Performance deficits affecting function are weakness, impaired endurance, impaired self care skills, impaired functional mobility, gait instability, impaired balance, decreased coordination, decreased upper extremity function, decreased lower extremity function, pain, decreased safety awareness, decreased ROM, orthopedic precautions.     Pt tolerated Tx session well. Pt demo'd increased tolerance for OOB Ax and improved bed level mobility. Pt still unable to recall 3/3 spinal precautions. Even with provided verbal cues "BLT"...bending, lifting, twisting. Pt frequently self-limiting, 2* fearful of falls/pain. Pt will cont to benefit from skilled therapy services while in hospital to maximize return to PLOF, increase safety & independence w/ performance of ADLs and decrease burden of care on caregiver(s).    Rehab Prognosis:  Good; patient would benefit from acute skilled OT services to address these deficits and reach maximum level of function.       Plan:     Patient to be seen  (5-6x/wk) to address the above listed problems via self-care/home management, therapeutic activities, therapeutic exercises  Plan of Care Expires: 01/12/24  Plan of Care Reviewed with: patient    Subjective     Chief Complaint: Hip pain and "But I haven't even had lunch yet."  Patient/Family Comments/goals: Comfort  Pain/Comfort:  Pain Rating 1: 0/10 (no numerical value provided)  Location - Side 1: Bilateral  Location - Orientation 1: lower  Location 1: hip  Premedicated before session  Pain " "Rating Post-Intervention 1: 0/10 (no numerical value provided)    Objective:     Communicated with: RN prior to session.  Patient found HOB elevated with oxygen, peripheral IV, TLSO, PureWick upon OT entry to room.    General Precautions: Standard, fall    Orthopedic Precautions:spinal precautions, LLE weight bearing as tolerated, RLE weight bearing as tolerated  Braces: TLSO  Respiratory Status: Room air     Occupational Performance:     Bed Mobility:    Patient completed Rolling/Turning to Right with moderate assistance and increased time with verbal cues to initiate and sequence log roll  Patient completed Supine to Sit with minimum assistance of 2 persons     Functional Mobility/Transfers:  Patient completed Sit <> Stand Transfer with minimum assistance and of 2 persons  with  rolling walker   Patient completed Bed <> Chair Transfer using Step Transfer technique with minimum assistance with rolling walker  Patient completed Toilet Transfer Step Transfer technique with minimum assistance with  rolling walker  Functional Mobility: Pt performed dynamic standing Ax by ambulating w/ in room (EOB to door, door to bedside commode) w/ RW and Derek with TLSO donned and verbal cues for motivation    Activities of Daily Living:  Pt donned BUE UBD (TLSO) w/ MaxA while sitting EOB  Grooming: minimum assistance for hand washing while seated @ sink  Toileting: maximal assistance for lore-care (anterior and posterior) in stance @ bedside commode and seated 2* soiled (BM) diaper and dry feces in anterior/pubic hair region requiring increased time to remove      AMPAC 6 Click ADL: 14    Treatment & Education:  *Role of HARRIS/L per POC  *Safety  *Spinal precautions, TLSO and "BLT"  *Benefits of continued participation w/ therapy   *Call don't fall    Patient left up in chair with all lines intact, call button in reach, and lunch SetupA    GOALS:   Multidisciplinary Problems       Occupational Therapy Goals          Problem: " Occupational Therapy    Goal Priority Disciplines Outcome Interventions   Occupational Therapy Goal     OT, PT/OT Ongoing, Progressing    Description: Goals to be met by: 1/12/24     Patient will increase functional independence with ADLs by performing:    UE Dressing with Modified [place].  LE Dressing with Supervision and use of AE   Grooming while standing at sink with Stand-by Assistance.  Toileting from bedside commode with Stand-by Assistance for hygiene and clothing management.   Rolling to Bilateral with Modified [place]    Supine to sit with Minimal Assistance.  Step transfer with Stand-by Assistance  Toilet transfer to bedside commode with Stand-by Assistance.  Upper extremity exercise program x15 reps per handout, with independence.  Able to identify 3/3 spinal precautions independently in order to increase safety awareness with mobility/transfers                          Time Tracking:     OT Date of Treatment: 01/03/24  OT Start Time: 1228  OT Stop Time: 1302  OT Total Time (min): 34 min (w/ PTA)    Billable Minutes:Self Care/Home Management 14  Therapeutic Activity 20    OT/JHON: JHON     Number of JHON visits since last OT visit: 1    1/3/2024

## 2024-01-03 NOTE — PLAN OF CARE
REASSESSMENT:    Plan A:  SNF  Plan B:  Home with family and Home Health    TN talked with son and patient, choices for SNFs: 1. [facility], 2. [facility] and 3. [facility].  Choice/preference form completed.     01/02/24 0936   Discharge Reassessment   Assessment Type Discharge Planning Reassessment   Discharge Plan discussed with: Adult children   Discharge Plan A Skilled Nursing Facility   Discharge Plan B Home Health;Home with family   DME Needed Upon Discharge  none   Transition of Care Barriers Other (see comments)  (need locet and 142)   Why the patient remains in the hospital Requires continued medical care   Post-Acute Status   Post-Acute Authorization Placement   Post-Acute Placement Status Patient List Provided   Home Health Status Discharge Plan Changed   Patient choice form signed by patient/caregiver List with quality metrics by geographic area provided   Discharge Delays (!) Post-Acute Set-up

## 2024-01-03 NOTE — NURSING
Sitting at bedside after enema given bowel movents noted after several meds given for constipation. Relief noted

## 2024-01-03 NOTE — PLAN OF CARE
TN called [facility] [phone] #3, Kay Romeo completed and Pasrr completed and faxed to [facility] [phone].  Awaiting 142.

## 2024-01-03 NOTE — ASSESSMENT & PLAN NOTE
-bowel reg ordered   -milk of mag, dulcolax suppository ordered   -abdominal cramping on 1/2 after suppository

## 2024-01-04 NOTE — NURSING
Scheduled medications given. Jere at bedside to transport pt to [facility]. Purewick removed; incontinent care. Prn pain medication given at 1906. Safety measures maintained.

## 2024-01-04 NOTE — DISCHARGE SUMMARY
[facility] Medicine  Discharge Summary      Patient Name: Juli Parra  MRN: [id number]  Patient Class: IP- Inpatient  Admission Date: 12/28/2023  Hospital Length of Stay: 4 days  Discharge Date and Time: 1/3/2024  9:07 PM  Attending Physician: No att. providers found   Discharging Provider: Yaritza Manning MD  Primary Care Provider: Jefe Serrano MD      HPI:   Juli Parra is a 78 y.o. with a pmh hypotension, GERD, anxiety, depression, and IBS presents to the hospital with complaints of left hip pain after slip and fall this morning. Patient has slipped and fell and landed on her left hip when she was trying to get up to change channel on her TV. Patient did not hit her head or lose consciousness.  Patient states that on 12/12/2023 she fell in similar fashion and hurt her left hip. She was told that she has a hairline fracture of her left hip. Patient takes tramadol relief of her pain. Patient denies any other alleviating or exacerbating factors. Patient is not on any blood thinners.  Patient denies fever, headache, chest pain, shortness on breath, nausea, vomiting, diarrhea, abdominal pain, hematuria, or any other associated symptoms.    In the ED: Patient afebrile without leukocytosis, hypotensive on presentation 147/69, H&H 11.4/34, sodium 131, potassium 2.8, albumin 3.1, CT pelvis shows (1) acute fracture deformity involving the superior and inferior pubic ramus on the right again noted, (2) acute fracture involving the superior and inferior pubic ramus on the left noted, (3) acute fracture involving the sacral ala bilaterally noted, (4) acute fracture involving the transverse process of L4 on the right and L5 bilaterally noted.  Patient given hydrocodone 5 mg, ketorolac 30 mg IV, and Zofran 4 mg IV.    Case discussed with ED provider.    Juli Parra we will be placed under observation for further medical management of her pelvic fracture.    * No surgery found *       Hospital Course:   Closed fracture of ramus of left pubis  -Patient complaining of left hip pain after a slip and fall earlier when she was trying to get out of her chair. Patient landed on left hip, denies any head injury, and loss of consciousness.  Patient has a previous history of hairline pelvic fracture on 12/12/2023.  -CT of pelvis was ordered: (1) acute fracture involving superior and inferior pubic rami bilaterally (2) acute fracture of sacral ala bilaterally (3) acute fracture involving transverse process of L4 and L5 bilaterally  -Patient received Norco/Zofran in the emergency department and states pain is much improved  -ED provided consulted Orthopedic surgery Dr. Jimenez who recommended that patient be admitted to the hospital for pain control, PT/OT, and orthopedic consultation.      Plan:  -PT/OT consulted  -Orthopedic consulted. No surgery needed at this time per ortho  -p.r.n. pain medications   -DC to SNF        Slow transit constipation  -bowel reg ordered   -milk of mag, dulcolax suppository ordered   -abdominal cramping on 1/2 after suppository  -pt had a BM prior to DC           Closed fracture of transverse process of lumbar vertebra  -see above  -NSGY consulted.   -per NSGY, continue to wear TLSO brace, marcos when getting up  -obtain MRIs of the thoracic and lumbar spine to better characterize the patient's fractures  -MRI shows:   1.Recent burst compression fracture of the T12 vertebral body with questionable involvement of the right pedicle, 50% height loss and 3 mm of osseous retropulsion contributing to mild spinal canal stenosis.  No cord compression.  No marrow replacement process.  No epidural or paraspinal fluid collection or enhancing mass.   2. Recent insufficiency fractures of the bilateral sacral ala with extension into the S3 segment.   3. Recent nondisplaced fractures of the bilateral L5 transverse processes.   4. Marrow edema within the right T11 pedicle, possibly sequela of  a nondisplaced fracture.   5. Multilevel spondylosis as detailed above.      -per NSGY recs, ordered upright spinal XR which shows Similar appearance of the T12 compression deformity.      -continue to work with PT/OT, DC to SNF  -OP f/u with ortho and NSGY     Closed fracture of sacrum  -see above  -NSGY consulted. Pending recs      Closed fracture of right inferior pubic ramus  -see above     Gastroesophageal reflux disease  Stable and continue home therapy with Nexium             Goals of Care Treatment Preferences:  Code Status: Full Code      Consults:   Consults (From admission, onward)          Status Ordering Provider     Inpatient consult to Social Work  Once        Provider:  (Not yet assigned)    Completed HELEN MANNING     Inpatient virtual consult to Hospital Medicine  Once        Provider:  Helen Manning MD    Completed TANIYA MONTIEL     Inpatient consult to Neurosurgery  Once        Provider:  Alton Adrian MD    Completed TANIYA MONTIEL     Inpatient consult to Orthopedic Surgery  Once        Provider:  Sam Dubois MD    Completed ALBANIA STERLING            No new Assessment & Plan notes have been filed under this hospital service since the last note was generated.  Service: Hospital Medicine    Final Active Diagnoses:    Diagnosis Date Noted POA    PRINCIPAL PROBLEM:  Closed fracture of ramus of left pubis [S32.592A] 12/28/2023 Yes    Discharge planning issues [Z02.9] 12/31/2023 Not Applicable    Debility [R53.81] 12/31/2023 No    Slow transit constipation [K59.01] 12/29/2023 No    Closed fracture of right inferior pubic ramus [S32.591A] 12/28/2023 Yes    Closed fracture of sacrum [S32.10XA] 12/28/2023 Yes    Closed fracture of transverse process of lumbar vertebra [S32.009A] 12/28/2023 Yes    Hypokalemia [E87.6] 12/28/2023 Yes    Essential hypertension [I10] 03/22/2018 Yes    Gastroesophageal reflux disease [K21.9] 03/22/2018 Yes      Problems Resolved During this  Admission:       Discharged Condition: stable    Disposition: Skilled Nursing Facility    Follow Up:   Follow-up Information       [facility] Follow up.    Specialty: Skilled Nursing Facility  Why: SNF  Contact information:  [address] [place] [address]  [phone]             Sam Dubois MD. Schedule an appointment as soon as possible for a visit in 3 week(s).    Specialty: Orthopedic Surgery  Contact information:  [place] [address]  [phone]                           Patient Instructions:      Ambulatory referral/consult to Orthopedics   Standing Status: Future   Referral Priority: Routine Referral Type: Consultation   Requested Specialty: Orthopedic Surgery   Number of Visits Requested: 1     Ambulatory referral/consult to Neurosurgery   Standing Status: Future   Referral Priority: Routine Referral Type: Consultation   Referral Reason: Specialty Services Required   Requested Specialty: Neurosurgery   Number of Visits Requested: 1     Diet Cardiac     Notify your health care provider if you experience any of the following:  temperature >100.4     Notify your health care provider if you experience any of the following:  persistent nausea and vomiting or diarrhea     Notify your health care provider if you experience any of the following:  severe uncontrolled pain     Notify your health care provider if you experience any of the following:  redness, tenderness, or signs of infection (pain, swelling, redness, odor or green/yellow discharge around incision site)     Notify your health care provider if you experience any of the following:  difficulty breathing or increased cough     Notify your health care provider if you experience any of the following:  severe persistent headache     Notify your health care provider if you experience any of the following:  worsening rash     Notify your health care provider if you experience any of the following:   persistent dizziness, light-headedness, or visual disturbances     Notify your health care provider if you experience any of the following:  increased confusion or weakness     Send follow up & questions to   Order Comments: Patient's primary care physician: Jefe Serrano MD     Activity as tolerated       Significant Diagnostic Studies: N/A    Pending Diagnostic Studies:       None           Medications:  Reconciled Home Medications:      Medication List        START taking these medications      HYDROcodone-acetaminophen 5-325 mg per tablet  Commonly known as: NORCO  Take 1 tablet by mouth every 6 (six) hours as needed for Pain.     polyethylene glycol 17 gram Pwpk  Commonly known as: GLYCOLAX  Take 17 g by mouth once daily.     senna-docusate 8.6-50 mg 8.6-50 mg per tablet  Commonly known as: PERICOLACE  Take 2 tablets by mouth once daily.            CONTINUE taking these medications      chlorhexidine 0.12 % solution  Commonly known as: PERIDEX  SMARTSIG:By Mouth     dicyclomine 10 MG capsule  Commonly known as: BENTYL  TAKE 1 CAPSULE BY MOUTH FOUR TIMES DAILY EVERY NIGHT BEFORE MEALS     DULoxetine 20 MG capsule  Commonly known as: CYMBALTA  TAKE 1 CAPSULE(20 MG) BY MOUTH EVERY DAY     esomeprazole 40 MG capsule  Commonly known as: NEXIUM  Take 1 capsule (40 mg total) by mouth before breakfast.     hydroCHLOROthiazide 25 MG tablet  Commonly known as: HYDRODIURIL  TAKE 1 TABLET(25 MG) BY MOUTH EVERY DAY     ibuprofen 800 MG tablet  Commonly known as: ADVIL,MOTRIN  Take 1 tablet (800 mg total) by mouth every 6 (six) hours as needed for Pain.     LORazepam 1 MG tablet  Commonly known as: ATIVAN  Take 1 tablet (1 mg total) by mouth every evening.     losartan 100 MG tablet  Commonly known as: COZAAR  TAKE 1 TABLET(100 MG) BY MOUTH EVERY DAY     metoprolol succinate 50 MG 24 hr tablet  Commonly known as: TOPROL-XL  TAKE 1 TABLET(50 MG) BY MOUTH EVERY DAY     oxybutynin 15 MG Tr24  Commonly known as: DITROPAN  XL  Take 1 tablet (15 mg total) by mouth once daily.     potassium chloride SA 10 MEQ tablet  Commonly known as: K-DUR,KLOR-CON M  TAKE 1 TABLET(10 MEQ) BY MOUTH EVERY DAY     valACYclovir 500 MG tablet  Commonly known as: VALTREX  Take by mouth.            STOP taking these medications      traMADoL 50 mg tablet  Commonly known as: ULTRAM            ASK your doctor about these medications      fluticasone propionate 50 mcg/actuation nasal spray  Commonly known as: FLONASE  SPRAY ONCE INTO EACH NOSTRIL EVERY DAY     LIDOcaine 5 %  Commonly known as: LIDODERM  Place 1 patch onto the skin once daily. Remove & Discard patch within 12 hours or as directed by MD              Indwelling Lines/Drains at time of discharge:   Lines/Drains/Airways       Drain  Duration             Female External Urinary Catheter w/ Suction 12/28/23 0426 6 days                    Time spent on the discharge of patient: 39 minutes         The attending portion of this evaluation, treatment, and documentation was performed per Yaritza Manning MD via Telemedicine AudioVisual using the secure [employer] software platform with 2 way audio/video. The provider was located off-site and the patient is located in the hospital. The aforementioned video software was utilized to document the relevant history and physical exam    Yaritza Manning MD  Department of Hospital Medicine  [facility]

## 2024-01-21 DIAGNOSIS — F32.1 CURRENT MODERATE EPISODE OF MAJOR DEPRESSIVE DISORDER WITHOUT PRIOR EPISODE: ICD-10-CM

## 2024-01-21 DIAGNOSIS — F41.9 ANXIETY: ICD-10-CM

## 2024-01-22 RX ORDER — DULOXETIN HYDROCHLORIDE 20 MG/1
20 CAPSULE, DELAYED RELEASE ORAL
Qty: 30 CAPSULE | Refills: 1 | Status: SHIPPED | OUTPATIENT
Start: 2024-01-22 | End: 2024-05-20

## 2024-02-09 NOTE — PROGRESS NOTES
Follow up visit    History of Present Illness:   Juli comes to the office for follow up evaluation of bilateral knee pain.  Recommended treatment at the last visit included injection - was injected by me 10/26/22.  Since the last visit her pain returned yesterday - got about 5 months of relief     Also complains of tailbone pain after a fall onto her buttocks yesterday    ROS: unremarkable and no change since last visit    Physical Examination:    NAD  Left and Right knee  No change in exam     Lumbar spine - no point tenderness    Radiographic imaging: no new     Assessment/Plan:  77 y.o. female  with Bilateral knee OA     We discussed the etiology of persistent pain and further treatment options.  Injection of the bilateral knee  performed, please see procedure note for more details.  Prior to the injection risks and benefits of corticosteroid injection were discussed with the patient including pain, infection, bleeding, skin color changes, swelling, steroid flare. We discussed that over time injections can result in chondral damage, acceleration of arthritis formation, damage to tendons and damage to joints.  The patient consented for the procedure.  Post-injection instructions were given to the patient in writing  Sacral XR today, will call w results  Return for follow up visit: in 2 or more weeks for CMC injections    All questions were answered in detail. The patient  verbalized the understanding of the treatment plan and is in full agreement with the treatment plan.    
CXR negative - No infiltrates, No consolidation, No atelectasis seen

## 2024-02-23 ENCOUNTER — OFFICE VISIT (OUTPATIENT)
Dept: INTERNAL MEDICINE | Facility: CLINIC | Age: 79
End: 2024-02-23
Payer: MEDICARE

## 2024-02-23 ENCOUNTER — LAB VISIT (OUTPATIENT)
Dept: LAB | Facility: HOSPITAL | Age: 79
End: 2024-02-23
Attending: INTERNAL MEDICINE
Payer: MEDICARE

## 2024-02-23 VITALS
BODY MASS INDEX: 23.92 KG/M2 | HEART RATE: 59 BPM | DIASTOLIC BLOOD PRESSURE: 64 MMHG | HEIGHT: 57 IN | OXYGEN SATURATION: 98 % | SYSTOLIC BLOOD PRESSURE: 132 MMHG | WEIGHT: 110.88 LBS

## 2024-02-23 DIAGNOSIS — K58.9 IRRITABLE BOWEL SYNDROME, UNSPECIFIED TYPE: ICD-10-CM

## 2024-02-23 DIAGNOSIS — D64.9 ANEMIA, UNSPECIFIED TYPE: ICD-10-CM

## 2024-02-23 DIAGNOSIS — S42.294D OTHER CLOSED NONDISPLACED FRACTURE OF PROXIMAL END OF RIGHT HUMERUS WITH ROUTINE HEALING, SUBSEQUENT ENCOUNTER: Primary | ICD-10-CM

## 2024-02-23 DIAGNOSIS — S32.009D CLOSED FRACTURE OF TRANSVERSE PROCESS OF LUMBAR VERTEBRA WITH ROUTINE HEALING, SUBSEQUENT ENCOUNTER: ICD-10-CM

## 2024-02-23 DIAGNOSIS — I70.0 AORTIC ATHEROSCLEROSIS: ICD-10-CM

## 2024-02-23 DIAGNOSIS — F32.1 CURRENT MODERATE EPISODE OF MAJOR DEPRESSIVE DISORDER WITHOUT PRIOR EPISODE: ICD-10-CM

## 2024-02-23 DIAGNOSIS — S32.111G: ICD-10-CM

## 2024-02-23 LAB
BASOPHILS # BLD AUTO: 0.06 K/UL (ref 0–0.2)
BASOPHILS NFR BLD: 0.9 % (ref 0–1.9)
DIFFERENTIAL METHOD BLD: NORMAL
EOSINOPHIL # BLD AUTO: 0.5 K/UL (ref 0–0.5)
EOSINOPHIL NFR BLD: 6.9 % (ref 0–8)
ERYTHROCYTE [DISTWIDTH] IN BLOOD BY AUTOMATED COUNT: 13.2 % (ref 11.5–14.5)
FERRITIN SERPL-MCNC: 75 NG/ML (ref 20–300)
HCT VFR BLD AUTO: 42.7 % (ref 37–48.5)
HGB BLD-MCNC: 14.2 G/DL (ref 12–16)
IMM GRANULOCYTES # BLD AUTO: 0.03 K/UL (ref 0–0.04)
IMM GRANULOCYTES NFR BLD AUTO: 0.5 % (ref 0–0.5)
IRON SERPL-MCNC: 90 UG/DL (ref 30–160)
LYMPHOCYTES # BLD AUTO: 1.2 K/UL (ref 1–4.8)
LYMPHOCYTES NFR BLD: 18.7 % (ref 18–48)
MCH RBC QN AUTO: 31 PG (ref 27–31)
MCHC RBC AUTO-ENTMCNC: 33.3 G/DL (ref 32–36)
MCV RBC AUTO: 93 FL (ref 82–98)
MONOCYTES # BLD AUTO: 0.6 K/UL (ref 0.3–1)
MONOCYTES NFR BLD: 9.1 % (ref 4–15)
NEUTROPHILS # BLD AUTO: 4.2 K/UL (ref 1.8–7.7)
NEUTROPHILS NFR BLD: 63.9 % (ref 38–73)
NRBC BLD-RTO: 0 /100 WBC
PLATELET # BLD AUTO: 322 K/UL (ref 150–450)
PMV BLD AUTO: 9.5 FL (ref 9.2–12.9)
RBC # BLD AUTO: 4.58 M/UL (ref 4–5.4)
SATURATED IRON: 24 % (ref 20–50)
TOTAL IRON BINDING CAPACITY: 374 UG/DL (ref 250–450)
TRANSFERRIN SERPL-MCNC: 253 MG/DL (ref 200–375)
WBC # BLD AUTO: 6.51 K/UL (ref 3.9–12.7)

## 2024-02-23 PROCEDURE — 36415 COLL VENOUS BLD VENIPUNCTURE: CPT | Performed by: INTERNAL MEDICINE

## 2024-02-23 PROCEDURE — 83540 ASSAY OF IRON: CPT | Performed by: INTERNAL MEDICINE

## 2024-02-23 PROCEDURE — 99215 OFFICE O/P EST HI 40 MIN: CPT | Mod: PBBFAC | Performed by: INTERNAL MEDICINE

## 2024-02-23 PROCEDURE — 85025 COMPLETE CBC W/AUTO DIFF WBC: CPT | Performed by: INTERNAL MEDICINE

## 2024-02-23 PROCEDURE — 82728 ASSAY OF FERRITIN: CPT | Performed by: INTERNAL MEDICINE

## 2024-02-23 PROCEDURE — 99999 PR PBB SHADOW E&M-EST. PATIENT-LVL V: CPT | Mod: PBBFAC,,, | Performed by: INTERNAL MEDICINE

## 2024-02-23 PROCEDURE — 99214 OFFICE O/P EST MOD 30 MIN: CPT | Mod: S$PBB,,, | Performed by: INTERNAL MEDICINE

## 2024-02-23 RX ORDER — DICYCLOMINE HYDROCHLORIDE 10 MG/1
CAPSULE ORAL
Qty: 90 CAPSULE | Refills: 6 | Status: SHIPPED | OUTPATIENT
Start: 2024-02-23

## 2024-02-23 NOTE — PROGRESS NOTES
Subjective:       Patient ID: Juli Parra is a 78 y.o. female.    Chief Complaint: Fall and fracture in spine    HPI: Here for follow up after hospital and then rehab stay for fall and multiple fractures.   Discharged home from rehab about 2 weeks ago. Having home PT.   Some mild pain, fatigue and is taking occas ibuprofen with good relief. She has a brace that she is wearing. She says she wants to be cleared to resume driving and to potentially D/C the brace when safe.   We need to update CBC. Also D/C note suggested follow up with Ortho as outpatient. I will assist pt with this.   She said Neurosurgery did not suggest surgery and her pain is well controlled so she does not think she needs it.     Summary:  Hospital Course:   Closed fracture of ramus of left pubis  -Patient complaining of left hip pain after a slip and fall earlier when she was trying to get out of her chair. Patient landed on left hip, denies any head injury, and loss of consciousness.  Patient has a previous history of hairline pelvic fracture on 12/12/2023.  -CT of pelvis was ordered: (1) acute fracture involving superior and inferior pubic rami bilaterally (2) acute fracture of sacral ala bilaterally (3) acute fracture involving transverse process of L4 and L5 bilaterally  -Patient received Norco/Zofran in the emergency department and states pain is much improved  -ED provided consulted Orthopedic surgery Dr. Jimenez who recommended that patient be admitted to the hospital for pain control, PT/OT, and orthopedic consultation.      Plan:  -PT/OT consulted  -Orthopedic consulted. No surgery needed at this time per ortho  -p.r.n. pain medications   -DC to SNF        Slow transit constipation  -bowel reg ordered   -milk of mag, dulcolax suppository ordered   -abdominal cramping on 1/2 after suppository  -pt had a BM prior to DC           Closed fracture of transverse process of lumbar vertebra  -see above  -NSGY consulted.   -per NSGY,  continue to wear TLSO brace, marcos when getting up  -obtain MRIs of the thoracic and lumbar spine to better characterize the patient's fractures  -MRI shows:   1.Recent burst compression fracture of the T12 vertebral body with questionable involvement of the right pedicle, 50% height loss and 3 mm of osseous retropulsion contributing to mild spinal canal stenosis.  No cord compression.  No marrow replacement process.  No epidural or paraspinal fluid collection or enhancing mass.   2. Recent insufficiency fractures of the bilateral sacral ala with extension into the S3 segment.   3. Recent nondisplaced fractures of the bilateral L5 transverse processes.   4. Marrow edema within the right T11 pedicle, possibly sequela of a nondisplaced fracture.   5. Multilevel spondylosis as detailed above.      -per NSGY recs, ordered upright spinal XR which shows Similar appearance of the T12 compression deformity.      -continue to work with PT/OT, DC to SNF  -OP f/u with ortho and NSGY     Closed fracture of sacrum  -see above  -NSGY consulted. Pending recs         Review of Systems   Gastrointestinal:  Negative for abdominal pain.   Genitourinary:  Negative for difficulty urinating and dysuria.   Musculoskeletal:  Positive for arthralgias and back pain (mild). Negative for gait problem.   Neurological:  Negative for headaches.           Past Medical History:   Diagnosis Date    Anxiety     GERD (gastroesophageal reflux disease)     HTN (hypertension)     IBS (irritable bowel syndrome)     Kidney stones     OAB (overactive bladder)     Vertigo      Past Surgical History:   Procedure Laterality Date    CATARACT EXTRACTION, BILATERAL      CHOLECYSTECTOMY      HYSTERECTOMY      LITHOTRIPSY        Patient Active Problem List   Diagnosis    Mixed irritable bowel syndrome    Primary generalized (osteo)arthritis    Essential hypertension    Impaired fasting glucose    Gastroesophageal reflux disease    Bilateral sensorineural hearing loss     Anxiety    Arthritis of right knee    Chest congestion    Depression    Coccydynia    Lumbar spondylosis    DDD (degenerative disc disease), lumbar    Current moderate episode of major depressive disorder without prior episode    Closed fracture of ramus of left pubis    Closed fracture of right inferior pubic ramus    Closed fracture of sacrum    Closed fracture of transverse process of lumbar vertebra    Hypokalemia    Slow transit constipation    Discharge planning issues    Debility    Aortic atherosclerosis        Objective:      Physical Exam  Constitutional:       Appearance: Normal appearance.   Cardiovascular:      Rate and Rhythm: Normal rate.   Pulmonary:      Effort: Pulmonary effort is normal. No respiratory distress.      Breath sounds: No wheezing.   Abdominal:      Tenderness: There is no abdominal tenderness.   Musculoskeletal:         General: No tenderness.      Comments: Wearing torso brace.    Neurological:      Mental Status: She is alert.   Psychiatric:         Mood and Affect: Mood normal.         Thought Content: Thought content normal.         Assessment:       Problem List Items Addressed This Visit          Neuro    Closed fracture of transverse process of lumbar vertebra    Relevant Orders    Ambulatory referral/consult to Orthopedics       Psychiatric    Current moderate episode of major depressive disorder without prior episode       Cardiac/Vascular    Aortic atherosclerosis       Orthopedic    Closed fracture of sacrum    Relevant Orders    Ambulatory referral/consult to Orthopedics     Other Visit Diagnoses       Other closed nondisplaced fracture of proximal end of right humerus with routine healing, subsequent encounter    -  Primary    Relevant Orders    Ambulatory referral/consult to Orthopedics    Irritable bowel syndrome, unspecified type        Relevant Medications    dicyclomine (BENTYL) 10 MG capsule    Anemia, unspecified type        Relevant Orders    CBC Auto  "Differential    Iron and TIBC    Ferritin            Plan:         Juli was seen today for fall and fracture in spine.    Diagnoses and all orders for this visit:    Other closed nondisplaced fracture of proximal end of right humerus with routine healing, subsequent encounter  -     Ambulatory referral/consult to Orthopedics; Future    Closed fracture of transverse process of lumbar vertebra with routine healing, subsequent encounter  -     Ambulatory referral/consult to Orthopedics; Future    Closed minimally displaced zone I fracture of sacrum with delayed healing, subsequent encounter  -     Ambulatory referral/consult to Orthopedics; Future    Aortic atherosclerosis  Comments:  Clinically stable. Continue to monitor.    Current moderate episode of major depressive disorder without prior episode    Irritable bowel syndrome, unspecified type  -     dicyclomine (BENTYL) 10 MG capsule; TAKE 1 CAPSULE BY MOUTH FOUR TIMES DAILY EVERY NIGHT BEFORE MEALS    Anemia, unspecified type  -     CBC Auto Differential; Future  -     Iron and TIBC; Future  -     Ferritin; Future                     Portions of this note may have been created with voice recognition software. Occasional "wrong-word" or "sound-a-like" substitutions may have occurred due to the inherent limitations of voice recognition software. Please, read the note carefully and recognize, using context, where substitutions have occurred.  "

## 2024-02-26 DIAGNOSIS — S32.591A FRACTURE OF MULTIPLE PUBIC RAMI, RIGHT, CLOSED, INITIAL ENCOUNTER: Primary | ICD-10-CM

## 2024-02-29 ENCOUNTER — HOSPITAL ENCOUNTER (OUTPATIENT)
Dept: RADIOLOGY | Facility: CLINIC | Age: 79
Discharge: HOME | End: 2024-02-29
Attending: INTERNAL MEDICINE
Payer: MEDICARE

## 2024-02-29 ENCOUNTER — PATIENT MESSAGE (OUTPATIENT)
Dept: INTERNAL MEDICINE | Facility: CLINIC | Age: 79
End: 2024-02-29
Payer: MEDICARE

## 2024-02-29 DIAGNOSIS — S32.591D CLOSED FRACTURE OF RIGHT INFERIOR PUBIC RAMUS WITH ROUTINE HEALING, SUBSEQUENT ENCOUNTER: ICD-10-CM

## 2024-02-29 DIAGNOSIS — Z78.0 ASYMPTOMATIC MENOPAUSAL STATE: ICD-10-CM

## 2024-02-29 PROCEDURE — 77080 DXA BONE DENSITY AXIAL: CPT | Mod: 26,,, | Performed by: INTERNAL MEDICINE

## 2024-02-29 PROCEDURE — 77080 DXA BONE DENSITY AXIAL: CPT | Mod: TC

## 2024-03-05 ENCOUNTER — TELEPHONE (OUTPATIENT)
Dept: INTERNAL MEDICINE | Facility: CLINIC | Age: 79
End: 2024-03-05
Payer: MEDICARE

## 2024-03-05 DIAGNOSIS — M81.0 OSTEOPOROSIS, UNSPECIFIED OSTEOPOROSIS TYPE, UNSPECIFIED PATHOLOGICAL FRACTURE PRESENCE: Primary | ICD-10-CM

## 2024-03-05 NOTE — TELEPHONE ENCOUNTER
Called pt; pt answered    Attempted to schedule pt with the only available appt on [facility]; pt was unable to make that appt    Pt requested appt with endocrinology on [facility]    Routing message to [facility] requesting assistance with scheduling pt for an appt

## 2024-03-05 NOTE — TELEPHONE ENCOUNTER
Notified pt of message from PCP and she agreed to see the endocrinologist.    ----- Message from Nita Clifton PA-C sent at 3/5/2024 11:09 AM CST -----  Please inform pt.   Your bone density reveals osteoporosis. Because of your history of fracture you may benefit from an injection/infusion to help strengthen your bones. I have placed a referral to our Endocrinology clinic that manages this. I will have staff call to arrange appointment. Let me know of any questions.    Staff: pt maybe open to [facility] location.

## 2024-03-08 ENCOUNTER — PATIENT MESSAGE (OUTPATIENT)
Dept: INTERNAL MEDICINE | Facility: CLINIC | Age: 79
End: 2024-03-08
Payer: MEDICARE

## 2024-03-08 ENCOUNTER — TELEPHONE (OUTPATIENT)
Dept: INTERNAL MEDICINE | Facility: CLINIC | Age: 79
End: 2024-03-08
Payer: MEDICARE

## 2024-03-12 ENCOUNTER — OFFICE VISIT (OUTPATIENT)
Dept: ORTHOPEDICS | Facility: CLINIC | Age: 79
End: 2024-03-12
Payer: MEDICARE

## 2024-03-12 VITALS
TEMPERATURE: 98 F | HEIGHT: 57 IN | HEART RATE: 68 BPM | SYSTOLIC BLOOD PRESSURE: 154 MMHG | DIASTOLIC BLOOD PRESSURE: 72 MMHG | WEIGHT: 110 LBS | RESPIRATION RATE: 18 BRPM | BODY MASS INDEX: 23.73 KG/M2

## 2024-03-12 DIAGNOSIS — S32.810G: Primary | ICD-10-CM

## 2024-03-12 DIAGNOSIS — S32.591A FRACTURE OF MULTIPLE PUBIC RAMI, RIGHT, CLOSED, INITIAL ENCOUNTER: ICD-10-CM

## 2024-03-12 PROCEDURE — 99213 OFFICE O/P EST LOW 20 MIN: CPT | Mod: S$PBB,,, | Performed by: ORTHOPAEDIC SURGERY

## 2024-03-12 PROCEDURE — 99999 PR PBB SHADOW E&M-EST. PATIENT-LVL IV: CPT | Mod: PBBFAC,,, | Performed by: ORTHOPAEDIC SURGERY

## 2024-03-12 PROCEDURE — 99214 OFFICE O/P EST MOD 30 MIN: CPT | Mod: PBBFAC | Performed by: ORTHOPAEDIC SURGERY

## 2024-03-12 NOTE — PROGRESS NOTES
CC:  Pelvic ring fracture      HISTORY       HPI:  78-year-old female, multiple falls over the last year.  Eventually noted to have a sacral U type fracture 12/28/2023, seen outside hospital.  At the same time she would multiple spine fractures, seen by Neurosurgery, treated non operatively in a T0 LSO brace.      Today's here for initial orthopedic trauma evaluation 3+ months following her injury    States that her overall pain has significantly improved.  She denies any low back or pelvic pain.  She is ambulating using a walker, has a slow unsteady gait, she uses this because she feels unsteady.  No numbness no tingling, no bowel or bladder issues.    ROS:  Constitutional: Denies fever/chills  Neurological: Denies numbness/tingling (any exceptions noted in orthopaedic exam)   Psychiatric/Behavioral: Denies change in normal mood  Eyes: Denies change in vision  Cardiovascular: Denies chest pain  Respiratory: Denies shortness of breath  Hematologic/Lymphatic: Denies easy bleeding/bruising   Skin: Denies new rash or skin lesions   Gastrointestinal: Denies nausea/vomitting/diarrhea, change in bowel habits, abdominal pain   Allergic/Immunologic: Denies adverse reactions to current medications  Musculoskeletal: see HPI    PAST MEDICAL HISTORY:   Past Medical History:   Diagnosis Date    Anxiety     GERD (gastroesophageal reflux disease)     HTN (hypertension)     IBS (irritable bowel syndrome)     Kidney stones     OAB (overactive bladder)     Vertigo      PAST SURGICAL HISTORY:   Past Surgical History:   Procedure Laterality Date    CATARACT EXTRACTION, BILATERAL      CHOLECYSTECTOMY      HYSTERECTOMY      LITHOTRIPSY       FAMILY HISTORY:   Family History   Problem Relation Age of Onset    Leukemia Father     Cancer Brother         Pancreatic     SOCIAL HISTORY:   Social History     Socioeconomic History    Marital status:    Tobacco Use    Smoking status: Never    Smokeless tobacco: Never   Substance and  Sexual Activity    Alcohol use: Not Currently    Drug use: Never    Sexual activity: Not Currently     Social Determinants of Health     Financial Resource Strain: Low Risk  (11/30/2023)    Overall Financial Resource Strain (CARDIA)     Difficulty of Paying Living Expenses: Not hard at all   Food Insecurity: No Food Insecurity (11/30/2023)    Hunger Vital Sign     Worried About Running Out of Food in the Last Year: Never true     Ran Out of Food in the Last Year: Never true   Transportation Needs: No Transportation Needs (11/30/2023)    PRAPARE - Transportation     Lack of Transportation (Medical): No     Lack of Transportation (Non-Medical): No   Physical Activity: Insufficiently Active (11/30/2023)    Exercise Vital Sign     Days of Exercise per Week: 5 days     Minutes of Exercise per Session: 20 min   Stress: No Stress Concern Present (11/30/2023)    [employer] [place] [employer] - Occupational Stress Questionnaire     Feeling of Stress : Not at all   Social Connections: Socially Isolated (11/30/2023)    Social Connection and Isolation Panel [NHANES]     Frequency of Communication with Friends and Family: Three times a week     Frequency of Social Gatherings with Friends and Family: Three times a week     Attends Faith Services: Never     Active Member of Clubs or Organizations: No     Attends Club or Organization Meetings: Never     Marital Status:    Housing Stability: Low Risk  (11/30/2023)    Housing Stability Vital Sign     Unable to Pay for Housing in the Last Year: No     Number of Places Lived in the Last Year: 1     Unstable Housing in the Last Year: No     MEDICATIONS:   Current Outpatient Medications:     chlorhexidine (PERIDEX) 0.12 % solution, SMARTSIG:By Mouth, Disp: , Rfl:     dicyclomine (BENTYL) 10 MG capsule, TAKE 1 CAPSULE BY MOUTH FOUR TIMES DAILY EVERY NIGHT BEFORE MEALS, Disp: 90 capsule, Rfl: 6    DULoxetine (CYMBALTA) 20 MG capsule, TAKE 1 CAPSULE(20 MG) BY MOUTH  EVERY DAY, Disp: 30 capsule, Rfl: 1    esomeprazole (NEXIUM) 40 MG capsule, Take 1 capsule (40 mg total) by mouth before breakfast., Disp: 90 capsule, Rfl: 11    fluticasone propionate (FLONASE) 50 mcg/actuation nasal spray, SPRAY ONCE INTO EACH NOSTRIL EVERY DAY, Disp: 48 g, Rfl: 3    hydroCHLOROthiazide (HYDRODIURIL) 25 MG tablet, TAKE 1 TABLET(25 MG) BY MOUTH EVERY DAY, Disp: 90 tablet, Rfl: 2    HYDROcodone-acetaminophen (NORCO) 5-325 mg per tablet, Take 1 tablet by mouth every 6 (six) hours as needed for Pain. (Patient not taking: Reported on 2/23/2024), Disp: 10 tablet, Rfl: 0    ibuprofen (ADVIL,MOTRIN) 800 MG tablet, Take 1 tablet (800 mg total) by mouth every 6 (six) hours as needed for Pain., Disp: 20 tablet, Rfl: 0    LIDOcaine (LIDODERM) 5 %, Place 1 patch onto the skin once daily. Remove & Discard patch within 12 hours or as directed by MD (Patient not taking: Reported on 12/20/2023), Disp: 5 patch, Rfl: 0    LORazepam (ATIVAN) 1 MG tablet, Take 1 tablet (1 mg total) by mouth every evening., Disp: 30 tablet, Rfl: 5    losartan (COZAAR) 100 MG tablet, TAKE 1 TABLET(100 MG) BY MOUTH EVERY DAY, Disp: 90 tablet, Rfl: 2    metoprolol succinate (TOPROL-XL) 50 MG 24 hr tablet, TAKE 1 TABLET(50 MG) BY MOUTH EVERY DAY, Disp: 90 tablet, Rfl: 2    oxybutynin (DITROPAN XL) 15 MG TR24, Take 1 tablet (15 mg total) by mouth once daily., Disp: 90 tablet, Rfl: 3    polyethylene glycol (GLYCOLAX) 17 gram PwPk, Take 17 g by mouth once daily. (Patient not taking: Reported on 2/23/2024), Disp: , Rfl: 0    potassium chloride SA (K-DUR,KLOR-CON M) 10 MEQ tablet, TAKE 1 TABLET(10 MEQ) BY MOUTH EVERY DAY, Disp: 90 tablet, Rfl: 2    senna-docusate 8.6-50 mg (PERICOLACE) 8.6-50 mg per tablet, Take 2 tablets by mouth once daily. (Patient not taking: Reported on 2/23/2024), Disp: 30 tablet, Rfl: 11    valACYclovir (VALTREX) 500 MG tablet, Take by mouth., Disp: , Rfl:   ALLERGIES:   Review of patient's allergies indicates:    Allergen Reactions    Egg Nausea Only    Cipro [ciprofloxacin hcl]     Sulfur Rash         EXAM      VITAL SIGNS:   There were no vitals taken for this visit.      PE:  General:  no acute distress, appears stated age    Neuro: alert and oriented x3  Psych: normal mood  Head: normocephalic, atraumatic.   Eyes: no scleral icterus  Mouth: moist mucous membranes  Cardiovascular: extremities warm and well perfused  Lungs: breathing comfortably, equal chest rise bilat  Skin: clean, dry, intact (any exceptions noted in below musculoskeletal exam)    Musculoskeletal:    Pelvis  Stable with compression, no pain over her SI joints or sacrum  Bilateral lower extremities  Motor function intact hip flexion  aphenous, sural, deep peroneal, superficial peroneal, tibial nerves.  Palpable DP/PT pulse, brisk cap refill        XRAYS:  No new x-rays  Review of CT scan pelvis 12/28/2023, bilateral sacral fractures, as well as a bilateral pubic rami fractures  (I independently reviewed and interpreted the above imaging)    MEDICAL DECISION MAKING       Encounter Diagnosis   Name Primary?    Closed pelvic ring fracture with delayed healing, subsequent encounter Yes       78-year-old female, multiple falls   Pelvic ring fracture, sacral U type fracture sustained sometime last year     This appears to have healed based on her physical exam and symptoms with improving pain, steady gait.      Recommend repeat x-ray pelvis, inlet outlet views today, however the patient would like to obtain x-rays at a different date due to another appointment that she has.      Recommend   Weightbear as tolerated with a walker   Avoid falls   Calcium, vitamin-D  Fragility fracture Clinic referral    Spine referral for follow-up management of her multiple spine fractures   Continue TLSO for now    F/u prn      =====================  Jemal Ball MD  Orthopaedic Surgery

## 2024-04-25 DIAGNOSIS — M79.641 PAIN OF RIGHT HAND: Primary | ICD-10-CM

## 2024-04-28 DIAGNOSIS — I10 ESSENTIAL HYPERTENSION: ICD-10-CM

## 2024-04-28 NOTE — TELEPHONE ENCOUNTER
No care due was identified.  [facility] Embedded Care Due Messages. Reference number: [id number].   4/28/2024 8:02:19 AM CDT

## 2024-04-29 RX ORDER — LOSARTAN POTASSIUM 100 MG/1
100 TABLET ORAL
Qty: 90 TABLET | Refills: 2 | Status: SHIPPED | OUTPATIENT
Start: 2024-04-29

## 2024-04-29 RX ORDER — POTASSIUM CHLORIDE 750 MG/1
10 TABLET, EXTENDED RELEASE ORAL
Qty: 90 TABLET | Refills: 2 | Status: SHIPPED | OUTPATIENT
Start: 2024-04-29

## 2024-04-29 NOTE — TELEPHONE ENCOUNTER
Refill Routing Note   Medication(s) are not appropriate for processing by [facility] for the following reason(s):        Required vitals abnormal  03/12/24 (!) 154/72       OR action(s):  Defer  Approve        Medication Therapy Plan: 03/12/24 (!) 154/72      Appointments  past 12m or future 3m with PCP    Date Provider   Last Visit   2/23/2024 Jefe Serrano MD   Next Visit   5/7/2024 Jefe Serrano MD   ED visits in past 90 days: 0        Note composed:1:10 PM 04/29/2024

## 2024-05-01 ENCOUNTER — OFFICE VISIT (OUTPATIENT)
Dept: ORTHOPEDICS | Facility: CLINIC | Age: 79
End: 2024-05-01
Payer: MEDICARE

## 2024-05-01 DIAGNOSIS — M18.11 ARTHRITIS OF CARPOMETACARPAL (CMC) JOINT OF RIGHT THUMB: ICD-10-CM

## 2024-05-01 DIAGNOSIS — M18.12 ARTHRITIS OF CARPOMETACARPAL (CMC) JOINT OF LEFT THUMB: Primary | ICD-10-CM

## 2024-05-01 PROCEDURE — 99213 OFFICE O/P EST LOW 20 MIN: CPT | Mod: 25,S$PBB,, | Performed by: ORTHOPAEDIC SURGERY

## 2024-05-01 PROCEDURE — 99213 OFFICE O/P EST LOW 20 MIN: CPT | Mod: PBBFAC,25,PN | Performed by: ORTHOPAEDIC SURGERY

## 2024-05-01 PROCEDURE — 99999PBSHW PR PBB SHADOW TECHNICAL ONLY FILED TO HB: Mod: PBBFAC,,,

## 2024-05-01 PROCEDURE — 99999 PR PBB SHADOW E&M-EST. PATIENT-LVL III: CPT | Mod: PBBFAC,,, | Performed by: ORTHOPAEDIC SURGERY

## 2024-05-01 PROCEDURE — 20600 DRAIN/INJ JOINT/BURSA W/O US: CPT | Mod: 50,PBBFAC,PN | Performed by: ORTHOPAEDIC SURGERY

## 2024-05-01 RX ORDER — TRIAMCINOLONE ACETONIDE 40 MG/ML
40 INJECTION, SUSPENSION INTRA-ARTICULAR; INTRAMUSCULAR
Status: DISCONTINUED | OUTPATIENT
Start: 2024-05-01 | End: 2024-05-01 | Stop reason: HOSPADM

## 2024-05-01 RX ADMIN — TRIAMCINOLONE ACETONIDE 40 MG: 40 INJECTION, SUSPENSION INTRA-ARTICULAR; INTRAMUSCULAR at 12:05

## 2024-05-01 NOTE — PROCEDURES
Small Joint Aspiration/Injection: L thumb CMC, R thumb CMC    Date/Time: 5/1/2024 12:45 PM    Performed by: Berta Conroy MD  Authorized by: Berta Conroy MD    Consent Done?:  Yes (Verbal)  Indications:  Arthritis  Timeout: prior to procedure the correct patient, procedure, and site was verified    Prep: patient was prepped and draped in usual sterile fashion      Local anesthesia used?: Yes    Local anesthetic:  Topical anesthetic  Location:  Thumb  Site:  L thumb CMC and R thumb CMC  Needle size:  25 G  Medications:  40 mg triamcinolone acetonide 40 mg/mL  Patient tolerance:  Patient tolerated the procedure well with no immediate complications

## 2024-05-01 NOTE — PROGRESS NOTES
Follow up visit    History of Present Illness:   Juli comes to the office for follow up evaluation of bilateral  CMC arthritis   - here for repeat injections    ROS: unremarkable and no change since last visit    Physical Examination:    Vitals:    05/01/24 1248   PainSc: 0-No pain   PainLoc: Hand        NAD  Bilateral Hand/Wrist Examination:    Observation/Inspection:  Swelling  none    Deformity  none  Discoloration  none     Scars   none    Atrophy  none    HAND/WRIST EXAMINATION:  Finkelstein's Test   Neg  WHAT Test    Neg  CMC grind    Pos  Circumduction test   Pos    Neurovascular Exam:  Digits WWP, brisk CR < 3s throughout  NVI motor/LTS to M/R/U nerves, radial pulse 2+  Tinel's Test - Carpal Tunnel  Neg  Tinel's Test - Cubital Tunnel  Neg  Phalen's Test    Neg  Median Nerve Compression Test Neg    ROM hand/wrist/elbow full, painless     Radiographic imaging: 3 views of the bilateral hands show advanced CMC OA    Assessment/Plan:  78 y.o. female  with Bilateral CMC arthritis    We discussed the etiology of persistent pain and further treatment options.  Injection of the bilateral thumb carpometacarpal joint injection  performed, please see procedure note for more details.  Prior to the injection risks and benefits of corticosteroid injection were discussed with the patient including pain, infection, bleeding, skin color changes, swelling, steroid flare. We discussed that over time injections can result in chondral damage, acceleration of arthritis formation, damage to tendons and damage to joints.  The patient consented for the procedure.  Post-injection instructions were given to the patient in writing.  Return for follow up visit: PRN    All questions were answered in detail. The patient  verbalized the understanding of the treatment plan and is in full agreement with the treatment plan.

## 2024-05-06 DIAGNOSIS — F41.9 ANXIETY: ICD-10-CM

## 2024-05-07 ENCOUNTER — OFFICE VISIT (OUTPATIENT)
Dept: INTERNAL MEDICINE | Facility: CLINIC | Age: 79
End: 2024-05-07
Payer: MEDICARE

## 2024-05-07 VITALS
WEIGHT: 117.94 LBS | OXYGEN SATURATION: 98 % | HEART RATE: 65 BPM | SYSTOLIC BLOOD PRESSURE: 150 MMHG | BODY MASS INDEX: 25.52 KG/M2 | DIASTOLIC BLOOD PRESSURE: 62 MMHG

## 2024-05-07 DIAGNOSIS — F41.9 ANXIETY: ICD-10-CM

## 2024-05-07 DIAGNOSIS — I10 ESSENTIAL HYPERTENSION: Primary | ICD-10-CM

## 2024-05-07 DIAGNOSIS — M47.816 LUMBAR SPONDYLOSIS: ICD-10-CM

## 2024-05-07 DIAGNOSIS — M81.0 OSTEOPOROSIS, UNSPECIFIED OSTEOPOROSIS TYPE, UNSPECIFIED PATHOLOGICAL FRACTURE PRESENCE: ICD-10-CM

## 2024-05-07 DIAGNOSIS — N32.81 OAB (OVERACTIVE BLADDER): ICD-10-CM

## 2024-05-07 PROCEDURE — 99999 PR PBB SHADOW E&M-EST. PATIENT-LVL III: CPT | Mod: PBBFAC,,, | Performed by: INTERNAL MEDICINE

## 2024-05-07 PROCEDURE — 99214 OFFICE O/P EST MOD 30 MIN: CPT | Mod: S$PBB,,, | Performed by: INTERNAL MEDICINE

## 2024-05-07 PROCEDURE — 99213 OFFICE O/P EST LOW 20 MIN: CPT | Mod: PBBFAC | Performed by: INTERNAL MEDICINE

## 2024-05-07 RX ORDER — LORAZEPAM 1 MG/1
1 TABLET ORAL NIGHTLY
Qty: 30 TABLET | OUTPATIENT
Start: 2024-05-07

## 2024-05-07 RX ORDER — LORAZEPAM 1 MG/1
1 TABLET ORAL NIGHTLY
Qty: 30 TABLET | Refills: 5 | Status: SHIPPED | OUTPATIENT
Start: 2024-05-07

## 2024-05-07 RX ORDER — OXYBUTYNIN CHLORIDE 15 MG/1
15 TABLET, EXTENDED RELEASE ORAL DAILY
Qty: 90 TABLET | Refills: 3 | Status: SHIPPED | OUTPATIENT
Start: 2024-05-07

## 2024-05-07 NOTE — PROGRESS NOTES
Subjective:       Patient ID: Juli Parra is a 78 y.o. female.    Chief Complaint: Follow-up    Patient seen for follow-up of back pain, back arthritis and osteoporosis.  She was seeing Orthopedics but decided not to pursue any further treatment because her pain is manageable.  She does have an appointment with Endocrine and hopes to consider infusions twice yearly would like to do them on the [place].  We reviewed her bone density and the recommendations.  Lastly we talked about anxiety meds and urinary meds.   reviewed.  Side effects discussed.      Review of Systems   Constitutional:  Negative for appetite change, chills and fever.   HENT:  Negative for nosebleeds and sore throat.    Eyes:  Negative for pain and visual disturbance.   Respiratory:  Negative for cough, shortness of breath and wheezing.    Cardiovascular:  Negative for chest pain and leg swelling.   Gastrointestinal:  Negative for abdominal pain, constipation and diarrhea.   Endocrine: Negative for polyuria.   Genitourinary:  Positive for bladder incontinence. Negative for difficulty urinating, hematuria and vaginal bleeding.   Musculoskeletal:  Positive for arthralgias and back pain. Negative for gait problem and neck pain.   Integumentary:  Negative for pallor and rash.   Neurological:  Negative for tremors, seizures and headaches.   Hematological:  Does not bruise/bleed easily.   Psychiatric/Behavioral:  Negative for dysphoric mood. The patient is nervous/anxious.            Past Medical History:   Diagnosis Date    Anxiety     GERD (gastroesophageal reflux disease)     HTN (hypertension)     IBS (irritable bowel syndrome)     Kidney stones     OAB (overactive bladder)     Vertigo      Past Surgical History:   Procedure Laterality Date    CATARACT EXTRACTION, BILATERAL      CHOLECYSTECTOMY      HYSTERECTOMY      LITHOTRIPSY        Patient Active Problem List   Diagnosis    Mixed irritable bowel syndrome    Primary generalized  (osteo)arthritis    Essential hypertension    Impaired fasting glucose    Gastroesophageal reflux disease    Bilateral sensorineural hearing loss    Anxiety    Arthritis of right knee    Chest congestion    Depression    Coccydynia    Lumbar spondylosis    DDD (degenerative disc disease), lumbar    Current moderate episode of major depressive disorder without prior episode    Closed fracture of ramus of left pubis    Closed fracture of right inferior pubic ramus    Closed fracture of sacrum    Closed fracture of transverse process of lumbar vertebra    Hypokalemia    Slow transit constipation    Discharge planning issues    Debility    Aortic atherosclerosis        Objective:      Physical Exam  Constitutional:       General: She is not in acute distress.     Appearance: She is well-developed.   HENT:      Head: Normocephalic and atraumatic.      Right Ear: External ear normal.      Left Ear: External ear normal.      Mouth/Throat:      Pharynx: No oropharyngeal exudate or posterior oropharyngeal erythema.   Eyes:      General: No scleral icterus.     Conjunctiva/sclera: Conjunctivae normal.      Pupils: Pupils are equal, round, and reactive to light.   Neck:      Thyroid: No thyromegaly.   Cardiovascular:      Rate and Rhythm: Normal rate and regular rhythm.      Pulses: Normal pulses.      Heart sounds: No murmur heard.  Pulmonary:      Effort: Pulmonary effort is normal.      Breath sounds: Normal breath sounds. No wheezing.   Abdominal:      General: Bowel sounds are normal. There is no distension.      Palpations: Abdomen is soft.      Tenderness: There is no abdominal tenderness.   Musculoskeletal:         General: Tenderness present.      Cervical back: Normal range of motion and neck supple.      Right lower leg: No edema.      Left lower leg: No edema.   Lymphadenopathy:      Cervical: No cervical adenopathy.   Skin:     Coloration: Skin is not jaundiced.      Findings: No rash.   Neurological:       "General: No focal deficit present.      Mental Status: She is alert and oriented to person, place, and time.   Psychiatric:         Mood and Affect: Mood normal.         Behavior: Behavior normal.         Assessment:       Problem List Items Addressed This Visit          Neuro    Lumbar spondylosis       Psychiatric    Anxiety    Relevant Medications    LORazepam (ATIVAN) 1 MG tablet       Cardiac/Vascular    Essential hypertension - Primary     Other Visit Diagnoses       Osteoporosis, unspecified osteoporosis type, unspecified pathological fracture presence        OAB (overactive bladder)        Relevant Medications    oxybutynin (DITROPAN XL) 15 MG TR24            Plan:         Juli Douglas" was seen today for follow-up.    Diagnoses and all orders for this visit:    Essential hypertension    Osteoporosis, unspecified osteoporosis type, unspecified pathological fracture presence    Lumbar spondylosis    Anxiety  -     LORazepam (ATIVAN) 1 MG tablet; Take 1 tablet (1 mg total) by mouth every evening.    OAB (overactive bladder)  -     oxybutynin (DITROPAN XL) 15 MG TR24; Take 1 tablet (15 mg total) by mouth once daily.           Follow-up in 5-6 months.  Continue current plan          Portions of this note may have been created with voice recognition software. Occasional "wrong-word" or "sound-a-like" substitutions may have occurred due to the inherent limitations of voice recognition software. Please, read the note carefully and recognize, using context, where substitutions have occurred.  "

## 2024-05-07 NOTE — TELEPHONE ENCOUNTER
No care due was identified.  [facility] Embedded Care Due Messages. Reference number: [id number].   5/06/2024 9:03:45 PM CDT

## 2024-05-20 DIAGNOSIS — F41.9 ANXIETY: ICD-10-CM

## 2024-05-20 DIAGNOSIS — F32.1 CURRENT MODERATE EPISODE OF MAJOR DEPRESSIVE DISORDER WITHOUT PRIOR EPISODE: ICD-10-CM

## 2024-05-20 RX ORDER — DULOXETIN HYDROCHLORIDE 20 MG/1
20 CAPSULE, DELAYED RELEASE ORAL
Qty: 30 CAPSULE | Refills: 1 | Status: SHIPPED | OUTPATIENT
Start: 2024-05-20

## 2024-06-04 ENCOUNTER — OFFICE VISIT (OUTPATIENT)
Dept: ENDOCRINOLOGY | Facility: CLINIC | Age: 79
End: 2024-06-04
Payer: MEDICARE

## 2024-06-04 VITALS
HEART RATE: 107 BPM | WEIGHT: 122 LBS | DIASTOLIC BLOOD PRESSURE: 70 MMHG | SYSTOLIC BLOOD PRESSURE: 130 MMHG | BODY MASS INDEX: 25.61 KG/M2 | HEIGHT: 58 IN

## 2024-06-04 DIAGNOSIS — M81.0 OSTEOPOROSIS, UNSPECIFIED OSTEOPOROSIS TYPE, UNSPECIFIED PATHOLOGICAL FRACTURE PRESENCE: ICD-10-CM

## 2024-06-04 DIAGNOSIS — I10 ESSENTIAL HYPERTENSION: ICD-10-CM

## 2024-06-04 DIAGNOSIS — M81.0 AGE-RELATED OSTEOPOROSIS WITHOUT CURRENT PATHOLOGICAL FRACTURE: Primary | ICD-10-CM

## 2024-06-04 DIAGNOSIS — E55.9 VITAMIN D DEFICIENCY: ICD-10-CM

## 2024-06-04 DIAGNOSIS — S32.591D CLOSED FRACTURE OF RIGHT INFERIOR PUBIC RAMUS WITH ROUTINE HEALING, SUBSEQUENT ENCOUNTER: ICD-10-CM

## 2024-06-04 DIAGNOSIS — M15.0 PRIMARY GENERALIZED (OSTEO)ARTHRITIS: ICD-10-CM

## 2024-06-04 PROCEDURE — 99999 PR PBB SHADOW E&M-EST. PATIENT-LVL IV: CPT | Mod: PBBFAC,,, | Performed by: HOSPITALIST

## 2024-06-04 PROCEDURE — 99214 OFFICE O/P EST MOD 30 MIN: CPT | Mod: PBBFAC | Performed by: HOSPITALIST

## 2024-06-04 PROCEDURE — 99204 OFFICE O/P NEW MOD 45 MIN: CPT | Mod: S$PBB,,, | Performed by: HOSPITALIST

## 2024-06-04 RX ORDER — SODIUM CHLORIDE 0.9 % (FLUSH) 0.9 %
10 SYRINGE (ML) INJECTION
Status: CANCELLED | OUTPATIENT
Start: 2024-06-05

## 2024-06-04 RX ORDER — HEPARIN 100 UNIT/ML
500 SYRINGE INTRAVENOUS
Status: CANCELLED | OUTPATIENT
Start: 2024-06-05

## 2024-06-04 RX ORDER — ZOLEDRONIC ACID 5 MG/100ML
5 INJECTION, SOLUTION INTRAVENOUS
Status: CANCELLED | OUTPATIENT
Start: 2024-06-05

## 2024-06-04 NOTE — ASSESSMENT & PLAN NOTE
- Patient is here for evaluation and management of osteoporosis, 1st noted on bone density 3/4/2024, patient denies ever been told she would osteoporosis previously  - Age of menopause: late 30s, complete  Hysterectomy at 43, Was on HRT, stop in her 70s  - History of falls, fallen several times, in the past. Had fall 3/2023 with reported pelvic fracture with no surgical correction, 2nd falls 12/2023, fell after tripping in the yard.  No surgery  - Poor gait, high fall risk, uses walker to help with ambulation due to osteoarthritis both knees  - Recent DXA: Reviewed 03/04/2024: T-score -2.8 on total hip, elevated FRAX score    Plan  - Discuss treatment option, given PO bisphosphonates, Reclast, Denosumab  - Start patient on IV Reclast yearly, order placed for [facility] infusion  - Duration of therapy of at minimum 2 years and side effect profile discussed (including acute-phase reaction), given High risk of fracture, outweighs the risk of side effects  - Recommend the patient take sufficient calcium and vitamin D3 OTC  - Check routine lab work: check Renal Panel, vitamin-D regularly   - Fall precautions/Exercise regimen: advised, (weight bearing exercises recommended)  - Routine dental health screening advised, dental cleaning every 6 months  - Next DXA: 2 years from most current, 4/2026  - Routine follow-up with me every 12 months

## 2024-06-04 NOTE — PROGRESS NOTES
"Subjective:      Patient ID: Juli Parra is a 78 y.o. female presented to [facility] on 6/4/2024.  Chief complaint:  Osteoporosis    History of Present illness: Juli Parra is a 78 y.o. female here for Osteoporosis    Other significant past medical history:  Hypertension      Osteoporosis  - Diagnosis on DXA noted on bone density 3/4/2024, patient denies ever been told she would osteoporosis previously  - NEVER BEEN ON MEDICATION  - Vit D intake?/Calcium intake? Taking calcium twice a day, Vit D3 2000 ui daily  - History of falls, fallen several times, in the past. Had fall 3/2023 with reported pelvic fracture with no surgical correction, 2nd falls 12/2023, fell after tripping in the yard.  No surgery  - History of fractures to wrist, hip or spine: yes  - Corrected calcium: 9.7 on 12/30/2023    - Previously getting her care from [place].  Moved into town the last 2 years take care of her son.  No old bone density for review    Osteoporosis Risk Factor Assessment:  - Family history of osteoporosis: yes, son (who has D1M)  - Family history of fractures of hip: no  - History of loss of height of more than 1 1/2 to 2 inches: no, measured height in clinic: 4'10", the same  - Age of menopause: late 30s, complete  Hysterectomy at 43, Was on HRT, stop in her 70s  - Tobacco use, including use in the past:  no   - Patient lives with:  Family at home  - Walking gait:  Unsteady, uses walker, due to osteoarthritis of knees.  - Weight bearing exercise?  no     Medical hx  - Dental work planned? no, sees dentist regularly, routine cleaning  - Chronic kidney disease: no  - History of Hyperparathyrodism, no  - History of kidney stones, no  - History of cancer or malignancy, history of malignancy, or prior radiation treatment , no    Recent DXA: Reviewed   03/04/2024  Lumbar spine (L1-L4):  BMD is 0.794 g/cm2, T-score is -2.3, and Z-score is 0.4.  Total hip: BMD is 0.603 g/cm2, T-score is " "-2.8, and Z-score is -0.7.  Femoral neck: BMD is 0.619 g/cm2, T-score is -2.1, and Z-score is 0.3.  Distal 1/3 radius: Not applicable     FRAX:  22% risk of a major osteoporotic fracture in the next 10 years.  6.2% risk of hip fracture in the next 10 years.     Impression:  *Osteoporosis based on T-score below -2.5.  *Fracture risk is very high due to calculated 10 year risk of hip fracture >4.5% (FRAX)                                  Lab work reviewed  Lab Results   Component Value Date    PJEMGBCB94CC 88 11/10/2022    CALCIUM 8.6 (L) 01/01/2024    CALCIUM 8.6 (L) 12/30/2023    CALCIUM 9.1 12/29/2023    PHOS 3.3 01/01/2024    PHOS 3.1 12/30/2023    PHOS 3.1 12/29/2023    ALKPHOS 111 12/30/2023    ALKPHOS 119 12/29/2023    ALKPHOS 121 12/28/2023    EGFRNORACEVR >60 01/01/2024    ALBUMIN 2.6 (L) 12/30/2023     Lab Results   Component Value Date    TSH 1.606 08/10/2023     The patient's medications, allergies, past medical, surgical, social and family histories were reviewed and updated as appropriate.  Review of Systems: pertinent positives as per the above HPI, and otherwise negative.    Objective:   /70   Pulse 107   Ht 4' 10" (1.473 m)   Wt 55.3 kg (122 lb)   BMI 25.50 kg/m²   Body mass index is 25.5 kg/m².  Vital signs reviewed    Physical Exam  Vitals and nursing note reviewed.   Constitutional:       Appearance: Normal appearance. She is well-developed. She is not ill-appearing.   Neck:      Thyroid: No thyromegaly.   Pulmonary:      Effort: Pulmonary effort is normal. No respiratory distress.   Musculoskeletal:         General: Normal range of motion.      Cervical back: Normal range of motion.   Neurological:      General: No focal deficit present.      Mental Status: She is alert. Mental status is at baseline.   Psychiatric:         Mood and Affect: Mood normal.         Behavior: Behavior normal.         Labs reviewed:  See results in subjective  Lab Results   Component Value Date    HGBA1C 5.4 " 08/24/2023     Lab Results   Component Value Date    CHOL 202 (H) 04/12/2022    HDL 62 04/12/2022    LDLCALC 116.8 04/12/2022    TRIG 116 04/12/2022    CHOLHDL 30.7 04/12/2022     Lab Results   Component Value Date     01/01/2024    K 4.2 01/01/2024     01/01/2024    CO2 26 01/01/2024     01/01/2024    BUN 9 01/01/2024    CREATININE 0.5 01/01/2024    CALCIUM 8.6 (L) 01/01/2024    PHOS 3.3 01/01/2024    PROT 5.9 (L) 12/30/2023    ALBUMIN 2.6 (L) 12/30/2023    BILITOT 0.4 12/30/2023    ALKPHOS 111 12/30/2023    AST 15 12/30/2023    ALT 9 (L) 12/30/2023    ANIONGAP 7 (L) 01/01/2024    EGFRNORACEVR >60 01/01/2024    TSH 1.606 08/10/2023    TOPPCGNR44SM 88 11/10/2022       Assessment     1. Age-related osteoporosis without current pathological fracture  Vitamin D    Renal Function Panel      2. Osteoporosis, unspecified osteoporosis type, unspecified pathological fracture presence  Ambulatory referral/consult to Endocrinology    Renal Function Panel      3. Vitamin D deficiency  Vitamin D      4. Primary generalized (osteo)arthritis        5. Essential hypertension        6. Closed fracture of right inferior pubic ramus with routine healing, subsequent encounter           Plan     Age-related osteoporosis without current pathological fracture  - Patient is here for evaluation and management of osteoporosis, 1st noted on bone density 3/4/2024, patient denies ever been told she would osteoporosis previously  - Age of menopause: late 30s, complete  Hysterectomy at 43, Was on HRT, stop in her 70s  - History of falls, fallen several times, in the past. Had fall 3/2023 with reported pelvic fracture with no surgical correction, 2nd falls 12/2023, fell after tripping in the yard.  No surgery  - Poor gait, high fall risk, uses walker to help with ambulation due to osteoarthritis both knees  - Recent DXA: Reviewed 03/04/2024: T-score -2.8 on total hip, elevated FRAX score    Plan  - Discuss treatment option,  given PO bisphosphonates, Reclast, Denosumab  - Start patient on IV Reclast yearly, order placed for [facility] infusion  - Duration of therapy of at minimum 2 years and side effect profile discussed (including acute-phase reaction), given High risk of fracture, outweighs the risk of side effects  - Recommend the patient take sufficient calcium and vitamin D3 OTC  - Check routine lab work: check Renal Panel, vitamin-D regularly   - Fall precautions/Exercise regimen: advised, (weight bearing exercises recommended)  - Routine dental health screening advised, dental cleaning every 6 months  - Next DXA: 2 years from most current, 4/2026  - Routine follow-up with me every 12 months    Primary generalized (osteo)arthritis  - arthritis of knees, high risk of falls    Essential hypertension  - received management by PCP.  Of note on hydrochlorothiazide    Closed fracture of right inferior pubic ramus  - history of falls 2 times and fracture.  - advised the need to start on medication to help prevent future fracture       Advised patient to follow up with PCP for routine health maintenance care.   RTC in yearly    Garrison Gross M.D.  Endocrinology  [facility]  6/4/2024      Disclaimer: This note has been generated in part with the use of voice-recognition software. There may be typographical errors that have been missed during proof-reading.

## 2024-06-04 NOTE — ASSESSMENT & PLAN NOTE
- history of falls 2 times and fracture.  - advised the need to start on medication to help prevent future fracture

## 2024-06-04 NOTE — PATIENT INSTRUCTIONS
"Zoledronic acid (Reclast) start  - Once yearly infusion that is typically given for 3 consecutive years (and occasionally up to 5 years)  - The day before and day of the infusion, drink plenty of fluids  - The day of the infusion, take over the counter tylenol one dose every six to eight hours for one day to minimize the joint pains that can occur with reclast  - Take an extra dose of over the counter vitamin D the day before the infusion  - DEXA scan should be performed at baseline and every 1-3 years on treatment (usually 2 years after starting therapy, then more or less frequently depending on stability of bone density)    - Zoledronic acid works by inhibiting bone resorption by inhibiting osteoclasts or their precursors, leading to an indirect increase in bone mineral density  - Most patients do not experience serious side-effects from Reclast but can include flu-like symptoms within 24 to 72 hours of the first dose. This may include a low-grade fever and muscle and joint pain. Treatment with a fever-reducing medication (Tylenol) generally improves the symptoms. Subsequent doses typically cause milder symptoms      Warnings/Precautions  - Osteonecrosis of the jaw has rarely occurred. The risk of this problem is very small (less than 1 in 10,000). Experts do not think that it is necessary to stop the medication before invasive dental work (eg, tooth extraction or implant). We advise you see the dentist on a regular basis and let them know you are taking this medication, but avoid non-emergent dental implants or extractions if possible while on this medication  - Taking bisphosphonates for a long time (eg, seven years or longer) can rarely increase the risk of an unusual type of femur (thigh bone) fracture. Taking bisphosphonates for up to five years for osteoporosis (the usual duration of treatment) is usually not associated with these "atypical" femoral fractures  - The benefit of this medication in preventing " bone loss far out-weighs the risk of side effects    - For more information on medications  https://www.nof.org/patients/treatment/medicationadherence/

## 2024-06-05 ENCOUNTER — LAB VISIT (OUTPATIENT)
Dept: LAB | Facility: HOSPITAL | Age: 79
End: 2024-06-05
Attending: HOSPITALIST
Payer: MEDICARE

## 2024-06-05 DIAGNOSIS — E55.9 VITAMIN D DEFICIENCY: ICD-10-CM

## 2024-06-05 DIAGNOSIS — M81.0 AGE-RELATED OSTEOPOROSIS WITHOUT CURRENT PATHOLOGICAL FRACTURE: ICD-10-CM

## 2024-06-05 DIAGNOSIS — M81.0 OSTEOPOROSIS, UNSPECIFIED OSTEOPOROSIS TYPE, UNSPECIFIED PATHOLOGICAL FRACTURE PRESENCE: ICD-10-CM

## 2024-06-05 LAB
25(OH)D3+25(OH)D2 SERPL-MCNC: 84 NG/ML (ref 30–96)
ALBUMIN SERPL BCP-MCNC: 3.1 G/DL (ref 3.5–5.2)
ANION GAP SERPL CALC-SCNC: 7 MMOL/L (ref 8–16)
BUN SERPL-MCNC: 10 MG/DL (ref 8–23)
CALCIUM SERPL-MCNC: 9.1 MG/DL (ref 8.7–10.5)
CHLORIDE SERPL-SCNC: 101 MMOL/L (ref 95–110)
CO2 SERPL-SCNC: 25 MMOL/L (ref 23–29)
CREAT SERPL-MCNC: 0.7 MG/DL (ref 0.5–1.4)
EST. GFR  (NO RACE VARIABLE): >60 ML/MIN/1.73 M^2
GLUCOSE SERPL-MCNC: 124 MG/DL (ref 70–110)
PHOSPHATE SERPL-MCNC: 3.1 MG/DL (ref 2.7–4.5)
POTASSIUM SERPL-SCNC: 4.2 MMOL/L (ref 3.5–5.1)
SODIUM SERPL-SCNC: 133 MMOL/L (ref 136–145)

## 2024-06-05 PROCEDURE — 80069 RENAL FUNCTION PANEL: CPT | Performed by: HOSPITALIST

## 2024-06-05 PROCEDURE — 82306 VITAMIN D 25 HYDROXY: CPT | Performed by: HOSPITALIST

## 2024-06-05 PROCEDURE — 36415 COLL VENOUS BLD VENIPUNCTURE: CPT | Mod: PO | Performed by: HOSPITALIST

## 2024-06-10 ENCOUNTER — PATIENT MESSAGE (OUTPATIENT)
Dept: INTERNAL MEDICINE | Facility: CLINIC | Age: 79
End: 2024-06-10
Payer: MEDICARE

## 2024-06-11 RX ORDER — FLUTICASONE PROPIONATE 50 MCG
1 SPRAY, SUSPENSION (ML) NASAL DAILY
Qty: 32 G | Refills: 3 | Status: SHIPPED | OUTPATIENT
Start: 2024-06-11

## 2024-06-11 NOTE — TELEPHONE ENCOUNTER
Refill Decision Note   Juli Aida  is requesting a refill authorization.  Brief Assessment and Rationale for Refill:  Approve     Medication Therapy Plan:         Comments:     Note composed:9:23 AM 06/11/2024

## 2024-06-11 NOTE — TELEPHONE ENCOUNTER
No care due was identified.  [facility] Embedded Care Due Messages. Reference number: [id number].   6/11/2024 8:42:49 AM CDT

## 2024-06-12 ENCOUNTER — OFFICE VISIT (OUTPATIENT)
Dept: ORTHOPEDICS | Facility: CLINIC | Age: 79
End: 2024-06-12
Payer: MEDICARE

## 2024-06-12 DIAGNOSIS — M17.0 PRIMARY OSTEOARTHRITIS OF BOTH KNEES: Primary | ICD-10-CM

## 2024-06-12 PROCEDURE — 99499 UNLISTED E&M SERVICE: CPT | Mod: S$PBB,,, | Performed by: ORTHOPAEDIC SURGERY

## 2024-06-12 PROCEDURE — 20610 DRAIN/INJ JOINT/BURSA W/O US: CPT | Mod: 50,PBBFAC,PN | Performed by: ORTHOPAEDIC SURGERY

## 2024-06-12 PROCEDURE — 99999 PR PBB SHADOW E&M-EST. PATIENT-LVL III: CPT | Mod: PBBFAC,,, | Performed by: ORTHOPAEDIC SURGERY

## 2024-06-12 PROCEDURE — 99999PBSHW PR PBB SHADOW TECHNICAL ONLY FILED TO HB: Mod: PBBFAC,,,

## 2024-06-12 PROCEDURE — 99213 OFFICE O/P EST LOW 20 MIN: CPT | Mod: PBBFAC,PN,25 | Performed by: ORTHOPAEDIC SURGERY

## 2024-06-12 RX ORDER — TRIAMCINOLONE ACETONIDE 40 MG/ML
40 INJECTION, SUSPENSION INTRA-ARTICULAR; INTRAMUSCULAR
Status: DISCONTINUED | OUTPATIENT
Start: 2024-06-12 | End: 2024-06-12 | Stop reason: HOSPADM

## 2024-06-12 RX ADMIN — TRIAMCINOLONE ACETONIDE 40 MG: 40 INJECTION, SUSPENSION INTRA-ARTICULAR; INTRAMUSCULAR at 10:06

## 2024-06-12 NOTE — PROGRESS NOTES
Follow up visit    History of Present Illness:   Juli comes to the office for follow up evaluation of bilateral knee arthritis. Here for injections to the bilateral knees      ROS: unremarkable and no change since last visit    Physical Examination:    NAD  Left and Right knee  No change    Radiographic imaging: no new     Assessment/Plan:  78 y.o. female  with Bilateral knee OA    We discussed the etiology of persistent pain and further treatment options.  Injection of the bilateral knee  performed, please see procedure note for more details.  Prior to the injection risks and benefits of corticosteroid injection were discussed with the patient including pain, infection, bleeding, skin color changes, swelling, steroid flare. We discussed that over time injections can result in chondral damage, acceleration of arthritis formation, damage to tendons and damage to joints.  The patient consented for the procedure.  Post-injection instructions were given to the patient in writing.  Return for follow up visit: PRN    All questions were answered in detail. The patient  verbalized the understanding of the treatment plan and is in full agreement with the treatment plan.

## 2024-06-12 NOTE — PROCEDURES
Large Joint Aspiration/Injection: bilateral knee    Date/Time: 6/12/2024 10:30 AM    Performed by: Berta Conroy MD  Authorized by: Berta Conroy MD    Consent Done?:  Yes (Verbal)  Indications:  Arthritis  Timeout: prior to procedure the correct patient, procedure, and site was verified    Prep: patient was prepped and draped in usual sterile fashion      Local anesthesia used?: Yes    Local anesthetic:  Topical anesthetic    Details:  Needle Size:  22 G  Approach:  Anteromedial  Location:  Knee  Laterality:  Bilateral  Site:  Bilateral knee  Medications (Right):  40 mg triamcinolone acetonide 40 mg/mL  Medications (Left):  40 mg triamcinolone acetonide 40 mg/mL  Patient tolerance:  Patient tolerated the procedure well with no immediate complications

## 2024-06-19 ENCOUNTER — INFUSION (OUTPATIENT)
Dept: INFUSION THERAPY | Facility: HOSPITAL | Age: 79
End: 2024-06-19
Attending: HOSPITALIST
Payer: MEDICARE

## 2024-06-19 VITALS
DIASTOLIC BLOOD PRESSURE: 70 MMHG | SYSTOLIC BLOOD PRESSURE: 163 MMHG | RESPIRATION RATE: 16 BRPM | HEART RATE: 58 BPM | TEMPERATURE: 98 F | OXYGEN SATURATION: 97 %

## 2024-06-19 DIAGNOSIS — M81.0 AGE-RELATED OSTEOPOROSIS WITHOUT CURRENT PATHOLOGICAL FRACTURE: Primary | ICD-10-CM

## 2024-06-19 PROCEDURE — 96374 THER/PROPH/DIAG INJ IV PUSH: CPT

## 2024-06-19 PROCEDURE — 63600175 PHARM REV CODE 636 W HCPCS: Performed by: HOSPITALIST

## 2024-06-19 RX ORDER — ZOLEDRONIC ACID 5 MG/100ML
5 INJECTION, SOLUTION INTRAVENOUS
OUTPATIENT
Start: 2024-06-19

## 2024-06-19 RX ORDER — ZOLEDRONIC ACID 5 MG/100ML
5 INJECTION, SOLUTION INTRAVENOUS
Status: COMPLETED | OUTPATIENT
Start: 2024-06-19 | End: 2024-06-19

## 2024-06-19 RX ORDER — SODIUM CHLORIDE 0.9 % (FLUSH) 0.9 %
10 SYRINGE (ML) INJECTION
OUTPATIENT
Start: 2024-06-19

## 2024-06-19 RX ORDER — HEPARIN 100 UNIT/ML
500 SYRINGE INTRAVENOUS
OUTPATIENT
Start: 2024-06-19

## 2024-06-19 RX ADMIN — ZOLEDRONIC ACID 5 MG: 0.05 INJECTION, SOLUTION INTRAVENOUS at 10:06

## 2024-06-19 NOTE — PLAN OF CARE
Patient arrived to unit for initial Reclast infusion. Oriented pt to unit. Labs reviewed, Calcium 9.1 and Cr 0.7 noted. Pt denies any invasive dental procedures over the past 3 months.Pt did have a fall with back injury. Pt denies any broken bone, but did finish PT and continues using a walker. Pt reports taking daily Calcium and Vitamin D. Reviewed signs and symptoms of allergic reaction and potential side effects of Reclast. Drug handout reviewed and given to pt. Plan of care reviewed, patient agreeable to plan. Patient tolerated infusion well. 30 min post observation completed. VSS. Discharge instructions reviewed, patient instructed to return 1 year, 6/18/25. Patient ambulated off unit using walker. Patient in NAD at time of discharge.

## 2024-07-03 DIAGNOSIS — Z71.89 COMPLEX CARE COORDINATION: ICD-10-CM

## 2024-07-22 DIAGNOSIS — F32.1 CURRENT MODERATE EPISODE OF MAJOR DEPRESSIVE DISORDER WITHOUT PRIOR EPISODE: ICD-10-CM

## 2024-07-22 DIAGNOSIS — F41.9 ANXIETY: ICD-10-CM

## 2024-07-22 DIAGNOSIS — I10 ESSENTIAL HYPERTENSION: ICD-10-CM

## 2024-07-22 RX ORDER — METOPROLOL SUCCINATE 50 MG/1
50 TABLET, EXTENDED RELEASE ORAL
Qty: 90 TABLET | Refills: 3 | Status: SHIPPED | OUTPATIENT
Start: 2024-07-22

## 2024-07-22 RX ORDER — DULOXETIN HYDROCHLORIDE 20 MG/1
20 CAPSULE, DELAYED RELEASE ORAL
Qty: 90 CAPSULE | Refills: 3 | Status: SHIPPED | OUTPATIENT
Start: 2024-07-22

## 2024-07-22 RX ORDER — HYDROCHLOROTHIAZIDE 25 MG/1
25 TABLET ORAL
Qty: 90 TABLET | Refills: 3 | Status: SHIPPED | OUTPATIENT
Start: 2024-07-22

## 2024-07-22 NOTE — TELEPHONE ENCOUNTER
No care due was identified.  [facility] Embedded Care Due Messages. Reference number: [id number].   7/22/2024 8:53:45 AM CDT

## 2024-07-22 NOTE — TELEPHONE ENCOUNTER
Refill Routing Note   Medication(s) are not appropriate for processing by [facility] for the following reason(s):        Required vitals abnormal    [facility] action(s):  Defer             Appointments  past 12m or future 3m with PCP    Date Provider   Last Visit   5/7/2024 Jefe Serrano MD   Next Visit   Visit date not found Jefe Serrano MD   ED visits in past 90 days: 0        Note composed:3:53 PM 07/22/2024

## 2024-08-21 NOTE — ED TRIAGE NOTES
Pt. Reports she fell on last week. Pt. Reports she was seen in the ED and the offered her pain meds. Pt. Reports at that time she did not have pain however she did not except meds. Pt. Reports she now has right groin and lower back pain. Pt. Reports she a bruise to her right hip area.   
No indicators present

## 2024-09-18 ENCOUNTER — OFFICE VISIT (OUTPATIENT)
Dept: ORTHOPEDICS | Facility: CLINIC | Age: 79
End: 2024-09-18
Payer: MEDICARE

## 2024-09-18 DIAGNOSIS — M17.0 PRIMARY OSTEOARTHRITIS OF BOTH KNEES: Primary | ICD-10-CM

## 2024-09-18 PROCEDURE — 99499 UNLISTED E&M SERVICE: CPT | Mod: S$PBB,,, | Performed by: ORTHOPAEDIC SURGERY

## 2024-09-18 PROCEDURE — 99999PBSHW PR PBB SHADOW TECHNICAL ONLY FILED TO HB: Mod: PBBFAC,,,

## 2024-09-18 PROCEDURE — 99212 OFFICE O/P EST SF 10 MIN: CPT | Mod: PBBFAC,PN,25 | Performed by: ORTHOPAEDIC SURGERY

## 2024-09-18 PROCEDURE — 20610 DRAIN/INJ JOINT/BURSA W/O US: CPT | Mod: 50,PBBFAC,PN | Performed by: ORTHOPAEDIC SURGERY

## 2024-09-18 PROCEDURE — 99999 PR PBB SHADOW E&M-EST. PATIENT-LVL II: CPT | Mod: PBBFAC,,, | Performed by: ORTHOPAEDIC SURGERY

## 2024-09-18 RX ORDER — TRIAMCINOLONE ACETONIDE 40 MG/ML
80 INJECTION, SUSPENSION INTRA-ARTICULAR; INTRAMUSCULAR
Status: DISCONTINUED | OUTPATIENT
Start: 2024-09-18 | End: 2024-09-18 | Stop reason: HOSPADM

## 2024-09-18 RX ADMIN — TRIAMCINOLONE ACETONIDE 80 MG: 40 INJECTION, SUSPENSION INTRA-ARTICULAR; INTRAMUSCULAR at 10:09

## 2024-09-18 NOTE — PROCEDURES
Large Joint Aspiration/Injection: bilateral knee    Date/Time: 9/18/2024 10:30 AM    Performed by: Berta Conroy MD  Authorized by: Berta Conroy MD    Consent Done?:  Yes (Verbal)  Indications:  Arthritis  Timeout: prior to procedure the correct patient, procedure, and site was verified    Prep: patient was prepped and draped in usual sterile fashion      Local anesthesia used?: Yes    Local anesthetic:  Topical anesthetic    Details:  Needle Size:  22 G  Approach:  Anteromedial  Location:  Knee  Laterality:  Bilateral  Site:  Bilateral knee  Medications (Right):  80 mg triamcinolone acetonide 40 mg/mL  Medications (Left):  80 mg triamcinolone acetonide 40 mg/mL  Patient tolerance:  Patient tolerated the procedure well with no immediate complications

## 2024-09-18 NOTE — PROGRESS NOTES
Here for follow up of bilateral knee OA  8 weeks of relief with last set of injections- she would like to repeat today  Not interested in TKA  Did not get good relief with visco  Repeated injection with increased dose of kenalog - see procedure note for details  RTC PRN

## 2024-10-29 ENCOUNTER — OFFICE VISIT (OUTPATIENT)
Dept: INTERNAL MEDICINE | Facility: CLINIC | Age: 79
End: 2024-10-29
Payer: MEDICARE

## 2024-10-29 VITALS
BODY MASS INDEX: 27.35 KG/M2 | WEIGHT: 130.31 LBS | OXYGEN SATURATION: 98 % | HEART RATE: 70 BPM | HEIGHT: 58 IN | SYSTOLIC BLOOD PRESSURE: 138 MMHG | DIASTOLIC BLOOD PRESSURE: 66 MMHG

## 2024-10-29 DIAGNOSIS — M47.816 LUMBAR SPONDYLOSIS: ICD-10-CM

## 2024-10-29 DIAGNOSIS — F32.1 CURRENT MODERATE EPISODE OF MAJOR DEPRESSIVE DISORDER WITHOUT PRIOR EPISODE: ICD-10-CM

## 2024-10-29 DIAGNOSIS — M19.049 HAND ARTHRITIS: ICD-10-CM

## 2024-10-29 DIAGNOSIS — F41.9 ANXIETY: ICD-10-CM

## 2024-10-29 DIAGNOSIS — K12.1 MOUTH ULCERS: Primary | ICD-10-CM

## 2024-10-29 DIAGNOSIS — K21.9 GASTROESOPHAGEAL REFLUX DISEASE, UNSPECIFIED WHETHER ESOPHAGITIS PRESENT: ICD-10-CM

## 2024-10-29 PROCEDURE — 99999 PR PBB SHADOW E&M-EST. PATIENT-LVL IV: CPT | Mod: PBBFAC,,, | Performed by: INTERNAL MEDICINE

## 2024-10-29 PROCEDURE — 99214 OFFICE O/P EST MOD 30 MIN: CPT | Mod: S$PBB,,, | Performed by: INTERNAL MEDICINE

## 2024-10-29 PROCEDURE — 99214 OFFICE O/P EST MOD 30 MIN: CPT | Mod: PBBFAC | Performed by: INTERNAL MEDICINE

## 2024-10-29 PROCEDURE — G2211 COMPLEX E/M VISIT ADD ON: HCPCS | Mod: S$PBB,,, | Performed by: INTERNAL MEDICINE

## 2024-10-29 RX ORDER — CHLORHEXIDINE GLUCONATE ORAL RINSE 1.2 MG/ML
15 SOLUTION DENTAL 2 TIMES DAILY
Qty: 118 ML | Refills: 1 | Status: SHIPPED | OUTPATIENT
Start: 2024-10-29

## 2024-10-29 RX ORDER — LORAZEPAM 1 MG/1
1 TABLET ORAL NIGHTLY
Qty: 30 TABLET | Refills: 5 | Status: SHIPPED | OUTPATIENT
Start: 2024-10-29

## 2024-11-13 NOTE — PROGRESS NOTES
Follow up visit    History of Present Illness:   5/1/24  Juli comes to the office for follow up evaluation of bilateral  CMC arthritis   - here for repeat injections    11/20/24  Patient returns for bilateral CMC injections.       ROS: unremarkable and no change since last visit    Physical Examination:    Vitals:    11/20/24 1042   PainSc: 0-No pain   PainLoc: Wrist          NAD  Bilateral Hand/Wrist Examination:    Observation/Inspection:  Swelling  none    Deformity  none  Discoloration  none     Scars   none    Atrophy  none    HAND/WRIST EXAMINATION:  Finkelstein's Test   Neg  WHAT Test    Neg  CMC grind    Pos  Circumduction test   Pos    Ganglion cyst L CMC     Neurovascular Exam:  Digits WWP, brisk CR < 3s throughout  NVI motor/LTS to M/R/U nerves, radial pulse 2+  Tinel's Test - Carpal Tunnel  Neg  Tinel's Test - Cubital Tunnel  Neg  Phalen's Test    Neg  Median Nerve Compression Test Neg    ROM hand/wrist/elbow full, painless     Radiographic imaging: 3 views of the bilateral hands show advanced CMC OA    Assessment/Plan:  79 y.o. female  with Bilateral CMC arthritis    We discussed the etiology of persistent pain and further treatment options.  Injection of the bilateral thumb carpometacarpal joint injection  performed, please see procedure note for more details.  Prior to the injection risks and benefits of corticosteroid injection were discussed with the patient including pain, infection, bleeding, skin color changes, swelling, steroid flare. We discussed that over time injections can result in chondral damage, acceleration of arthritis formation, damage to tendons and damage to joints.  The patient consented for the procedure.  Post-injection instructions were given to the patient in writing.   Attempted to aspirate cyst from L thumb- unsuccessful  Return for follow up visit: PRN    All questions were answered in detail. The patient  verbalized the understanding of the treatment plan and is in full  agreement with the treatment plan.

## 2024-11-20 ENCOUNTER — OFFICE VISIT (OUTPATIENT)
Dept: ORTHOPEDICS | Facility: CLINIC | Age: 79
End: 2024-11-20
Payer: MEDICARE

## 2024-11-20 DIAGNOSIS — M18.11 ARTHRITIS OF CARPOMETACARPAL (CMC) JOINT OF RIGHT THUMB: ICD-10-CM

## 2024-11-20 DIAGNOSIS — M18.12 ARTHRITIS OF CARPOMETACARPAL (CMC) JOINT OF LEFT THUMB: Primary | ICD-10-CM

## 2024-11-20 PROCEDURE — 99999PBSHW PR PBB SHADOW TECHNICAL ONLY FILED TO HB: Mod: PBBFAC,,,

## 2024-11-20 PROCEDURE — 99999 PR PBB SHADOW E&M-EST. PATIENT-LVL III: CPT | Mod: PBBFAC,,, | Performed by: ORTHOPAEDIC SURGERY

## 2024-11-20 PROCEDURE — 20600 DRAIN/INJ JOINT/BURSA W/O US: CPT | Mod: 50,PBBFAC,PN | Performed by: ORTHOPAEDIC SURGERY

## 2024-11-20 PROCEDURE — 99213 OFFICE O/P EST LOW 20 MIN: CPT | Mod: PBBFAC,PN | Performed by: ORTHOPAEDIC SURGERY

## 2024-11-20 PROCEDURE — 99499 UNLISTED E&M SERVICE: CPT | Mod: S$PBB,,, | Performed by: ORTHOPAEDIC SURGERY

## 2024-11-20 RX ORDER — TRIAMCINOLONE ACETONIDE 40 MG/ML
40 INJECTION, SUSPENSION INTRA-ARTICULAR; INTRAMUSCULAR
Status: DISCONTINUED | OUTPATIENT
Start: 2024-11-20 | End: 2024-11-20 | Stop reason: HOSPADM

## 2024-11-20 RX ADMIN — TRIAMCINOLONE ACETONIDE 40 MG: 40 INJECTION, SUSPENSION INTRA-ARTICULAR; INTRAMUSCULAR at 10:11

## 2024-11-20 NOTE — PROCEDURES
Small Joint Aspiration/Injection: L thumb CMC, R thumb CMC    Date/Time: 11/20/2024 10:30 AM    Performed by: Berta Conroy MD  Authorized by: Berta Conroy MD    Consent Done?:  Yes (Verbal)  Indications:  Arthritis  Timeout: prior to procedure the correct patient, procedure, and site was verified    Prep: patient was prepped and draped in usual sterile fashion      Local anesthesia used?: Yes    Local anesthetic:  Topical anesthetic  Location:  Thumb  Site:  L thumb CMC and R thumb CMC  Needle size:  25 G  Medications:  40 mg triamcinolone acetonide 40 mg/mL  Patient tolerance:  Patient tolerated the procedure well with no immediate complications

## 2024-11-26 ENCOUNTER — PATIENT MESSAGE (OUTPATIENT)
Dept: ADMINISTRATIVE | Facility: HOSPITAL | Age: 79
End: 2024-11-26
Payer: MEDICARE

## 2025-01-02 DIAGNOSIS — M17.0 PRIMARY OSTEOARTHRITIS OF BOTH KNEES: Primary | ICD-10-CM

## 2025-01-02 NOTE — PROGRESS NOTES
Follow up visit    History of Present Illness:   6/12/24  Juli comes to the office for follow up evaluation of bilateral knee arthritis. Here for injections to the bilateral knees      9/18/24  Here for follow up of bilateral knee OA  8 weeks of relief with last set of injections- she would like to repeat today  Not interested in TKA  Did not get good relief with visco  Repeated injection with increased dose of kenalog - see procedure note for details  RTC PRN    1/8/25  Patient returns to clinic for bilateral knee CSI.     ROS: unremarkable and no change since last visit    Physical Examination:    NAD  Left and Right knee  No change    Radiographic imaging: no new     Assessment/Plan:  79 y.o. female  with Bilateral knee OA    We discussed the etiology of persistent pain and further treatment options.  Injection of the bilateral knee  performed, please see procedure note for more details.  Prior to the injection risks and benefits of corticosteroid injection were discussed with the patient including pain, infection, bleeding, skin color changes, swelling, steroid flare. We discussed that over time injections can result in chondral damage, acceleration of arthritis formation, damage to tendons and damage to joints.  The patient consented for the procedure.  Post-injection instructions were given to the patient in writing.  Return for follow up visit: PRN    All questions were answered in detail. The patient  verbalized the understanding of the treatment plan and is in full agreement with the treatment plan.

## 2025-01-08 ENCOUNTER — OFFICE VISIT (OUTPATIENT)
Dept: ORTHOPEDICS | Facility: CLINIC | Age: 80
End: 2025-01-08
Payer: MEDICARE

## 2025-01-08 DIAGNOSIS — M17.0 PRIMARY OSTEOARTHRITIS OF BOTH KNEES: Primary | ICD-10-CM

## 2025-01-08 PROCEDURE — 99999PBSHW PR PBB SHADOW TECHNICAL ONLY FILED TO HB: Mod: PBBFAC,,,

## 2025-01-08 PROCEDURE — 99999 PR PBB SHADOW E&M-EST. PATIENT-LVL III: CPT | Mod: PBBFAC,,, | Performed by: ORTHOPAEDIC SURGERY

## 2025-01-08 PROCEDURE — 20610 DRAIN/INJ JOINT/BURSA W/O US: CPT | Mod: 50,PBBFAC,PN | Performed by: ORTHOPAEDIC SURGERY

## 2025-01-08 PROCEDURE — 99213 OFFICE O/P EST LOW 20 MIN: CPT | Mod: PBBFAC,25,PN | Performed by: ORTHOPAEDIC SURGERY

## 2025-01-08 PROCEDURE — 99499 UNLISTED E&M SERVICE: CPT | Mod: S$PBB,,, | Performed by: ORTHOPAEDIC SURGERY

## 2025-01-08 RX ADMIN — TRIAMCINOLONE ACETONIDE 80 MG: 40 INJECTION, SUSPENSION INTRA-ARTICULAR; INTRAMUSCULAR at 11:01

## 2025-01-10 RX ORDER — TRIAMCINOLONE ACETONIDE 40 MG/ML
80 INJECTION, SUSPENSION INTRA-ARTICULAR; INTRAMUSCULAR
Status: DISCONTINUED | OUTPATIENT
Start: 2025-01-08 | End: 2025-01-10 | Stop reason: HOSPADM

## 2025-01-10 NOTE — PROCEDURES
Large Joint Aspiration/Injection: bilateral knee    Date/Time: 1/8/2025 11:00 AM    Performed by: Berta Conroy MD  Authorized by: Berta Conroy MD    Consent Done?:  Yes (Verbal)  Indications:  Arthritis  Timeout: prior to procedure the correct patient, procedure, and site was verified    Prep: patient was prepped and draped in usual sterile fashion      Local anesthesia used?: Yes    Local anesthetic:  Topical anesthetic    Details:  Needle Size:  22 G  Approach:  Anteromedial  Location:  Knee  Laterality:  Bilateral  Site:  Bilateral knee  Medications (Right):  80 mg triamcinolone acetonide 40 mg/mL  Medications (Left):  80 mg triamcinolone acetonide 40 mg/mL  Patient tolerance:  Patient tolerated the procedure well with no immediate complications

## 2025-02-21 DIAGNOSIS — Z00.00 ENCOUNTER FOR MEDICARE ANNUAL WELLNESS EXAM: ICD-10-CM

## 2025-04-09 NOTE — PROGRESS NOTES
Follow up visit    History of Present Illness:   6/12/24  Juli comes to the office for follow up evaluation of bilateral knee arthritis. Here for injections to the bilateral knees      9/18/24  Here for follow up of bilateral knee OA  8 weeks of relief with last set of injections- she would like to repeat today  Not interested in TKA  Did not get good relief with visco  Repeated injection with increased dose of kenalog - see procedure note for details  RTC PRN    1/8/25  Patient returns to clinic for bilateral knee CSI.     4/30/25  Patient returns to clinic for bilateral knee CSI.    ROS: unremarkable and no change since last visit    Physical Examination:    NAD  Left and Right knee  No change    Radiographic imaging: no new     Assessment/Plan:  79 y.o. female  with Bilateral knee OA    We discussed the etiology of persistent pain and further treatment options.  Injection of the bilateral knee  performed, please see procedure note for more details.  Prior to the injection risks and benefits of corticosteroid injection were discussed with the patient including pain, infection, bleeding, skin color changes, swelling, steroid flare. We discussed that over time injections can result in chondral damage, acceleration of arthritis formation, damage to tendons and damage to joints.  The patient consented for the procedure.  Post-injection instructions were given to the patient in writing.  Return for follow up visit: PRN    All questions were answered in detail. The patient  verbalized the understanding of the treatment plan and is in full agreement with the treatment plan.

## 2025-04-22 ENCOUNTER — LAB VISIT (OUTPATIENT)
Dept: LAB | Facility: HOSPITAL | Age: 80
End: 2025-04-22
Attending: INTERNAL MEDICINE
Payer: MEDICARE

## 2025-04-22 ENCOUNTER — OFFICE VISIT (OUTPATIENT)
Dept: INTERNAL MEDICINE | Facility: CLINIC | Age: 80
End: 2025-04-22
Payer: MEDICARE

## 2025-04-22 VITALS
OXYGEN SATURATION: 97 % | WEIGHT: 134.25 LBS | BODY MASS INDEX: 28.18 KG/M2 | HEART RATE: 87 BPM | HEIGHT: 58 IN | DIASTOLIC BLOOD PRESSURE: 64 MMHG | SYSTOLIC BLOOD PRESSURE: 138 MMHG

## 2025-04-22 DIAGNOSIS — F32.1 CURRENT MODERATE EPISODE OF MAJOR DEPRESSIVE DISORDER WITHOUT PRIOR EPISODE: ICD-10-CM

## 2025-04-22 DIAGNOSIS — R73.09 ELEVATED GLUCOSE LEVEL: ICD-10-CM

## 2025-04-22 DIAGNOSIS — E78.5 DYSLIPIDEMIA: ICD-10-CM

## 2025-04-22 DIAGNOSIS — J31.0 CHRONIC RHINITIS: ICD-10-CM

## 2025-04-22 DIAGNOSIS — K58.2 MIXED IRRITABLE BOWEL SYNDROME: ICD-10-CM

## 2025-04-22 DIAGNOSIS — F41.9 ANXIETY: ICD-10-CM

## 2025-04-22 DIAGNOSIS — K58.9 IRRITABLE BOWEL SYNDROME, UNSPECIFIED TYPE: ICD-10-CM

## 2025-04-22 DIAGNOSIS — I10 ESSENTIAL HYPERTENSION: ICD-10-CM

## 2025-04-22 DIAGNOSIS — D64.9 ANEMIA, UNSPECIFIED TYPE: ICD-10-CM

## 2025-04-22 DIAGNOSIS — K21.9 GASTROESOPHAGEAL REFLUX DISEASE, UNSPECIFIED WHETHER ESOPHAGITIS PRESENT: ICD-10-CM

## 2025-04-22 DIAGNOSIS — M81.0 AGE-RELATED OSTEOPOROSIS WITHOUT CURRENT PATHOLOGICAL FRACTURE: ICD-10-CM

## 2025-04-22 DIAGNOSIS — J31.0 CHRONIC RHINITIS: Primary | ICD-10-CM

## 2025-04-22 PROBLEM — I70.0 AORTIC ATHEROSCLEROSIS: Status: RESOLVED | Noted: 2024-02-23 | Resolved: 2025-04-22

## 2025-04-22 LAB
25(OH)D3+25(OH)D2 SERPL-MCNC: 122 NG/ML (ref 30–96)
ABSOLUTE EOSINOPHIL (OHS): 0.31 K/UL
ABSOLUTE MONOCYTE (OHS): 0.79 K/UL (ref 0.3–1)
ABSOLUTE NEUTROPHIL COUNT (OHS): 4.45 K/UL (ref 1.8–7.7)
ALBUMIN SERPL BCP-MCNC: 3.2 G/DL (ref 3.5–5.2)
ALP SERPL-CCNC: 73 UNIT/L (ref 40–150)
ALT SERPL W/O P-5'-P-CCNC: 8 UNIT/L (ref 10–44)
ANION GAP (OHS): 9 MMOL/L (ref 8–16)
AST SERPL-CCNC: 17 UNIT/L (ref 11–45)
BASOPHILS # BLD AUTO: 0.06 K/UL
BASOPHILS NFR BLD AUTO: 0.9 %
BILIRUB SERPL-MCNC: 0.5 MG/DL (ref 0.1–1)
BUN SERPL-MCNC: 11 MG/DL (ref 8–23)
CALCIUM SERPL-MCNC: 8.8 MG/DL (ref 8.7–10.5)
CHLORIDE SERPL-SCNC: 102 MMOL/L (ref 95–110)
CHOLEST SERPL-MCNC: 182 MG/DL (ref 120–199)
CHOLEST/HDLC SERPL: 3.5 {RATIO} (ref 2–5)
CO2 SERPL-SCNC: 27 MMOL/L (ref 23–29)
CREAT SERPL-MCNC: 0.6 MG/DL (ref 0.5–1.4)
EAG (OHS): 131 MG/DL (ref 68–131)
ERYTHROCYTE [DISTWIDTH] IN BLOOD BY AUTOMATED COUNT: 13.2 % (ref 11.5–14.5)
GFR SERPLBLD CREATININE-BSD FMLA CKD-EPI: >60 ML/MIN/1.73/M2
GLUCOSE SERPL-MCNC: 65 MG/DL (ref 70–110)
HBA1C MFR BLD: 6.2 % (ref 4–5.6)
HCT VFR BLD AUTO: 39.1 % (ref 37–48.5)
HDLC SERPL-MCNC: 52 MG/DL (ref 40–75)
HDLC SERPL: 28.6 % (ref 20–50)
HGB BLD-MCNC: 12.5 GM/DL (ref 12–16)
IMM GRANULOCYTES # BLD AUTO: 0.06 K/UL (ref 0–0.04)
IMM GRANULOCYTES NFR BLD AUTO: 0.9 % (ref 0–0.5)
LDLC SERPL CALC-MCNC: 104.8 MG/DL (ref 63–159)
LYMPHOCYTES # BLD AUTO: 0.89 K/UL (ref 1–4.8)
MCH RBC QN AUTO: 29.8 PG (ref 27–31)
MCHC RBC AUTO-ENTMCNC: 32 G/DL (ref 32–36)
MCV RBC AUTO: 93 FL (ref 82–98)
NONHDLC SERPL-MCNC: 130 MG/DL
NUCLEATED RBC (/100WBC) (OHS): 0 /100 WBC
PLATELET # BLD AUTO: 277 K/UL (ref 150–450)
PMV BLD AUTO: 8.9 FL (ref 9.2–12.9)
POTASSIUM SERPL-SCNC: 3.6 MMOL/L (ref 3.5–5.1)
PROT SERPL-MCNC: 7 GM/DL (ref 6–8.4)
RBC # BLD AUTO: 4.2 M/UL (ref 4–5.4)
RELATIVE EOSINOPHIL (OHS): 4.7 %
RELATIVE LYMPHOCYTE (OHS): 13.6 % (ref 18–48)
RELATIVE MONOCYTE (OHS): 12 % (ref 4–15)
RELATIVE NEUTROPHIL (OHS): 67.9 % (ref 38–73)
SODIUM SERPL-SCNC: 138 MMOL/L (ref 136–145)
TRIGL SERPL-MCNC: 126 MG/DL (ref 30–150)
TSH SERPL-ACNC: 1.47 UIU/ML (ref 0.4–4)
WBC # BLD AUTO: 6.56 K/UL (ref 3.9–12.7)

## 2025-04-22 PROCEDURE — 99214 OFFICE O/P EST MOD 30 MIN: CPT | Mod: S$PBB,,, | Performed by: INTERNAL MEDICINE

## 2025-04-22 PROCEDURE — 85025 COMPLETE CBC W/AUTO DIFF WBC: CPT

## 2025-04-22 PROCEDURE — 82306 VITAMIN D 25 HYDROXY: CPT

## 2025-04-22 PROCEDURE — 99999 PR PBB SHADOW E&M-EST. PATIENT-LVL IV: CPT | Mod: PBBFAC,,, | Performed by: INTERNAL MEDICINE

## 2025-04-22 PROCEDURE — 84075 ASSAY ALKALINE PHOSPHATASE: CPT

## 2025-04-22 PROCEDURE — 83036 HEMOGLOBIN GLYCOSYLATED A1C: CPT

## 2025-04-22 PROCEDURE — 36415 COLL VENOUS BLD VENIPUNCTURE: CPT

## 2025-04-22 PROCEDURE — 84443 ASSAY THYROID STIM HORMONE: CPT

## 2025-04-22 PROCEDURE — 80061 LIPID PANEL: CPT

## 2025-04-22 PROCEDURE — 99214 OFFICE O/P EST MOD 30 MIN: CPT | Mod: PBBFAC | Performed by: INTERNAL MEDICINE

## 2025-04-22 PROCEDURE — G2211 COMPLEX E/M VISIT ADD ON: HCPCS | Mod: S$PBB,,, | Performed by: INTERNAL MEDICINE

## 2025-04-22 RX ORDER — LORAZEPAM 1 MG/1
1 TABLET ORAL NIGHTLY
Qty: 30 TABLET | Refills: 5 | Status: SHIPPED | OUTPATIENT
Start: 2025-04-22

## 2025-04-22 RX ORDER — FLUTICASONE PROPIONATE 50 MCG
1 SPRAY, SUSPENSION (ML) NASAL DAILY
Qty: 32 G | Refills: 6 | Status: SHIPPED | OUTPATIENT
Start: 2025-04-22 | End: 2025-04-22 | Stop reason: SDUPTHER

## 2025-04-22 RX ORDER — FLUTICASONE PROPIONATE 50 MCG
1 SPRAY, SUSPENSION (ML) NASAL DAILY
Qty: 32 G | Refills: 6 | Status: SHIPPED | OUTPATIENT
Start: 2025-04-22

## 2025-04-22 NOTE — PROGRESS NOTES
Follow-up  Associated symptoms include arthralgias. Pertinent negatives include no abdominal pain, chest pain, chills, coughing, fever, headaches, neck pain, rash or sore throat.     History of Present Illness    CHIEF COMPLAINT:  Juli presents for a routine follow-up visit and to discuss medication refills.    HPI:  Patient here for interval follow-up to review labs, medical conditions and prescriptions.   reviewed.  She wanted a refill of her anxiety medication.  She states she still would prefer to be living in [place] but for family reasons she is remaining here but tries to go visit periodically.  Trying to stay active.  Uses a cane for stability.    Would like to update some blood work.  Juli, an 79-year-old individual turning 80, has come for follow-up. She inquires about a new nasal spray reportedly developed for dementia treatment, mentioning a family history of dementia. She reports having a broken tooth that needs attention but expresses concerns about dental work due to her ongoing Reclast treatment for osteoporosis. Her last Reclast treatment was about 10 months ago, with the next one due in approximately June. Her son reportedly has worse osteoporosis than she does.     MEDICATIONS:  uJli is on Flonase and Reclast. Reclast is administered approximately every 12 months for osteoporosis.    MEDICAL HISTORY:  Juli has a history of osteoporosis.    FAMILY HISTORY:  Family history is significant for mother who developed dementia in her late 80s, almost 90 years old. Her mother's two sisters lived into their mid-90s without dementia.             Review of Systems   Constitutional:  Negative for appetite change, chills and fever.   HENT:  Negative for nosebleeds and sore throat.    Eyes:  Negative for pain and visual disturbance.   Respiratory:  Negative for cough, shortness of breath and wheezing.    Cardiovascular:  Negative for chest pain and leg swelling.   Gastrointestinal:  Negative for  abdominal pain, constipation and diarrhea.   Endocrine: Negative for polyuria.   Genitourinary:  Negative for difficulty urinating, hematuria and vaginal bleeding.   Musculoskeletal:  Positive for arthralgias. Negative for back pain, gait problem and neck pain.   Integumentary:  Negative for pallor and rash.   Neurological:  Negative for tremors, seizures and headaches.   Hematological:  Does not bruise/bleed easily.   Psychiatric/Behavioral:  Positive for sleep disturbance. Negative for dysphoric mood. The patient is nervous/anxious.        Past Medical History:   Diagnosis Date    Anxiety     GERD (gastroesophageal reflux disease)     HTN (hypertension)     IBS (irritable bowel syndrome)     Kidney stones     OAB (overactive bladder)     Vertigo      Past Surgical History:   Procedure Laterality Date    CATARACT EXTRACTION, BILATERAL      CHOLECYSTECTOMY      HYSTERECTOMY      LITHOTRIPSY        Problem List[1]       Objective:      Physical Exam            Physical Exam  Constitutional:       General: She is not in acute distress.     Appearance: She is well-developed.   HENT:      Head: Normocephalic and atraumatic.      Right Ear: Tympanic membrane, ear canal and external ear normal.      Left Ear: Tympanic membrane, ear canal and external ear normal.      Mouth/Throat:      Pharynx: No oropharyngeal exudate or posterior oropharyngeal erythema.   Eyes:      General: No scleral icterus.     Conjunctiva/sclera: Conjunctivae normal.      Pupils: Pupils are equal, round, and reactive to light.   Neck:      Thyroid: No thyromegaly.   Cardiovascular:      Rate and Rhythm: Normal rate and regular rhythm.      Pulses: Normal pulses.      Heart sounds: No murmur heard.  Pulmonary:      Effort: Pulmonary effort is normal.      Breath sounds: Normal breath sounds. No wheezing.   Abdominal:      General: Bowel sounds are normal. There is no distension.      Palpations: Abdomen is soft.      Tenderness: There is no  abdominal tenderness.   Musculoskeletal:         General: No tenderness.      Cervical back: Normal range of motion and neck supple.      Right lower leg: No edema.      Left lower leg: No edema.   Lymphadenopathy:      Cervical: No cervical adenopathy.   Skin:     Coloration: Skin is not jaundiced.      Findings: No rash.   Neurological:      General: No focal deficit present.      Mental Status: She is alert and oriented to person, place, and time.   Psychiatric:         Mood and Affect: Mood normal.         Behavior: Behavior normal.           Assessment:       Problem List Items Addressed This Visit          Psychiatric    Anxiety    Relevant Medications    LORazepam (ATIVAN) 1 MG tablet    Other Relevant Orders    Lipid Panel    Comprehensive Metabolic Panel    Hemoglobin A1C    TSH    CBC Auto Differential    Current moderate episode of major depressive disorder without prior episode       Cardiac/Vascular    Essential hypertension    Relevant Orders    Lipid Panel    Comprehensive Metabolic Panel       Endocrine    Age-related osteoporosis without current pathological fracture    Relevant Orders    Lipid Panel    Comprehensive Metabolic Panel    Vitamin D       GI    Mixed irritable bowel syndrome    Relevant Orders    Lipid Panel    Comprehensive Metabolic Panel    Gastroesophageal reflux disease    Relevant Orders    Lipid Panel    Comprehensive Metabolic Panel     Other Visit Diagnoses         Chronic rhinitis    -  Primary    Relevant Medications    fluticasone propionate (FLONASE) 50 mcg/actuation nasal spray    Other Relevant Orders    Lipid Panel    Comprehensive Metabolic Panel    Hemoglobin A1C    TSH    CBC Auto Differential      Anemia, unspecified type        Relevant Orders    Lipid Panel    Comprehensive Metabolic Panel    Hemoglobin A1C    TSH    CBC Auto Differential      Irritable bowel syndrome, unspecified type        Relevant Orders    Lipid Panel    Comprehensive Metabolic Panel     "Hemoglobin A1C    TSH    CBC Auto Differential      Dyslipidemia        Relevant Orders    Lipid Panel    Comprehensive Metabolic Panel      Elevated glucose level        Relevant Orders    Hemoglobin A1C            Plan:       Juli Douglas" was seen today for follow-up.    Diagnoses and all orders for this visit:    Chronic rhinitis  -     fluticasone propionate (FLONASE) 50 mcg/actuation nasal spray; 1 spray (50 mcg total) by Each Nostril route Daily.  -     Lipid Panel; Future  -     Comprehensive Metabolic Panel; Future  -     Hemoglobin A1C; Future  -     TSH; Future  -     CBC Auto Differential; Future    Anxiety  -     LORazepam (ATIVAN) 1 MG tablet; Take 1 tablet (1 mg total) by mouth every evening.  -     Lipid Panel; Future  -     Comprehensive Metabolic Panel; Future  -     Hemoglobin A1C; Future  -     TSH; Future  -     CBC Auto Differential; Future    Anemia, unspecified type  -     Lipid Panel; Future  -     Comprehensive Metabolic Panel; Future  -     Hemoglobin A1C; Future  -     TSH; Future  -     CBC Auto Differential; Future    Irritable bowel syndrome, unspecified type  -     Lipid Panel; Future  -     Comprehensive Metabolic Panel; Future  -     Hemoglobin A1C; Future  -     TSH; Future  -     CBC Auto Differential; Future    Essential hypertension  -     Lipid Panel; Future  -     Comprehensive Metabolic Panel; Future    Age-related osteoporosis without current pathological fracture  -     Lipid Panel; Future  -     Comprehensive Metabolic Panel; Future  -     Vitamin D; Future    Mixed irritable bowel syndrome  -     Lipid Panel; Future  -     Comprehensive Metabolic Panel; Future    Gastroesophageal reflux disease, unspecified whether esophagitis present  -     Lipid Panel; Future  -     Comprehensive Metabolic Panel; Future    Dyslipidemia  -     Lipid Panel; Future  -     Comprehensive Metabolic Panel; Future    Elevated glucose level  -     Hemoglobin A1C; Future    Current moderate " "episode of major depressive disorder without prior episode  Comments:  Continue cymbalta treatment. Clinically stable currently.    Other orders  -     Discontinue: fluticasone propionate (FLONASE) 50 mcg/actuation nasal spray; 1 spray (50 mcg total) by Each Nostril route Daily.         Continue meds.  Call with any other changes or problems.  Review labs.        Portions of this note may have been created with [employer] voice recognition software. Occasional "wrong-word" or "sound-a-like" substitutions may have occurred due to the inherent limitations of voice recognition software. Please, read the note carefully and recognize, using context, where substitutions have occurred.       [1]   Patient Active Problem List  Diagnosis    Mixed irritable bowel syndrome    Primary generalized (osteo)arthritis    Essential hypertension    Impaired fasting glucose    Gastroesophageal reflux disease    Bilateral sensorineural hearing loss    Anxiety    Arthritis of right knee    Chest congestion    Depression    Coccydynia    Lumbar spondylosis    DDD (degenerative disc disease), lumbar    Current moderate episode of major depressive disorder without prior episode    Closed fracture of ramus of left pubis    Closed fracture of right inferior pubic ramus    Closed fracture of sacrum    Closed fracture of transverse process of lumbar vertebra    Hypokalemia    Slow transit constipation    Discharge planning issues    Debility    Age-related osteoporosis without current pathological fracture    Hand arthritis     "

## 2025-04-27 ENCOUNTER — PATIENT MESSAGE (OUTPATIENT)
Dept: INTERNAL MEDICINE | Facility: CLINIC | Age: 80
End: 2025-04-27
Payer: MEDICARE

## 2025-04-28 NOTE — TELEPHONE ENCOUNTER
Pt states she will be cutting her sweets intake. Wondering how often she should take her 125mcg (5000IU) vitamin d pill.

## 2025-04-30 ENCOUNTER — OFFICE VISIT (OUTPATIENT)
Dept: ORTHOPEDICS | Facility: CLINIC | Age: 80
End: 2025-04-30
Payer: MEDICARE

## 2025-04-30 DIAGNOSIS — M17.0 PRIMARY OSTEOARTHRITIS OF BOTH KNEES: Primary | ICD-10-CM

## 2025-04-30 PROCEDURE — 99999PBSHW PR PBB SHADOW TECHNICAL ONLY FILED TO HB: Mod: PBBFAC,,,

## 2025-04-30 PROCEDURE — 99213 OFFICE O/P EST LOW 20 MIN: CPT | Mod: PBBFAC,PN | Performed by: ORTHOPAEDIC SURGERY

## 2025-04-30 PROCEDURE — 20610 DRAIN/INJ JOINT/BURSA W/O US: CPT | Mod: 50,PBBFAC,PN | Performed by: ORTHOPAEDIC SURGERY

## 2025-04-30 PROCEDURE — 99999 PR PBB SHADOW E&M-EST. PATIENT-LVL III: CPT | Mod: PBBFAC,,, | Performed by: ORTHOPAEDIC SURGERY

## 2025-04-30 PROCEDURE — 99499 UNLISTED E&M SERVICE: CPT | Mod: S$PBB,,, | Performed by: ORTHOPAEDIC SURGERY

## 2025-04-30 RX ORDER — TRIAMCINOLONE ACETONIDE 40 MG/ML
80 INJECTION, SUSPENSION INTRA-ARTICULAR; INTRAMUSCULAR
Status: DISCONTINUED | OUTPATIENT
Start: 2025-04-30 | End: 2025-04-30 | Stop reason: HOSPADM

## 2025-04-30 RX ADMIN — TRIAMCINOLONE ACETONIDE 80 MG: 40 INJECTION, SUSPENSION INTRA-ARTICULAR; INTRAMUSCULAR at 11:04

## 2025-04-30 NOTE — PROCEDURES
Large Joint Aspiration/Injection: bilateral knee    Date/Time: 4/30/2025 11:00 AM    Performed by: Berta Conroy MD  Authorized by: Berta Conroy MD    Consent Done?:  Yes (Verbal)  Indications:  Arthritis  Timeout: prior to procedure the correct patient, procedure, and site was verified    Prep: patient was prepped and draped in usual sterile fashion      Local anesthesia used?: Yes    Local anesthetic:  Topical anesthetic    Details:  Needle Size:  22 G  Approach:  Anteromedial  Location:  Knee  Laterality:  Bilateral  Site:  Bilateral knee  Medications (Right):  80 mg triamcinolone acetonide 40 mg/mL  Medications (Left):  80 mg triamcinolone acetonide 40 mg/mL  Patient tolerance:  Patient tolerated the procedure well with no immediate complications

## 2025-05-28 DIAGNOSIS — N32.81 OAB (OVERACTIVE BLADDER): ICD-10-CM

## 2025-05-28 RX ORDER — OXYBUTYNIN CHLORIDE 15 MG/1
15 TABLET, EXTENDED RELEASE ORAL
Qty: 90 TABLET | Refills: 3 | Status: SHIPPED | OUTPATIENT
Start: 2025-05-28

## 2025-05-28 NOTE — TELEPHONE ENCOUNTER
No care due was identified.  [facility] Embedded Care Due Messages. Reference number: [id number].   5/28/2025 11:04:15 AM CDT

## 2025-05-28 NOTE — TELEPHONE ENCOUNTER
Refill Routing Note   Medication(s) are not appropriate for processing by [facility] for the following reason(s):        Drug-drug interaction    [facility] action(s):  Defer        Medication Therapy Plan: dicyclomine, oxybutynin    Pharmacist review requested: Yes     Appointments  past 12m or future 3m with PCP    Date Provider   Last Visit   4/22/2025 Jefe Serrano MD   Next Visit   10/22/2025 Jefe Serrano MD   ED visits in past 90 days: 0        Note composed:11:56 AM 05/28/2025

## 2025-05-28 NOTE — TELEPHONE ENCOUNTER
Refill Decision Note   Juli Parra  is requesting a refill authorization.  Brief Assessment and Rationale for Refill:  Approve     Medication Therapy Plan:        Pharmacist review requested: Yes   Comments:     Note composed:12:52 PM 05/28/2025

## 2025-07-01 ENCOUNTER — TELEPHONE (OUTPATIENT)
Dept: ENDOCRINOLOGY | Facility: CLINIC | Age: 80
End: 2025-07-01
Payer: MEDICARE

## 2025-07-01 ENCOUNTER — LAB VISIT (OUTPATIENT)
Dept: LAB | Facility: HOSPITAL | Age: 80
End: 2025-07-01
Payer: MEDICARE

## 2025-07-01 DIAGNOSIS — M81.0 OSTEOPOROSIS, UNSPECIFIED OSTEOPOROSIS TYPE, UNSPECIFIED PATHOLOGICAL FRACTURE PRESENCE: ICD-10-CM

## 2025-07-01 DIAGNOSIS — M81.0 OSTEOPOROSIS, UNSPECIFIED OSTEOPOROSIS TYPE, UNSPECIFIED PATHOLOGICAL FRACTURE PRESENCE: Primary | ICD-10-CM

## 2025-07-01 LAB
ALBUMIN SERPL BCP-MCNC: 3.3 G/DL (ref 3.5–5.2)
ANION GAP (OHS): 7 MMOL/L (ref 8–16)
BUN SERPL-MCNC: 11 MG/DL (ref 8–23)
CALCIUM SERPL-MCNC: 8.7 MG/DL (ref 8.7–10.5)
CHLORIDE SERPL-SCNC: 103 MMOL/L (ref 95–110)
CO2 SERPL-SCNC: 25 MMOL/L (ref 23–29)
CREAT SERPL-MCNC: 0.7 MG/DL (ref 0.5–1.4)
GFR SERPLBLD CREATININE-BSD FMLA CKD-EPI: >60 ML/MIN/1.73/M2
GLUCOSE SERPL-MCNC: 91 MG/DL (ref 70–110)
PHOSPHATE SERPL-MCNC: 2.7 MG/DL (ref 2.7–4.5)
POTASSIUM SERPL-SCNC: 3.5 MMOL/L (ref 3.5–5.1)
SODIUM SERPL-SCNC: 135 MMOL/L (ref 136–145)

## 2025-07-01 PROCEDURE — 36415 COLL VENOUS BLD VENIPUNCTURE: CPT | Mod: PO

## 2025-07-01 PROCEDURE — 80069 RENAL FUNCTION PANEL: CPT

## 2025-07-09 ENCOUNTER — INFUSION (OUTPATIENT)
Dept: INFUSION THERAPY | Facility: HOSPITAL | Age: 80
End: 2025-07-09
Attending: HOSPITALIST
Payer: MEDICARE

## 2025-07-09 VITALS
SYSTOLIC BLOOD PRESSURE: 153 MMHG | OXYGEN SATURATION: 95 % | RESPIRATION RATE: 18 BRPM | DIASTOLIC BLOOD PRESSURE: 69 MMHG | TEMPERATURE: 98 F | HEART RATE: 60 BPM

## 2025-07-09 DIAGNOSIS — M81.0 AGE-RELATED OSTEOPOROSIS WITHOUT CURRENT PATHOLOGICAL FRACTURE: Primary | ICD-10-CM

## 2025-07-09 PROCEDURE — 96374 THER/PROPH/DIAG INJ IV PUSH: CPT

## 2025-07-09 PROCEDURE — 63600175 PHARM REV CODE 636 W HCPCS: Performed by: HOSPITALIST

## 2025-07-09 RX ORDER — SODIUM CHLORIDE 0.9 % (FLUSH) 0.9 %
10 SYRINGE (ML) INJECTION
OUTPATIENT
Start: 2025-07-09

## 2025-07-09 RX ORDER — ZOLEDRONIC ACID 5 MG/100ML
5 INJECTION, SOLUTION INTRAVENOUS
Status: COMPLETED | OUTPATIENT
Start: 2025-07-09 | End: 2025-07-09

## 2025-07-09 RX ORDER — HEPARIN 100 UNIT/ML
500 SYRINGE INTRAVENOUS
OUTPATIENT
Start: 2025-07-09

## 2025-07-09 RX ORDER — SODIUM CHLORIDE 0.9 % (FLUSH) 0.9 %
10 SYRINGE (ML) INJECTION
Status: CANCELLED | OUTPATIENT
Start: 2025-07-09

## 2025-07-09 RX ORDER — HEPARIN 100 UNIT/ML
500 SYRINGE INTRAVENOUS
Status: CANCELLED | OUTPATIENT
Start: 2025-07-09

## 2025-07-09 RX ORDER — ZOLEDRONIC ACID 5 MG/100ML
5 INJECTION, SOLUTION INTRAVENOUS
OUTPATIENT
Start: 2025-07-09

## 2025-07-09 RX ADMIN — ZOLEDRONIC ACID 5 MG: 0.05 INJECTION, SOLUTION INTRAVENOUS at 10:07

## 2025-07-09 NOTE — PLAN OF CARE
Patient presented to unit for Reclast infusion. VSS. No complaints voiced. Reclast infused over 15 minutes and tolerated without difficulty. Patient in NAD, escorted by MA via wheelchair to cafeteria at time of discharge.    Problem: Fall Injury Risk  Goal: Absence of Fall and Fall-Related Injury  Outcome: Progressing

## 2025-07-17 DIAGNOSIS — F41.9 ANXIETY: ICD-10-CM

## 2025-07-17 DIAGNOSIS — I10 ESSENTIAL HYPERTENSION: ICD-10-CM

## 2025-07-17 DIAGNOSIS — F32.1 CURRENT MODERATE EPISODE OF MAJOR DEPRESSIVE DISORDER WITHOUT PRIOR EPISODE: ICD-10-CM

## 2025-07-17 DIAGNOSIS — K58.9 IRRITABLE BOWEL SYNDROME, UNSPECIFIED TYPE: ICD-10-CM

## 2025-07-17 RX ORDER — POTASSIUM CHLORIDE 750 MG/1
10 TABLET, EXTENDED RELEASE ORAL
Qty: 90 TABLET | Refills: 3 | Status: SHIPPED | OUTPATIENT
Start: 2025-07-17

## 2025-07-17 NOTE — TELEPHONE ENCOUNTER
No care due was identified.  [facility] Embedded Care Due Messages. Reference number: [id number].   7/17/2025 12:45:05 PM CDT

## 2025-07-17 NOTE — TELEPHONE ENCOUNTER
Refill Routing Note   Medication(s) are not appropriate for processing by [facility] for the following reason(s):        Required vitals abnormal  Outside of protocol    [facility] action(s):  Defer  Approve  Route             Appointments  past 12m or future 3m with PCP    Date Provider   Last Visit   4/22/2025 Jefe Serrano MD   Next Visit   10/22/2025 Jefe Serrano MD   ED visits in past 90 days: 0        Note composed:2:42 PM 07/17/2025

## 2025-07-21 RX ORDER — DICYCLOMINE HYDROCHLORIDE 10 MG/1
CAPSULE ORAL
Qty: 90 CAPSULE | Refills: 6 | Status: SHIPPED | OUTPATIENT
Start: 2025-07-21

## 2025-07-21 RX ORDER — METOPROLOL SUCCINATE 50 MG/1
50 TABLET, EXTENDED RELEASE ORAL
Qty: 90 TABLET | Refills: 3 | Status: SHIPPED | OUTPATIENT
Start: 2025-07-21

## 2025-07-21 RX ORDER — DULOXETIN HYDROCHLORIDE 20 MG/1
20 CAPSULE, DELAYED RELEASE ORAL
Qty: 90 CAPSULE | Refills: 3 | Status: SHIPPED | OUTPATIENT
Start: 2025-07-21

## 2025-07-21 RX ORDER — HYDROCHLOROTHIAZIDE 25 MG/1
25 TABLET ORAL
Qty: 90 TABLET | Refills: 3 | Status: SHIPPED | OUTPATIENT
Start: 2025-07-21

## 2025-07-22 NOTE — PROGRESS NOTES
Follow up visit    History of Present Illness:   6/12/24  Juli comes to the office for follow up evaluation of bilateral knee arthritis. Here for injections to the bilateral knees      9/18/24  Here for follow up of bilateral knee OA  8 weeks of relief with last set of injections- she would like to repeat today  Not interested in TKA  Did not get good relief with visco  Repeated injection with increased dose of kenalog - see procedure note for details  RTC PRN    1/8/25  Patient returns to clinic for bilateral knee CSI.     4/30/25  Patient returns to clinic for bilateral knee CSI.    7/30/25  Patient returns to clinic for bilateral knee CSI.    ROS: unremarkable and no change since last visit    Physical Examination:    NAD  Left and Right knee  No change    Radiographic imaging: no new     Assessment/Plan:  79 y.o. female  with Bilateral knee OA    We discussed the etiology of persistent pain and further treatment options.  Injection of the bilateral knee  performed, please see procedure note for more details.  Prior to the injection risks and benefits of corticosteroid injection were discussed with the patient including pain, infection, bleeding, skin color changes, swelling, steroid flare. We discussed that over time injections can result in chondral damage, acceleration of arthritis formation, damage to tendons and damage to joints.  The patient consented for the procedure.  Post-injection instructions were given to the patient in writing.  Return for follow up visit: PRN    All questions were answered in detail. The patient  verbalized the understanding of the treatment plan and is in full agreement with the treatment plan.

## 2025-07-28 DIAGNOSIS — I10 ESSENTIAL HYPERTENSION: ICD-10-CM

## 2025-07-28 NOTE — TELEPHONE ENCOUNTER
No care due was identified.  [facility] Embedded Care Due Messages. Reference number: [id number].   7/28/2025 10:07:28 AM CDT

## 2025-07-29 RX ORDER — LOSARTAN POTASSIUM 100 MG/1
100 TABLET ORAL
Qty: 90 TABLET | Refills: 2 | Status: SHIPPED | OUTPATIENT
Start: 2025-07-29

## 2025-07-30 ENCOUNTER — OFFICE VISIT (OUTPATIENT)
Dept: ORTHOPEDICS | Facility: CLINIC | Age: 80
End: 2025-07-30
Payer: MEDICARE

## 2025-07-30 DIAGNOSIS — M17.0 PRIMARY OSTEOARTHRITIS OF BOTH KNEES: Primary | ICD-10-CM

## 2025-07-30 DIAGNOSIS — M79.641 BILATERAL HAND PAIN: Primary | ICD-10-CM

## 2025-07-30 DIAGNOSIS — M79.642 BILATERAL HAND PAIN: Primary | ICD-10-CM

## 2025-07-30 PROCEDURE — 99213 OFFICE O/P EST LOW 20 MIN: CPT | Mod: PBBFAC,PN | Performed by: ORTHOPAEDIC SURGERY

## 2025-07-30 PROCEDURE — 99499 UNLISTED E&M SERVICE: CPT | Mod: S$PBB,,, | Performed by: ORTHOPAEDIC SURGERY

## 2025-07-30 PROCEDURE — 20610 DRAIN/INJ JOINT/BURSA W/O US: CPT | Mod: 50,PBBFAC,PN | Performed by: ORTHOPAEDIC SURGERY

## 2025-07-30 PROCEDURE — 99999PBSHW PR PBB SHADOW TECHNICAL ONLY FILED TO HB: Mod: PBBFAC,,,

## 2025-07-30 PROCEDURE — 99999 PR PBB SHADOW E&M-EST. PATIENT-LVL III: CPT | Mod: PBBFAC,,, | Performed by: ORTHOPAEDIC SURGERY

## 2025-07-30 RX ORDER — TRIAMCINOLONE ACETONIDE 40 MG/ML
80 INJECTION, SUSPENSION INTRA-ARTICULAR; INTRAMUSCULAR
Status: DISCONTINUED | OUTPATIENT
Start: 2025-07-30 | End: 2025-07-30 | Stop reason: HOSPADM

## 2025-07-30 RX ADMIN — TRIAMCINOLONE ACETONIDE 80 MG: 40 INJECTION, SUSPENSION INTRA-ARTICULAR; INTRAMUSCULAR at 11:07

## 2025-07-30 NOTE — PROCEDURES
Large Joint Aspiration/Injection: bilateral knee    Date/Time: 7/30/2025 11:00 AM    Performed by: Berta Conroy MD  Authorized by: Berta Conroy MD    Timeout: prior to procedure the correct patient, procedure, and site was verified    Prep: patient was prepped and draped in usual sterile fashion      Local anesthesia used?: Yes    Local anesthetic:  Topical anesthetic    Details:  Needle Size:  22 G  Approach:  Anteromedial  Location:  Knee  Laterality:  Bilateral  Site:  Bilateral knee  Medications (Right):  80 mg triamcinolone acetonide 40 mg/mL  Medications (Left):  80 mg triamcinolone acetonide 40 mg/mL  Patient tolerance:  Patient tolerated the procedure well with no immediate complications

## 2025-08-12 ENCOUNTER — TELEPHONE (OUTPATIENT)
Dept: INTERNAL MEDICINE | Facility: CLINIC | Age: 80
End: 2025-08-12

## 2025-08-12 ENCOUNTER — OFFICE VISIT (OUTPATIENT)
Dept: INTERNAL MEDICINE | Facility: CLINIC | Age: 80
End: 2025-08-12
Payer: MEDICARE

## 2025-08-12 VITALS
HEART RATE: 67 BPM | OXYGEN SATURATION: 98 % | BODY MASS INDEX: 27.3 KG/M2 | SYSTOLIC BLOOD PRESSURE: 138 MMHG | HEIGHT: 58 IN | DIASTOLIC BLOOD PRESSURE: 60 MMHG | WEIGHT: 130.06 LBS

## 2025-08-12 DIAGNOSIS — B37.2 CANDIDAL INTERTRIGO: Primary | ICD-10-CM

## 2025-08-12 PROCEDURE — 99999 PR PBB SHADOW E&M-EST. PATIENT-LVL IV: CPT | Mod: PBBFAC,,, | Performed by: PHYSICIAN ASSISTANT

## 2025-08-12 PROCEDURE — 99214 OFFICE O/P EST MOD 30 MIN: CPT | Mod: PBBFAC | Performed by: PHYSICIAN ASSISTANT

## 2025-08-12 PROCEDURE — 99213 OFFICE O/P EST LOW 20 MIN: CPT | Mod: S$PBB,,, | Performed by: PHYSICIAN ASSISTANT

## 2025-08-12 RX ORDER — NYSTATIN 100000 [USP'U]/G
POWDER TOPICAL 4 TIMES DAILY
Qty: 60 G | Refills: 1 | Status: SHIPPED | OUTPATIENT
Start: 2025-08-12

## 2025-08-12 RX ORDER — KETOCONAZOLE 20 MG/G
CREAM TOPICAL 2 TIMES DAILY
Qty: 60 G | Refills: 0 | Status: SHIPPED | OUTPATIENT
Start: 2025-08-12 | End: 2025-08-13 | Stop reason: ALTCHOICE

## 2025-08-13 RX ORDER — CLOTRIMAZOLE 1 %
CREAM (GRAM) TOPICAL 2 TIMES DAILY
Qty: 45 G | Refills: 0 | Status: SHIPPED | OUTPATIENT
Start: 2025-08-13

## 2025-08-20 ENCOUNTER — APPOINTMENT (OUTPATIENT)
Dept: RADIOLOGY | Facility: HOSPITAL | Age: 80
End: 2025-08-20
Attending: ORTHOPAEDIC SURGERY
Payer: MEDICARE

## 2025-08-20 ENCOUNTER — OFFICE VISIT (OUTPATIENT)
Dept: ORTHOPEDICS | Facility: CLINIC | Age: 80
End: 2025-08-20
Payer: MEDICARE

## 2025-08-20 DIAGNOSIS — M18.12 ARTHRITIS OF CARPOMETACARPAL (CMC) JOINT OF LEFT THUMB: Primary | ICD-10-CM

## 2025-08-20 DIAGNOSIS — M18.11 ARTHRITIS OF CARPOMETACARPAL (CMC) JOINT OF RIGHT THUMB: ICD-10-CM

## 2025-08-20 DIAGNOSIS — M79.641 BILATERAL HAND PAIN: ICD-10-CM

## 2025-08-20 DIAGNOSIS — M79.642 BILATERAL HAND PAIN: ICD-10-CM

## 2025-08-20 PROCEDURE — 20600 DRAIN/INJ JOINT/BURSA W/O US: CPT | Mod: 50,PBBFAC,PN | Performed by: ORTHOPAEDIC SURGERY

## 2025-08-20 PROCEDURE — 99999 PR PBB SHADOW E&M-EST. PATIENT-LVL III: CPT | Mod: PBBFAC,,, | Performed by: ORTHOPAEDIC SURGERY

## 2025-08-20 PROCEDURE — 99999PBSHW PR PBB SHADOW TECHNICAL ONLY FILED TO HB: Mod: PBBFAC,,,

## 2025-08-20 PROCEDURE — 73130 X-RAY EXAM OF HAND: CPT | Mod: 26,50,, | Performed by: RADIOLOGY

## 2025-08-20 PROCEDURE — 73130 X-RAY EXAM OF HAND: CPT | Mod: TC,50,PN

## 2025-08-20 PROCEDURE — 99499 UNLISTED E&M SERVICE: CPT | Mod: S$PBB,,, | Performed by: ORTHOPAEDIC SURGERY

## 2025-08-20 PROCEDURE — 99213 OFFICE O/P EST LOW 20 MIN: CPT | Mod: PBBFAC,25,PN | Performed by: ORTHOPAEDIC SURGERY

## 2025-08-20 RX ORDER — TRIAMCINOLONE ACETONIDE 40 MG/ML
40 INJECTION, SUSPENSION INTRA-ARTICULAR; INTRAMUSCULAR
Status: DISCONTINUED | OUTPATIENT
Start: 2025-08-20 | End: 2025-08-20 | Stop reason: HOSPADM

## 2025-08-20 RX ADMIN — TRIAMCINOLONE ACETONIDE 40 MG: 40 INJECTION, SUSPENSION INTRA-ARTICULAR; INTRAMUSCULAR at 10:08
